# Patient Record
Sex: MALE | Race: WHITE | NOT HISPANIC OR LATINO
[De-identification: names, ages, dates, MRNs, and addresses within clinical notes are randomized per-mention and may not be internally consistent; named-entity substitution may affect disease eponyms.]

---

## 2017-06-16 ENCOUNTER — RESULT REVIEW (OUTPATIENT)
Age: 77
End: 2017-06-16

## 2020-04-21 ENCOUNTER — RESULT REVIEW (OUTPATIENT)
Age: 80
End: 2020-04-21

## 2020-06-12 ENCOUNTER — RESULT CHARGE (OUTPATIENT)
Age: 80
End: 2020-06-12

## 2020-06-12 ENCOUNTER — APPOINTMENT (OUTPATIENT)
Age: 80
End: 2020-06-12
Payer: MEDICARE

## 2020-06-12 VITALS
WEIGHT: 220 LBS | BODY MASS INDEX: 32.58 KG/M2 | OXYGEN SATURATION: 95 % | DIASTOLIC BLOOD PRESSURE: 88 MMHG | SYSTOLIC BLOOD PRESSURE: 149 MMHG | HEART RATE: 84 BPM | HEIGHT: 69 IN

## 2020-06-12 DIAGNOSIS — Z86.39 PERSONAL HISTORY OF OTHER ENDOCRINE, NUTRITIONAL AND METABOLIC DISEASE: ICD-10-CM

## 2020-06-12 DIAGNOSIS — Z86.79 PERSONAL HISTORY OF OTHER DISEASES OF THE CIRCULATORY SYSTEM: ICD-10-CM

## 2020-06-12 LAB
BILIRUB UR QL STRIP: NORMAL
GLUCOSE UR-MCNC: NORMAL
HCG UR QL: 0.2 EU/DL
HGB UR QL STRIP.AUTO: NORMAL
KETONES UR-MCNC: NORMAL
LEUKOCYTE ESTERASE UR QL STRIP: NORMAL
NITRITE UR QL STRIP: NORMAL
PH UR STRIP: 5.5
PROT UR STRIP-MCNC: NORMAL
SP GR UR STRIP: <=1.005

## 2020-06-12 PROCEDURE — 99204 OFFICE O/P NEW MOD 45 MIN: CPT

## 2020-06-12 NOTE — HISTORY OF PRESENT ILLNESS
[FreeTextEntry1] : 78 yo male presents for Gross hematuria and Bladder mass. \par 10 days ago had Gross hematuria, saw Primary care physician, took Antibiotics- resolved. \par On Ultrasound- Bladder mass. No history of recurrent urinary tract infections or kidney stone. \par Non smoker. \par Reports reasonable stream, urinates every few hours or so during the day. Nocturia of 1 x. \par Denies hesitancy, straining, intermittency, urgency, incontinence, sense of incomplete emptying.\par Denies dysuria, lower abdominal or flank pain, fever, chills or rigors.\par \par

## 2020-06-12 NOTE — LETTER BODY
[Dear  ___] : Dear  [unfilled], [Consult Letter:] : I had the pleasure of evaluating your patient, [unfilled]. [( Thank you for referring [unfilled] for consultation for _____ )] : Thank you for referring [unfilled] for consultation for [unfilled] [Please see my note below.] : Please see my note below. [Consult Closing:] : Thank you very much for allowing me to participate in the care of this patient.  If you have any questions, please do not hesitate to contact me. [Sincerely,] : Sincerely, [FreeTextEntry3] : Adrien Nicole MD\par  of Urology\par [facility]\par \par Offices:\par [facility] @\par [address], [place] [address]\par and\par [address], [place] [address], [address]\par and\par [address] [address]\par \par TEL: [phone]\par FAX: [phone]

## 2020-06-12 NOTE — REVIEW OF SYSTEMS
[Told you have blood in urine on a urine test] : told blood was present in a urine test [Blood in urine that you can see] : blood visible in urine [Wake up at night to urinate  How many times?  ___] : wakes up to urinate [unfilled] times during the night [Negative] : Heme/Lymph

## 2020-06-12 NOTE — ASSESSMENT
[FreeTextEntry1] : Reviewed outside records. \par \par Benign Prostatic Hyperplasia:\par No bothersome lower urinary tract symptoms and minimal post void residual. \par \par Gross hematuria:\par Bladder mass:\par Discussed the differential diagnosis of hematuria including benign and malignant pathology- including but not limited to nephrolithiasis, bladder stone, urinary tract infection, glomerular disease, renal cancer, bladder cancer, prostate cancer. We also discussed the chance that workup will not reveal a source for the bleeding. The patient understands that the hematuria could be from an upper tract (kidney or ureter) or lower tract (bladder, urethra, prostate) and that workup includes imaging and direct visualization of all of these.\par \par Will proceed with work up with Urinalysis with microscopy, Urine culture, Urine cytology, CT Urogram and Cystoscopy. \par \par Return to office after CT scan- will do Cystoscopy.

## 2020-06-12 NOTE — PHYSICAL EXAM
[Normal Appearance] : normal appearance [General Appearance - Well Developed] : well developed [General Appearance - In No Acute Distress] : no acute distress [] : no respiratory distress [Abdomen Tenderness] : non-tender [Abdomen Mass (___ Cm)] : no abdominal mass palpated [Abdomen Soft] : soft [Urethral Meatus] : meatus normal [Costovertebral Angle Tenderness] : no ~M costovertebral angle tenderness [Epididymis] : the epididymides were normal [Penis Abnormality] : normal uncircumcised penis [Scrotum] : the scrotum was normal [Testes Mass (___cm)] : there were no testicular masses [Testes Tenderness] : no tenderness of the testes [FreeTextEntry1] : Prostate partially palpated, non tender, no nodule in the palpated part  [Normal Station and Gait] : the gait and station were normal for the patient's age [Skin Color & Pigmentation] : normal skin color and pigmentation [No Focal Deficits] : no focal deficits [Oriented To Time, Place, And Person] : oriented to person, place, and time [No Palpable Adenopathy] : no palpable adenopathy

## 2020-06-15 LAB
APPEARANCE: CLEAR
BACTERIA UR CULT: NORMAL
BACTERIA: NEGATIVE
BILIRUBIN URINE: NEGATIVE
BLOOD URINE: ABNORMAL
COLOR: NORMAL
GLUCOSE QUALITATIVE U: NEGATIVE
HYALINE CASTS: 3 /LPF
KETONES URINE: NEGATIVE
LEUKOCYTE ESTERASE URINE: ABNORMAL
MICROSCOPIC-UA: NORMAL
NITRITE URINE: NEGATIVE
PH URINE: 6
PROTEIN URINE: NORMAL
RED BLOOD CELLS URINE: 2 /HPF
SPECIFIC GRAVITY URINE: 1.01
SQUAMOUS EPITHELIAL CELLS: 2 /HPF
UROBILINOGEN URINE: NORMAL
WHITE BLOOD CELLS URINE: 16 /HPF

## 2020-06-17 LAB — URINE CYTOLOGY: NORMAL

## 2020-07-17 ENCOUNTER — APPOINTMENT (OUTPATIENT)
Dept: UROLOGY | Facility: CLINIC | Age: 80
End: 2020-07-17
Payer: MEDICARE

## 2020-07-17 VITALS
SYSTOLIC BLOOD PRESSURE: 146 MMHG | HEIGHT: 69 IN | DIASTOLIC BLOOD PRESSURE: 83 MMHG | OXYGEN SATURATION: 96 % | WEIGHT: 225 LBS | BODY MASS INDEX: 33.33 KG/M2 | HEART RATE: 77 BPM | TEMPERATURE: 98.3 F

## 2020-07-17 DIAGNOSIS — N32.89 OTHER SPECIFIED DISORDERS OF BLADDER: ICD-10-CM

## 2020-07-17 PROCEDURE — 52000 CYSTOURETHROSCOPY: CPT

## 2020-07-29 ENCOUNTER — RESULT REVIEW (OUTPATIENT)
Age: 80
End: 2020-07-29

## 2020-07-29 ENCOUNTER — OUTPATIENT (OUTPATIENT)
Dept: OUTPATIENT SERVICES | Facility: HOSPITAL | Age: 80
LOS: 1 days | End: 2020-07-29
Payer: MEDICARE

## 2020-07-29 VITALS
SYSTOLIC BLOOD PRESSURE: 144 MMHG | WEIGHT: 235.01 LBS | HEART RATE: 81 BPM | RESPIRATION RATE: 16 BRPM | HEIGHT: 69 IN | OXYGEN SATURATION: 96 % | TEMPERATURE: 98 F | DIASTOLIC BLOOD PRESSURE: 79 MMHG

## 2020-07-29 DIAGNOSIS — Z96.651 PRESENCE OF RIGHT ARTIFICIAL KNEE JOINT: Chronic | ICD-10-CM

## 2020-07-29 DIAGNOSIS — Z98.890 OTHER SPECIFIED POSTPROCEDURAL STATES: Chronic | ICD-10-CM

## 2020-07-29 DIAGNOSIS — D49.4 NEOPLASM OF UNSPECIFIED BEHAVIOR OF BLADDER: ICD-10-CM

## 2020-07-29 DIAGNOSIS — Z01.818 ENCOUNTER FOR OTHER PREPROCEDURAL EXAMINATION: ICD-10-CM

## 2020-07-29 DIAGNOSIS — Z96.652 PRESENCE OF LEFT ARTIFICIAL KNEE JOINT: Chronic | ICD-10-CM

## 2020-07-29 DIAGNOSIS — Z98.49 CATARACT EXTRACTION STATUS, UNSPECIFIED EYE: Chronic | ICD-10-CM

## 2020-07-29 LAB
ANION GAP SERPL CALC-SCNC: 4 MMOL/L — LOW (ref 5–17)
APPEARANCE UR: CLEAR — SIGNIFICANT CHANGE UP
APTT BLD: 38.1 SEC — HIGH (ref 27.5–35.5)
BASOPHILS # BLD AUTO: 0.05 K/UL — SIGNIFICANT CHANGE UP (ref 0–0.2)
BASOPHILS NFR BLD AUTO: 0.5 % — SIGNIFICANT CHANGE UP (ref 0–2)
BILIRUB UR-MCNC: NEGATIVE — SIGNIFICANT CHANGE UP
BUN SERPL-MCNC: 16 MG/DL — SIGNIFICANT CHANGE UP (ref 7–23)
CALCIUM SERPL-MCNC: 8.9 MG/DL — SIGNIFICANT CHANGE UP (ref 8.5–10.1)
CHLORIDE SERPL-SCNC: 109 MMOL/L — HIGH (ref 96–108)
CO2 SERPL-SCNC: 28 MMOL/L — SIGNIFICANT CHANGE UP (ref 22–31)
COLOR SPEC: YELLOW — SIGNIFICANT CHANGE UP
CREAT SERPL-MCNC: 1.12 MG/DL — SIGNIFICANT CHANGE UP (ref 0.5–1.3)
DIFF PNL FLD: ABNORMAL
EOSINOPHIL # BLD AUTO: 0.4 K/UL — SIGNIFICANT CHANGE UP (ref 0–0.5)
EOSINOPHIL NFR BLD AUTO: 4.1 % — SIGNIFICANT CHANGE UP (ref 0–6)
GLUCOSE SERPL-MCNC: 144 MG/DL — HIGH (ref 70–99)
GLUCOSE UR QL: NEGATIVE MG/DL — SIGNIFICANT CHANGE UP
HCT VFR BLD CALC: 46.7 % — SIGNIFICANT CHANGE UP (ref 39–50)
HGB BLD-MCNC: 15.6 G/DL — SIGNIFICANT CHANGE UP (ref 13–17)
IMM GRANULOCYTES NFR BLD AUTO: 0.6 % — SIGNIFICANT CHANGE UP (ref 0–1.5)
INR BLD: 1.01 RATIO — SIGNIFICANT CHANGE UP (ref 0.88–1.16)
KETONES UR-MCNC: NEGATIVE — SIGNIFICANT CHANGE UP
LEUKOCYTE ESTERASE UR-ACNC: ABNORMAL
LYMPHOCYTES # BLD AUTO: 2 K/UL — SIGNIFICANT CHANGE UP (ref 1–3.3)
LYMPHOCYTES # BLD AUTO: 20.6 % — SIGNIFICANT CHANGE UP (ref 13–44)
MCHC RBC-ENTMCNC: 31.5 PG — SIGNIFICANT CHANGE UP (ref 27–34)
MCHC RBC-ENTMCNC: 33.4 GM/DL — SIGNIFICANT CHANGE UP (ref 32–36)
MCV RBC AUTO: 94.3 FL — SIGNIFICANT CHANGE UP (ref 80–100)
MONOCYTES # BLD AUTO: 0.83 K/UL — SIGNIFICANT CHANGE UP (ref 0–0.9)
MONOCYTES NFR BLD AUTO: 8.5 % — SIGNIFICANT CHANGE UP (ref 2–14)
NEUTROPHILS # BLD AUTO: 6.39 K/UL — SIGNIFICANT CHANGE UP (ref 1.8–7.4)
NEUTROPHILS NFR BLD AUTO: 65.7 % — SIGNIFICANT CHANGE UP (ref 43–77)
NITRITE UR-MCNC: NEGATIVE — SIGNIFICANT CHANGE UP
PH UR: 5 — SIGNIFICANT CHANGE UP (ref 5–8)
PLATELET # BLD AUTO: 297 K/UL — SIGNIFICANT CHANGE UP (ref 150–400)
POTASSIUM SERPL-MCNC: 4.7 MMOL/L — SIGNIFICANT CHANGE UP (ref 3.5–5.3)
POTASSIUM SERPL-SCNC: 4.7 MMOL/L — SIGNIFICANT CHANGE UP (ref 3.5–5.3)
PROT UR-MCNC: 30 MG/DL
PROTHROM AB SERPL-ACNC: 11.8 SEC — SIGNIFICANT CHANGE UP (ref 10.6–13.6)
RBC # BLD: 4.95 M/UL — SIGNIFICANT CHANGE UP (ref 4.2–5.8)
RBC # FLD: 13 % — SIGNIFICANT CHANGE UP (ref 10.3–14.5)
SODIUM SERPL-SCNC: 141 MMOL/L — SIGNIFICANT CHANGE UP (ref 135–145)
SP GR SPEC: 1.01 — SIGNIFICANT CHANGE UP (ref 1.01–1.02)
UROBILINOGEN FLD QL: NEGATIVE MG/DL — SIGNIFICANT CHANGE UP
WBC # BLD: 9.73 K/UL — SIGNIFICANT CHANGE UP (ref 3.8–10.5)
WBC # FLD AUTO: 9.73 K/UL — SIGNIFICANT CHANGE UP (ref 3.8–10.5)

## 2020-07-29 PROCEDURE — 85025 COMPLETE CBC W/AUTO DIFF WBC: CPT

## 2020-07-29 PROCEDURE — 86850 RBC ANTIBODY SCREEN: CPT

## 2020-07-29 PROCEDURE — 86900 BLOOD TYPING SEROLOGIC ABO: CPT

## 2020-07-29 PROCEDURE — 86901 BLOOD TYPING SEROLOGIC RH(D): CPT

## 2020-07-29 PROCEDURE — 93005 ELECTROCARDIOGRAM TRACING: CPT

## 2020-07-29 PROCEDURE — G0463: CPT | Mod: 25

## 2020-07-29 PROCEDURE — 71046 X-RAY EXAM CHEST 2 VIEWS: CPT

## 2020-07-29 PROCEDURE — 93010 ELECTROCARDIOGRAM REPORT: CPT

## 2020-07-29 PROCEDURE — 85610 PROTHROMBIN TIME: CPT

## 2020-07-29 PROCEDURE — 81001 URINALYSIS AUTO W/SCOPE: CPT

## 2020-07-29 PROCEDURE — 80048 BASIC METABOLIC PNL TOTAL CA: CPT

## 2020-07-29 PROCEDURE — 71046 X-RAY EXAM CHEST 2 VIEWS: CPT | Mod: 26

## 2020-07-29 PROCEDURE — 87186 SC STD MICRODIL/AGAR DIL: CPT

## 2020-07-29 PROCEDURE — 83036 HEMOGLOBIN GLYCOSYLATED A1C: CPT

## 2020-07-29 PROCEDURE — 87086 URINE CULTURE/COLONY COUNT: CPT

## 2020-07-29 PROCEDURE — 36415 COLL VENOUS BLD VENIPUNCTURE: CPT

## 2020-07-29 PROCEDURE — 85730 THROMBOPLASTIN TIME PARTIAL: CPT

## 2020-07-29 NOTE — H&P PST ADULT - HEALTH CARE MAINTENANCE
[place] Pt denies  travel internationally for the past 21 days

## 2020-07-29 NOTE — H&P PST ADULT - ASSESSMENT
79 year male presents to PST for TURBT    Plan:  1. PST instructions given ; NPO status instructions to be given by ASU   2. Pt instructed to take following meds with sip of water : irbesartan amlodipine   3. Pt instructed to take routine evening medications unless indicated   4. Stop NSAIDS ( Aspirin Alev Motrin Mobic Diclofenac), herbal supplements , MVI , Vitamin fish oil 7 days prior to surgery  unless   directed by surgeon or cardiologist;   5. Medical Optimization  with Dr Gloria Lowery   6. Labs EKG CXR as per surgeon request  7.  Pt denies covid symptoms shortness of breath fever cough   8. Pt instructed to self quarantine   9. Covid Testing scheduled Pt notified and aware 79 year male presents to PST for TURBT     Plan:  1. PST instructions given ; NPO status instructions to be given by ASU   2. Pt instructed to take following meds with sip of water : irbesartan amlodipine   3. Pt instructed to take routine evening medications unless indicated   4. Stop NSAIDS ( Aspirin Alev Motrin Mobic Diclofenac), herbal supplements , MVI , Vitamin fish oil 7 days prior to surgery  unless   directed by surgeon or cardiologist;   5. Medical Optimization  with Dr Gloria Lowery   6. Labs EKG CXR as per surgeon request  7.  Pt denies covid symptoms shortness of breath fever cough   8. Pt instructed to self quarantine   9. Covid Testing scheduled Pt notified and aware

## 2020-07-29 NOTE — H&P PST ADULT - NSICDXPASTSURGICALHX_GEN_ALL_CORE_FT
PAST SURGICAL HISTORY:  H/O total knee replacement, right 2015    History of cataract surgery left eye    History of melanoma excision     S/P TKR (total knee replacement), left 2015

## 2020-07-29 NOTE — H&P PST ADULT - NSICDXPASTMEDICALHX_GEN_ALL_CORE_FT
PAST MEDICAL HISTORY:  Bladder tumor     Diabetes mellitus     HLD (hyperlipidemia)     HTN (hypertension)     Melanoma head

## 2020-07-30 DIAGNOSIS — Z01.818 ENCOUNTER FOR OTHER PREPROCEDURAL EXAMINATION: ICD-10-CM

## 2020-07-30 DIAGNOSIS — D49.4 NEOPLASM OF UNSPECIFIED BEHAVIOR OF BLADDER: ICD-10-CM

## 2020-07-30 LAB
A1C WITH ESTIMATED AVERAGE GLUCOSE RESULT: 6.5 % — HIGH (ref 4–5.6)
ESTIMATED AVERAGE GLUCOSE: 140 MG/DL — HIGH (ref 68–114)

## 2020-08-01 ENCOUNTER — OUTPATIENT (OUTPATIENT)
Dept: OUTPATIENT SERVICES | Facility: HOSPITAL | Age: 80
LOS: 1 days | End: 2020-08-01
Payer: MEDICARE

## 2020-08-01 DIAGNOSIS — Z96.651 PRESENCE OF RIGHT ARTIFICIAL KNEE JOINT: Chronic | ICD-10-CM

## 2020-08-01 DIAGNOSIS — Z96.652 PRESENCE OF LEFT ARTIFICIAL KNEE JOINT: Chronic | ICD-10-CM

## 2020-08-01 DIAGNOSIS — Z11.59 ENCOUNTER FOR SCREENING FOR OTHER VIRAL DISEASES: ICD-10-CM

## 2020-08-01 DIAGNOSIS — Z98.49 CATARACT EXTRACTION STATUS, UNSPECIFIED EYE: Chronic | ICD-10-CM

## 2020-08-01 DIAGNOSIS — Z98.890 OTHER SPECIFIED POSTPROCEDURAL STATES: Chronic | ICD-10-CM

## 2020-08-01 PROCEDURE — U0003: CPT

## 2020-08-02 DIAGNOSIS — Z11.59 ENCOUNTER FOR SCREENING FOR OTHER VIRAL DISEASES: ICD-10-CM

## 2020-08-02 LAB
-  AMPICILLIN: SIGNIFICANT CHANGE UP
-  AMPICILLIN: SIGNIFICANT CHANGE UP
-  CIPROFLOXACIN: SIGNIFICANT CHANGE UP
-  CIPROFLOXACIN: SIGNIFICANT CHANGE UP
-  LEVOFLOXACIN: SIGNIFICANT CHANGE UP
-  LEVOFLOXACIN: SIGNIFICANT CHANGE UP
-  NITROFURANTOIN: SIGNIFICANT CHANGE UP
-  NITROFURANTOIN: SIGNIFICANT CHANGE UP
-  TETRACYCLINE: SIGNIFICANT CHANGE UP
-  TETRACYCLINE: SIGNIFICANT CHANGE UP
-  VANCOMYCIN: SIGNIFICANT CHANGE UP
-  VANCOMYCIN: SIGNIFICANT CHANGE UP
CULTURE RESULTS: SIGNIFICANT CHANGE UP
METHOD TYPE: SIGNIFICANT CHANGE UP
METHOD TYPE: SIGNIFICANT CHANGE UP
ORGANISM # SPEC MICROSCOPIC CNT: SIGNIFICANT CHANGE UP
SARS-COV-2 RNA SPEC QL NAA+PROBE: SIGNIFICANT CHANGE UP
SPECIMEN SOURCE: SIGNIFICANT CHANGE UP

## 2020-08-03 RX ORDER — SODIUM CHLORIDE 9 MG/ML
3 INJECTION INTRAMUSCULAR; INTRAVENOUS; SUBCUTANEOUS EVERY 8 HOURS
Refills: 0 | Status: DISCONTINUED | OUTPATIENT
Start: 2020-08-04 | End: 2020-08-05

## 2020-08-03 RX ORDER — SODIUM CHLORIDE 9 MG/ML
1000 INJECTION, SOLUTION INTRAVENOUS
Refills: 0 | Status: DISCONTINUED | OUTPATIENT
Start: 2020-08-04 | End: 2020-08-04

## 2020-08-03 RX ORDER — ONDANSETRON 8 MG/1
4 TABLET, FILM COATED ORAL ONCE
Refills: 0 | Status: DISCONTINUED | OUTPATIENT
Start: 2020-08-04 | End: 2020-08-04

## 2020-08-03 RX ORDER — OXYCODONE HYDROCHLORIDE 5 MG/1
5 TABLET ORAL ONCE
Refills: 0 | Status: DISCONTINUED | OUTPATIENT
Start: 2020-08-04 | End: 2020-08-04

## 2020-08-03 RX ORDER — FENTANYL CITRATE 50 UG/ML
50 INJECTION INTRAVENOUS
Refills: 0 | Status: DISCONTINUED | OUTPATIENT
Start: 2020-08-04 | End: 2020-08-04

## 2020-08-04 ENCOUNTER — APPOINTMENT (OUTPATIENT)
Dept: UROLOGY | Facility: HOSPITAL | Age: 80
End: 2020-08-04

## 2020-08-04 ENCOUNTER — RESULT REVIEW (OUTPATIENT)
Age: 80
End: 2020-08-04

## 2020-08-04 ENCOUNTER — OUTPATIENT (OUTPATIENT)
Dept: INPATIENT UNIT | Facility: HOSPITAL | Age: 80
LOS: 1 days | Discharge: HOME | End: 2020-08-04
Payer: MEDICARE

## 2020-08-04 VITALS
HEART RATE: 96 BPM | DIASTOLIC BLOOD PRESSURE: 76 MMHG | HEIGHT: 69 IN | WEIGHT: 235.01 LBS | RESPIRATION RATE: 18 BRPM | TEMPERATURE: 98 F | OXYGEN SATURATION: 97 % | SYSTOLIC BLOOD PRESSURE: 120 MMHG

## 2020-08-04 DIAGNOSIS — Z96.651 PRESENCE OF RIGHT ARTIFICIAL KNEE JOINT: Chronic | ICD-10-CM

## 2020-08-04 DIAGNOSIS — E11.39 TYPE 2 DIABETES MELLITUS WITH OTHER DIABETIC OPHTHALMIC COMPLICATION: ICD-10-CM

## 2020-08-04 DIAGNOSIS — H42 GLAUCOMA IN DISEASES CLASSIFIED ELSEWHERE: ICD-10-CM

## 2020-08-04 DIAGNOSIS — I12.9 HYPERTENSIVE CHRONIC KIDNEY DISEASE WITH STAGE 1 THROUGH STAGE 4 CHRONIC KIDNEY DISEASE, OR UNSPECIFIED CHRONIC KIDNEY DISEASE: ICD-10-CM

## 2020-08-04 DIAGNOSIS — D49.4 NEOPLASM OF UNSPECIFIED BEHAVIOR OF BLADDER: ICD-10-CM

## 2020-08-04 DIAGNOSIS — K21.9 GASTRO-ESOPHAGEAL REFLUX DISEASE WITHOUT ESOPHAGITIS: ICD-10-CM

## 2020-08-04 DIAGNOSIS — E78.5 HYPERLIPIDEMIA, UNSPECIFIED: ICD-10-CM

## 2020-08-04 DIAGNOSIS — D41.4 NEOPLASM OF UNCERTAIN BEHAVIOR OF BLADDER: ICD-10-CM

## 2020-08-04 DIAGNOSIS — Z96.652 PRESENCE OF LEFT ARTIFICIAL KNEE JOINT: Chronic | ICD-10-CM

## 2020-08-04 DIAGNOSIS — Z98.49 CATARACT EXTRACTION STATUS, UNSPECIFIED EYE: Chronic | ICD-10-CM

## 2020-08-04 DIAGNOSIS — N18.1 CHRONIC KIDNEY DISEASE, STAGE 1: ICD-10-CM

## 2020-08-04 DIAGNOSIS — D09.0 CARCINOMA IN SITU OF BLADDER: ICD-10-CM

## 2020-08-04 DIAGNOSIS — E11.22 TYPE 2 DIABETES MELLITUS WITH DIABETIC CHRONIC KIDNEY DISEASE: ICD-10-CM

## 2020-08-04 DIAGNOSIS — Z96.653 PRESENCE OF ARTIFICIAL KNEE JOINT, BILATERAL: ICD-10-CM

## 2020-08-04 DIAGNOSIS — Z98.890 OTHER SPECIFIED POSTPROCEDURAL STATES: Chronic | ICD-10-CM

## 2020-08-04 PROCEDURE — 88307 TISSUE EXAM BY PATHOLOGIST: CPT | Mod: 26

## 2020-08-04 PROCEDURE — 88307 TISSUE EXAM BY PATHOLOGIST: CPT

## 2020-08-04 PROCEDURE — 82962 GLUCOSE BLOOD TEST: CPT

## 2020-08-04 PROCEDURE — 80048 BASIC METABOLIC PNL TOTAL CA: CPT

## 2020-08-04 PROCEDURE — 36415 COLL VENOUS BLD VENIPUNCTURE: CPT

## 2020-08-04 PROCEDURE — 85027 COMPLETE CBC AUTOMATED: CPT

## 2020-08-04 PROCEDURE — 76770 US EXAM ABDO BACK WALL COMP: CPT

## 2020-08-04 PROCEDURE — 52240 CYSTOSCOPY AND TREATMENT: CPT

## 2020-08-04 PROCEDURE — 51720 TREATMENT OF BLADDER LESION: CPT | Mod: 59

## 2020-08-04 RX ORDER — MITOMYCIN 5 MG/10ML
40 INJECTION, POWDER, LYOPHILIZED, FOR SOLUTION INTRAVENOUS ONCE
Refills: 0 | Status: DISCONTINUED | OUTPATIENT
Start: 2020-08-04 | End: 2020-08-05

## 2020-08-04 RX ORDER — ACETAMINOPHEN 500 MG
650 TABLET ORAL EVERY 6 HOURS
Refills: 0 | Status: DISCONTINUED | OUTPATIENT
Start: 2020-08-04 | End: 2020-08-05

## 2020-08-04 RX ORDER — PHENAZOPYRIDINE HCL 100 MG
200 TABLET ORAL ONCE
Refills: 0 | Status: COMPLETED | OUTPATIENT
Start: 2020-08-04 | End: 2020-08-04

## 2020-08-04 RX ORDER — FAMOTIDINE 10 MG/ML
20 INJECTION INTRAVENOUS ONCE
Refills: 0 | Status: COMPLETED | OUTPATIENT
Start: 2020-08-04 | End: 2020-08-04

## 2020-08-04 RX ORDER — DEXTROSE 50 % IN WATER 50 %
25 SYRINGE (ML) INTRAVENOUS ONCE
Refills: 0 | Status: DISCONTINUED | OUTPATIENT
Start: 2020-08-04 | End: 2020-08-05

## 2020-08-04 RX ORDER — DEXTROSE 50 % IN WATER 50 %
15 SYRINGE (ML) INTRAVENOUS ONCE
Refills: 0 | Status: DISCONTINUED | OUTPATIENT
Start: 2020-08-04 | End: 2020-08-05

## 2020-08-04 RX ORDER — INSULIN LISPRO 100/ML
VIAL (ML) SUBCUTANEOUS
Refills: 0 | Status: DISCONTINUED | OUTPATIENT
Start: 2020-08-04 | End: 2020-08-05

## 2020-08-04 RX ORDER — ACETAMINOPHEN 500 MG
975 TABLET ORAL ONCE
Refills: 0 | Status: COMPLETED | OUTPATIENT
Start: 2020-08-04 | End: 2020-08-04

## 2020-08-04 RX ORDER — PHENAZOPYRIDINE HCL 100 MG
100 TABLET ORAL EVERY 8 HOURS
Refills: 0 | Status: DISCONTINUED | OUTPATIENT
Start: 2020-08-04 | End: 2020-08-05

## 2020-08-04 RX ORDER — GLUCAGON INJECTION, SOLUTION 0.5 MG/.1ML
1 INJECTION, SOLUTION SUBCUTANEOUS ONCE
Refills: 0 | Status: DISCONTINUED | OUTPATIENT
Start: 2020-08-04 | End: 2020-08-05

## 2020-08-04 RX ORDER — DEXTROSE 50 % IN WATER 50 %
12.5 SYRINGE (ML) INTRAVENOUS ONCE
Refills: 0 | Status: DISCONTINUED | OUTPATIENT
Start: 2020-08-04 | End: 2020-08-05

## 2020-08-04 RX ORDER — CIPROFLOXACIN LACTATE 400MG/40ML
400 VIAL (ML) INTRAVENOUS EVERY 12 HOURS
Refills: 0 | Status: DISCONTINUED | OUTPATIENT
Start: 2020-08-04 | End: 2020-08-05

## 2020-08-04 RX ORDER — SODIUM CHLORIDE 9 MG/ML
1000 INJECTION, SOLUTION INTRAVENOUS
Refills: 0 | Status: DISCONTINUED | OUTPATIENT
Start: 2020-08-04 | End: 2020-08-05

## 2020-08-04 RX ADMIN — Medication 975 MILLIGRAM(S): at 10:37

## 2020-08-04 RX ADMIN — FAMOTIDINE 20 MILLIGRAM(S): 10 INJECTION INTRAVENOUS at 10:37

## 2020-08-04 RX ADMIN — SODIUM CHLORIDE 3 MILLILITER(S): 9 INJECTION INTRAMUSCULAR; INTRAVENOUS; SUBCUTANEOUS at 20:20

## 2020-08-04 RX ADMIN — Medication 2: at 20:32

## 2020-08-04 RX ADMIN — Medication 975 MILLIGRAM(S): at 10:38

## 2020-08-04 RX ADMIN — Medication 200 MILLIGRAM(S): at 13:39

## 2020-08-04 RX ADMIN — Medication 100 MILLIGRAM(S): at 18:19

## 2020-08-04 RX ADMIN — Medication 200 MILLIGRAM(S): at 21:54

## 2020-08-04 RX ADMIN — SODIUM CHLORIDE 100 MILLILITER(S): 9 INJECTION, SOLUTION INTRAVENOUS at 13:59

## 2020-08-04 NOTE — PROGRESS NOTE ADULT - SUBJECTIVE AND OBJECTIVE BOX
Pt's pre op urine culture was positive, pt was asymptomatic.   Decided to proceed under cover of antibiotics.   Pt underwent Transurethral resection of Bladder tumor and Intravesical mitomycin instillation.   Large bladder tumor just proximal and medial to right UO.   Post resection urine flow not seen but was able to cannulate UO with sensor wire for 5 cm with out any issue.     Discussed with Pt and wife will admit for Observation.   Will continue antibiotics.   Renal US in the AM.   Possible discharge tomorrow.

## 2020-08-04 NOTE — BRIEF OPERATIVE NOTE - NSICDXBRIEFPROCEDURE_GEN_ALL_CORE_FT
PROCEDURES:  Instillation of mitomycin C 04-Aug-2020 12:57:42  Adrien Nicole  Cystoscopy with fulguration of large lesion of bladder 04-Aug-2020 12:55:42  Adrien Nicole

## 2020-08-04 NOTE — ASU PATIENT PROFILE, ADULT - PSH
H/O total knee replacement, right  2015  History of cataract surgery  left eye  History of melanoma excision    S/P TKR (total knee replacement), left  2015

## 2020-08-04 NOTE — ASU PREOP CHECKLIST - WEIGHT IN LBS
Reason for Call:  Medication or medication refill:    Do you use a [facility]?  Name of the pharmacy and phone number for the current request:  [facility]    Name of the medication requested: Amtiodipine 2.5 mg 2 tabs QD, please call me when approved so I can      Other request: changed to Blood pressure med now wants to go back to Amtiodipine 2.5 mg QD, did not start metropolol and does not want to take these    Can we leave a detailed message on this number? YES    Phone number patient can be reached at: Home number on file [phone] (home)    Best Time: any    Call taken on 8/30/2017 at 1:52 PM by Pascale Lee       070

## 2020-08-05 ENCOUNTER — TRANSCRIPTION ENCOUNTER (OUTPATIENT)
Age: 80
End: 2020-08-05

## 2020-08-05 ENCOUNTER — RESULT REVIEW (OUTPATIENT)
Age: 80
End: 2020-08-05

## 2020-08-05 VITALS
DIASTOLIC BLOOD PRESSURE: 69 MMHG | SYSTOLIC BLOOD PRESSURE: 114 MMHG | RESPIRATION RATE: 17 BRPM | TEMPERATURE: 97 F | OXYGEN SATURATION: 95 % | HEART RATE: 79 BPM

## 2020-08-05 PROBLEM — C43.9 MALIGNANT MELANOMA OF SKIN, UNSPECIFIED: Chronic | Status: ACTIVE | Noted: 2020-07-29

## 2020-08-05 PROBLEM — D49.4 NEOPLASM OF UNSPECIFIED BEHAVIOR OF BLADDER: Chronic | Status: ACTIVE | Noted: 2020-07-29

## 2020-08-05 PROBLEM — E11.9 TYPE 2 DIABETES MELLITUS WITHOUT COMPLICATIONS: Chronic | Status: ACTIVE | Noted: 2020-07-29

## 2020-08-05 PROBLEM — E78.5 HYPERLIPIDEMIA, UNSPECIFIED: Chronic | Status: ACTIVE | Noted: 2020-07-29

## 2020-08-05 PROBLEM — I10 ESSENTIAL (PRIMARY) HYPERTENSION: Chronic | Status: ACTIVE | Noted: 2020-07-29

## 2020-08-05 LAB
ANION GAP SERPL CALC-SCNC: 9 MMOL/L — SIGNIFICANT CHANGE UP (ref 5–17)
BUN SERPL-MCNC: 26 MG/DL — HIGH (ref 7–23)
CALCIUM SERPL-MCNC: 8.2 MG/DL — LOW (ref 8.5–10.1)
CHLORIDE SERPL-SCNC: 110 MMOL/L — HIGH (ref 96–108)
CO2 SERPL-SCNC: 21 MMOL/L — LOW (ref 22–31)
CREAT SERPL-MCNC: 1.21 MG/DL — SIGNIFICANT CHANGE UP (ref 0.5–1.3)
GLUCOSE SERPL-MCNC: 134 MG/DL — HIGH (ref 70–99)
HCT VFR BLD CALC: 43.9 % — SIGNIFICANT CHANGE UP (ref 39–50)
HGB BLD-MCNC: 14.2 G/DL — SIGNIFICANT CHANGE UP (ref 13–17)
MCHC RBC-ENTMCNC: 31.3 PG — SIGNIFICANT CHANGE UP (ref 27–34)
MCHC RBC-ENTMCNC: 32.3 GM/DL — SIGNIFICANT CHANGE UP (ref 32–36)
MCV RBC AUTO: 96.7 FL — SIGNIFICANT CHANGE UP (ref 80–100)
PLATELET # BLD AUTO: 273 K/UL — SIGNIFICANT CHANGE UP (ref 150–400)
POTASSIUM SERPL-MCNC: 4.7 MMOL/L — SIGNIFICANT CHANGE UP (ref 3.5–5.3)
POTASSIUM SERPL-SCNC: 4.7 MMOL/L — SIGNIFICANT CHANGE UP (ref 3.5–5.3)
RBC # BLD: 4.54 M/UL — SIGNIFICANT CHANGE UP (ref 4.2–5.8)
RBC # FLD: 13.1 % — SIGNIFICANT CHANGE UP (ref 10.3–14.5)
SODIUM SERPL-SCNC: 140 MMOL/L — SIGNIFICANT CHANGE UP (ref 135–145)
WBC # BLD: 15.05 K/UL — HIGH (ref 3.8–10.5)
WBC # FLD AUTO: 15.05 K/UL — HIGH (ref 3.8–10.5)

## 2020-08-05 PROCEDURE — 99204 OFFICE O/P NEW MOD 45 MIN: CPT

## 2020-08-05 PROCEDURE — 76770 US EXAM ABDO BACK WALL COMP: CPT | Mod: 26

## 2020-08-05 RX ORDER — CIPROFLOXACIN LACTATE 400MG/40ML
1 VIAL (ML) INTRAVENOUS
Qty: 10 | Refills: 0
Start: 2020-08-05 | End: 2020-08-09

## 2020-08-05 RX ORDER — PHENAZOPYRIDINE HCL 100 MG
1 TABLET ORAL
Qty: 6 | Refills: 0
Start: 2020-08-05 | End: 2020-08-06

## 2020-08-05 RX ADMIN — SODIUM CHLORIDE 3 MILLILITER(S): 9 INJECTION INTRAMUSCULAR; INTRAVENOUS; SUBCUTANEOUS at 06:19

## 2020-08-05 RX ADMIN — Medication 200 MILLIGRAM(S): at 10:06

## 2020-08-05 RX ADMIN — Medication 2: at 12:30

## 2020-08-05 RX ADMIN — SODIUM CHLORIDE 3 MILLILITER(S): 9 INJECTION INTRAMUSCULAR; INTRAVENOUS; SUBCUTANEOUS at 13:07

## 2020-08-05 RX ADMIN — Medication 100 MILLIGRAM(S): at 10:10

## 2020-08-05 RX ADMIN — Medication 100 MILLIGRAM(S): at 02:02

## 2020-08-05 NOTE — DISCHARGE NOTE PROVIDER - NSDCCPTREATMENT_GEN_ALL_CORE_FT
PRINCIPAL PROCEDURE  Procedure: Cystoscopy, with large bladder lesion fulguration  Findings and Treatment:

## 2020-08-05 NOTE — DISCHARGE NOTE PROVIDER - PROVIDER TOKENS
PROVIDER:[TOKEN:[[id number]],FOLLOWUP:[1-3 days]] PROVIDER:[TOKEN:[[id number]],SCHEDULEDAPPT:[08/07/2020],SCHEDULEDAPPTTIME:[11:00 AM]]

## 2020-08-05 NOTE — CONSULT NOTE ADULT - SUBJECTIVE AND OBJECTIVE BOX
HPI:  79 year male with PMH of HTN, HLD, DM Type II,  bladder tumor with hematuria admitted for elective TURB. Pt was kept overnight for monitoring and Am labs and US ordered. As per outPt labs reviewed Pt had + UCX outPt, procedure done under IV Cipro as per urology orders.     PAST MEDICAL & SURGICAL HISTORY:  Bladder tumor  Diabetes mellitus  HLD (hyperlipidemia)  HTN (hypertension)  Melanoma: head  History of melanoma excision  H/O total knee replacement, right: 2015  S/P TKR (total knee replacement), left: 2015  History of cataract surgery: left eye      MEDICATIONS  (STANDING):  ciprofloxacin   IVPB 400 milliGRAM(s) IV Intermittent every 12 hours  dextrose 5%. 1000 milliLiter(s) (50 mL/Hr) IV Continuous <Continuous>  dextrose 50% Injectable 12.5 Gram(s) IV Push once  dextrose 50% Injectable 25 Gram(s) IV Push once  dextrose 50% Injectable 25 Gram(s) IV Push once  insulin lispro (HumaLOG) corrective regimen sliding scale   SubCutaneous Before meals and at bedtime  mitoMYcin Bladder Irrigation. (eMAR) 40 milliGRAM(s) IntraVesical once  sodium chloride 0.9% lock flush 3 milliLiter(s) IV Push every 8 hours    MEDICATIONS  (PRN):  acetaminophen   Tablet .. 650 milliGRAM(s) Oral every 6 hours PRN Mild Pain (1 - 3)  dextrose 40% Gel 15 Gram(s) Oral once PRN Blood Glucose LESS THAN 70 milliGRAM(s)/deciliter  glucagon  Injectable 1 milliGRAM(s) IntraMuscular once PRN Glucose LESS THAN 70 milligrams/deciliter  phenazopyridine 100 milliGRAM(s) Oral every 8 hours PRN dysuria      Allergies  No Known Allergies    SOCIAL HISTORY: Non smoker, denies alcohol or drug abuse     FAMILY HISTORY:    REVIEW OF SYSTEMS:  All other review of systems is negative unless indicated above.          Vital Signs Last 24 Hrs  T(C): 36.7 (05 Aug 2020 05:05), Max: 37 (04 Aug 2020 13:45)  T(F): 98.1 (05 Aug 2020 05:05), Max: 98.6 (04 Aug 2020 13:45)  HR: 55 (05 Aug 2020 05:05) (53 - 96)  BP: 100/57 (05 Aug 2020 05:05) (93/50 - 141/69)-  RR: 18 (05 Aug 2020 05:05) (13 - 21)  SpO2: 92% (05 Aug 2020 05:05) (92% - 100%)      PHYSICAL EXAM:  General: Well developed; well nourished; in no acute distress  Eyes: PERRLA, EOMI; conjunctiva and sclera clear  Head: Normocephalic; atraumatic  ENMT: No nasal discharge; airway clear  Neck: Supple; non tender; no masses  Respiratory: No wheezes, rales or rhonchi  Cardiovascular: Regular rate and rhythm. S1 and S2 Normal; No murmurs, gallops or rubs  Gastrointestinal: Soft non-tender non-distended; Normal bowel sounds  Genitourinary: No  suprapubic  tenderness  Extremities: Normal range of motion, No clubbing, cyanosis or edema  Vascular: Peripheral pulses palpable 2+ bilaterally  Neurological: Alert and oriented x4  Skin: Warm and dry. No acute rash  Lymph Nodes: No acute cervical adenopathy  Musculoskeletal: Normal muscle tone, without deformities  Psychiatric: Cooperative and appropriate      LABS:  OutPt labs reviewed.     Culture - Urine (07.29.20 @ 16:53)    -  Levofloxacin: S <=1    -  Levofloxacin: S <=1    -  Nitrofurantoin: S <=32 Should not be used to treat pyelonephritis.    -  Nitrofurantoin: S <=32 Should not be used to treat pyelonephritis.    -  Tetra/Doxy: R >8    -  Tetra/Doxy: R >8    -  Vancomycin: S 2    -  Vancomycin: S 1    -  Ampicillin: S <=2 Predicts results to ampicillin/sulbactam, amoxacillin-clavulanate and  piperacillin-tazobactam.    -  Ampicillin: S <=2 Predicts results to ampicillin/sulbactam, amoxacillin-clavulanate and  piperacillin-tazobactam.    -  Ciprofloxacin: S <=1    -  Ciprofloxacin: S <=1    Specimen Source: .Urine Clean Catch (Midstream)    Culture Results:   >100,000 CFU/ml Enterococcus faecalis  >100,000 CFU/ml Enterococcus faecalis #2          RADIOLOGY & ADDITIONAL STUDIES:    EXAM:  XR CHEST PA LAT 2V                        PROCEDURE DATE:  07/29/2020        FINDINGS:  The airway is midline.  There are no airspace consolidations.  There is no pleural effusion or pneumothorax.  The visualized osseus structures are intact.  The  heart is enlarged in transverse diameter. No hilar mass. Trachea midline.    IMPRESSION:  No acute radiographic cardiopulmonary pathology. HPI:  79 year male with PMH of HTN, HLD, DM Type II,  bladder tumor with hematuria admitted for elective TURB. Pt was kept overnight for monitoring and Am labs and US ordered. As per outPt labs reviewed Pt had + UCX outPt, procedure done under IV Cipro as per urology orders.   Today Pt is POD #1, OOB to chair, reports some pain at side of cath but otherwise doing well, denies Dizziness, no CP or SOB. Afebrile       PAST MEDICAL & SURGICAL HISTORY:  Bladder tumor  Diabetes mellitus  HLD (hyperlipidemia)  HTN (hypertension)  Melanoma: head  History of melanoma excision  H/O total knee replacement, right: 2015  S/P TKR (total knee replacement), left: 2015  History of cataract surgery: left eye      MEDICATIONS  (STANDING):  ciprofloxacin   IVPB 400 milliGRAM(s) IV Intermittent every 12 hours  dextrose 5%. 1000 milliLiter(s) (50 mL/Hr) IV Continuous <Continuous>  dextrose 50% Injectable 12.5 Gram(s) IV Push once  dextrose 50% Injectable 25 Gram(s) IV Push once  dextrose 50% Injectable 25 Gram(s) IV Push once  insulin lispro (HumaLOG) corrective regimen sliding scale   SubCutaneous Before meals and at bedtime  mitoMYcin Bladder Irrigation. (eMAR) 40 milliGRAM(s) IntraVesical once  sodium chloride 0.9% lock flush 3 milliLiter(s) IV Push every 8 hours    MEDICATIONS  (PRN):  acetaminophen   Tablet .. 650 milliGRAM(s) Oral every 6 hours PRN Mild Pain (1 - 3)  dextrose 40% Gel 15 Gram(s) Oral once PRN Blood Glucose LESS THAN 70 milliGRAM(s)/deciliter  glucagon  Injectable 1 milliGRAM(s) IntraMuscular once PRN Glucose LESS THAN 70 milligrams/deciliter  phenazopyridine 100 milliGRAM(s) Oral every 8 hours PRN dysuria      Allergies  No Known Allergies    SOCIAL HISTORY: Non smoker, denies alcohol or drug abuse     FAMILY HISTORY: denies, reports that both mother and father were healthy     REVIEW OF SYSTEMS:  All other review of systems is negative unless indicated above.          Vital Signs Last 24 Hrs  T(C): 36.7 (05 Aug 2020 05:05), Max: 37 (04 Aug 2020 13:45)  T(F): 98.1 (05 Aug 2020 05:05), Max: 98.6 (04 Aug 2020 13:45)  HR: 55 (05 Aug 2020 05:05) (53 - 96)  BP: 100/57 (05 Aug 2020 05:05) (93/50 - 141/69)-  RR: 18 (05 Aug 2020 05:05) (13 - 21)  SpO2: 92% (05 Aug 2020 05:05) (92% - 100%)      PHYSICAL EXAM:  General: Well developed; well nourished; in no acute distress  Eyes: PERRLA, EOMI; conjunctiva and sclera clear  Head: Normocephalic; atraumatic  ENMT: No nasal discharge; airway clear  Neck: Supple; non tender; no masses  Respiratory: No wheezes, rales or rhonchi  Cardiovascular: Regular rate and rhythm. S1 and S2 Normal;  Gastrointestinal: Soft non-tender non-distended; Normal bowel sounds  Genitourinary: No  suprapubic  tenderness, Khalil in place, draining orange urine   Extremities: No  edema  Vascular: Peripheral pulses palpable 2+ bilaterally  Neurological: Alert and oriented x4  Skin: Warm and dry. No acute rash  Musculoskeletal: Normal muscle tone and strength   Psychiatric: Cooperative and appropriate      LABS:  OutPt labs reviewed.     Culture - Urine (07.29.20 @ 16:53)    -  Levofloxacin: S <=1    -  Levofloxacin: S <=1    -  Nitrofurantoin: S <=32 Should not be used to treat pyelonephritis.    -  Nitrofurantoin: S <=32 Should not be used to treat pyelonephritis.    -  Tetra/Doxy: R >8    -  Tetra/Doxy: R >8    -  Vancomycin: S 2    -  Vancomycin: S 1    -  Ampicillin: S <=2 Predicts results to ampicillin/sulbactam, amoxacillin-clavulanate and  piperacillin-tazobactam.    -  Ampicillin: S <=2 Predicts results to ampicillin/sulbactam, amoxacillin-clavulanate and  piperacillin-tazobactam.    -  Ciprofloxacin: S <=1    -  Ciprofloxacin: S <=1    Specimen Source: .Urine Clean Catch (Midstream)    Culture Results:   >100,000 CFU/ml Enterococcus faecalis  >100,000 CFU/ml Enterococcus faecalis #2          RADIOLOGY & ADDITIONAL STUDIES:    EXAM:  XR CHEST PA LAT 2V                        PROCEDURE DATE:  07/29/2020        FINDINGS:  The airway is midline.  There are no airspace consolidations.  There is no pleural effusion or pneumothorax.  The visualized osseus structures are intact.  The  heart is enlarged in transverse diameter. No hilar mass. Trachea midline.    IMPRESSION:  No acute radiographic cardiopulmonary pathology.

## 2020-08-05 NOTE — DISCHARGE NOTE NURSING/CASE MANAGEMENT/SOCIAL WORK - PATIENT PORTAL LINK FT
You can access the FollowMyHealth Patient Portal offered by [facility] by registering at the following website: [email or web address]. By joining [facility]’s FollowMyHealth portal, you will also be able to view your health information using other applications (apps) compatible with our system.

## 2020-08-05 NOTE — DISCHARGE NOTE PROVIDER - NSDCCPCAREPLAN_GEN_ALL_CORE_FT
PRINCIPAL DISCHARGE DIAGNOSIS  Diagnosis: S/P bladder tumor excision with fulguration  Assessment and Plan of Treatment:

## 2020-08-05 NOTE — DISCHARGE NOTE PROVIDER - HOSPITAL COURSE
· Subjective and Objective:     78 yo male with hx of bladder tumor with hematuria POD 1 s/p cystoscopy TURBT on 8/4/20. Patient is doing well post op. He denies any abd/flank pain, fevers, chills, nausea, vomiting. Yao draining clear orange tinge urine.        PE:    General: No distress, No anxiety    VITALS  T(C): 36.3 (08-05-20 @ 09:15), Max: 37 (08-04-20 @ 13:45)    HR: 79 (08-05-20 @ 09:15) (53 - 90)    BP: 114/69 (08-05-20 @ 09:15) (93/50 - 141/69)    RR: 17 (08-05-20 @ 09:15) (13 - 21)    SpO2: 95% (08-05-20 @ 09:15) (92% - 100%)                Skin     : No jaundice, No lesions, No swelling    HEENT: No icterus , EOM full , No epistaxis    Abdo:   : Soft, Non tender, No guarding, No distension     Back    : No CVAT b/l    Extremity: No calf tenderness     Genitalia Male: Yao draining clear orange tinge urine        LABS:                                14.2     15.05 )-----------( 273      ( 05 Aug 2020 08:07 )               43.9         08-05        140  |  110<H>  |  26<H>    ----------------------------<  134<H>    4.7   |  21<L>  |  1.21        Ca    8.2<L>      05 Aug 2020 08:07                            Assessment and Plan:             78 yo male with hx of bladder tumor with hematuria POD 1 s/p cystoscopy TURBT on 8/4/20. Patient is doing well post op. Yao draining clear orange tinge urine. WBC 15 likely reactive, pt is on Cipro. Creat 1.21. RBUS with no hydronephrosis b/l.    - Discharge with yao to leg bag    - Cipro 500 mg BID x 5 days    - Follow up with Dr. Nicole in office as scheduled on 8/7/20        Case discussed with Dr. Nicole

## 2020-08-05 NOTE — PROGRESS NOTE ADULT - ASSESSMENT
78 yo male with hx of bladder tumor with hematuria POD 1 s/p cystoscopy TURBT on 8/4/20. Patient is doing well post op. Yao draining clear orange tinge urine. WBC 15 likely reactive, pt is on Cipro. Creat 1.21. RBUS with no hydronephrosis b/l.  - Discharge with yao to leg bag  - Cipro 500 mg BID x 5 days  - Follow up with Dr. Nicole in office as scheduled on 8/7/20    Case discussed with Dr. Nicole

## 2020-08-05 NOTE — CONSULT NOTE ADULT - NSTELEHEALTH_GEN_ALL_CORE
I evaluated the patient at bedside.  Stable.  GCS = 15.  Nontoxic.  No distress. Scattered abrasions and contusions.  Pelvis stable and nontender.  Chest wall nontender despite abrasions.  Spine non-tender from top to bottom.  Abdomen nontender.  Appropriate imaging studies/labs ordered and reviewed.  Patient remains stable.  Expect discharge.      Patient ambulatory.  Imaging studies reviewed.  Remained stable     Eulogio Londono MD  05/26/18 [id number]     No

## 2020-08-05 NOTE — CONSULT NOTE ADULT - ASSESSMENT
79 year male with PMH of HTN, HLD, DM Type II, bladder tumor with hematuria   admitted for:       1. Elective TURB POD #1  management as per Urology   Tolerates diet     OOB and ambulate as tolerated           2. HTN  BP meds on hold as BP low normal  likely 22 anesthesia  Monitor closely will resume meds if BP start going up       3. DM Type II   HB A1c 6.5  Diabetic diet   Hold oral hypoglycemics  Monitor BS and cover with ISS    4. DVT PPX: venodynes , pharmacological prophylaxis as per urology team 79 year male with PMH of HTN, HLD, DM Type II, bladder tumor with hematuria   admitted for:       1. Elective TURB POD #1. Large Bladder mass   management as per Urology   Tolerates diet   On IV Cipro for + outPt UCX and procedure, but clinically asymptomatic no UTI   Pyridium PRN   OOB and ambulate as tolerated           2. HTN  BP meds on hold as BP low normal  likely 22 anesthesia  Monitor closely will resume meds if BP start going up   Encourage oral intake       3. DM Type II   HB A1c 6.5  Diabetic diet   Hold oral hypoglycemics  Monitor BS and cover with ISS      4. Leukocytosis, possibly reactive   no fevers, not symptomatic   On IV  Cipro for + UCX   Monitor Closely       5. DVT PPX: venodynes , pharmacological prophylaxis as per urology team     Thank you for consult, will follow with you

## 2020-08-05 NOTE — DISCHARGE NOTE PROVIDER - NSDCFUADDAPPT_GEN_ALL_CORE_FT
Follow up with Dr. Nicole on 8/7/20 as scheduled. Follow up with Dr. Nicole on 8/7/20 at 11 am as scheduled.

## 2020-08-05 NOTE — DISCHARGE NOTE PROVIDER - CARE PROVIDER_API CALL
Adrien Nicole  UROLOGY  [address] [address] [place] [address]  Phone: [phone]  Fax: [phone]  Follow Up Time: 1-3 days Adrien Nicole  UROLOGY  [address] [address] [place] [address]  Phone: [phone]  Fax: [phone]  Scheduled Appointment: 08/07/2020 11:00 AM

## 2020-08-05 NOTE — DISCHARGE NOTE PROVIDER - NSDCMRMEDTOKEN_GEN_ALL_CORE_FT
amLODIPine 5 mg oral tablet: 1 tab(s) orally once a day  atorvastatin 40 mg oral tablet: 1 tab(s) orally once a day (at bedtime)  ciprofloxacin 500 mg oral tablet: 1 tab(s) orally every 12 hours   glimepiride 2 mg oral tablet: 1 tab(s) orally once a day  irbesartan 150 mg oral tablet: 1 tab(s) orally once a day  phenazopyridine 100 mg oral tablet: 1 tab(s) orally every 8 hours, As needed, dysuria

## 2020-08-07 ENCOUNTER — APPOINTMENT (OUTPATIENT)
Dept: UROLOGY | Facility: CLINIC | Age: 80
End: 2020-08-07
Payer: MEDICARE

## 2020-08-07 VITALS
BODY MASS INDEX: 32.58 KG/M2 | DIASTOLIC BLOOD PRESSURE: 81 MMHG | SYSTOLIC BLOOD PRESSURE: 160 MMHG | TEMPERATURE: 98 F | WEIGHT: 220 LBS | OXYGEN SATURATION: 95 % | HEIGHT: 69 IN | HEART RATE: 123 BPM

## 2020-08-07 PROCEDURE — 51700 IRRIGATION OF BLADDER: CPT

## 2020-08-07 PROCEDURE — 51798 US URINE CAPACITY MEASURE: CPT

## 2020-09-02 ENCOUNTER — APPOINTMENT (OUTPATIENT)
Dept: UROLOGY | Facility: CLINIC | Age: 80
End: 2020-09-02
Payer: MEDICARE

## 2020-09-02 PROCEDURE — 51798 US URINE CAPACITY MEASURE: CPT

## 2020-09-02 PROCEDURE — 99214 OFFICE O/P EST MOD 30 MIN: CPT | Mod: 25

## 2020-09-14 NOTE — LETTER BODY
[Dear  ___] : Dear  [unfilled], [Courtesy Letter:] : I had the pleasure of seeing your patient, [unfilled], in my office today. [Please see my note below.] : Please see my note below. [Sincerely,] : Sincerely, [FreeTextEntry3] : Adrien Nicole MD\par  of Urology\par [facility]\par \par Offices:\par [facility] @\par [address], [place] [address]\par and\par [address], [place] [address], [address]\par and\par [address] [address]\par \par TEL: [phone]\par FAX: [phone]

## 2020-09-14 NOTE — HISTORY OF PRESENT ILLNESS
[FreeTextEntry1] : 80 yo male presents for follow up \par Taking Flomax- urinating better, improved flow, nocturia 1-2 x. \par Denies dysuria, hematuria, lower abdominal or flank pain, fever, chills or rigors. \par \par Status post Transurethral Resection of Bladder Tumor with Intravesical Mitomycin Instillation done on 8/4/20 at .\par \par Initially seen for Gross hematuria and Bladder mass. \par 10 days ago had Gross hematuria, saw Primary care physician, took Antibiotics- resolved. \par On Ultrasound- Bladder mass. No history of recurrent urinary tract infections or kidney stone. \par Non smoker. \par Reports reasonable stream, urinates every few hours or so during the day. Nocturia of 1 x. \par Denies hesitancy, straining, intermittency, urgency, incontinence, sense of incomplete emptying.\par Denies dysuria, lower abdominal or flank pain, fever, chills or rigors.\par \par

## 2020-09-14 NOTE — ASSESSMENT
[FreeTextEntry1] : Bladder tumor:\par Bladder cancer:\par Discussed Pathology. \par Recommended Surveillance Cystoscopy in 3 months. \par \par Benign Prostatic Hyperplasia:\par Continue Flomax. \par \par Return to office in 3 months or sooner if any issues: will do Cystoscopy.

## 2020-09-14 NOTE — PHYSICAL EXAM
[Normal Appearance] : normal appearance [General Appearance - In No Acute Distress] : no acute distress [Abdomen Soft] : soft [Abdomen Tenderness] : non-tender [FreeTextEntry1] : normal peripheral circulation  [] : no respiratory distress [Oriented To Time, Place, And Person] : oriented to person, place, and time

## 2021-02-03 ENCOUNTER — APPOINTMENT (OUTPATIENT)
Dept: UROLOGY | Facility: CLINIC | Age: 81
End: 2021-02-03

## 2021-02-03 ENCOUNTER — APPOINTMENT (OUTPATIENT)
Dept: UROLOGY | Facility: CLINIC | Age: 81
End: 2021-02-03
Payer: MEDICARE

## 2021-02-03 VITALS
DIASTOLIC BLOOD PRESSURE: 88 MMHG | WEIGHT: 220 LBS | HEART RATE: 84 BPM | SYSTOLIC BLOOD PRESSURE: 143 MMHG | HEIGHT: 69 IN | BODY MASS INDEX: 32.58 KG/M2 | OXYGEN SATURATION: 94 %

## 2021-02-03 PROCEDURE — 99072 ADDL SUPL MATRL&STAF TM PHE: CPT

## 2021-02-03 PROCEDURE — 52000 CYSTOURETHROSCOPY: CPT

## 2021-06-23 ENCOUNTER — APPOINTMENT (OUTPATIENT)
Dept: UROLOGY | Facility: CLINIC | Age: 81
End: 2021-06-23
Payer: MEDICARE

## 2021-06-23 VITALS
SYSTOLIC BLOOD PRESSURE: 149 MMHG | WEIGHT: 220 LBS | HEART RATE: 72 BPM | OXYGEN SATURATION: 96 % | HEIGHT: 69 IN | TEMPERATURE: 97.7 F | BODY MASS INDEX: 32.58 KG/M2 | DIASTOLIC BLOOD PRESSURE: 82 MMHG

## 2021-06-23 LAB
BILIRUB UR QL STRIP: NORMAL
GLUCOSE UR-MCNC: NORMAL
HCG UR QL: 0.2 EU/DL
HGB UR QL STRIP.AUTO: NORMAL
KETONES UR-MCNC: NORMAL
LEUKOCYTE ESTERASE UR QL STRIP: NORMAL
NITRITE UR QL STRIP: NORMAL
PH UR STRIP: 5
PROT UR STRIP-MCNC: ABNORMAL
SP GR UR STRIP: 1.03

## 2021-06-23 PROCEDURE — 52000 CYSTOURETHROSCOPY: CPT

## 2021-12-30 LAB
APPEARANCE: CLEAR
BILIRUBIN URINE: NEGATIVE
BLOOD URINE: NEGATIVE
COLOR: YELLOW
GLUCOSE QUALITATIVE U: NEGATIVE
KETONES URINE: NEGATIVE
LEUKOCYTE ESTERASE URINE: NEGATIVE
NITRITE URINE: NEGATIVE
PH URINE: 5.5
PROTEIN URINE: NORMAL
SPECIFIC GRAVITY URINE: 1.03
UROBILINOGEN URINE: NORMAL

## 2022-01-05 ENCOUNTER — APPOINTMENT (OUTPATIENT)
Dept: UROLOGY | Facility: CLINIC | Age: 82
End: 2022-01-05
Payer: MEDICARE

## 2022-01-05 VITALS — OXYGEN SATURATION: 97 % | DIASTOLIC BLOOD PRESSURE: 87 MMHG | SYSTOLIC BLOOD PRESSURE: 144 MMHG | HEART RATE: 78 BPM

## 2022-01-05 LAB — URINE CYTOLOGY: NORMAL

## 2022-01-05 PROCEDURE — 99213 OFFICE O/P EST LOW 20 MIN: CPT | Mod: 25

## 2022-01-05 PROCEDURE — 52000 CYSTOURETHROSCOPY: CPT

## 2022-01-05 NOTE — PHYSICAL EXAM
[Normal Appearance] : normal appearance [General Appearance - In No Acute Distress] : no acute distress [Abdomen Soft] : soft [Abdomen Tenderness] : non-tender [Urethral Meatus] : meatus normal [Penis Abnormality] : normal uncircumcised penis [Scrotum] : the scrotum was normal [Epididymis] : the epididymides were normal [Testes Tenderness] : no tenderness of the testes [Testes Mass (___cm)] : there were no testicular masses [Skin Color & Pigmentation] : normal skin color and pigmentation [FreeTextEntry1] : normal peripheral circulation  [] : no respiratory distress [Oriented To Time, Place, And Person] : oriented to person, place, and time

## 2022-01-05 NOTE — LETTER BODY
[Dear  ___] : Dear  [unfilled], [Courtesy Letter:] : I had the pleasure of seeing your patient, [unfilled], in my office today. [Please see my note below.] : Please see my note below. [Sincerely,] : Sincerely, [FreeTextEntry3] : Adrien Nicole MD\par  of Urology\par [facility]\par \par Offices:\par [facility] @\par [address], [place] [address]\par and\par [address], [place] [address], [address]\par and\par [address] [address]\par \par TEL: [phone]\par FAX: [phone]

## 2022-01-05 NOTE — ASSESSMENT
[FreeTextEntry1] : Benign Prostatic Hyperplasia:\par Continue Flomax. \par \par Bladder cancer:\par Negative Cystoscopy today for recurrence. \par Surveillance Cystoscopy in 6 months. \par Urine test 2 weeks before. \par \par Return to office in 6 months or sooner if any issues.

## 2022-01-05 NOTE — HISTORY OF PRESENT ILLNESS
[FreeTextEntry1] : 82 yo male presents for follow up \par Taking Flomax, reasonable stream, daytime frequency 2-3 x, nocturia 1-2 x. \par Endorses urgency. No urinary incontinence. \par Denies dysuria, hematuria, lower abdominal or flank pain, fever, chills or rigors. \par \par Status post Transurethral Resection of Bladder Tumor with Intravesical Mitomycin Instillation done on 8/4/20 at [facility].\par Pathology: Papillary urothelial carcinoma, low grade \par \par Initially seen for Gross hematuria and Bladder mass. \par 10 days ago had Gross hematuria, saw Primary care physician, took Antibiotics- resolved. \par On Ultrasound- Bladder mass. No history of recurrent urinary tract infections or kidney stone. \par Non smoker. \par Reports reasonable stream, urinates every few hours or so during the day. Nocturia of 1 x. \par Denies hesitancy, straining, intermittency, urgency, incontinence, sense of incomplete emptying.\par Denies dysuria, lower abdominal or flank pain, fever, chills or rigors.\par \par

## 2022-02-15 ENCOUNTER — INPATIENT (INPATIENT)
Facility: HOSPITAL | Age: 82
LOS: 13 days | Discharge: NURSING, REHAB OR LONG-TERM CARE | End: 2022-03-01
Attending: INTERNAL MEDICINE | Admitting: INTERNAL MEDICINE
Payer: MEDICARE

## 2022-02-15 VITALS — HEART RATE: 110 BPM | WEIGHT: 220.02 LBS | OXYGEN SATURATION: 99 % | HEIGHT: 69 IN

## 2022-02-15 DIAGNOSIS — Z98.890 OTHER SPECIFIED POSTPROCEDURAL STATES: Chronic | ICD-10-CM

## 2022-02-15 DIAGNOSIS — Z96.652 PRESENCE OF LEFT ARTIFICIAL KNEE JOINT: Chronic | ICD-10-CM

## 2022-02-15 DIAGNOSIS — Z98.49 CATARACT EXTRACTION STATUS, UNSPECIFIED EYE: Chronic | ICD-10-CM

## 2022-02-15 DIAGNOSIS — Z96.651 PRESENCE OF RIGHT ARTIFICIAL KNEE JOINT: Chronic | ICD-10-CM

## 2022-02-15 LAB
ALBUMIN SERPL ELPH-MCNC: 1.4 G/DL — LOW (ref 3.3–5)
ALBUMIN SERPL ELPH-MCNC: 1.5 G/DL — LOW (ref 3.3–5)
ALBUMIN SERPL ELPH-MCNC: 1.6 G/DL — LOW (ref 3.3–5)
ALP SERPL-CCNC: 149 U/L — HIGH (ref 40–120)
ALP SERPL-CCNC: 166 U/L — HIGH (ref 40–120)
ALP SERPL-CCNC: 169 U/L — HIGH (ref 40–120)
ALT FLD-CCNC: 49 U/L — SIGNIFICANT CHANGE UP (ref 12–78)
ALT FLD-CCNC: 51 U/L — SIGNIFICANT CHANGE UP (ref 12–78)
ALT FLD-CCNC: 52 U/L — SIGNIFICANT CHANGE UP (ref 12–78)
ANION GAP SERPL CALC-SCNC: 15 MMOL/L — SIGNIFICANT CHANGE UP (ref 5–17)
ANION GAP SERPL CALC-SCNC: 18 MMOL/L — HIGH (ref 5–17)
ANION GAP SERPL CALC-SCNC: 18 MMOL/L — HIGH (ref 5–17)
APPEARANCE UR: CLEAR — SIGNIFICANT CHANGE UP
APTT BLD: 41.1 SEC — HIGH (ref 27.5–35.5)
AST SERPL-CCNC: 109 U/L — HIGH (ref 15–37)
AST SERPL-CCNC: 120 U/L — HIGH (ref 15–37)
AST SERPL-CCNC: 94 U/L — HIGH (ref 15–37)
BASE EXCESS BLDA CALC-SCNC: -13.2 MMOL/L — LOW (ref -2–3)
BASOPHILS # BLD AUTO: 0 K/UL — SIGNIFICANT CHANGE UP (ref 0–0.2)
BASOPHILS # BLD AUTO: 0.15 K/UL — SIGNIFICANT CHANGE UP (ref 0–0.2)
BASOPHILS NFR BLD AUTO: 0 % — SIGNIFICANT CHANGE UP (ref 0–2)
BASOPHILS NFR BLD AUTO: 0.6 % — SIGNIFICANT CHANGE UP (ref 0–2)
BILIRUB SERPL-MCNC: 0.8 MG/DL — SIGNIFICANT CHANGE UP (ref 0.2–1.2)
BILIRUB SERPL-MCNC: 0.8 MG/DL — SIGNIFICANT CHANGE UP (ref 0.2–1.2)
BILIRUB SERPL-MCNC: 0.9 MG/DL — SIGNIFICANT CHANGE UP (ref 0.2–1.2)
BILIRUB UR-MCNC: NEGATIVE — SIGNIFICANT CHANGE UP
BLOOD GAS COMMENTS: SIGNIFICANT CHANGE UP
BLOOD GAS SOURCE: SIGNIFICANT CHANGE UP
BUN SERPL-MCNC: 169 MG/DL — HIGH (ref 7–23)
BUN SERPL-MCNC: 170 MG/DL — HIGH (ref 7–23)
BUN SERPL-MCNC: 184 MG/DL — HIGH (ref 7–23)
BURR CELLS BLD QL SMEAR: PRESENT — SIGNIFICANT CHANGE UP
CALCIUM SERPL-MCNC: 7 MG/DL — LOW (ref 8.5–10.1)
CALCIUM SERPL-MCNC: 7.4 MG/DL — LOW (ref 8.5–10.1)
CALCIUM SERPL-MCNC: 8 MG/DL — LOW (ref 8.5–10.1)
CHLORIDE SERPL-SCNC: 101 MMOL/L — SIGNIFICANT CHANGE UP (ref 96–108)
CHLORIDE SERPL-SCNC: 105 MMOL/L — SIGNIFICANT CHANGE UP (ref 96–108)
CHLORIDE SERPL-SCNC: 106 MMOL/L — SIGNIFICANT CHANGE UP (ref 96–108)
CO2 BLDA-SCNC: 12 MMOL/L — LOW (ref 19–24)
CO2 SERPL-SCNC: 14 MMOL/L — LOW (ref 22–31)
CO2 SERPL-SCNC: 15 MMOL/L — LOW (ref 22–31)
CO2 SERPL-SCNC: 15 MMOL/L — LOW (ref 22–31)
COLOR SPEC: YELLOW — SIGNIFICANT CHANGE UP
CREAT SERPL-MCNC: 11.8 MG/DL — HIGH (ref 0.5–1.3)
CREAT SERPL-MCNC: 12 MG/DL — HIGH (ref 0.5–1.3)
CREAT SERPL-MCNC: 12.9 MG/DL — HIGH (ref 0.5–1.3)
DIFF PNL FLD: ABNORMAL
EOSINOPHIL # BLD AUTO: 0 K/UL — SIGNIFICANT CHANGE UP (ref 0–0.5)
EOSINOPHIL # BLD AUTO: 0.01 K/UL — SIGNIFICANT CHANGE UP (ref 0–0.5)
EOSINOPHIL NFR BLD AUTO: 0 % — SIGNIFICANT CHANGE UP (ref 0–6)
EOSINOPHIL NFR BLD AUTO: 0 % — SIGNIFICANT CHANGE UP (ref 0–6)
GLUCOSE BLDC GLUCOMTR-MCNC: 228 MG/DL — HIGH (ref 70–99)
GLUCOSE BLDC GLUCOMTR-MCNC: 472 MG/DL — CRITICAL HIGH (ref 70–99)
GLUCOSE SERPL-MCNC: 229 MG/DL — HIGH (ref 70–99)
GLUCOSE SERPL-MCNC: 237 MG/DL — HIGH (ref 70–99)
GLUCOSE SERPL-MCNC: 259 MG/DL — HIGH (ref 70–99)
GLUCOSE UR QL: NEGATIVE MG/DL — SIGNIFICANT CHANGE UP
HCO3 BLDA-SCNC: 12 MMOL/L — LOW (ref 21–28)
HCT VFR BLD CALC: 35.2 % — LOW (ref 39–50)
HCT VFR BLD CALC: 36.6 % — LOW (ref 39–50)
HGB BLD-MCNC: 11.5 G/DL — LOW (ref 13–17)
HGB BLD-MCNC: 12.2 G/DL — LOW (ref 13–17)
HOROWITZ INDEX BLDA+IHG-RTO: 0.4 — SIGNIFICANT CHANGE UP
IMM GRANULOCYTES NFR BLD AUTO: 1.4 % — SIGNIFICANT CHANGE UP (ref 0–1.5)
INR BLD: 1.81 RATIO — HIGH (ref 0.88–1.16)
KETONES UR-MCNC: NEGATIVE — SIGNIFICANT CHANGE UP
LACTATE SERPL-SCNC: 1.5 MMOL/L — SIGNIFICANT CHANGE UP (ref 0.7–2)
LACTATE SERPL-SCNC: 2.5 MMOL/L — HIGH (ref 0.7–2)
LACTATE SERPL-SCNC: 3.9 MMOL/L — HIGH (ref 0.7–2)
LEUKOCYTE ESTERASE UR-ACNC: ABNORMAL
LYMPHOCYTES # BLD AUTO: 0.92 K/UL — LOW (ref 1–3.3)
LYMPHOCYTES # BLD AUTO: 1.9 K/UL — SIGNIFICANT CHANGE UP (ref 1–3.3)
LYMPHOCYTES # BLD AUTO: 3.6 % — LOW (ref 13–44)
LYMPHOCYTES # BLD AUTO: 5 % — LOW (ref 13–44)
MAGNESIUM SERPL-MCNC: 2.8 MG/DL — HIGH (ref 1.6–2.6)
MANUAL SMEAR VERIFICATION: YES — SIGNIFICANT CHANGE UP
MCHC RBC-ENTMCNC: 30.6 PG — SIGNIFICANT CHANGE UP (ref 27–34)
MCHC RBC-ENTMCNC: 31 PG — SIGNIFICANT CHANGE UP (ref 27–34)
MCHC RBC-ENTMCNC: 32.7 G/DL — SIGNIFICANT CHANGE UP (ref 32–36)
MCHC RBC-ENTMCNC: 33.3 G/DL — SIGNIFICANT CHANGE UP (ref 32–36)
MCV RBC AUTO: 92.9 FL — SIGNIFICANT CHANGE UP (ref 80–100)
MCV RBC AUTO: 93.6 FL — SIGNIFICANT CHANGE UP (ref 80–100)
MICROCYTES BLD QL: SLIGHT — SIGNIFICANT CHANGE UP
MONOCYTES # BLD AUTO: 0.76 K/UL — SIGNIFICANT CHANGE UP (ref 0–0.9)
MONOCYTES # BLD AUTO: 1.14 K/UL — HIGH (ref 0–0.9)
MONOCYTES NFR BLD AUTO: 2.9 % — SIGNIFICANT CHANGE UP (ref 2–14)
MONOCYTES NFR BLD AUTO: 3 % — SIGNIFICANT CHANGE UP (ref 2–14)
NEUTROPHILS # BLD AUTO: 23.69 K/UL — HIGH (ref 1.8–7.4)
NEUTROPHILS # BLD AUTO: 34.9 K/UL — HIGH (ref 1.8–7.4)
NEUTROPHILS NFR BLD AUTO: 91.5 % — HIGH (ref 43–77)
NEUTROPHILS NFR BLD AUTO: 92 % — HIGH (ref 43–77)
NITRITE UR-MCNC: NEGATIVE — SIGNIFICANT CHANGE UP
NRBC # BLD: 0 /100 WBCS — SIGNIFICANT CHANGE UP (ref 0–0)
NRBC # BLD: 0 /100 — SIGNIFICANT CHANGE UP (ref 0–0)
NRBC # BLD: SIGNIFICANT CHANGE UP /100 WBCS (ref 0–0)
PCO2 BLDA: 25 MMHG — LOW (ref 32–46)
PH BLD: 7.28 — LOW (ref 7.35–7.45)
PH UR: 7 — SIGNIFICANT CHANGE UP (ref 5–8)
PHOSPHATE SERPL-MCNC: 5.7 MG/DL — HIGH (ref 2.5–4.5)
PLAT MORPH BLD: NORMAL — SIGNIFICANT CHANGE UP
PLATELET # BLD AUTO: 61 K/UL — LOW (ref 150–400)
PLATELET # BLD AUTO: 65 K/UL — LOW (ref 150–400)
PO2 BLDA: 68 MMHG — LOW (ref 83–108)
POTASSIUM SERPL-MCNC: 5.1 MMOL/L — SIGNIFICANT CHANGE UP (ref 3.5–5.3)
POTASSIUM SERPL-MCNC: 5.3 MMOL/L — SIGNIFICANT CHANGE UP (ref 3.5–5.3)
POTASSIUM SERPL-MCNC: 5.7 MMOL/L — HIGH (ref 3.5–5.3)
POTASSIUM SERPL-SCNC: 5.1 MMOL/L — SIGNIFICANT CHANGE UP (ref 3.5–5.3)
POTASSIUM SERPL-SCNC: 5.3 MMOL/L — SIGNIFICANT CHANGE UP (ref 3.5–5.3)
POTASSIUM SERPL-SCNC: 5.7 MMOL/L — HIGH (ref 3.5–5.3)
PROT SERPL-MCNC: 5.5 GM/DL — LOW (ref 6–8.3)
PROT SERPL-MCNC: 5.6 GM/DL — LOW (ref 6–8.3)
PROT SERPL-MCNC: 6 GM/DL — SIGNIFICANT CHANGE UP (ref 6–8.3)
PROT UR-MCNC: 100 MG/DL
PROTHROM AB SERPL-ACNC: 20.4 SEC — HIGH (ref 10.6–13.6)
RAPID RVP RESULT: SIGNIFICANT CHANGE UP
RBC # BLD: 3.76 M/UL — LOW (ref 4.2–5.8)
RBC # BLD: 3.94 M/UL — LOW (ref 4.2–5.8)
RBC # FLD: 13.9 % — SIGNIFICANT CHANGE UP (ref 10.3–14.5)
RBC # FLD: 14.2 % — SIGNIFICANT CHANGE UP (ref 10.3–14.5)
RBC BLD AUTO: ABNORMAL
SAO2 % BLDA: 93 % — LOW (ref 94–98)
SARS-COV-2 RNA SPEC QL NAA+PROBE: SIGNIFICANT CHANGE UP
SCHISTOCYTES BLD QL AUTO: SLIGHT — SIGNIFICANT CHANGE UP
SODIUM SERPL-SCNC: 134 MMOL/L — LOW (ref 135–145)
SODIUM SERPL-SCNC: 135 MMOL/L — SIGNIFICANT CHANGE UP (ref 135–145)
SODIUM SERPL-SCNC: 138 MMOL/L — SIGNIFICANT CHANGE UP (ref 135–145)
SP GR SPEC: 1.01 — SIGNIFICANT CHANGE UP (ref 1.01–1.02)
UROBILINOGEN FLD QL: NEGATIVE MG/DL — SIGNIFICANT CHANGE UP
WBC # BLD: 25.9 K/UL — HIGH (ref 3.8–10.5)
WBC # BLD: 37.94 K/UL — HIGH (ref 3.8–10.5)
WBC # FLD AUTO: 25.9 K/UL — HIGH (ref 3.8–10.5)
WBC # FLD AUTO: 37.94 K/UL — HIGH (ref 3.8–10.5)

## 2022-02-15 PROCEDURE — 74176 CT ABD & PELVIS W/O CONTRAST: CPT | Mod: 26

## 2022-02-15 PROCEDURE — 99285 EMERGENCY DEPT VISIT HI MDM: CPT

## 2022-02-15 PROCEDURE — 99291 CRITICAL CARE FIRST HOUR: CPT

## 2022-02-15 PROCEDURE — 71045 X-RAY EXAM CHEST 1 VIEW: CPT | Mod: 26

## 2022-02-15 RX ORDER — AZITHROMYCIN 500 MG/1
TABLET, FILM COATED ORAL
Refills: 0 | Status: DISCONTINUED | OUTPATIENT
Start: 2022-02-15 | End: 2022-02-21

## 2022-02-15 RX ORDER — CHLORHEXIDINE GLUCONATE 213 G/1000ML
1 SOLUTION TOPICAL
Refills: 0 | Status: DISCONTINUED | OUTPATIENT
Start: 2022-02-15 | End: 2022-02-16

## 2022-02-15 RX ORDER — SODIUM CHLORIDE 9 MG/ML
1000 INJECTION INTRAMUSCULAR; INTRAVENOUS; SUBCUTANEOUS ONCE
Refills: 0 | Status: COMPLETED | OUTPATIENT
Start: 2022-02-15 | End: 2022-02-15

## 2022-02-15 RX ORDER — DEXTROSE 50 % IN WATER 50 %
50 SYRINGE (ML) INTRAVENOUS ONCE
Refills: 0 | Status: COMPLETED | OUTPATIENT
Start: 2022-02-15 | End: 2022-02-15

## 2022-02-15 RX ORDER — SODIUM CHLORIDE 9 MG/ML
2200 INJECTION INTRAMUSCULAR; INTRAVENOUS; SUBCUTANEOUS ONCE
Refills: 0 | Status: COMPLETED | OUTPATIENT
Start: 2022-02-15 | End: 2022-02-15

## 2022-02-15 RX ORDER — AZITHROMYCIN 500 MG/1
500 TABLET, FILM COATED ORAL EVERY 24 HOURS
Refills: 0 | Status: DISCONTINUED | OUTPATIENT
Start: 2022-02-16 | End: 2022-02-21

## 2022-02-15 RX ORDER — SODIUM BICARBONATE 1 MEQ/ML
0.07 SYRINGE (ML) INTRAVENOUS
Qty: 150 | Refills: 0 | Status: DISCONTINUED | OUTPATIENT
Start: 2022-02-15 | End: 2022-02-16

## 2022-02-15 RX ORDER — HEPARIN SODIUM 5000 [USP'U]/ML
5000 INJECTION INTRAVENOUS; SUBCUTANEOUS EVERY 12 HOURS
Refills: 0 | Status: DISCONTINUED | OUTPATIENT
Start: 2022-02-15 | End: 2022-02-16

## 2022-02-15 RX ORDER — AZITHROMYCIN 500 MG/1
500 TABLET, FILM COATED ORAL ONCE
Refills: 0 | Status: COMPLETED | OUTPATIENT
Start: 2022-02-15 | End: 2022-02-15

## 2022-02-15 RX ORDER — NOREPINEPHRINE BITARTRATE/D5W 8 MG/250ML
0.05 PLASTIC BAG, INJECTION (ML) INTRAVENOUS
Qty: 8 | Refills: 0 | Status: ACTIVE | OUTPATIENT
Start: 2022-02-15 | End: 2023-01-14

## 2022-02-15 RX ORDER — SODIUM BICARBONATE 1 MEQ/ML
0.06 SYRINGE (ML) INTRAVENOUS
Qty: 50 | Refills: 0 | Status: DISCONTINUED | OUTPATIENT
Start: 2022-02-15 | End: 2022-02-15

## 2022-02-15 RX ORDER — CEFTRIAXONE 500 MG/1
1000 INJECTION, POWDER, FOR SOLUTION INTRAMUSCULAR; INTRAVENOUS EVERY 24 HOURS
Refills: 0 | Status: ACTIVE | OUTPATIENT
Start: 2022-02-15 | End: 2022-02-22

## 2022-02-15 RX ORDER — INSULIN HUMAN 100 [IU]/ML
10 INJECTION, SOLUTION SUBCUTANEOUS ONCE
Refills: 0 | Status: COMPLETED | OUTPATIENT
Start: 2022-02-15 | End: 2022-02-15

## 2022-02-15 RX ADMIN — INSULIN HUMAN 10 UNIT(S): 100 INJECTION, SOLUTION SUBCUTANEOUS at 22:56

## 2022-02-15 RX ADMIN — SODIUM CHLORIDE 1000 MILLILITER(S): 9 INJECTION INTRAMUSCULAR; INTRAVENOUS; SUBCUTANEOUS at 19:03

## 2022-02-15 RX ADMIN — Medication 9.36 MICROGRAM(S)/KG/MIN: at 22:31

## 2022-02-15 RX ADMIN — SODIUM CHLORIDE 2200 MILLILITER(S): 9 INJECTION INTRAMUSCULAR; INTRAVENOUS; SUBCUTANEOUS at 14:26

## 2022-02-15 RX ADMIN — Medication 50 MILLILITER(S): at 22:56

## 2022-02-15 RX ADMIN — AZITHROMYCIN 255 MILLIGRAM(S): 500 TABLET, FILM COATED ORAL at 22:14

## 2022-02-15 RX ADMIN — CEFTRIAXONE 100 MILLIGRAM(S): 500 INJECTION, POWDER, FOR SOLUTION INTRAMUSCULAR; INTRAVENOUS at 21:42

## 2022-02-15 RX ADMIN — Medication 125 MEQ/KG/HR: at 20:50

## 2022-02-15 NOTE — H&P ADULT - NSHPPHYSICALEXAM_GEN_ALL_CORE
CON : NAD  EENT : EOMI, Dry MM  NECK : Full ROM  RESP : decreased bs at bases, slighlty increased wob  CARD : irreg irreg, tachyno m/r/g  ABD : S NT ND NABS no rebound  EXT : mild edema  SKIN : no rashes  NEURO : AAOX3

## 2022-02-15 NOTE — ED ADULT TRIAGE NOTE - CHIEF COMPLAINT QUOTE
pt biba from  home c/o severe diarrhea x2 weeks, started on erythromycin by PMD on 2/9 for stomach virus , bp  low on EMS arrival 79/42 hr 110 o2 sat 80 on RA and 99% on NRBM h x DM , HTN

## 2022-02-15 NOTE — ED PROVIDER NOTE - OBJECTIVE STATEMENT
81M w/ PMH HTN BIBEMS weakness. Pt reports diarrhea x 3 days. Pt is poor historian. Per EMS, BP in field 70/40, HR 110s, Afib, SPO2 80% RA, 99% NRB, RR 20s. Pt improved on ED arrival, w/ elevated BP. Awake, alert. States he feels better. Pt received 1L NS en route. Denies F/C, CP, SOB, abd pain, back pain, N/V, UTI sx. Pt endorses phlegm cough onset this AM. Reports saw PMD this week. Triage note states started on erythromycin for 'stomach virus' 81M w/ PMH HTN BIBEMS weakness. Pt reports diarrhea x 3 days. Pt is poor historian. Per EMS, BP in field 70/40, HR 110s, Afib, SPO2 80% RA, 99% NRB, RR 20s. Pt improved on ED arrival, w/ elevated BP. Awake, alert. States he feels better. Pt received 1L NS en route. Denies F/C, CP, SOB, abd pain, back pain, N/V, UTI sx. Pt endorses phlegm cough onset this AM. Reports saw PMD this week. Triage note states started on erythromycin for 'stomach virus'    Denies hx Afib, states no on AC. 81M w/ PMH HTN BIBEMS weakness. Pt reports diarrhea x 3 days. Pt is poor historian. Per EMS, BP in field 70/40, HR 110s, Afib, SPO2 80% RA, 99% NRB, RR 20s. Pt improved on ED arrival, w/ elevated BP. Awake, alert. States he feels better. Pt received 1L NS en route. Denies F/C, CP, SOB, abd pain, back pain, N/V, UTI sx. Pt endorses phlegm cough onset this AM. Reports saw PMD this week. Triage note states started on erythromycin for 'stomach virus.'  Denies hx Afib, states no on AC.  Spoke w/ grand daughter Chantal: PMH HTN, meds as listed, PSH knee, melanoma, bladder tumor removal, NKDA. Pt w/ diarrhea x 2wks, saw PMD 1wk ago, started on erythromycin, unsure for what exactly. No fever / chills / nausea / vomiting. Saw pt today, agree he looked SOB but pt denied SOB. Pt did not complain of phlegm / cough to family.

## 2022-02-15 NOTE — ED ADULT NURSE NOTE - NS ED NURSE TRANSPORT WITH
Cardiac Monitor/Defib/ACLS/Rescue Kit/O2/BVM/IV pump Cardiac Monitor/Defib/ACLS/Rescue Kit/O2/BVM/IV pump/ACLS Rescue Kit

## 2022-02-15 NOTE — H&P ADULT - NSHPLABSRESULTS_GEN_ALL_CORE
Lab Results:  CBC  CBC Full  -  ( 15 Feb 2022 14:18 )  WBC Count : 37.94 K/uL  RBC Count : 3.94 M/uL  Hemoglobin : 12.2 g/dL  Hematocrit : 36.6 %  Platelet Count - Automated : 65 K/uL  Mean Cell Volume : 92.9 fl  Mean Cell Hemoglobin : 31.0 pg  Mean Cell Hemoglobin Concentration : 33.3 g/dL  Auto Neutrophil # : 34.90 K/uL  Auto Lymphocyte # : 1.90 K/uL  Auto Monocyte # : 1.14 K/uL  Auto Eosinophil # : 0.00 K/uL  Auto Basophil # : 0.00 K/uL  Auto Neutrophil % : 92.0 %  Auto Lymphocyte % : 5.0 %  Auto Monocyte % : 3.0 %  Auto Eosinophil % : 0.0 %  Auto Basophil % : 0.0 %    .		Differential:	[] Automated		[] Manual  Chemistry                        12.2   37.94 )-----------( 65       ( 15 Feb 2022 14:18 )             36.6     02-15    135  |  105  |  170<H>  ----------------------------<  229<H>  5.7<H>   |  15<L>  |  12.00<H>    Ca    7.4<L>      15 Feb 2022 19:04    TPro  5.5<L>  /  Alb  1.4<L>  /  TBili  0.9  /  DBili  x   /  AST  109<H>  /  ALT  51  /  AlkPhos  149<H>  02-15    LIVER FUNCTIONS - ( 15 Feb 2022 19:04 )  Alb: 1.4 g/dL / Pro: 5.5 gm/dL / ALK PHOS: 149 U/L / ALT: 51 U/L / AST: 109 U/L / GGT: x           PT/INR - ( 15 Feb 2022 14:18 )   PT: 20.4 sec;   INR: 1.81 ratio         PTT - ( 15 Feb 2022 14:18 )  PTT:41.1 sec        Lactate, Blood: 2.5 mmol/L *H* (02-15-22 @ 18:24)  Lactate, Blood: 3.9 mmol/L *H* (02-15-22 @ 14:15)      Lactate, Blood: 2.5 mmol/L (02-15-22 @ 18:24)  Lactate, Blood: 3.9 mmol/L (02-15-22 @ 14:15)      MICROBIOLOGY/CULTURES:  blood and urine cultures pending    RADIOLOGY RESULTS:  cxr - with possible new b/l basilar infiltrates    EKG - afib, no peaked t or widned qrs    POCUS - hyperdynamic, no rv strain no pericardial effusion

## 2022-02-15 NOTE — H&P ADULT - NSHPREVIEWOFSYSTEMS_GEN_ALL_CORE
CONSTITUTIONAL:  no weight loss or night sweats  HEENT:  Eyes:  No diplopia or blurred vision.   ENT:  No earache, sore throat or runny nose.  CARDIOVASCULAR:  No pressure, squeezing, tightness, heaviness or aching about the chest, neck, axilla or epigastrium.  RESPIRATORY:  No cough, shortness of breath, PND or orthopnea.  GASTROINTESTINAL:  No nausea, vomiting +diarrhea.  GENITOURINARY:  No dysuria, frequency or urgency.  MUSCULOSKELETAL:  No joint pain  SKIN:  No change in skin, hair or nails.  NEUROLOGIC:  No paresthesias, fasciculations, seizures + general weakness  PSYCHIATRIC:  No disorder of thought or mood.  HEMATOLOGICAL:  No easy bruising or bleeding.

## 2022-02-15 NOTE — CONSULT NOTE ADULT - SUBJECTIVE AND OBJECTIVE BOX
HPI:  Patient is a 81y old  Male who presented to ED earlier today with lethargy, weakness following 3 weeks of diarrhea which worsened last 3 days. He was found to be in severe RADHA associated with AG/NAGMA.   He was hypotensive on arrival --> received 4L of crystalloid with minimal improvement in BP and eventually started on vasopressor. Per my request, Khalil placed at 22:30 pm revealing 500cc+ residual. No imaging done thus far.   Pt is awake, alert but mildly lethargic., providing information with difficulty due to mouth dryness and deafness. Denies recent NSAIDs, IV dye.   Renal evaluation requested to manage RADHA.     Home meds include Cipro and ARB among others.       PAST MEDICAL & SURGICAL HISTORY:  Melanoma  head    HTN (hypertension)    HLD (hyperlipidemia)    Diabetes mellitus    Bladder tumor    History of cataract surgery  left eye    S/P TKR (total knee replacement), left  2015    H/O total knee replacement, right  2015    History of melanoma excision        Social Hx:     FAMILY HISTORY:      Allergies    No Known Allergies    Intolerances        MEDICATIONS  (STANDING):  azithromycin  IVPB      cefTRIAXone   IVPB 1000 milliGRAM(s) IV Intermittent every 24 hours  chlorhexidine 4% Liquid 1 Application(s) Topical <User Schedule>  dextrose 50% Injectable 50 milliLiter(s) IV Push once  heparin   Injectable 5000 Unit(s) SubCutaneous every 12 hours  insulin regular  human recombinant 10 Unit(s) IV Push once  norepinephrine Infusion 0.05 MICROgram(s)/kG/Min (9.36 mL/Hr) IV Continuous <Continuous>  sodium bicarbonate  Infusion 0.188 mEq/kG/Hr (125 mL/Hr) IV Continuous <Continuous>    MEDICATIONS  (PRN):      Daily Height in cm: 175.26 (15 Feb 2022 13:08)    Daily     Drug Dosing Weight  Height (cm): 175.3 (15 Feb 2022 13:08)  Weight (kg): 99.8 (15 Feb 2022 13:08)  BMI (kg/m2): 32.5 (15 Feb 2022 13:08)  BSA (m2): 2.15 (15 Feb 2022 13:08)      REVIEW OF SYSTEMS:    Per HPI        ABG - ( 15 Feb 2022 22:05 )  pH, Arterial: x     pH, Blood: 7.28  /  pCO2: 25    /  pO2: 68    / HCO3: 12    / Base Excess: -13.2 /  SaO2: 93.0                I&O's Detail        PHYSICAL EXAM:    GENERAL: dyspneic, lethargic.   HEAD:  Atraumatic, normocephalic  EYES: EOMI, PERRLA. Conjunctiva and sclera clear  ENMT: dry oral mucosa  NECK: Supple. No increase in JVP  NERVOUS SYSTEM:  pos asterixis  CHEST/LUNG: Clear to auscultation bilaterally  HEART: S1S2  ABDOMEN: Soft, distended,   EXTREMITIES:  no edema      LABS:  CBC Full  -  ( 15 Feb 2022 14:18 )  WBC Count : 37.94 K/uL  RBC Count : 3.94 M/uL  Hemoglobin : 12.2 g/dL  Hematocrit : 36.6 %  Platelet Count - Automated : 65 K/uL  Mean Cell Volume : 92.9 fl  Mean Cell Hemoglobin : 31.0 pg  Mean Cell Hemoglobin Concentration : 33.3 g/dL  Auto Neutrophil # : 34.90 K/uL  Auto Lymphocyte # : 1.90 K/uL  Auto Monocyte # : 1.14 K/uL  Auto Eosinophil # : 0.00 K/uL  Auto Basophil # : 0.00 K/uL  Auto Neutrophil % : 92.0 %  Auto Lymphocyte % : 5.0 %  Auto Monocyte % : 3.0 %  Auto Eosinophil % : 0.0 %  Auto Basophil % : 0.0 %    02-15    135  |  105  |  170<H>  ----------------------------<  229<H>  5.7<H>   |  15<L>  |  12.00<H>    Ca    7.4<L>      15 Feb 2022 19:04  Phos  5.7     02-15  Mg     2.8     02-15    TPro  5.5<L>  /  Alb  1.4<L>  /  TBili  0.9  /  DBili  x   /  AST  109<H>  /  ALT  51  /  AlkPhos  149<H>  02-15    CAPILLARY BLOOD GLUCOSE      POCT Blood Glucose.: 472 mg/dL (15 Feb 2022 13:20)    PT/INR - ( 15 Feb 2022 14:18 )   PT: 20.4 sec;   INR: 1.81 ratio         PTT - ( 15 Feb 2022 14:18 )  PTT:41.1 sec          Impression:  * RADHA -- DDx: severe pre-renal azotemia sec to volume depletion following 3 weeks of diarrhea, ischemic ATN, septic ATN, post-renal, AIN  * HyperK -- due to severe RADHA, acidosis, dec distal Na delivery  * AG + NAGMA due to RADHA and diarrhea respectively.   * Uremia   Recommendations:   * Risks, benefits of dialysis discussed with patient who provide consent. Will dialyze urgently tonight to tx uremia, hyperK, metabolic acidosis  * Access to be place by CCU PA at my request  * HD nurse informed about dialytic urgency at 22:50  * Maintain Khalil  * Vasopressors, crystalloid, empiric Abx

## 2022-02-15 NOTE — ED ADULT NURSE NOTE - OBJECTIVE STATEMENT
pt biba from  home c/o severe diarrhea x2 weeks, started on erythromycin by PMD on 2/9 for stomach virus , bp  low on EMS arrival 79/42 hr 110 o2 sat 80 on RA and 99% on NRBM h x DM , HTN . pt states mouth severely dry, pt appears weak, tired , tachypneic. denies any pain, denies palpitations.  pt -125  on CM. 18 g right AC 1 L fluid given by ems/  92% on non re breather. /152

## 2022-02-15 NOTE — H&P ADULT - HISTORY OF PRESENT ILLNESS
81 year old male pmh htn dm presents with 2 weeks of diarrhea and weakness. presented to ED and found to be in rapid afib with elevated cr. last cr feb 2021 was 1.  rec'd 5 liters ns by ed/ems.  cr improved slighlty. no abd pain, no fever no chest pain.

## 2022-02-15 NOTE — H&P ADULT - ASSESSMENT
81 year old with anthony in setting of diarrhea with elevated wbc     Plan  Neuro - monitor mental status  RESP - slightly tachypneic, likely from metabolic acidosis. will cover for cap given b/l infiltrates  CV - afib, possible new. goal map greater than 65. can start midodrine. continue fluid resus, official echo  GI - monitor lft - ct abdomen pelvis to rule out obstruction, cdiff  RENAL - repeat lytes in 4 hours, vbg. start bicarb drip 125/hr, nephro consult. renal us, [facility]. currently no need for HD but may require upon next lab draw  ID - cap treatment, follow up cultures  ENDO - sliding scale insulin  PPX - scd, hep subq  GOC - patient wishes to be full code  DISPO - admit to micu in setting of possible need for urgent HD    ccm 40 min

## 2022-02-15 NOTE — ED PROVIDER NOTE - PHYSICAL EXAMINATION
GEN: Awake, alert, interactive, NAD.  HEAD AND NECK: NC/AT. Airway patent. Neck supple.   EYES:  Clear b/l. EOMI. PERRL.   ENT: Moist mucus membranes.   CARDIAC: Regular rate, regular rhythm. No evident pedal edema.    RESP/CHEST: Normal respiratory effort with no use of accessory muscles or retractions. Clear throughout on auscultation.  ABD: Soft, non-distended, non-tender. No rebound, no guarding.   BACK: No midline spinal TTP. No CVAT.   EXTREMITIES: Moving all extremities with no apparent deformities.   SKIN: Warm, dry, intact normal color. No rash.   NEURO: AOx3, CN II-XII grossly intact, no focal deficits.   PSYCH: Appropriate mood and affect. GEN: Awake, alert, interactive, NAD.  HEAD AND NECK: NC/AT. Airway patent. Neck supple.   EYES:  Clear b/l. EOMI. PERRL.   ENT: Dry mucus membranes. Tongue surface bright red color.   CARDIAC: Regular rate, regular rhythm. No evident pedal edema.    RESP/CHEST: Normal respiratory effort with no use of accessory muscles or retractions. Clear throughout on auscultation.  ABD: Soft, non-distended, non-tender. No rebound, no guarding.   BACK: No midline spinal TTP. No CVAT.   EXTREMITIES: Moving all extremities with no apparent deformities.   SKIN: Warm, dry, intact normal color. No rash.   NEURO: AOx3, CN II-XII grossly intact, no focal deficits.   PSYCH: Appropriate mood and affect. GEN: Awake, alert, interactive, NAD.  HEAD AND NECK: NC/AT. Airway patent. Neck supple.   EYES:  Clear b/l. EOMI. PERRL.   ENT: Dry mucus membranes. ? Tongue surface bright red color.   CARDIAC: Tachycardic, irregularly irregular rhythm.  RESP/CHEST: + Tachypneic. Clear throughout on auscultation.  ABD: Soft, non-distended, non-tender. No rebound, no guarding.   BACK: No midline spinal TTP. No CVAT.   EXTREMITIES: Moving all extremities with no apparent deformities.   SKIN: Warm, dry, intact normal color. No rash.   NEURO: AOx3, CN II-XII grossly intact, no focal deficits.   PSYCH: Appropriate mood and affect.

## 2022-02-15 NOTE — ED PROVIDER NOTE - PROGRESS NOTE DETAILS
W/u significant for: + leukocytosis to 38, lactate 3.9 > 2.5, BUN/Cr 170/13 (baseline Cr 1.2 in 2020). Will add on C.dif PCR / contact precautions. Rpt CMP w/ renal function grossly unchanged, pt persistently hypotensive despite 4-5L NS. ICU consulted, will admit pt to their service. Pt updated. He understands / agrees w/ this plan.

## 2022-02-16 LAB
A1C WITH ESTIMATED AVERAGE GLUCOSE RESULT: 7.8 % — HIGH (ref 4–5.6)
ALBUMIN SERPL ELPH-MCNC: 1.4 G/DL — LOW (ref 3.3–5)
ALBUMIN SERPL ELPH-MCNC: 1.5 G/DL — LOW (ref 3.3–5)
ALP SERPL-CCNC: 185 U/L — HIGH (ref 40–120)
ALP SERPL-CCNC: 232 U/L — HIGH (ref 40–120)
ALT FLD-CCNC: 48 U/L — SIGNIFICANT CHANGE UP (ref 12–78)
ALT FLD-CCNC: 56 U/L — SIGNIFICANT CHANGE UP (ref 12–78)
ANION GAP SERPL CALC-SCNC: 13 MMOL/L — SIGNIFICANT CHANGE UP (ref 5–17)
ANION GAP SERPL CALC-SCNC: 14 MMOL/L — SIGNIFICANT CHANGE UP (ref 5–17)
APPEARANCE UR: ABNORMAL
AST SERPL-CCNC: 124 U/L — HIGH (ref 15–37)
AST SERPL-CCNC: 146 U/L — HIGH (ref 15–37)
BACTERIA # UR AUTO: ABNORMAL
BACTERIA # UR AUTO: ABNORMAL
BASE EXCESS BLDA CALC-SCNC: -7.5 MMOL/L — LOW (ref -2–3)
BASOPHILS # BLD AUTO: 0.03 K/UL — SIGNIFICANT CHANGE UP (ref 0–0.2)
BASOPHILS NFR BLD AUTO: 0.2 % — SIGNIFICANT CHANGE UP (ref 0–2)
BILIRUB SERPL-MCNC: 0.8 MG/DL — SIGNIFICANT CHANGE UP (ref 0.2–1.2)
BILIRUB SERPL-MCNC: 0.9 MG/DL — SIGNIFICANT CHANGE UP (ref 0.2–1.2)
BILIRUB UR-MCNC: NEGATIVE — SIGNIFICANT CHANGE UP
BLOOD GAS COMMENTS: SIGNIFICANT CHANGE UP
BLOOD GAS COMMENTS: SIGNIFICANT CHANGE UP
BLOOD GAS SOURCE: SIGNIFICANT CHANGE UP
BUN SERPL-MCNC: 148 MG/DL — HIGH (ref 7–23)
BUN SERPL-MCNC: 149 MG/DL — HIGH (ref 7–23)
C3 SERPL-MCNC: 115 MG/DL — SIGNIFICANT CHANGE UP (ref 81–157)
C4 SERPL-MCNC: 26 MG/DL — SIGNIFICANT CHANGE UP (ref 13–39)
CALCIUM SERPL-MCNC: 7.1 MG/DL — LOW (ref 8.5–10.1)
CALCIUM SERPL-MCNC: 7.1 MG/DL — LOW (ref 8.5–10.1)
CHLORIDE SERPL-SCNC: 104 MMOL/L — SIGNIFICANT CHANGE UP (ref 96–108)
CHLORIDE SERPL-SCNC: 104 MMOL/L — SIGNIFICANT CHANGE UP (ref 96–108)
CO2 BLDA-SCNC: 16 MMOL/L — LOW (ref 19–24)
CO2 SERPL-SCNC: 18 MMOL/L — LOW (ref 22–31)
CO2 SERPL-SCNC: 20 MMOL/L — LOW (ref 22–31)
COLOR SPEC: ABNORMAL
CREAT SERPL-MCNC: 8.73 MG/DL — HIGH (ref 0.5–1.3)
CREAT SERPL-MCNC: 9.2 MG/DL — HIGH (ref 0.5–1.3)
D DIMER BLD IA.RAPID-MCNC: 8320 NG/ML DDU — HIGH
DIFF PNL FLD: ABNORMAL
EOSINOPHIL # BLD AUTO: 0 K/UL — SIGNIFICANT CHANGE UP (ref 0–0.5)
EOSINOPHIL NFR BLD AUTO: 0 % — SIGNIFICANT CHANGE UP (ref 0–6)
EPI CELLS # UR: SIGNIFICANT CHANGE UP
EPI CELLS # UR: SIGNIFICANT CHANGE UP
ESTIMATED AVERAGE GLUCOSE: 177 MG/DL — HIGH (ref 68–114)
FIBRINOGEN PPP-MCNC: 572 MG/DL — HIGH (ref 290–520)
GLUCOSE BLDC GLUCOMTR-MCNC: 189 MG/DL — HIGH (ref 70–99)
GLUCOSE BLDC GLUCOMTR-MCNC: 233 MG/DL — HIGH (ref 70–99)
GLUCOSE BLDC GLUCOMTR-MCNC: 253 MG/DL — HIGH (ref 70–99)
GLUCOSE SERPL-MCNC: 245 MG/DL — HIGH (ref 70–99)
GLUCOSE SERPL-MCNC: 257 MG/DL — HIGH (ref 70–99)
GLUCOSE UR QL: NEGATIVE MG/DL — SIGNIFICANT CHANGE UP
GRAM STN FLD: SIGNIFICANT CHANGE UP
GRAM STN FLD: SIGNIFICANT CHANGE UP
HAV IGM SER-ACNC: SIGNIFICANT CHANGE UP
HBV CORE IGM SER-ACNC: SIGNIFICANT CHANGE UP
HBV SURFACE AB SER-ACNC: SIGNIFICANT CHANGE UP
HBV SURFACE AG SER-ACNC: SIGNIFICANT CHANGE UP
HCO3 BLDA-SCNC: 16 MMOL/L — LOW (ref 21–28)
HCT VFR BLD CALC: 33.1 % — LOW (ref 39–50)
HCV AB S/CO SERPL IA: 0.12 S/CO — SIGNIFICANT CHANGE UP (ref 0–0.99)
HCV AB SERPL-IMP: SIGNIFICANT CHANGE UP
HGB BLD-MCNC: 11.2 G/DL — LOW (ref 13–17)
HOROWITZ INDEX BLDA+IHG-RTO: 45 — SIGNIFICANT CHANGE UP
IMM GRANULOCYTES NFR BLD AUTO: 2.1 % — HIGH (ref 0–1.5)
K OXYTOCA DNA BLD POS QL NAA+NON-PROBE: SIGNIFICANT CHANGE UP
KETONES UR-MCNC: NEGATIVE — SIGNIFICANT CHANGE UP
LEUKOCYTE ESTERASE UR-ACNC: ABNORMAL
LYMPHOCYTES # BLD AUTO: 0.5 K/UL — LOW (ref 1–3.3)
LYMPHOCYTES # BLD AUTO: 3 % — LOW (ref 13–44)
MAGNESIUM SERPL-MCNC: 2.6 MG/DL — SIGNIFICANT CHANGE UP (ref 1.6–2.6)
MAGNESIUM SERPL-MCNC: 2.9 MG/DL — HIGH (ref 1.6–2.6)
MCHC RBC-ENTMCNC: 30.8 PG — SIGNIFICANT CHANGE UP (ref 27–34)
MCHC RBC-ENTMCNC: 33.8 G/DL — SIGNIFICANT CHANGE UP (ref 32–36)
MCV RBC AUTO: 90.9 FL — SIGNIFICANT CHANGE UP (ref 80–100)
METHOD TYPE: SIGNIFICANT CHANGE UP
MONOCYTES # BLD AUTO: 0.4 K/UL — SIGNIFICANT CHANGE UP (ref 0–0.9)
MONOCYTES NFR BLD AUTO: 2.4 % — SIGNIFICANT CHANGE UP (ref 2–14)
MRSA PCR RESULT.: SIGNIFICANT CHANGE UP
NEUTROPHILS # BLD AUTO: 15.21 K/UL — HIGH (ref 1.8–7.4)
NEUTROPHILS NFR BLD AUTO: 92.3 % — HIGH (ref 43–77)
NITRITE UR-MCNC: POSITIVE
NRBC # BLD: 0 /100 WBCS — SIGNIFICANT CHANGE UP (ref 0–0)
PCO2 BLDA: 25 MMHG — LOW (ref 32–46)
PH BLD: 7.4 — SIGNIFICANT CHANGE UP (ref 7.35–7.45)
PH UR: 6.5 — SIGNIFICANT CHANGE UP (ref 5–8)
PHOSPHATE SERPL-MCNC: 4.1 MG/DL — SIGNIFICANT CHANGE UP (ref 2.5–4.5)
PHOSPHATE SERPL-MCNC: 4.6 MG/DL — HIGH (ref 2.5–4.5)
PLATELET # BLD AUTO: 49 K/UL — LOW (ref 150–400)
PO2 BLDA: 142 MMHG — HIGH (ref 83–108)
POTASSIUM SERPL-MCNC: 3.9 MMOL/L — SIGNIFICANT CHANGE UP (ref 3.5–5.3)
POTASSIUM SERPL-MCNC: 4 MMOL/L — SIGNIFICANT CHANGE UP (ref 3.5–5.3)
POTASSIUM SERPL-SCNC: 3.9 MMOL/L — SIGNIFICANT CHANGE UP (ref 3.5–5.3)
POTASSIUM SERPL-SCNC: 4 MMOL/L — SIGNIFICANT CHANGE UP (ref 3.5–5.3)
PROCALCITONIN SERPL-MCNC: 81.7 NG/ML — HIGH (ref 0.02–0.1)
PROT ?TM UR-MCNC: 75 MG/DL — HIGH (ref 0–12)
PROT SERPL-MCNC: 4.9 G/DL — LOW (ref 6–8.3)
PROT SERPL-MCNC: 4.9 G/DL — LOW (ref 6–8.3)
PROT SERPL-MCNC: 5.2 GM/DL — LOW (ref 6–8.3)
PROT SERPL-MCNC: 5.4 GM/DL — LOW (ref 6–8.3)
PROT UR-MCNC: 100 MG/DL
RBC # BLD: 3.64 M/UL — LOW (ref 4.2–5.8)
RBC # FLD: 13.9 % — SIGNIFICANT CHANGE UP (ref 10.3–14.5)
RBC CASTS # UR COMP ASSIST: ABNORMAL /HPF (ref 0–4)
RBC CASTS # UR COMP ASSIST: ABNORMAL /HPF (ref 0–4)
S AUREUS DNA NOSE QL NAA+PROBE: SIGNIFICANT CHANGE UP
SAO2 % BLDA: 99.6 % — HIGH (ref 94–98)
SODIUM SERPL-SCNC: 136 MMOL/L — SIGNIFICANT CHANGE UP (ref 135–145)
SODIUM SERPL-SCNC: 137 MMOL/L — SIGNIFICANT CHANGE UP (ref 135–145)
SODIUM UR-SCNC: 52 MMOL/L — SIGNIFICANT CHANGE UP
SP GR SPEC: 1.01 — SIGNIFICANT CHANGE UP (ref 1.01–1.02)
SPECIMEN SOURCE: SIGNIFICANT CHANGE UP
SPECIMEN SOURCE: SIGNIFICANT CHANGE UP
UROBILINOGEN FLD QL: NEGATIVE MG/DL — SIGNIFICANT CHANGE UP
WBC # BLD: 16.49 K/UL — HIGH (ref 3.8–10.5)
WBC # FLD AUTO: 16.49 K/UL — HIGH (ref 3.8–10.5)
WBC UR QL: >50
WBC UR QL: ABNORMAL

## 2022-02-16 PROCEDURE — 99291 CRITICAL CARE FIRST HOUR: CPT

## 2022-02-16 PROCEDURE — 71045 X-RAY EXAM CHEST 1 VIEW: CPT | Mod: 26

## 2022-02-16 PROCEDURE — 76705 ECHO EXAM OF ABDOMEN: CPT | Mod: 26

## 2022-02-16 PROCEDURE — 36556 INSERT NON-TUNNEL CV CATH: CPT

## 2022-02-16 RX ORDER — AMIODARONE HYDROCHLORIDE 400 MG/1
1 TABLET ORAL
Qty: 900 | Refills: 0 | Status: ACTIVE | OUTPATIENT
Start: 2022-02-16 | End: 2022-02-16

## 2022-02-16 RX ORDER — AMIODARONE HYDROCHLORIDE 400 MG/1
0.5 TABLET ORAL
Qty: 900 | Refills: 0 | Status: ACTIVE | OUTPATIENT
Start: 2022-02-16 | End: 2022-02-17

## 2022-02-16 RX ORDER — CHLORHEXIDINE GLUCONATE 213 G/1000ML
1 SOLUTION TOPICAL
Refills: 0 | Status: DISCONTINUED | OUTPATIENT
Start: 2022-02-16 | End: 2022-03-01

## 2022-02-16 RX ORDER — DEXTROSE 50 % IN WATER 50 %
12.5 SYRINGE (ML) INTRAVENOUS ONCE
Refills: 0 | Status: DISCONTINUED | OUTPATIENT
Start: 2022-02-16 | End: 2022-02-16

## 2022-02-16 RX ORDER — HEPARIN SODIUM 5000 [USP'U]/ML
5000 INJECTION INTRAVENOUS; SUBCUTANEOUS EVERY 8 HOURS
Refills: 0 | Status: DISCONTINUED | OUTPATIENT
Start: 2022-02-16 | End: 2022-02-16

## 2022-02-16 RX ORDER — DEXTROSE 50 % IN WATER 50 %
25 SYRINGE (ML) INTRAVENOUS ONCE
Refills: 0 | Status: DISCONTINUED | OUTPATIENT
Start: 2022-02-16 | End: 2022-02-16

## 2022-02-16 RX ORDER — DEXTROSE 50 % IN WATER 50 %
15 SYRINGE (ML) INTRAVENOUS ONCE
Refills: 0 | Status: DISCONTINUED | OUTPATIENT
Start: 2022-02-16 | End: 2022-02-16

## 2022-02-16 RX ORDER — SODIUM CHLORIDE 9 MG/ML
1000 INJECTION, SOLUTION INTRAVENOUS
Refills: 0 | Status: DISCONTINUED | OUTPATIENT
Start: 2022-02-16 | End: 2022-02-16

## 2022-02-16 RX ORDER — SODIUM CHLORIDE 9 MG/ML
10 INJECTION INTRAMUSCULAR; INTRAVENOUS; SUBCUTANEOUS
Refills: 0 | Status: DISCONTINUED | OUTPATIENT
Start: 2022-02-16 | End: 2022-03-01

## 2022-02-16 RX ORDER — GLUCAGON INJECTION, SOLUTION 0.5 MG/.1ML
1 INJECTION, SOLUTION SUBCUTANEOUS ONCE
Refills: 0 | Status: DISCONTINUED | OUTPATIENT
Start: 2022-02-16 | End: 2022-02-16

## 2022-02-16 RX ORDER — SODIUM CHLORIDE 9 MG/ML
1000 INJECTION, SOLUTION INTRAVENOUS
Refills: 0 | Status: COMPLETED | OUTPATIENT
Start: 2022-02-16 | End: 2022-02-16

## 2022-02-16 RX ORDER — SODIUM CHLORIDE 9 MG/ML
1000 INJECTION, SOLUTION INTRAVENOUS ONCE
Refills: 0 | Status: COMPLETED | OUTPATIENT
Start: 2022-02-16 | End: 2022-02-16

## 2022-02-16 RX ORDER — GLIMEPIRIDE 1 MG
1 TABLET ORAL
Qty: 0 | Refills: 0 | DISCHARGE

## 2022-02-16 RX ORDER — INSULIN LISPRO 100/ML
VIAL (ML) SUBCUTANEOUS EVERY 6 HOURS
Refills: 0 | Status: DISCONTINUED | OUTPATIENT
Start: 2022-02-16 | End: 2022-02-18

## 2022-02-16 RX ADMIN — Medication 6: at 06:09

## 2022-02-16 RX ADMIN — SODIUM CHLORIDE 100 MILLILITER(S): 9 INJECTION, SOLUTION INTRAVENOUS at 11:03

## 2022-02-16 RX ADMIN — CEFTRIAXONE 100 MILLIGRAM(S): 500 INJECTION, POWDER, FOR SOLUTION INTRAMUSCULAR; INTRAVENOUS at 20:25

## 2022-02-16 RX ADMIN — Medication 9.36 MICROGRAM(S)/KG/MIN: at 11:24

## 2022-02-16 RX ADMIN — Medication 125 MEQ/KG/HR: at 05:06

## 2022-02-16 RX ADMIN — Medication 2: at 17:52

## 2022-02-16 RX ADMIN — AMIODARONE HYDROCHLORIDE 33.3 MG/MIN: 400 TABLET ORAL at 11:03

## 2022-02-16 RX ADMIN — AMIODARONE HYDROCHLORIDE 16.7 MG/MIN: 400 TABLET ORAL at 17:49

## 2022-02-16 RX ADMIN — CHLORHEXIDINE GLUCONATE 1 APPLICATION(S): 213 SOLUTION TOPICAL at 05:07

## 2022-02-16 RX ADMIN — Medication 4: at 11:22

## 2022-02-16 RX ADMIN — SODIUM CHLORIDE 1000 MILLILITER(S): 9 INJECTION, SOLUTION INTRAVENOUS at 10:20

## 2022-02-16 RX ADMIN — AZITHROMYCIN 255 MILLIGRAM(S): 500 TABLET, FILM COATED ORAL at 22:14

## 2022-02-16 RX ADMIN — SODIUM CHLORIDE 100 MILLILITER(S): 9 INJECTION, SOLUTION INTRAVENOUS at 20:25

## 2022-02-16 NOTE — PROCEDURAL SAFETY CHECKLIST WITH OR WITHOUT SEDATION - NSPREVERIFYSED_GEN_ALL_CORE
Cardiac w/u in hospital neg  Now on omeprazole as ? Of jaundice w/ pantoprazole  No complaints of CP voiced to family  No further cardiac w/u per cardiology  Will keep on PPI long term.         done

## 2022-02-16 NOTE — PROGRESS NOTE ADULT - SUBJECTIVE AND OBJECTIVE BOX
CHIEF COMPLAINT:    Interval Events:    REVIEW OF SYSTEMS:  Constitutional: [ ] fevers [ ] chills [ ] weight loss [ ] weight gain  HEENT: [ ] dry eyes [ ] eye irritation [ ] postnasal drip [ ] nasal congestion  CV: [ ] chest pain [ ] orthopnea [ ] palpitations [ ] murmur  Resp: [ ] cough [ ] shortness of breath [ ] dyspnea [ ] wheezing [ ] sputum [ ] hemoptysis  GI: [ ] nausea [ ] vomiting [ ] diarrhea [ ] constipation [ ] abd pain [ ] dysphagia   : [ ] dysuria [ ] nocturia [ ] hematuria [ ] increased urinary frequency  Musculoskeletal: [ ] back pain [ ] myalgias [ ] arthralgias [ ] fracture  Skin: [ ] rash [ ] itch  Neurological: [ ] headache [ ] dizziness [ ] syncope [ ] weakness [ ] numbness  Hematologic/Lymphatic: [ ] anemia [ ] bleeding problem  Allergic/Immunologic: [ ] itchy eyes [ ] nasal discharge [ ] hives [ ] angioedema  [ ] All other systems negative  [ ] Unable to assess ROS because ________    OBJECTIVE:  ICU Vital Signs Last 24 Hrs  T(C): 36.4 (2022 03:46), Max: 37 (2022 00:09)  T(F): 97.6 (2022 03:46), Max: 98.6 (2022 00:09)  HR: 129 (2022 07:15) (96 - 161)  BP: 109/63 (2022 07:00) (75/53 - 171/130)  BP(mean): 72 (2022 07:00) (53 - 90)  ABP: --  ABP(mean): --  RR: 21 (2022 07:15) (20 - 31)  SpO2: 97% (2022 07:15) (85% - 100%)        02-15 @ 07:01  -   @ 07:00  --------------------------------------------------------  IN: 790.5 mL / OUT: 1500 mL / NET: -709.5 mL      CAPILLARY BLOOD GLUCOSE      POCT Blood Glucose.: 253 mg/dL (2022 05:43)      PHYSICAL EXAM:  General:   HEENT:   Neck:   Respiratory:   Cardiovascular:   Abdomen:   Extremities:   Skin:   Neurological:  Psychiatry:    LINES:    HOSPITAL MEDICATIONS:  MEDICATIONS  (STANDING):  azithromycin  IVPB      azithromycin  IVPB 500 milliGRAM(s) IV Intermittent every 24 hours  cefTRIAXone   IVPB 1000 milliGRAM(s) IV Intermittent every 24 hours  chlorhexidine 2% Cloths 1 Application(s) Topical <User Schedule>  dextrose 40% Gel 15 Gram(s) Oral once  dextrose 5%. 1000 milliLiter(s) (50 mL/Hr) IV Continuous <Continuous>  dextrose 5%. 1000 milliLiter(s) (100 mL/Hr) IV Continuous <Continuous>  dextrose 50% Injectable 25 Gram(s) IV Push once  dextrose 50% Injectable 12.5 Gram(s) IV Push once  dextrose 50% Injectable 25 Gram(s) IV Push once  glucagon  Injectable 1 milliGRAM(s) IntraMuscular once  insulin lispro (ADMELOG) corrective regimen sliding scale   SubCutaneous every 6 hours  norepinephrine Infusion 0.05 MICROgram(s)/kG/Min (9.36 mL/Hr) IV Continuous <Continuous>  sodium bicarbonate  Infusion 0.075 mEq/kG/Hr (50 mL/Hr) IV Continuous <Continuous>    MEDICATIONS  (PRN):  sodium chloride 0.9% lock flush 10 milliLiter(s) IV Push every 1 hour PRN Pre/post blood products, medications, blood draw, and to maintain line patency      LABS:                        11.2   16.49 )-----------( 49       ( 2022 02:31 )             33.1     Hgb Trend: 11.2<--, 11.5<--, 12.2<--      136  |  104  |  149<H>  ----------------------------<  245<H>  3.9   |  18<L>  |  9.20<H>    Ca    7.1<L>      2022 02:31  Phos  4.1       Mg     2.9         TPro  5.2<L>  /  Alb  1.4<L>  /  TBili  0.8  /  DBili  x   /  AST  124<H>  /  ALT  48  /  AlkPhos  232<H>      PT/INR - ( 15 Feb 2022 14:18 )   PT: 20.4 sec;   INR: 1.81 ratio         PTT - ( 15 Feb 2022 14:18 )  PTT:41.1 sec  Urinalysis Basic - ( 2022 02:27 )    Color: Brown / Appearance: very cloudy / S.010 / pH: x  Gluc: x / Ketone: Negative  / Bili: Negative / Urobili: Negative mg/dL   Blood: x / Protein: 100 mg/dL / Nitrite: Positive   Leuk Esterase: Moderate / RBC: 6-10 /HPF / WBC >50   Sq Epi: x / Non Sq Epi: Occasional / Bacteria: TNTC      Arterial Blood Gas:   @ 05:19  --//16/99.6/-7.5  ABG lactate: --  Arterial Blood Gas:  02-15 @ 22:05  --/12/93.0/-13.2  ABG lactate: --        MICROBIOLOGY:     RADIOLOGY:  [ ] Reviewed and interpreted by me CHIEF COMPLAINT:    Interval Events:  s/p HD x1 overnight    REVIEW OF SYSTEMS:  [x ] Unable to assess ROS because somewhat confused/uremic    OBJECTIVE:  ICU Vital Signs Last 24 Hrs  T(C): 36.4 (2022 03:46), Max: 37 (2022 00:09)  T(F): 97.6 (2022 03:46), Max: 98.6 (2022 00:09)  HR: 129 (2022 07:15) (96 - 161)  BP: 109/63 (2022 07:00) (75/53 - 171/130)  BP(mean): 72 (2022 07:00) (53 - 90)  ABP: --  ABP(mean): --  RR: 21 (2022 07:15) (20 - 31)  SpO2: 97% (2022 07:15) (85% - 100%)        -15 @ 07:01  -  -16 @ 07:00  --------------------------------------------------------  IN: 790.5 mL / OUT: 1500 mL / NET: -709.5 mL      CAPILLARY BLOOD GLUCOSE      POCT Blood Glucose.: 253 mg/dL (2022 05:43)      PHYSICAL EXAM:  General: NAD, non toxic appearing  HEENT: dry MM, EOMI  Neck: supple  Respiratory: CTA b/l  Cardiovascular: s1s2 irregular tachycardic  Abdomen: soft, non tender, non distended  Extremities: warm, no edema or clubbing  Skin: intact  Neurological: moves all extremities  Psychiatry: answers to his name, but sleepy    LINES:  [facility] HD catheter     HOSPITAL MEDICATIONS:  MEDICATIONS  (STANDING):  azithromycin  IVPB      azithromycin  IVPB 500 milliGRAM(s) IV Intermittent every 24 hours  cefTRIAXone   IVPB 1000 milliGRAM(s) IV Intermittent every 24 hours  chlorhexidine 2% Cloths 1 Application(s) Topical <User Schedule>  dextrose 40% Gel 15 Gram(s) Oral once  dextrose 5%. 1000 milliLiter(s) (50 mL/Hr) IV Continuous <Continuous>  dextrose 5%. 1000 milliLiter(s) (100 mL/Hr) IV Continuous <Continuous>  dextrose 50% Injectable 25 Gram(s) IV Push once  dextrose 50% Injectable 12.5 Gram(s) IV Push once  dextrose 50% Injectable 25 Gram(s) IV Push once  glucagon  Injectable 1 milliGRAM(s) IntraMuscular once  insulin lispro (ADMELOG) corrective regimen sliding scale   SubCutaneous every 6 hours  norepinephrine Infusion 0.05 MICROgram(s)/kG/Min (9.36 mL/Hr) IV Continuous <Continuous>  sodium bicarbonate  Infusion 0.075 mEq/kG/Hr (50 mL/Hr) IV Continuous <Continuous>    MEDICATIONS  (PRN):  sodium chloride 0.9% lock flush 10 milliLiter(s) IV Push every 1 hour PRN Pre/post blood products, medications, blood draw, and to maintain line patency      LABS:                        11.2   16.49 )-----------( 49       ( 2022 02:31 )             33.1     Hgb Trend: 11.2<--, 11.5<--, 12.2<--      136  |  104  |  149<H>  ----------------------------<  245<H>  3.9   |  18<L>  |  9.20<H>    Ca    7.1<L>      2022 02:31  Phos  4.1       Mg     2.9         TPro  5.2<L>  /  Alb  1.4<L>  /  TBili  0.8  /  DBili  x   /  AST  124<H>  /  ALT  48  /  AlkPhos  232<H>  16    PT/INR - ( 15 Feb 2022 14:18 )   PT: 20.4 sec;   INR: 1.81 ratio         PTT - ( 15 Feb 2022 14:18 )  PTT:41.1 sec  Urinalysis Basic - ( 2022 02:27 )    Color: Brown / Appearance: very cloudy / S.010 / pH: x  Gluc: x / Ketone: Negative  / Bili: Negative / Urobili: Negative mg/dL   Blood: x / Protein: 100 mg/dL / Nitrite: Positive   Leuk Esterase: Moderate / RBC: 6-10 /HPF / WBC >50   Sq Epi: x / Non Sq Epi: Occasional / Bacteria: TNTC      Arterial Blood Gas:   @ 05:19  --/16/99.6/-7.5  ABG lactate: --  Arterial Blood Gas:  02-15 @ 22:05  --//93.0/-13.2  ABG lactate: --        MICROBIOLOGY:   Culture - Blood (02.15.22 @ 19:01)    Gram Stain:   Growth in aerobic and anaerobic bottles: Gram Negative Rods    Specimen Source: .Blood Blood-Peripheral    Culture Results:   Growth in aerobic and anaerobic bottles: Gram Negative Rods          RADIOLOGY:  [ ] Reviewed and interpreted by me

## 2022-02-16 NOTE — PROGRESS NOTE ADULT - SUBJECTIVE AND OBJECTIVE BOX
[facility] NEPHROLOGY SERVICES, [facility]  NEPHROLOGY AND HYPERTENSION  [address]  [address]  [place] [address]  [phone]    MD ANU OH, MD FIONA SCHMIDT, MD MARIBELL ESQUEDA, MD GRACIE TIWARI, MD ALLA GUEVARA MD          Patient events noted  No distress  Post HD overnight       MEDICATIONS  (STANDING):  aMIOdarone Infusion 1 mG/Min (33.3 mL/Hr) IV Continuous <Continuous>  aMIOdarone Infusion 0.5 mG/Min (16.7 mL/Hr) IV Continuous <Continuous>  azithromycin  IVPB      azithromycin  IVPB 500 milliGRAM(s) IV Intermittent every 24 hours  cefTRIAXone   IVPB 1000 milliGRAM(s) IV Intermittent every 24 hours  chlorhexidine 2% Cloths 1 Application(s) Topical <User Schedule>  insulin lispro (ADMELOG) corrective regimen sliding scale   SubCutaneous every 6 hours  lactated ringers. 1000 milliLiter(s) (100 mL/Hr) IV Continuous <Continuous>  norepinephrine Infusion 0.05 MICROgram(s)/kG/Min (9.36 mL/Hr) IV Continuous <Continuous>    MEDICATIONS  (PRN):  sodium chloride 0.9% lock flush 10 milliLiter(s) IV Push every 1 hour PRN Pre/post blood products, medications, blood draw, and to maintain line patency      02-15-22 @ 07:01  -  02-16-22 @ 07:00  --------------------------------------------------------  IN: 860.8 mL / OUT: 1500 mL / NET: -639.2 mL    02-16-22 @ 07:01  -  02-16-22 @ 18:06  --------------------------------------------------------  IN: 2312 mL / OUT: 710 mL / NET: 1602 mL      PHYSICAL EXAM:      T(C): 36 (02-16-22 @ 16:27), Max: 37 (02-16-22 @ 00:09)  HR: 112 (02-16-22 @ 17:00) (96 - 161)  BP: 101/63 (02-16-22 @ 17:00) (75/53 - 117/70)  RR: 23 (02-16-22 @ 17:00) (19 - 31)  SpO2: 94% (02-16-22 @ 17:00) (85% - 100%)  Wt(kg): --  Lungs clear  Heart S1S2  Abd soft NT ND  Extremities:   tr edema                                    11.2   16.49 )-----------( 49       ( 16 Feb 2022 02:31 )             33.1     02-16    137  |  104  |  148<H>  ----------------------------<  257<H>  4.0   |  20<L>  |  8.73<H>    Ca    7.1<L>      16 Feb 2022 10:40  Phos  4.6     02-16  Mg     2.6     02-16    TPro  5.4<L>  /  Alb  1.5<L>  /  TBili  0.9  /  DBili  x   /  AST  146<H>  /  ALT  56  /  AlkPhos  185<H>  02-16    ABG - ( 16 Feb 2022 05:19 )  pH, Arterial: x     pH, Blood: 7.40  /  pCO2: 25    /  pO2: 142   / HCO3: 16    / Base Excess: -7.5  /  SaO2: 99.6              LIVER FUNCTIONS - ( 16 Feb 2022 10:40 )  Alb: 1.5 g/dL / Pro: 5.4 gm/dL / ALK PHOS: 185 U/L / ALT: 56 U/L / AST: 146 U/L / GGT: x           Creatinine Trend: 8.73<--, 9.20<--, 11.80<--, 12.00<--, 12.90<--      Assessment   RADHA pre renal azotemia; ischemic ATN  Nonoliguric;     Plan:  Follow off HD  IVF -150 ml hr  Decide on HD pending course;     Severino Lynn MD

## 2022-02-16 NOTE — PATIENT PROFILE ADULT - FALL HARM RISK - HARM RISK INTERVENTIONS

## 2022-02-16 NOTE — CHART NOTE - NSCHARTNOTEFT_GEN_A_CORE
: Kathy    INDICATION: shock state    PROCEDURE:  [x ] LIMITED ECHO  [ x] LIMITED CHEST  [ ] LIMITED RETROPERITONEAL  [ ] LIMITED ABDOMINAL  [ ] LIMITED DVT  [ ] NEEDLE GUIDANCE VASCULAR  [ ] NEEDLE GUIDANCE THORACENTESIS  [ ] NEEDLE GUIDANCE PARACENTESIS  [ ] NEEDLE GUIDANCE PERICARDIOCENTESIS  [ ] OTHER    FINDINGS:  Limited chest- A lines anteriorly, ?small consolidation b/l bases, no effusions  Limited TTE - very difficult windows to obtain, LV larger than RV, IVC virtual       INTERPRETATION:  unable to assess LV function on this POCUS, IVC remains very small / virtual - will give additional IVF and titrate down on pressors

## 2022-02-17 LAB
A1C WITH ESTIMATED AVERAGE GLUCOSE RESULT: 7.6 % — HIGH (ref 4–5.6)
ALBUMIN SERPL ELPH-MCNC: 1.3 G/DL — LOW (ref 3.3–5)
ALP SERPL-CCNC: 143 U/L — HIGH (ref 40–120)
ALT FLD-CCNC: 43 U/L — SIGNIFICANT CHANGE UP (ref 12–78)
ANION GAP SERPL CALC-SCNC: 11 MMOL/L — SIGNIFICANT CHANGE UP (ref 5–17)
ANION GAP SERPL CALC-SCNC: 11 MMOL/L — SIGNIFICANT CHANGE UP (ref 5–17)
APTT BLD: 29.4 SEC — SIGNIFICANT CHANGE UP (ref 27.5–35.5)
APTT BLD: 65 SEC — HIGH (ref 27.5–35.5)
APTT BLD: 71.5 SEC — HIGH (ref 27.5–35.5)
AST SERPL-CCNC: 93 U/L — HIGH (ref 15–37)
BILIRUB SERPL-MCNC: 0.7 MG/DL — SIGNIFICANT CHANGE UP (ref 0.2–1.2)
BUN SERPL-MCNC: 146 MG/DL — HIGH (ref 7–23)
BUN SERPL-MCNC: 148 MG/DL — HIGH (ref 7–23)
CALCIUM SERPL-MCNC: 7.2 MG/DL — LOW (ref 8.5–10.1)
CALCIUM SERPL-MCNC: 7.3 MG/DL — LOW (ref 8.5–10.1)
CHLORIDE SERPL-SCNC: 106 MMOL/L — SIGNIFICANT CHANGE UP (ref 96–108)
CHLORIDE SERPL-SCNC: 107 MMOL/L — SIGNIFICANT CHANGE UP (ref 96–108)
CO2 SERPL-SCNC: 21 MMOL/L — LOW (ref 22–31)
CO2 SERPL-SCNC: 22 MMOL/L — SIGNIFICANT CHANGE UP (ref 22–31)
CREAT ?TM UR-MCNC: 133 MG/DL — SIGNIFICANT CHANGE UP
CREAT SERPL-MCNC: 6.46 MG/DL — HIGH (ref 0.5–1.3)
CREAT SERPL-MCNC: 7.18 MG/DL — HIGH (ref 0.5–1.3)
CREATININE, URINE RESULT: 133 MG/DL — SIGNIFICANT CHANGE UP
ESTIMATED AVERAGE GLUCOSE: 171 MG/DL — HIGH (ref 68–114)
GLUCOSE BLDC GLUCOMTR-MCNC: 160 MG/DL — HIGH (ref 70–99)
GLUCOSE BLDC GLUCOMTR-MCNC: 177 MG/DL — HIGH (ref 70–99)
GLUCOSE BLDC GLUCOMTR-MCNC: 178 MG/DL — HIGH (ref 70–99)
GLUCOSE BLDC GLUCOMTR-MCNC: 180 MG/DL — HIGH (ref 70–99)
GLUCOSE BLDC GLUCOMTR-MCNC: 187 MG/DL — HIGH (ref 70–99)
GLUCOSE SERPL-MCNC: 175 MG/DL — HIGH (ref 70–99)
GLUCOSE SERPL-MCNC: 214 MG/DL — HIGH (ref 70–99)
GRAM STN FLD: SIGNIFICANT CHANGE UP
HCT VFR BLD CALC: 25.4 % — LOW (ref 39–50)
HCT VFR BLD CALC: 29.4 % — LOW (ref 39–50)
HGB BLD-MCNC: 8.7 G/DL — LOW (ref 13–17)
HGB BLD-MCNC: 9.9 G/DL — LOW (ref 13–17)
MAGNESIUM SERPL-MCNC: 3.2 MG/DL — HIGH (ref 1.6–2.6)
MCHC RBC-ENTMCNC: 30.5 PG — SIGNIFICANT CHANGE UP (ref 27–34)
MCHC RBC-ENTMCNC: 30.6 PG — SIGNIFICANT CHANGE UP (ref 27–34)
MCHC RBC-ENTMCNC: 33.7 G/DL — SIGNIFICANT CHANGE UP (ref 32–36)
MCHC RBC-ENTMCNC: 34.3 G/DL — SIGNIFICANT CHANGE UP (ref 32–36)
MCV RBC AUTO: 89.4 FL — SIGNIFICANT CHANGE UP (ref 80–100)
MCV RBC AUTO: 90.5 FL — SIGNIFICANT CHANGE UP (ref 80–100)
MYELOPEROXIDASE AB SER-ACNC: <5 UNITS — SIGNIFICANT CHANGE UP
MYELOPEROXIDASE CELLS FLD QL: NEGATIVE — SIGNIFICANT CHANGE UP
NRBC # BLD: 0 /100 WBCS — SIGNIFICANT CHANGE UP (ref 0–0)
NRBC # BLD: 0 /100 WBCS — SIGNIFICANT CHANGE UP (ref 0–0)
PHOSPHATE SERPL-MCNC: 4.9 MG/DL — HIGH (ref 2.5–4.5)
PLATELET # BLD AUTO: 74 K/UL — LOW (ref 150–400)
PLATELET # BLD AUTO: 80 K/UL — LOW (ref 150–400)
POTASSIUM SERPL-MCNC: 3.7 MMOL/L — SIGNIFICANT CHANGE UP (ref 3.5–5.3)
POTASSIUM SERPL-MCNC: 4.4 MMOL/L — SIGNIFICANT CHANGE UP (ref 3.5–5.3)
POTASSIUM SERPL-SCNC: 3.7 MMOL/L — SIGNIFICANT CHANGE UP (ref 3.5–5.3)
POTASSIUM SERPL-SCNC: 4.4 MMOL/L — SIGNIFICANT CHANGE UP (ref 3.5–5.3)
PROT SERPL-MCNC: 5 GM/DL — LOW (ref 6–8.3)
PROTEINASE3 AB FLD-ACNC: <5 UNITS — SIGNIFICANT CHANGE UP
PROTEINASE3 AB SER-ACNC: NEGATIVE — SIGNIFICANT CHANGE UP
RBC # BLD: 2.84 M/UL — LOW (ref 4.2–5.8)
RBC # BLD: 3.25 M/UL — LOW (ref 4.2–5.8)
RBC # FLD: 13.9 % — SIGNIFICANT CHANGE UP (ref 10.3–14.5)
RBC # FLD: 14 % — SIGNIFICANT CHANGE UP (ref 10.3–14.5)
SODIUM SERPL-SCNC: 139 MMOL/L — SIGNIFICANT CHANGE UP (ref 135–145)
SODIUM SERPL-SCNC: 139 MMOL/L — SIGNIFICANT CHANGE UP (ref 135–145)
SPECIMEN SOURCE: SIGNIFICANT CHANGE UP
WBC # BLD: 20.27 K/UL — HIGH (ref 3.8–10.5)
WBC # BLD: 25.84 K/UL — HIGH (ref 3.8–10.5)
WBC # FLD AUTO: 20.27 K/UL — HIGH (ref 3.8–10.5)
WBC # FLD AUTO: 25.84 K/UL — HIGH (ref 3.8–10.5)

## 2022-02-17 PROCEDURE — 93970 EXTREMITY STUDY: CPT | Mod: 26

## 2022-02-17 PROCEDURE — 99291 CRITICAL CARE FIRST HOUR: CPT

## 2022-02-17 RX ORDER — CEFEPIME 1 G/1
1000 INJECTION, POWDER, FOR SOLUTION INTRAMUSCULAR; INTRAVENOUS ONCE
Refills: 0 | Status: COMPLETED | OUTPATIENT
Start: 2022-02-17 | End: 2022-02-17

## 2022-02-17 RX ORDER — CEFEPIME 1 G/1
INJECTION, POWDER, FOR SOLUTION INTRAMUSCULAR; INTRAVENOUS
Refills: 0 | Status: DISCONTINUED | OUTPATIENT
Start: 2022-02-17 | End: 2022-02-21

## 2022-02-17 RX ORDER — HEPARIN SODIUM 5000 [USP'U]/ML
INJECTION INTRAVENOUS; SUBCUTANEOUS
Qty: 25000 | Refills: 0 | Status: DISCONTINUED | OUTPATIENT
Start: 2022-02-17 | End: 2022-02-19

## 2022-02-17 RX ORDER — SODIUM CHLORIDE 9 MG/ML
1000 INJECTION, SOLUTION INTRAVENOUS
Refills: 0 | Status: DISCONTINUED | OUTPATIENT
Start: 2022-02-17 | End: 2022-02-21

## 2022-02-17 RX ORDER — CEFEPIME 1 G/1
1000 INJECTION, POWDER, FOR SOLUTION INTRAMUSCULAR; INTRAVENOUS EVERY 24 HOURS
Refills: 0 | Status: DISCONTINUED | OUTPATIENT
Start: 2022-02-18 | End: 2022-02-21

## 2022-02-17 RX ORDER — SODIUM CHLORIDE 9 MG/ML
1000 INJECTION, SOLUTION INTRAVENOUS
Refills: 0 | Status: ACTIVE | OUTPATIENT
Start: 2022-02-17 | End: 2023-01-16

## 2022-02-17 RX ADMIN — Medication 2: at 06:10

## 2022-02-17 RX ADMIN — Medication 2: at 00:20

## 2022-02-17 RX ADMIN — HEPARIN SODIUM 1900 UNIT(S)/HR: 5000 INJECTION INTRAVENOUS; SUBCUTANEOUS at 17:15

## 2022-02-17 RX ADMIN — AZITHROMYCIN 255 MILLIGRAM(S): 500 TABLET, FILM COATED ORAL at 21:46

## 2022-02-17 RX ADMIN — CEFEPIME 100 MILLIGRAM(S): 1 INJECTION, POWDER, FOR SOLUTION INTRAMUSCULAR; INTRAVENOUS at 20:17

## 2022-02-17 RX ADMIN — CHLORHEXIDINE GLUCONATE 1 APPLICATION(S): 213 SOLUTION TOPICAL at 06:02

## 2022-02-17 RX ADMIN — SODIUM CHLORIDE 100 MILLILITER(S): 9 INJECTION, SOLUTION INTRAVENOUS at 12:23

## 2022-02-17 RX ADMIN — SODIUM CHLORIDE 100 MILLILITER(S): 9 INJECTION, SOLUTION INTRAVENOUS at 06:07

## 2022-02-17 RX ADMIN — HEPARIN SODIUM 1900 UNIT(S)/HR: 5000 INJECTION INTRAVENOUS; SUBCUTANEOUS at 10:04

## 2022-02-17 RX ADMIN — Medication 2: at 12:23

## 2022-02-17 NOTE — PROGRESS NOTE ADULT - SUBJECTIVE AND OBJECTIVE BOX
Patient is a 81y old  Male who presents with a chief complaint of diarrhea and weakness x 2 weeks.       BRIEF HOSPITAL COURSE: ***  80 yo male with PHX: HTN, HLD, bladder tumor presents to ED on 2/15/22 C/O diarrhea and weakness x 2 weeks. Found to be in Septic shock, ARF, metabolic acidosis with encephelopathy, new onset AFIB with RVR. Admitted to ICU S/P 1 HD session 22 with mental status mildly improving.    Events last 24 hours: ***  S/P initial HD session 22 mental status mildly improving, Renal function improving; producing urine.         Review of Systems:  CONSTITUTIONAL: No fever, chills, + Fatigue  EYES: No eye pain, visual disturbances, or discharge  ENMT:  + dry mouth, +difficulty hearing. No tinnitus,no throat pain.   NECK: No pain or stiffness  RESPIRATORY: No cough, wheezing, chills or hemoptysis; No shortness of breath  CARDIOVASCULAR: No chest pain, palpitations, dizziness, or leg swelling  GASTROINTESTINAL: No abdominal pain. No nausea, vomiting, or hematemesis; No diarrhea or constipation. No melena or hematochezia.  GENITOURINARY: No dysuria  NEUROLOGICAL: No headaches, memory loss, loss of strength, numbness, or tremors  SKIN: No itching, burning, rashes, or lesions   MUSCULOSKELETAL: No joint pain or swelling; No muscle, back, or extremity pain  PSYCHIATRIC: No depression, anxiety, mood swings, or difficulty sleeping      Medications:  azithromycin  IVPB      azithromycin  IVPB 500 milliGRAM(s) IV Intermittent every 24 hours  cefTRIAXone   IVPB 1000 milliGRAM(s) IV Intermittent every 24 hours    aMIOdarone Infusion 1 mG/Min IV Continuous <Continuous>  aMIOdarone Infusion 0.5 mG/Min IV Continuous <Continuous>  norepinephrine Infusion 0.05 MICROgram(s)/kG/Min IV Continuous <Continuous>          heparin  Infusion.  Unit(s)/Hr IV Continuous <Continuous>        insulin lispro (ADMELOG) corrective regimen sliding scale   SubCutaneous every 6 hours    lactated ringers. 1000 milliLiter(s) IV Continuous <Continuous>  sodium chloride 0.9% lock flush 10 milliLiter(s) IV Push every 1 hour PRN      chlorhexidine 2% Cloths 1 Application(s) Topical <User Schedule>            ICU Vital Signs Last 24 Hrs  T(C): 35.9 (2022 05:00), Max: 36.1 (2022 12:00)  T(F): 96.6 (2022 05:00), Max: 96.9 (2022 12:00)  HR: 81 (2022 07:00) (78 - 139)  BP: 98/56 (2022 07:00) (90/69 - 119/60)  BP(mean): 66 (2022 07:00) (58 - 81)  ABP: --  ABP(mean): --  RR: 24 (2022 07:00) (17 - 28)  SpO2: 98% (2022 07:00) (83% - 100%)      ABG - ( 2022 05:19 )  pH, Arterial: x     pH, Blood: 7.40  /  pCO2: 25    /  pO2: 142   / HCO3: 16    / Base Excess: -7.5  /  SaO2: 99.6                I&O's Detail    2022 07:01  -  2022 07:00  --------------------------------------------------------  IN:    Amiodarone: 233.1 mL    Amiodarone: 233.8 mL    IV PiggyBack: 300 mL    Lactated Ringers: 2100 mL    Lactated Ringers Bolus: 1000 mL    Norepinephrine: 289.5 mL    Sodium Bicarbonate: 50 mL  Total IN: 4206.4 mL    OUT:    Indwelling Catheter - Urethral (mL): 1100 mL    Voided (mL): 825 mL  Total OUT: 1925 mL    Total NET: 2281.4 mL            LABS:                        9.9    25.84 )-----------( 74       ( 2022 05:39 )             29.4     02-    139  |  107  |  146<H>  ----------------------------<  175<H>  4.4   |  21<L>  |  7.18<H>    Ca    7.3<L>      2022 05:39  Phos  4.9       Mg     3.2         TPro  5.0<L>  /  Alb  1.3<L>  /  TBili  0.7  /  DBili  x   /  AST  93<H>  /  ALT  43  /  AlkPhos  143<H>            CAPILLARY BLOOD GLUCOSE      POCT Blood Glucose.: 178 mg/dL (2022 06:04)    PT/INR - ( 15 Feb 2022 14:18 )   PT: 20.4 sec;   INR: 1.81 ratio         PTT - ( 2022 10:56 )  PTT:29.4 sec  Urinalysis Basic - ( 2022 02:27 )    Color: Brown / Appearance: very cloudy / S.010 / pH: x  Gluc: x / Ketone: Negative  / Bili: Negative / Urobili: Negative mg/dL   Blood: x / Protein: 100 mg/dL / Nitrite: Positive   Leuk Esterase: Moderate / RBC: 6-10 /HPF / WBC >50   Sq Epi: x / Non Sq Epi: Occasional / Bacteria: TNTC      CULTURES:  Culture Results:   Growth in aerobic and anaerobic bottles: Klebsiella oxytoca/Raoutella  ornithinolytica  ***Blood Panel PCR results on this specimen are available  approximately 3 hours after the Gram stain result.***  Gram stain, PCR, and/or culture results may not always  correspond due to difference in methodologies.  ************************************************************  This PCR assay was performed by multiplex PCR. This  Assay tests for 66 bacterial and resistance gene targets.  Please refer to [facility] test directory  at [email or web address] for details. (02-15 @ 19:01)  Culture Results:   Growth in aerobic and anaerobic bottles: Klebsiella oxytoca/Raoutella  ornithinolytica  See previous culture [id number] (02-15 @ 19:01)  Rapid RVP Result: NotDetec (02-15 @ 14:34)      Physical Examination:    General: No acute distress.  Alert, oriented, interactive, nonfocal    HEENT: Pupils equal, reactive to light.  Symmetric.    PULM: Clear to auscultation bilaterally, no significant sputum production    CVS: Regular rate and rhythm, no murmurs, rubs, or gallops    ABD: Soft, nondistended, nontender, normoactive bowel sounds, no masses    EXT: No edema, nontender    SKIN: Warm and well perfused, no rashes noted.    RADIOLOGY: ***    CRITICAL CARE TIME SPENT: ***   Patient is a 81y old  Male who presents with a chief complaint of diarrhea and weakness x 2 weeks.       BRIEF HOSPITAL COURSE: ***  80 yo male with PHX: HTN, HLD, bladder tumor presents to ED on 2/15/22 C/O diarrhea and weakness x 2 weeks. Found to be in Septic shock, ARF, metabolic acidosis with encephelopathy, new onset AFIB with RVR. Admitted to ICU S/P 1 HD session 22 with mental status mildly improving.    Events last 24 hours: ***  S/P initial HD session 22 mental status mildly improving, Renal function improving; producing urine.         Review of Systems:  CONSTITUTIONAL: No fever, chills, + Fatigue  EYES: No eye pain, visual disturbances, or discharge  ENMT:  + dry mouth, +difficulty hearing. No tinnitus,no throat pain.   NECK: No pain or stiffness  RESPIRATORY: No cough, wheezing, chills or hemoptysis; No shortness of breath  CARDIOVASCULAR: No chest pain, palpitations, dizziness, or leg swelling  GASTROINTESTINAL: No abdominal pain. No nausea, vomiting, or hematemesis; No diarrhea or constipation. No melena or hematochezia.  GENITOURINARY: No dysuria  NEUROLOGICAL: + memory loss. No headaches, decreased strength or sensation  SKIN: No itching, burning, rashes, or lesions   MUSCULOSKELETAL: No joint pain or swelling; No muscle, back, or extremity pain  PSYCHIATRIC: No depression, anxiety, mood swings, or difficulty sleeping                ICU Vital Signs Last 24 Hrs  T(C): 35.9 (2022 05:00), Max: 36.1 (2022 12:00)  T(F): 96.6 (2022 05:00), Max: 96.9 (2022 12:00)  HR: 81 (2022 07:00) (78 - 139)  BP: 98/56 (2022 07:00) (90/69 - 119/60)  BP(mean): 66 (2022 07:00) (58 - 81)  ABP: --  ABP(mean): --  RR: 24 (2022 07:00) (17 - 28)  SpO2: 98% (2022 07:00) (83% - 100%)      ABG - ( 2022 05:19 )  pH, Arterial: x     pH, Blood: 7.40  /  pCO2: 25    /  pO2: 142   / HCO3: 16    / Base Excess: -7.5  /  SaO2: 99.6                I&O's Detail    2022 07:01  -  2022 07:00  --------------------------------------------------------  IN:    Amiodarone: 233.1 mL    Amiodarone: 233.8 mL    IV PiggyBack: 300 mL    Lactated Ringers: 2100 mL    Lactated Ringers Bolus: 1000 mL    Norepinephrine: 289.5 mL    Sodium Bicarbonate: 50 mL  Total IN: 4206.4 mL    OUT:    Indwelling Catheter - Urethral (mL): 1100 mL    Voided (mL): 825 mL  Total OUT: 1925 mL    Total NET: 2281.4 mL            LABS:                        9.9    25.84 )-----------( 74       ( 2022 05:39 )             29.4     02-17    139  |  107  |  146<H>  ----------------------------<  175<H>  4.4   |  21<L>  |  7.18<H>    Ca    7.3<L>      2022 05:39  Phos  4.9       Mg     3.2         TPro  5.0<L>  /  Alb  1.3<L>  /  TBili  0.7  /  DBili  x   /  AST  93<H>  /  ALT  43  /  AlkPhos  143<H>  02-17          CAPILLARY BLOOD GLUCOSE      POCT Blood Glucose.: 178 mg/dL (2022 06:04)    PT/INR - ( 15 Feb 2022 14:18 )   PT: 20.4 sec;   INR: 1.81 ratio         PTT - ( 2022 10:56 )  PTT:29.4 sec  Urinalysis Basic - ( 2022 02:27 )    Color: Brown / Appearance: very cloudy / S.010 / pH: x  Gluc: x / Ketone: Negative  / Bili: Negative / Urobili: Negative mg/dL   Blood: x / Protein: 100 mg/dL / Nitrite: Positive   Leuk Esterase: Moderate / RBC: 6-10 /HPF / WBC >50   Sq Epi: x / Non Sq Epi: Occasional / Bacteria: TNTC      CULTURES:  Culture Results:   Growth in aerobic and anaerobic bottles: Klebsiella oxytoca/Raoutella  ornithinolytica  ***Blood Panel PCR results on this specimen are available  approximately 3 hours after the Gram stain result.***  Gram stain, PCR, and/or culture results may not always  correspond due to difference in methodologies.  ************************************************************  This PCR assay was performed by multiplex PCR. This  Assay tests for 66 bacterial and resistance gene targets.  Please refer to [facility] test directory  at [email or web address] for details. (02-15 @ 19:01)  Culture Results:   Growth in aerobic and anaerobic bottles: Klebsiella oxytoca/Raoutella  ornithinolytica  See previous culture [id number] (02-15 @ 19:01)  Rapid RVP Result: NotDetec (02-15 @ 14:34)      Physical Examination:    General: No acute distress.  Alert, oriented, interactive, nonfocal    HEENT: Pupils equal, reactive to light.  Symmetric.    PULM: Clear to auscultation bilaterally, no significant sputum production    CVS: Regular rate and rhythm, no murmurs, rubs, or gallops    ABD: Soft, nondistended, nontender, normoactive bowel sounds, no masses    EXT: No edema, nontender    SKIN: Warm and well perfused, no rashes noted.    RADIOLOGY: ***    Medications:  azithromycin  IVPB      azithromycin  IVPB 500 milliGRAM(s) IV Intermittent every 24 hours  cefTRIAXone   IVPB 1000 milliGRAM(s) IV Intermittent every 24 hours    aMIOdarone Infusion 1 mG/Min IV Continuous <Continuous>  aMIOdarone Infusion 0.5 mG/Min IV Continuous <Continuous>  norepinephrine Infusion 0.05 MICROgram(s)/kG/Min IV Continuous <Continuous>          heparin  Infusion.  Unit(s)/Hr IV Continuous <Continuous>        insulin lispro (ADMELOG) corrective regimen sliding scale   SubCutaneous every 6 hours    lactated ringers. 1000 milliLiter(s) IV Continuous <Continuous>  sodium chloride 0.9% lock flush 10 milliLiter(s) IV Push every 1 hour PRN      chlorhexidine 2% Cloths 1 Application(s) Topical <User Schedule>     Patient is a 81y old  Male who presents with a chief complaint of diarrhea and weakness x 2 weeks.       BRIEF HOSPITAL COURSE: ***  80 yo male with PHX: HTN, HLD, bladder tumor presents to ED on 2/15/22 C/O diarrhea and weakness x 2 weeks. Found to be in Septic shock, ARF, metabolic acidosis with encephelopathy, new onset AFIB with RVR. Admitted to ICU S/P 1 HD session 22 with mental status mildly improving.    Events last 24 hours: ***  S/P initial HD session 22 mental status mildly improving, Renal function improving; producing urine.         Review of Systems:  CONSTITUTIONAL: No fever, chills, + Fatigue  EYES: No eye pain, visual disturbances, or discharge  ENMT:  + dry mouth, +difficulty hearing. No tinnitus,no throat pain.   NECK: No pain or stiffness  RESPIRATORY: No cough, wheezing, chills or hemoptysis; No shortness of breath  CARDIOVASCULAR: No chest pain, palpitations, dizziness, or leg swelling  GASTROINTESTINAL: No abdominal pain. No nausea, vomiting, or hematemesis; No diarrhea or constipation. No melena or hematochezia.  GENITOURINARY: No dysuria  NEUROLOGICAL: + memory loss. No headaches, decreased strength or sensation  SKIN: No itching, burning, rashes, or lesions   MUSCULOSKELETAL: No joint pain or swelling; No muscle, back, or extremity pain  PSYCHIATRIC: No depression, anxiety, mood swings, or difficulty sleeping          ICU Vital Signs Last 24 Hrs  T(C): 35.9 (2022 05:00), Max: 36.1 (2022 12:00)  T(F): 96.6 (2022 05:00), Max: 96.9 (2022 12:00)  HR: 81 (2022 07:00) (78 - 139)  BP: 98/56 (2022 07:00) (90/69 - 119/60)  BP(mean): 66 (2022 07:00) (58 - 81)  ABP: --  ABP(mean): --  RR: 24 (2022 07:00) (17 - 28)  SpO2: 98% (2022 07:00) (83% - 100%)      PHYSICAL EXAM:  General: NAD, non toxic appearing  HEENT: dry oral mucosa, PERRLA, EOM intact  Neck: supple, Right IJ Shiley   Respiratory: CTA b/l with no adventitious sounds  Cardiovascular: RRR, S1/S2   Abdomen: soft, non tender, non distended  Extremities: warm, no edema or clubbing  Skin: intact  Neurological: moves all extremities  Psychiatry: answers to his name, but sleepy        LABS:                        9.9    25.84 )-----------( 74       ( 2022 05:39 )             29.4     02-    139  |  107  |  146<H>  ----------------------------<  175<H>  4.4   |  21<L>  |  7.18<H>    Ca    7.3<L>      2022 05:39  Phos  4.9       Mg     3.2         TPro  5.0<L>  /  Alb  1.3<L>  /  TBili  0.7  /  DBili  x   /  AST  93<H>  /  ALT  43  /  AlkPhos  143<H>      ABG - ( 2022 05:19 )  pH, Arterial: x     pH, Blood: 7.40  /  pCO2: 25    /  pO2: 142   / HCO3: 16    / Base Excess: -7.5  /  SaO2: 99.6      CAPILLARY BLOOD GLUCOSE      POCT Blood Glucose.: 178 mg/dL (2022 06:04)    PT/INR - ( 15 Feb 2022 14:18 )   PT: 20.4 sec;   INR: 1.81 ratio         PTT - ( 2022 10:56 )  PTT:29.4 sec  Urinalysis Basic - ( 2022 02:27 )    Color: Brown / Appearance: very cloudy / S.010 / pH: x  Gluc: x / Ketone: Negative  / Bili: Negative / Urobili: Negative mg/dL   Blood: x / Protein: 100 mg/dL / Nitrite: Positive   Leuk Esterase: Moderate / RBC: 6-10 /HPF / WBC >50   Sq Epi: x / Non Sq Epi: Occasional / Bacteria: TNTC      CULTURES:  Culture Results:   Growth in aerobic and anaerobic bottles: Klebsiella oxytoca/Raoutella  ornithinolytica  ***Blood Panel PCR results on this specimen are available  approximately 3 hours after the Gram stain result.***  Gram stain, PCR, and/or culture results may not always  correspond due to difference in methodologies.  ************************************************************  This PCR assay was performed by multiplex PCR. This  Assay tests for 66 bacterial and resistance gene targets.  Please refer to [facility] test directory  at [email or web address] for details. (02-15 @ 19:01)  Culture Results:   Growth in aerobic and anaerobic bottles: Klebsiella oxytoca/Raoutella  ornithinolytica  See previous culture [id number] (02-15 @ 19:01)  Rapid RVP Result: NotDetec (02-15 @ 14:34)        I&O's Detail    2022 07:01  -  2022 07:00  --------------------------------------------------------  IN:    Amiodarone: 233.1 mL    Amiodarone: 233.8 mL    IV PiggyBack: 300 mL    Lactated Ringers: 2100 mL    Lactated Ringers Bolus: 1000 mL    Norepinephrine: 289.5 mL    Sodium Bicarbonate: 50 mL  Total IN: 4206.4 mL    OUT:    Indwelling Catheter - Urethral (mL): 1100 mL    Voided (mL): 825 mL  Total OUT: 1925 mL    Total NET: 2281.4 mL                          Physical Examination:    General: No acute distress.  Alert, oriented, interactive, nonfocal    HEENT: Pupils equal, reactive to light.  Symmetric.    PULM: Clear to auscultation bilaterally, no significant sputum production    CVS: Regular rate and rhythm, no murmurs, rubs, or gallops    ABD: Soft, nondistended, nontender, normoactive bowel sounds, no masses    EXT: No edema, nontender    SKIN: Warm and well perfused, no rashes noted.    RADIOLOGY: ***    Medications:  azithromycin  IVPB      azithromycin  IVPB 500 milliGRAM(s) IV Intermittent every 24 hours  cefTRIAXone   IVPB 1000 milliGRAM(s) IV Intermittent every 24 hours    aMIOdarone Infusion 1 mG/Min IV Continuous <Continuous>  aMIOdarone Infusion 0.5 mG/Min IV Continuous <Continuous>  norepinephrine Infusion 0.05 MICROgram(s)/kG/Min IV Continuous <Continuous>          heparin  Infusion.  Unit(s)/Hr IV Continuous <Continuous>        insulin lispro (ADMELOG) corrective regimen sliding scale   SubCutaneous every 6 hours    lactated ringers. 1000 milliLiter(s) IV Continuous <Continuous>  sodium chloride 0.9% lock flush 10 milliLiter(s) IV Push every 1 hour PRN      chlorhexidine 2% Cloths 1 Application(s) Topical <User Schedule>     Patient is a 81y old  Male who presents with a chief complaint of diarrhea and weakness x 2 weeks.       BRIEF HOSPITAL COURSE: ***  80 yo male with PHX: HTN, HLD, bladder tumor presents to ED on 2/15/22 C/O diarrhea and weakness x 2 weeks. Found to be in Septic shock, ARF, metabolic acidosis with encephelopathy, new onset AFIB with RVR. Admitted to ICU S/P 1 HD session 22 with mental status mildly improving.    Events last 24 hours: ***  S/P initial HD session 22 mental status mildly improving, Renal function improving; producing urine.         Review of Systems:  CONSTITUTIONAL: No fever, chills, + Fatigue  EYES: No eye pain, visual disturbances, or discharge  ENMT:  + dry mouth, +difficulty hearing. No tinnitus,no throat pain.   NECK: No pain or stiffness  RESPIRATORY: No cough, wheezing, chills or hemoptysis; No shortness of breath  CARDIOVASCULAR: No chest pain, palpitations, dizziness, or leg swelling  GASTROINTESTINAL: No abdominal pain. No nausea, vomiting, or hematemesis; No diarrhea or constipation. No melena or hematochezia.  GENITOURINARY: No dysuria  NEUROLOGICAL: + memory loss. No headaches, decreased strength or sensation  SKIN: No itching, burning, rashes, or lesions   MUSCULOSKELETAL: No joint pain or swelling; No muscle, back, or extremity pain  PSYCHIATRIC: No depression, anxiety, mood swings, or difficulty sleeping          ICU Vital Signs Last 24 Hrs  T(C): 35.9 (2022 05:00), Max: 36.1 (2022 12:00)  T(F): 96.6 (2022 05:00), Max: 96.9 (2022 12:00)  HR: 81 (2022 07:00) (78 - 139)  BP: 98/56 (2022 07:00) (90/69 - 119/60)  BP(mean): 66 (2022 07:00) (58 - 81)  ABP: --  ABP(mean): --  RR: 24 (2022 07:00) (17 - 28)  SpO2: 98% (2022 07:00) (83% - 100%)      PHYSICAL EXAM:  General: NAD, non toxic appearing  HEENT: dry oral mucosa, PERRLA, EOM intact  Neck: supple, Right IJ Shiley   Respiratory: CTA b/l with no adventitious sounds  Cardiovascular: RRR, S1/S2 no m/r/g  Abdomen: distended, soft, non tender,  Extremities: warm, mild B/L LE edema  Skin: sacral gluteal fold stage 1 ulcer and DTI  Neurological: A&Ox2 to person and place, following commands, moves all extremities freely  Psychiatry: normal affect, drowsy        LABS:                        9.9    25.84 )-----------( 74       ( 2022 05:39 )             29.4     02    139  |  107  |  146<H>  ----------------------------<  175<H>  4.4   |  21<L>  |  7.18<H>    Ca    7.3<L>      2022 05:39  Phos  4.9       Mg     3.2         TPro  5.0<L>  /  Alb  1.3<L>  /  TBili  0.7  /  DBili  x   /  AST  93<H>  /  ALT  43  /  AlkPhos  143<H>      ABG - ( 2022 05:19 )  pH, Arterial: x     pH, Blood: 7.40  /  pCO2: 25    /  pO2: 142   / HCO3: 16    / Base Excess: -7.5  /  SaO2: 99.6      CAPILLARY BLOOD GLUCOSE      POCT Blood Glucose.: 178 mg/dL (2022 06:04)    PT/INR - ( 15 Feb 2022 14:18 )   PT: 20.4 sec;   INR: 1.81 ratio         PTT - ( 2022 10:56 )  PTT:29.4 sec  Urinalysis Basic - ( 2022 02:27 )    Color: Brown / Appearance: very cloudy / S.010 / pH: x  Gluc: x / Ketone: Negative  / Bili: Negative / Urobili: Negative mg/dL   Blood: x / Protein: 100 mg/dL / Nitrite: Positive   Leuk Esterase: Moderate / RBC: 6-10 /HPF / WBC >50   Sq Epi: x / Non Sq Epi: Occasional / Bacteria: TNTC      CULTURES:  Culture Results:   Growth in aerobic and anaerobic bottles: Klebsiella oxytoca/Raoutella  ornithinolytica  ***Blood Panel PCR results on this specimen are available  approximately 3 hours after the Gram stain result.***  Gram stain, PCR, and/or culture results may not always  correspond due to difference in methodologies.  ************************************************************  This PCR assay was performed by multiplex PCR. This  Assay tests for 66 bacterial and resistance gene targets.  Please refer to [facility] test directory  at [email or web address] for details. (02-15 @ 19:01)  Culture Results:   Growth in aerobic and anaerobic bottles: Klebsiella oxytoca/Raoutella  ornithinolytica  See previous culture [id number] (02-15 @ 19:01)  Rapid RVP Result: NotDetec (02-15 @ 14:34)        I&O's Detail    2022 07:01  -  2022 07:00  --------------------------------------------------------  IN:    Amiodarone: 233.1 mL    Amiodarone: 233.8 mL    IV PiggyBack: 300 mL    Lactated Ringers: 2100 mL    Lactated Ringers Bolus: 1000 mL    Norepinephrine: 289.5 mL    Sodium Bicarbonate: 50 mL  Total IN: 4206.4 mL    OUT:    Indwelling Catheter - Urethral (mL): 1100 mL    Voided (mL): 825 mL  Total OUT: 1925 mL    Total NET: 2281.4 mL        RADIOLOGY: ***    < from: US Duplex Venous Lower Ext Complete, Bilateral (22 @ 04:56) >  IMPRESSION:  RIGHT: Limited calf vein visualization. Nonvisualization of the calf   veins. No evidence of deep venous thrombosis at or above the knee.    LEFT: Limited calf vein visualization. Acute nonocclusive above-the-knee   deep venous thrombosis. Occlusive DVT in the greater saphenous vein.    Findings were discussed with NP Au at 4:25 AM on 2022 with read back   verification    --- End of Report ---    < end of copied text >      < from: US Abdomen Upper Quadrant Right (22 @ 12:03) >  IMPRESSION: Multiple gallstones. Gallbladder wall thickening. No   pericholecystic fluid. No evidence for intrahepatic or extrahepatic   biliary ductal dilatation.    --- End of Report ---    < end of copied text >            Medications:  azithromycin  IVPB      azithromycin  IVPB 500 milliGRAM(s) IV Intermittent every 24 hours  cefTRIAXone   IVPB 1000 milliGRAM(s) IV Intermittent every 24 hours    aMIOdarone Infusion 1 mG/Min IV Continuous <Continuous>  aMIOdarone Infusion 0.5 mG/Min IV Continuous <Continuous>  norepinephrine Infusion 0.05 MICROgram(s)/kG/Min IV Continuous <Continuous>          heparin  Infusion.  Unit(s)/Hr IV Continuous <Continuous>        insulin lispro (ADMELOG) corrective regimen sliding scale   SubCutaneous every 6 hours    lactated ringers. 1000 milliLiter(s) IV Continuous <Continuous>  sodium chloride 0.9% lock flush 10 milliLiter(s) IV Push every 1 hour PRN      chlorhexidine 2% Cloths 1 Application(s) Topical <User Schedule>

## 2022-02-17 NOTE — DIETITIAN INITIAL EVALUATION ADULT. - OTHER INFO
Pt adm w/ c/o diarrhea & weakness x 2 weeks. Pt found to be in septic shock ; ARF ; metabolic acidosis w/ encephalopathy. New onset A-fib w/ RVR. Adm to CCU s/p 1 HD session on 2/16 w/ mental status mildly improving ; Renal function Improving ; Producing urine. Pt adm w/ c/o diarrhea & weakness x 2 weeks. Pt found to be in septic shock ; ARF ; metabolic acidosis w/ encephalopathy. New onset A-fib w/ RVR. Adm to CCU s/p 1 HD session on 2/16 w/ mental status mildly improving ; Renal function Improving ; Producing urine.( I/O = 1556 / 8895) Pt on Contact C diff Isolation. Will deter Education/ literature until pt is on medical floor. Pt adm w/ c/o diarrhea & weakness x 2 weeks. Pt found to be in septic shock ; ARF ; metabolic acidosis w/ encephalopathy. New onset A-fib w/ RVR. Adm to CCU s/p 1 HD session on 2/16 w/ mental status mildly improving ; Renal function Improving ; Producing urine.( I/O = 5056 / 3765) Pt on Contact C diff Isolation. Will deter Education/ literature until pt is on medical floor. Pt withou pressure ulcer stage 2 or >.

## 2022-02-17 NOTE — PROGRESS NOTE ADULT - ASSESSMENT
81M w/ HTN, bladder tumor removal, melanoma. Presents w/ diarrhea and weakness. Admitted to ICU w/ rapif afib, septic shock likely from UTI vs pneumonia, RADHA w/ hyperkalemia and significant uremia and thrombocytopenia. S/p HD x1 overnight    #Neuro - pt is lethargic/confused at times, still uremic; continue to monitor  #CV - on norepi for likely septic shock and likely hypovolemic shock, decreasing doses, titrate to MAP >/65; pt with new onset rapid afib, will start amiodarone gtt for rate control; get baseline TTE  #Pulm - satting well on 2LNC  #ID- currently on ceftriaxone and azithromycin for CAP vs UTI; GNR+ bacteremia as of this morning, f/u specification; f/u blood and urine cx; send GI PCR and c diff if diarrhea recurs  #Renal/metabolic - RADHA likely combination of prerenal, ATN and obstructive uropathy; s/p HD x1 overnight; now making urine; pt still appears dry, will give additional IVF and start maintenance, d/c bicarb gtt; monitor UOP and electrolytes closely  #GI- keep NPO for now while uremic; noted to have elevated LFTs, CT A/P unremarkable, will get RUQ US  #Heme - noted thrombocytopenia of unclear etiology, will get DIC panel with next labs; monitor for now  #Endo - FS w/ ISS  #PPx - SCDs in setting of thrombocytopenia  #Dispo- prognosis guarded; remains in ICU on pressors; full code   81M w/ HTN, bladder tumor removal, melanoma. Presents w/ diarrhea and weakness. Admitted to ICU w/ rapif afib, septic shock likely from UTI vs pneumonia, RADHA w/ hyperkalemia and significant uremia and thrombocytopenia. S/p 1 HD treatment 2/16/22      Plan    #Neuro  - PT is drowsy, A&Ox2 confused at times, still uremic; continue to monitor neuro status    #CV   - on norepi for likely septic shock and likely hypovolemic shock, decreasing doses, titrate to MAP >/65; PT with new onset rapid afib: likely due to sepsis now in NSR with rate controlled: D/C amiodarone. Monitor on telemetry. Get baseline TTE    #Pulm   - SpO2 95% and above on 2LNC    #ID  - currently on ceftriaxone and azithromycin for CAP vs UTI; GNR+ bacteremia as of this morning, f/u specification; f/u blood and urine cx; No diarrhea 2 days: send GI PCR and c diff if diarrhea recurs    #Renal/metabolic   - RADHA likely combination of prerenal, ATN and obstructive uropathy; s/p HD x1; now making urine; PT still appears dry, will start LR maintenance, d/c bicarb gtt; monitor UOP and electrolytes closely    #GI  - Diet advanced now that mental status improving and able to follow commands, noted to have elevated LFTs, CT A/P unremarkable, RUQ US with multiple gallstones without cholecystitis.     #Heme   - noted thrombocytopenia of unclear etiology, now on Heparin for LLE DVT, monitor platelet count, aPTT.     #Endo   - FS w/ ISS    #PPx   - SCDs, on heparin gtt for DVT    #Dispo  - prognosis guarded; remains in ICU on pressors; full code

## 2022-02-17 NOTE — CHART NOTE - NSCHARTNOTEFT_GEN_A_CORE
Spoke with Alexx - Son-In-Law - [phone]. Updated on patient status. Opportunity provided to ask questions and answered. Attempted to contact Nidia Alison - Daughter - [phone] ( [phone]) with no [phone].

## 2022-02-17 NOTE — DIETITIAN INITIAL EVALUATION ADULT. - DIET TYPE
DASH/TLC (sodium and cholesterol restricted diet)/full fluid/consistent carbohydrate (evening snack)/renal replacement pts:no protein restr,no conc K & phos, low sodium

## 2022-02-17 NOTE — DIETITIAN INITIAL EVALUATION ADULT. - PERTINENT MEDS FT
MEDICATIONS  (STANDING):  aMIOdarone Infusion 1 mG/Min (33.3 mL/Hr) IV Continuous <Continuous>  aMIOdarone Infusion 0.5 mG/Min (16.7 mL/Hr) IV Continuous <Continuous>  azithromycin  IVPB      azithromycin  IVPB 500 milliGRAM(s) IV Intermittent every 24 hours  cefTRIAXone   IVPB 1000 milliGRAM(s) IV Intermittent every 24 hours  chlorhexidine 2% Cloths 1 Application(s) Topical <User Schedule>  heparin  Infusion.  Unit(s)/Hr (19 mL/Hr) IV Continuous <Continuous>  insulin lispro (ADMELOG) corrective regimen sliding scale   SubCutaneous every 6 hours  lactated ringers. 1000 milliLiter(s) (100 mL/Hr) IV Continuous <Continuous>  norepinephrine Infusion 0.05 MICROgram(s)/kG/Min (9.36 mL/Hr) IV Continuous <Continuous>    MEDICATIONS  (PRN):  sodium chloride 0.9% lock flush 10 milliLiter(s) IV Push every 1 hour PRN Pre/post blood products, medications, blood draw, and to maintain line patency

## 2022-02-17 NOTE — CONSULT NOTE ADULT - SUBJECTIVE AND OBJECTIVE BOX
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,             Allergies :  No Known Allergies  Intolerances - none        Social History:  Tobacco: negative  Alcohol: negative  Recent Travel: none  Immunizations: Rec'd the Moderna COVID-19 Vaccine x 2 and a Booster  Lives alone and still drives  No pets at home      MEDICATIONS  (STANDING):  aMIOdarone Infusion 1 mG/Min (33.3 mL/Hr) IV Continuous <Continuous>  aMIOdarone Infusion 0.5 mG/Min (16.7 mL/Hr) IV Continuous <Continuous>  azithromycin  IVPB      azithromycin  IVPB 500 milliGRAM(s) IV Intermittent every 24 hours  cefTRIAXone   IVPB 1000 milliGRAM(s) IV Intermittent every 24 hours  chlorhexidine 2% Cloths 1 Application(s) Topical <User Schedule>  heparin  Infusion.  Unit(s)/Hr (19 mL/Hr) IV Continuous <Continuous>  insulin lispro (ADMELOG) corrective regimen sliding scale   SubCutaneous every 6 hours  lactated ringers. 1000 milliLiter(s) (100 mL/Hr) IV Continuous <Continuous>  norepinephrine Infusion 0.05 MICROgram(s)/kG/Min (9.36 mL/Hr) IV Continuous <Continuous>    MEDICATIONS  (PRN):  sodium chloride 0.9% lock flush 10 milliLiter(s) IV Push every 1 hour PRN Pre/post blood products, medications, blood draw, and to maintain line patency      LABS:  CBC Full  -  ( 2022 16:54 )  WBC Count : 20.27 K/uL  RBC Count : 2.84 M/uL  Hemoglobin : 8.7 g/dL  Hematocrit : 25.4 %  Platelet Count - Automated : 80 K/uL  Mean Cell Volume : 89.4 fl  Mean Cell Hemoglobin : 30.6 pg  Mean Cell Hemoglobin Concentration : 34.3 g/dL  Auto Neutrophil # : x  Auto Lymphocyte # : x  Auto Monocyte # : x  Auto Eosinophil # : x  Auto Basophil # : x  Auto Neutrophil % : x  Auto Lymphocyte % : x  Auto Monocyte % : x  Auto Eosinophil % : x  Auto Basophil % : x        139  |  106  |  148<H>  ----------------------------<  214<H>  3.7   |  22  |  6.46<H>    Ca    7.2<L>      2022 16:54  Phos  4.9       Mg     3.2         TPro  5.0<L>  /  Alb  1.3<L>  /  TBili  0.7  /  DBili  x   /  AST  93<H>  /  ALT  43  /  AlkPhos  143<H>      LIVER FUNCTIONS - ( 2022 05:39 )  Alb: 1.3 g/dL / Pro: 5.0 gm/dL / ALK PHOS: 143 U/L / ALT: 43 U/L / AST: 93 U/L / GGT: x           PTT - ( 2022 16:54 )  PTT:65.0 sec    Urinalysis Basic - ( 2022 02:27 )  Color: Brown / Appearance: very cloudy / S.010 / pH: x  Gluc: x / Ketone: Negative  / Bili: Negative / Urobili: Negative mg/dL   Blood: x / Protein: 100 mg/dL / Nitrite: Positive   Leuk Esterase: Moderate / RBC: 6-10 /HPF / WBC >50   Sq Epi: x / Non Sq Epi: Occasional / Bacteria: TNTC    ABG - ( 2022 05:19 )  pH, Arterial: x     pH, Blood: 7.40  /  pCO2: 25    /  pO2: 142   / HCO3: 16    / Base Excess: -7.5  /  SaO2: 99.6      Procalcitonin, Serum (22 @ 09:20)    Procalcitonin, Serum: 81.70:    Lactate, Blood (02.15.22 @ 21:14)    Lactate, Blood: 1.5 mmol/L  Lactate, Blood (02.15.22 @ 18:24)    Lactate, Blood: 2.5: Elevated lactate. Consider ordering follow-up lactate to trend. mmol/L  Lactate, Blood (02.15.22 @ 14:15)    Lactate, Blood: 3.9: Elevated lactate. Consider ordering follow-up lactate to trend. mmol/L    A1C with Estimated Average Glucose (22 @ 09:30)    A1C with Estimated Average Glucose Result: 7.6: Method: Immunoassay       Reference Range                4.0-5.6%       High risk (prediabetic)        5.7-6.4%       Diabetic, diagnostic             >=6.5%       ADA diabetic treatment goal       <7.0%    Estimated Average Glucose: 171        MICROBIOLOGY:  Specimen Source: .Sputum Sputum ( @ 01:01)  Culture - Sputum . (22 @ 01:01)    Gram Stain:   Rare polymorphonuclear leukocytes per low power field  Moderate Squamous epithelial cells per low power field  Numerous Yeast per oil power field  Moderate Gram Positive Rods per oil power field  Few Gram Negative Rods per oil power field  Rare Gram positive cocci in pairs per oil power field    Specimen Source: .Sputum Sputum    Specimen Source: .Blood Blood-Peripheral (02-15 @ 19:01)  Culture Results:   Growth in aerobic and anaerobic bottles: Klebsiella oxytoca/Raoutella  ornithinolytica    Specimen Source: .Blood Blood-Peripheral (02-15 @ 19:01)  Culture Results:   Growth in aerobic and anaerobic bottles: Klebsiella oxytoca/Raoutella  ornithinolytica  See previous culture [id number] (02-15 @ 19:01)      MRSA/MSSA PCR (22 @ 09:20)    MRSA PCR Result.: [facility]:    Staph Aureus PCR Result: [facility]    Respiratory Viral Panel with COVID-19 by [facility] (02.15.22 @ 14:34)    Rapid RVP Result: [facility]    SARS-CoV-2: [facility]: This Respiratory Panel uses polymerase chain reaction (PCR) to detect for  adenovirus; coronavirus (HKU1, NL63, 229E, OC43); human metapneumovirus  (hMPV); human enterovirus/rhinovirus (Entero/RV); influenza A; influenza  A/H1; influenza A/H3; influenza A/H1-2009; influenza B; parainfluenza  viruses 1, 2, 3, 4; respiratory syncytial virus; Mycoplasma pneumoniae;  Chlamydophila pneumoniae; and SARS-CoV-2.      RADIOLOGY:  < from: US Duplex Venous Lower Ext Complete, Bilateral (22 @ 04:56) >  ACC: [id number] EXAM:  US DPLX LWR EXT VEINS COMPL BI                        PROCEDURE DATE:  2022    INTERPRETATION:  CLINICAL INFORMATION: Lower extremity swelling.  COMPARISON: None available.  TECHNIQUE: Duplex sonography of theBILATERAL LOWER extremity veins with   color and spectral Doppler, with and without compression.  FINDINGS:  RIGHT:  Normal compressibility of the RIGHT common femoral, femoral and popliteal   veins. The calf veins including the posterior tibial and peroneal were   not well visualized due to calf edema and portable technique.  Doppler examination shows normal spontaneous and phasic flow.  LEFT:  Echogenic material within the LEFT common femoral and femoral veins with   partial flow and partial compressibility compatible with nonocclusive   above-the-knee deep venous thrombosis. The greater saphenous vein also   demonstrates no compressibility or flow. The LEFT popliteal and posterior   tibial veins are patent. The peroneal vein is not visualized.  Doppler examination shows normal spontaneous and phasic flow.  IMPRESSION:  RIGHT: Limited calf vein visualization. Nonvisualization of the calf   veins. No evidence of deep venous thrombosis at or above the knee.  LEFT: Limited calf vein visualization. Acute nonocclusive above-the-knee   deep venous thrombosis. Occlusive DVT in the greater saphenous vein.    < from: US Abdomen Upper Quadrant Right (22 @ 12:03) >  ACC: [id number] EXAM:  US ABDOMEN RT UPR QUADRANT                        PROCEDURE DATE:  2022    INTERPRETATION:  CLINICAL INFORMATION: Elevated LFTs.  COMPARISON: CT abdomen/pelvis 2/15/2022.  TECHNIQUE: Sonography of the right upper quadrant.  FINDINGS:  Liver: Normal in size. No focal hepatic masses identified.  Bile ducts: Normal caliber. Common bile duct measures 5 mm.  Gallbladder: Multiple gallstones are identified. Gallbladder wall   thickening is identified measuring up to 6 mm. No pericholecystic fluid.  Pancreas: Visualized portions are within normal limits.  Right kidney: 11.2 cm.. No hydronephrosis. No renal calculi. No   space-occupying lesions.  Ascites: None.  IVC: Visualized portions are within normal limits.  IMPRESSION: Multiple gallstones. Gallbladder wall thickening. No   pericholecystic fluid. No evidence for intrahepatic or extrahepatic   biliary ductal dilatation.    < from: Xray Chest 1 View-PORTABLE IMMEDIATE (Xray Chest 1 View-PORTABLE IMMEDIATE .) (22 @ 01:26) >  ACC: [id number] EXAM:  XR CHEST PORTABLE IMMED 1V                        PROCEDURE DATE:  2022    INTERPRETATION:  Clinical history: 82-year-old male, status post right IJ   HD catheter placement.  Portable/rotated view of the chest is compared to 2/15/2022 and   demonstrates a right IJ HD catheter with the tip at the junction of the   SVC and right atrium, new.  Cardiac silhouette and pulmonary vasculature are within normal limits   with no consolidation, effusion, pneumothoraxor acute osseous finding.  IMPRESSION:  Right IJ line HD catheter in satisfactory position with no pneumothorax,   new    < from: CT Abdomen and Pelvis No Cont (02.15.22 @ 23:57) >  ACC: [id number] EXAM:  CT ABDOMEN AND PELVIS                        PROCEDURE DATE:  02/15/2022    INTERPRETATION:  CLINICAL INFORMATION:  Abdominal distension  COMPARISON: None.  CONTRAST/COMPLICATIONS:  IV Contrast: None  Oral Contrast:None  Complications: None  PROCEDURE:  Axial CT images were acquired through the abdomen and pelvis obtained   without intravenous contrast. Coronal and sagittal reformatted images   provided.  FINDINGS: This examination is limited by patient motion artifact and the   lack of oral and intravenous contrast.  LOWER CHEST: Bibasilar atelectasis. Coronary artery calcifications.  LIVER: Within normal limits.  BILE DUCTS: Normal caliber.  GALLBLADDER: Cholelithiasis..  SPLEEN: Within normal limits.  PANCREAS: Within normal limits.  ADRENALS: Within normal limits.  KIDNEYS/URETERS: No definite renal stone or hydronephrosis  BLADDER: Distended with Khalil catheter. No bladder wall thickening.  REPRODUCTIVE ORGANS: Prostate gland mildly enlarged.  BOWEL: Evaluation of bowel is limited without distention with oral   contrast, however there is no bowel obstruction. Few colonic diverticula   without acute diverticulitis. Small bowel loops are not dilated.   Unremarkable appendix without appendicitis. Stomach is underdistended for   adequate evaluation, however suggestion of a moderate hiatal hernia.   Appendix  PERITONEUM: No free air significant ascites.  VESSELS:  Limited without intravenous contrast. There is calcific   atherosclerosis of the abdominal aorta without gross aneurysmal   dilatation. 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  RETROPERITONEUM: No lymphadenopathy.  ABDOMINAL WALL: Small fat-containing bilateral inguinal hernias.  BONES: Degenerative changes of the spine. Bilateral L5 spondylolysis with   slight grade 1 anterolisthesis of L5 on S1.  MPRESSION:  There is no bowel obstruction, significant ascites or free   intraperitoneal air.  Moderate hiatal hernia.  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    < from: Xray Chest 1 View- PORTABLE-Urgent (02.15.22 @ 14:32) >  ACC: [id number] EXAM:  XR CHEST PORTABLE URGENT 1V                        PROCEDURE DATE:  02/15/2022    INTERPRETATION:  AP semierect chest on February 15, 2022 at 1:55 PM.   Patient has sepsis.  Heart enlargement again noted.  There aremild mid lower lung field infiltrates medially new since 2020.  IMPRESSION: Bilateral infiltrates as above.      Vital Signs Last 24 Hrs  T(C): 35.8 (2022 15:30), Max: 36.4 (2022 12:30)  T(F): 96.5 (2022 15:30), Max: 97.5 (2022 12:30)  HR: 77 (2022 18:30) (66 - 96)  BP: 104/56 (2022 18:00) (90/69 - 119/60)  BP(mean): 66 (2022 18:00) (58 - 81)  RR: 27 (2022 18:30) (14 - 29)  SpO2: 90% (2022 18:30) (83% - 100%)  Supplemental O2: on  2Liters NC O2    PHYSICAL EXAM    General: 82 y/o white male awake, alert, very [facility], with NC O2 in place, pleasant and cooperative, c/o very dry mouth but in NAD now  HEENT: conj pale, sclerae anicteric, PERRLA, no oral lesions noted but mucosa and tongue appear dry  Neck: supple, no nodes noted           RIJ Vas Cath in place - site clean with dressing dry and intact - place 22  Heart: RR  Lungs: few moist rhonchi anteriorly with decreased BS at the bases bilaterally  Abdomen: BS+, semi-soft, nontender to palpation, no masses or HS-megaly detected  Back: no CVA or Spinal tenderness noted  Extremities: well-healed bilateral TKR scars                    1+ edema LE's                     arms and hands moderately swollen now  Skin: warm, dry, no rash noted  Khalil in place and draining clear yellow urine now      I&O's Summary :    2022 07:01  -  2022 07:00  --------------------------------------------------------  IN: 4206.4 mL / OUT: 1925 mL / NET: 2281.4 mL    2022 07:01  -  2022 18:53  --------------------------------------------------------  IN: 2245.8 mL / OUT: 1450 mL / NET: 795.8 mL      Impression:  82 y/o white mlae with hx of HTN, HLD, DM, Enterococcus faecalis UTI on 20, 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 ago, s/p  Bilateral Cataract Surgeries, admitted via the ER to [facility] on 2/15/22 with c/o diarrhea x 2 weeks at home along with decreased po intake and mild nausea.,        Sepsis with Klebsiella Oxytoca ( AKA Raoutella ornithinolytica ) Bacteremia as identified from 2 out of 2 Blood Cx's from 2/15/22  - ? , GI, or Pulmonary source with Bilateral lower lobe PNA seen on initial CXR and Cholelithiasis with GB wall thickening seen on Sono.    Suggestions:  Will therefore change rx to Cefepime and continue Zithromax pending further Cx results.  Follow-up temps and labs closely.  Follow-up CXR and monitor O2 Sat's.  Discussed with patient in detail at the bedside.  thanks, will follow with you. HPI:  80 y/o white male with hx of HTN, HLD, DM, Enterococcus faecalis UTI on 20, s/p TURB in / for Low-Grade Papillary Urothelial Ca,             Allergies :  No Known Allergies  Intolerances - none        Social History:  Tobacco: negative  Alcohol: negative  Recent Travel: none  Immunizations: Rec'd the Moderna COVID-19 Vaccine x 2 and a Booster  Lives alone and still drives  No pets at home      MEDICATIONS  (STANDING):  aMIOdarone Infusion 1 mG/Min (33.3 mL/Hr) IV Continuous <Continuous>  aMIOdarone Infusion 0.5 mG/Min (16.7 mL/Hr) IV Continuous <Continuous>  azithromycin  IVPB      azithromycin  IVPB 500 milliGRAM(s) IV Intermittent every 24 hours  cefTRIAXone   IVPB 1000 milliGRAM(s) IV Intermittent every 24 hours  chlorhexidine 2% Cloths 1 Application(s) Topical <User Schedule>  heparin  Infusion.  Unit(s)/Hr (19 mL/Hr) IV Continuous <Continuous>  insulin lispro (ADMELOG) corrective regimen sliding scale   SubCutaneous every 6 hours  lactated ringers. 1000 milliLiter(s) (100 mL/Hr) IV Continuous <Continuous>  norepinephrine Infusion 0.05 MICROgram(s)/kG/Min (9.36 mL/Hr) IV Continuous <Continuous>    MEDICATIONS  (PRN):  sodium chloride 0.9% lock flush 10 milliLiter(s) IV Push every 1 hour PRN Pre/post blood products, medications, blood draw, and to maintain line patency      LABS:  CBC Full  -  ( 2022 16:54 )  WBC Count : 20.27 K/uL  RBC Count : 2.84 M/uL  Hemoglobin : 8.7 g/dL  Hematocrit : 25.4 %  Platelet Count - Automated : 80 K/uL  Mean Cell Volume : 89.4 fl  Mean Cell Hemoglobin : 30.6 pg  Mean Cell Hemoglobin Concentration : 34.3 g/dL  Auto Neutrophil # : x  Auto Lymphocyte # : x  Auto Monocyte # : x  Auto Eosinophil # : x  Auto Basophil # : x  Auto Neutrophil % : x  Auto Lymphocyte % : x  Auto Monocyte % : x  Auto Eosinophil % : x  Auto Basophil % : x        139  |  106  |  148<H>  ----------------------------<  214<H>  3.7   |  22  |  6.46<H>    Ca    7.2<L>      2022 16:54  Phos  4.9       Mg     3.2         TPro  5.0<L>  /  Alb  1.3<L>  /  TBili  0.7  /  DBili  x   /  AST  93<H>  /  ALT  43  /  AlkPhos  143<H>      LIVER FUNCTIONS - ( 2022 05:39 )  Alb: 1.3 g/dL / Pro: 5.0 gm/dL / ALK PHOS: 143 U/L / ALT: 43 U/L / AST: 93 U/L / GGT: x           PTT - ( 2022 16:54 )  PTT:65.0 sec    Urinalysis Basic - ( 2022 02:27 )  Color: Brown / Appearance: very cloudy / S.010 / pH: x  Gluc: x / Ketone: Negative  / Bili: Negative / Urobili: Negative mg/dL   Blood: x / Protein: 100 mg/dL / Nitrite: Positive   Leuk Esterase: Moderate / RBC: 6-10 /HPF / WBC >50   Sq Epi: x / Non Sq Epi: Occasional / Bacteria: TNTC    ABG - ( 2022 05:19 )  pH, Arterial: x     pH, Blood: 7.40  /  pCO2: 25    /  pO2: 142   / HCO3: 16    / Base Excess: -7.5  /  SaO2: 99.6      Procalcitonin, Serum (22 @ 09:20)    Procalcitonin, Serum: 81.70:    Lactate, Blood (02.15.22 @ 21:14)    Lactate, Blood: 1.5 mmol/L  Lactate, Blood (02.15.22 @ 18:24)    Lactate, Blood: 2.5: Elevated lactate. Consider ordering follow-up lactate to trend. mmol/L  Lactate, Blood (02.15.22 @ 14:15)    Lactate, Blood: 3.9: Elevated lactate. Consider ordering follow-up lactate to trend. mmol/L    A1C with Estimated Average Glucose (22 @ 09:30)    A1C with Estimated Average Glucose Result: 7.6: Method: Immunoassay       Reference Range                4.0-5.6%       High risk (prediabetic)        5.7-6.4%       Diabetic, diagnostic             >=6.5%       ADA diabetic treatment goal       <7.0%    Estimated Average Glucose: 171        MICROBIOLOGY:  Specimen Source: .Sputum Sputum ( @ 01:01)  Culture - Sputum . (22 @ 01:01)    Gram Stain:   Rare polymorphonuclear leukocytes per low power field  Moderate Squamous epithelial cells per low power field  Numerous Yeast per oil power field  Moderate Gram Positive Rods per oil power field  Few Gram Negative Rods per oil power field  Rare Gram positive cocci in pairs per oil power field    Specimen Source: .Sputum Sputum    Specimen Source: .Blood Blood-Peripheral (02-15 @ 19:01)  Culture Results:   Growth in aerobic and anaerobic bottles: Klebsiella oxytoca/Raoutella  ornithinolytica    Specimen Source: .Blood Blood-Peripheral (02-15 @ 19:01)  Culture Results:   Growth in aerobic and anaerobic bottles: Klebsiella oxytoca/Raoutella  ornithinolytica  See previous culture [id number] (02-15 @ 19:01)      MRSA/MSSA PCR (22 @ 09:20)    MRSA PCR Result.: [facility]:    Staph Aureus PCR Result: [facility]    Respiratory Viral Panel with COVID-19 by [facility] (02.15.22 @ 14:34)    Rapid RVP Result: [facility]    SARS-CoV-2: [facility]: This Respiratory Panel uses polymerase chain reaction (PCR) to detect for  adenovirus; coronavirus (HKU1, NL63, 229E, OC43); human metapneumovirus  (hMPV); human enterovirus/rhinovirus (Entero/RV); influenza A; influenza  A/H1; influenza A/H3; influenza A/H1-2009; influenza B; parainfluenza  viruses 1, 2, 3, 4; respiratory syncytial virus; Mycoplasma pneumoniae;  Chlamydophila pneumoniae; and SARS-CoV-2.      RADIOLOGY:  < from: US Duplex Venous Lower Ext Complete, Bilateral (22 @ 04:56) >  ACC: [id number] EXAM:  US DPLX LWR EXT VEINS COMPL BI                        PROCEDURE DATE:  2022    INTERPRETATION:  CLINICAL INFORMATION: Lower extremity swelling.  COMPARISON: None available.  TECHNIQUE: Duplex sonography of theBILATERAL LOWER extremity veins with   color and spectral Doppler, with and without compression.  FINDINGS:  RIGHT:  Normal compressibility of the RIGHT common femoral, femoral and popliteal   veins. The calf veins including the posterior tibial and peroneal were   not well visualized due to calf edema and portable technique.  Doppler examination shows normal spontaneous and phasic flow.  LEFT:  Echogenic material within the LEFT common femoral and femoral veins with   partial flow and partial compressibility compatible with nonocclusive   above-the-knee deep venous thrombosis. The greater saphenous vein also   demonstrates no compressibility or flow. The LEFT popliteal and posterior   tibial veins are patent. The peroneal vein is not visualized.  Doppler examination shows normal spontaneous and phasic flow.  IMPRESSION:  RIGHT: Limited calf vein visualization. Nonvisualization of the calf   veins. No evidence of deep venous thrombosis at or above the knee.  LEFT: Limited calf vein visualization. Acute nonocclusive above-the-knee   deep venous thrombosis. Occlusive DVT in the greater saphenous vein.    < from: US Abdomen Upper Quadrant Right (22 @ 12:03) >  ACC: [id number] EXAM:  US ABDOMEN RT UPR QUADRANT                        PROCEDURE DATE:  2022    INTERPRETATION:  CLINICAL INFORMATION: Elevated LFTs.  COMPARISON: CT abdomen/pelvis 2/15/2022.  TECHNIQUE: Sonography of the right upper quadrant.  FINDINGS:  Liver: Normal in size. No focal hepatic masses identified.  Bile ducts: Normal caliber. Common bile duct measures 5 mm.  Gallbladder: Multiple gallstones are identified. Gallbladder wall   thickening is identified measuring up to 6 mm. No pericholecystic fluid.  Pancreas: Visualized portions are within normal limits.  Right kidney: 11.2 cm.. No hydronephrosis. No renal calculi. No   space-occupying lesions.  Ascites: None.  IVC: Visualized portions are within normal limits.  IMPRESSION: Multiple gallstones. Gallbladder wall thickening. No   pericholecystic fluid. No evidence for intrahepatic or extrahepatic   biliary ductal dilatation.    < from: Xray Chest 1 View-PORTABLE IMMEDIATE (Xray Chest 1 View-PORTABLE IMMEDIATE .) (22 @ 01:26) >  ACC: [id number] EXAM:  XR CHEST PORTABLE IMMED 1V                        PROCEDURE DATE:  2022    INTERPRETATION:  Clinical history: 82-year-old male, status post right IJ   HD catheter placement.  Portable/rotated view of the chest is compared to 2/15/2022 and   demonstrates a right IJ HD catheter with the tip at the junction of the   SVC and right atrium, new.  Cardiac silhouette and pulmonary vasculature are within normal limits   with no consolidation, effusion, pneumothoraxor acute osseous finding.  IMPRESSION:  Right IJ line HD catheter in satisfactory position with no pneumothorax,   new    < from: CT Abdomen and Pelvis No Cont (02.15.22 @ 23:57) >  ACC: [id number] EXAM:  CT ABDOMEN AND PELVIS                        PROCEDURE DATE:  02/15/2022    INTERPRETATION:  CLINICAL INFORMATION:  Abdominal distension  COMPARISON: None.  CONTRAST/COMPLICATIONS:  IV Contrast: None  Oral Contrast:None  Complications: None  PROCEDURE:  Axial CT images were acquired through the abdomen and pelvis obtained   without intravenous contrast. Coronal and sagittal reformatted images   provided.  FINDINGS: This examination is limited by patient motion artifact and the   lack of oral and intravenous contrast.  LOWER CHEST: Bibasilar atelectasis. Coronary artery calcifications.  LIVER: Within normal limits.  BILE DUCTS: Normal caliber.  GALLBLADDER: Cholelithiasis..  SPLEEN: Within normal limits.  PANCREAS: Within normal limits.  ADRENALS: Within normal limits.  KIDNEYS/URETERS: No definite renal stone or hydronephrosis  BLADDER: Distended with Khalil catheter. No bladder wall thickening.  REPRODUCTIVE ORGANS: Prostate gland mildly enlarged.  BOWEL: Evaluation of bowel is limited without distention with oral   contrast, however there is no bowel obstruction. Few colonic diverticula   without acute diverticulitis. Small bowel loops are not dilated.   Unremarkable appendix without appendicitis. Stomach is underdistended for   adequate evaluation, however suggestion of a moderate hiatal hernia.   Appendix  PERITONEUM: No free air significant ascites.  VESSELS:  Limited without intravenous contrast. There is calcific   atherosclerosis of the abdominal aorta without gross aneurysmal   dilatation. 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  RETROPERITONEUM: No lymphadenopathy.  ABDOMINAL WALL: Small fat-containing bilateral inguinal hernias.  BONES: Degenerative changes of the spine. Bilateral L5 spondylolysis with   slight grade 1 anterolisthesis of L5 on S1.  MPRESSION:  There is no bowel obstruction, significant ascites or free   intraperitoneal air.  Moderate hiatal hernia.  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    < from: Xray Chest 1 View- PORTABLE-Urgent (02.15.22 @ 14:32) >  ACC: [id number] EXAM:  XR CHEST PORTABLE URGENT 1V                        PROCEDURE DATE:  02/15/2022    INTERPRETATION:  AP semierect chest on February 15, 2022 at 1:55 PM.   Patient has sepsis.  Heart enlargement again noted.  There aremild mid lower lung field infiltrates medially new since 2020.  IMPRESSION: Bilateral infiltrates as above.      Vital Signs Last 24 Hrs  T(C): 35.8 (2022 15:30), Max: 36.4 (2022 12:30)  T(F): 96.5 (2022 15:30), Max: 97.5 (2022 12:30)  HR: 77 (2022 18:30) (66 - 96)  BP: 104/56 (2022 18:00) (90/69 - 119/60)  BP(mean): 66 (2022 18:00) (58 - 81)  RR: 27 (2022 18:30) (14 - 29)  SpO2: 90% (2022 18:30) (83% - 100%)  Supplemental O2: on  2Liters NC O2    PHYSICAL EXAM    General: 80 y/o white male awake, alert, very [place], with NC O2 in place, pleasant and cooperative, c/o very dry mouth but in NAD now  HEENT: conj pale, sclerae anicteric, PERRLA, no oral lesions noted but mucosa and tongue appear dry  Neck: supple, no nodes noted           RIJ Vas Cath in place - site clean with dressing dry and intact - place 22  Heart: RR  Lungs: few moist rhonchi anteriorly with decreased BS at the bases bilaterally  Abdomen: BS+, semi-soft, nontender to palpation, no masses or HS-megaly detected  Back: no CVA or Spinal tenderness noted  Extremities: well-healed bilateral TKR scars                    1+ edema LE's                     arms and hands moderately swollen now  Skin: warm, dry, no rash noted  Khalil in place and draining clear yellow urine now      I&O's Summary :    2022 07:01  -  2022 07:00  --------------------------------------------------------  IN: 4206.4 mL / OUT: 1925 mL / NET: 2281.4 mL    2022 07:01  -  2022 18:53  --------------------------------------------------------  IN: 2245.8 mL / OUT: 1450 mL / NET: 795.8 mL      Impression:  80 y/o white mlae with hx of HTN, HLD, DM, Enterococcus faecalis UTI on 20, 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 ago, s/p  Bilateral Cataract Surgeries, admitted via the ER to [facility] on 2/15/22 with c/o diarrhea x 2 weeks at home along with decreased po intake and mild nausea.,        Sepsis with Klebsiella Oxytoca ( AKA Raoutella ornithinolytica ) Bacteremia as identified from 2 out of 2 Blood Cx's from 2/15/22  - ? , GI, or Pulmonary source with Bilateral lower lobe PNA seen on initial CXR and Cholelithiasis with GB wall thickening seen on Sono.    Suggestions:  Will therefore change rx to Cefepime and continue Zithromax pending further Cx results.  Follow-up temps and labs closely.  Follow-up CXR and monitor O2 Sat's. Consider further w/u with HIDA Scan / CT of the Abdomen + Pelvis with oral contrast.  Discussed with patient in detail at the bedside.  thanks, will follow with you. HPI:  80 y/o white male with hx of HTN, HLD, DM, Enterococcus faecalis UTI on 20, s/p TURB in / for Low-Grade Papillary Urothelial Ca, s/p Melanoma resection, s/p BIlateral TKR's several yrs. ago, s/p Bilateral Cataract Surgeries, admitted via the ER to [facility] on 2/15/22 with c/o diarrhea x 2 weeks at home along with decreased po intake and mild nausea with a few episodes of vomiting bilious material.  W/u revealed a markedly elevated WBC's of 37,940, an elevated Lactate to 3.9, and his BUN/ Creat were found to be elevated to 169/ 12.9 and Glucose was elevated as well.  Further w/u with CXR and CT of the Abdomen + Pelvis was done and revealed Bilateral Infiltrates, Cholelithiasis, a moderate Hiatal Hernia, but no bowel obstruction was seen.  Patient was admitted to the CCU for further care and was rx'ed with 5 Liters of NS and was begun on Rocephin + Zithromax empirically after Blood and Urine Cx's were obtained and he underwent HD urgently x 1 on 2/15/22 PM.  Patient now found to have 2 out of 2 Blood Cx's  from 2/15/22 ( all 4 bottles ) growing Klebsiella oxytoca ( AKA Raoutella ornithinolytica ).  Abdominal Sono done 22 revealed multiple GB stones and thickening of the GB wall but no pericholecystic fluid and Doppler of the LE's done today revealed LLE Acute non-occlusive above the knee DVT and an occlusive DVT of the Greater Saphenous Vein.      Allergies :  No Known Allergies  Intolerances - none      Social History:  Tobacco: negative  Alcohol: negative  Recent Travel: none  Immunizations: Rec'd the Moderna COVID-19 Vaccine x 2 and a Booster  Lives alone and still drives  No pets at home      MEDICATIONS  (STANDING):  aMIOdarone Infusion 1 mG/Min (33.3 mL/Hr) IV Continuous <Continuous>  aMIOdarone Infusion 0.5 mG/Min (16.7 mL/Hr) IV Continuous <Continuous>  azithromycin  IVPB      azithromycin  IVPB 500 milliGRAM(s) IV Intermittent every 24 hours  cefTRIAXone   IVPB 1000 milliGRAM(s) IV Intermittent every 24 hours  chlorhexidine 2% Cloths 1 Application(s) Topical <User Schedule>  heparin  Infusion.  Unit(s)/Hr (19 mL/Hr) IV Continuous <Continuous>  insulin lispro (ADMELOG) corrective regimen sliding scale   SubCutaneous every 6 hours  lactated ringers. 1000 milliLiter(s) (100 mL/Hr) IV Continuous <Continuous>  norepinephrine Infusion 0.05 MICROgram(s)/kG/Min (9.36 mL/Hr) IV Continuous <Continuous>    MEDICATIONS  (PRN):  sodium chloride 0.9% lock flush 10 milliLiter(s) IV Push every 1 hour PRN Pre/post blood products, medications, blood draw, and to maintain line patency      LABS:  CBC Full  -  ( 2022 16:54 )  WBC Count : 20.27 K/uL  RBC Count : 2.84 M/uL  Hemoglobin : 8.7 g/dL  Hematocrit : 25.4 %  Platelet Count - Automated : 80 K/uL  Mean Cell Volume : 89.4 fl  Mean Cell Hemoglobin : 30.6 pg  Mean Cell Hemoglobin Concentration : 34.3 g/dL  Auto Neutrophil # : x  Auto Lymphocyte # : x  Auto Monocyte # : x  Auto Eosinophil # : x  Auto Basophil # : x  Auto Neutrophil % : x  Auto Lymphocyte % : x  Auto Monocyte % : x  Auto Eosinophil % : x  Auto Basophil % : x        139  |  106  |  148<H>  ----------------------------<  214<H>  3.7   |  22  |  6.46<H>    Ca    7.2<L>      2022 16:54  Phos  4.9       Mg     3.2         TPro  5.0<L>  /  Alb  1.3<L>  /  TBili  0.7  /  DBili  x   /  AST  93<H>  /  ALT  43  /  AlkPhos  143<H>      LIVER FUNCTIONS - ( 2022 05:39 )  Alb: 1.3 g/dL / Pro: 5.0 gm/dL / ALK PHOS: 143 U/L / ALT: 43 U/L / AST: 93 U/L / GGT: x           PTT - ( 2022 16:54 )  PTT:65.0 sec    Urinalysis Basic - ( 2022 02:27 )  Color: Brown / Appearance: very cloudy / S.010 / pH: x  Gluc: x / Ketone: Negative  / Bili: Negative / Urobili: Negative mg/dL   Blood: x / Protein: 100 mg/dL / Nitrite: Positive   Leuk Esterase: Moderate / RBC: 6-10 /HPF / WBC >50   Sq Epi: x / Non Sq Epi: Occasional / Bacteria: TNTC    ABG - ( 2022 05:19 )  pH, Arterial: x     pH, Blood: 7.40  /  pCO2: 25    /  pO2: 142   / HCO3: 16    / Base Excess: -7.5  /  SaO2: 99.6      Procalcitonin, Serum (22 @ 09:20)    Procalcitonin, Serum: 81.70:    Lactate, Blood (02.15.22 @ 21:14)    Lactate, Blood: 1.5 mmol/L  Lactate, Blood (02.15.22 @ 18:24)    Lactate, Blood: 2.5: Elevated lactate. Consider ordering follow-up lactate to trend. mmol/L  Lactate, Blood (02.15.22 @ 14:15)    Lactate, Blood: 3.9: Elevated lactate. Consider ordering follow-up lactate to trend. mmol/L    A1C with Estimated Average Glucose (22 @ 09:30)    A1C with Estimated Average Glucose Result: 7.6: Method: Immunoassay       Reference Range                4.0-5.6%       High risk (prediabetic)        5.7-6.4%       Diabetic, diagnostic             >=6.5%       ADA diabetic treatment goal       <7.0%    Estimated Average Glucose: 171        MICROBIOLOGY:  Specimen Source: .Sputum Sputum ( @ 01:01)  Culture - Sputum . (22 @ 01:01)    Gram Stain:   Rare polymorphonuclear leukocytes per low power field  Moderate Squamous epithelial cells per low power field  Numerous Yeast per oil power field  Moderate Gram Positive Rods per oil power field  Few Gram Negative Rods per oil power field  Rare Gram positive cocci in pairs per oil power field    Specimen Source: .Sputum Sputum    Specimen Source: .Blood Blood-Peripheral (02-15 @ 19:01)  Culture Results:   Growth in aerobic and anaerobic bottles: Klebsiella oxytoca/Raoutella  ornithinolytica    Specimen Source: .Blood Blood-Peripheral (02-15 @ 19:01)  Culture Results:   Growth in aerobic and anaerobic bottles: Klebsiella oxytoca/Raoutella  ornithinolytica  See previous culture [id number] (02-15 @ 19:01)      MRSA/MSSA PCR (22 @ 09:20)    MRSA PCR Result.: [facility]:    Staph Aureus PCR Result: [facility]    Respiratory Viral Panel with COVID-19 by [facility] (02.15.22 @ 14:34)    Rapid RVP Result: [facility]    SARS-CoV-2: [facility]: This Respiratory Panel uses polymerase chain reaction (PCR) to detect for  adenovirus; coronavirus (HKU1, NL63, 229E, OC43); human metapneumovirus  (hMPV); human enterovirus/rhinovirus (Entero/RV); influenza A; influenza  A/H1; influenza A/H3; influenza A/H1-2009; influenza B; parainfluenza  viruses 1, 2, 3, 4; respiratory syncytial virus; Mycoplasma pneumoniae;  Chlamydophila pneumoniae; and SARS-CoV-2.      RADIOLOGY:  < from: US Duplex Venous Lower Ext Complete, Bilateral (22 @ 04:56) >  ACC: [id number] EXAM:  US DPLX LWR EXT VEINS COMPL BI                        PROCEDURE DATE:  2022    INTERPRETATION:  CLINICAL INFORMATION: Lower extremity swelling.  COMPARISON: None available.  TECHNIQUE: Duplex sonography of theBILATERAL LOWER extremity veins with   color and spectral Doppler, with and without compression.  FINDINGS:  RIGHT:  Normal compressibility of the RIGHT common femoral, femoral and popliteal   veins. The calf veins including the posterior tibial and peroneal were   not well visualized due to calf edema and portable technique.  Doppler examination shows normal spontaneous and phasic flow.  LEFT:  Echogenic material within the LEFT common femoral and femoral veins with   partial flow and partial compressibility compatible with nonocclusive   above-the-knee deep venous thrombosis. The greater saphenous vein also   demonstrates no compressibility or flow. The LEFT popliteal and posterior   tibial veins are patent. The peroneal vein is not visualized.  Doppler examination shows normal spontaneous and phasic flow.  IMPRESSION:  RIGHT: Limited calf vein visualization. Nonvisualization of the calf   veins. No evidence of deep venous thrombosis at or above the knee.  LEFT: Limited calf vein visualization. Acute nonocclusive above-the-knee   deep venous thrombosis. Occlusive DVT in the greater saphenous vein.    < from: US Abdomen Upper Quadrant Right (22 @ 12:03) >  ACC: [id number] EXAM:  US ABDOMEN RT UPR QUADRANT                        PROCEDURE DATE:  2022    INTERPRETATION:  CLINICAL INFORMATION: Elevated LFTs.  COMPARISON: CT abdomen/pelvis 2/15/2022.  TECHNIQUE: Sonography of the right upper quadrant.  FINDINGS:  Liver: Normal in size. No focal hepatic masses identified.  Bile ducts: Normal caliber. Common bile duct measures 5 mm.  Gallbladder: Multiple gallstones are identified. Gallbladder wall   thickening is identified measuring up to 6 mm. No pericholecystic fluid.  Pancreas: Visualized portions are within normal limits.  Right kidney: 11.2 cm.. No hydronephrosis. No renal calculi. No   space-occupying lesions.  Ascites: None.  IVC: Visualized portions are within normal limits.  IMPRESSION: Multiple gallstones. Gallbladder wall thickening. No   pericholecystic fluid. No evidence for intrahepatic or extrahepatic   biliary ductal dilatation.    < from: Xray Chest 1 View-PORTABLE IMMEDIATE (Xray Chest 1 View-PORTABLE IMMEDIATE .) (22 @ 01:26) >  ACC: [id number] EXAM:  XR CHEST PORTABLE IMMED 1V                        PROCEDURE DATE:  2022    INTERPRETATION:  Clinical history: 82-year-old male, status post right IJ   HD catheter placement.  Portable/rotated view of the chest is compared to 2/15/2022 and   demonstrates a right IJ HD catheter with the tip at the junction of the   SVC and right atrium, new.  Cardiac silhouette and pulmonary vasculature are within normal limits   with no consolidation, effusion, pneumothoraxor acute osseous finding.  IMPRESSION:  Right IJ line HD catheter in satisfactory position with no pneumothorax,   new    < from: CT Abdomen and Pelvis No Cont (02.15.22 @ 23:57) >  ACC: [id number] EXAM:  CT ABDOMEN AND PELVIS                        PROCEDURE DATE:  02/15/2022    INTERPRETATION:  CLINICAL INFORMATION:  Abdominal distension  COMPARISON: None.  CONTRAST/COMPLICATIONS:  IV Contrast: None  Oral Contrast:None  Complications: None  PROCEDURE:  Axial CT images were acquired through the abdomen and pelvis obtained   without intravenous contrast. Coronal and sagittal reformatted images   provided.  FINDINGS: This examination is limited by patient motion artifact and the   lack of oral and intravenous contrast.  LOWER CHEST: Bibasilar atelectasis. Coronary artery calcifications.  LIVER: Within normal limits.  BILE DUCTS: Normal caliber.  GALLBLADDER: Cholelithiasis..  SPLEEN: Within normal limits.  PANCREAS: Within normal limits.  ADRENALS: Within normal limits.  KIDNEYS/URETERS: No definite renal stone or hydronephrosis  BLADDER: Distended with Khalil catheter. No bladder wall thickening.  REPRODUCTIVE ORGANS: Prostate gland mildly enlarged.  BOWEL: Evaluation of bowel is limited without distention with oral   contrast, however there is no bowel obstruction. Few colonic diverticula   without acute diverticulitis. Small bowel loops are not dilated.   Unremarkable appendix without appendicitis. Stomach is underdistended for   adequate evaluation, however suggestion of a moderate hiatal hernia.   Appendix  PERITONEUM: No free air significant ascites.  VESSELS:  Limited without intravenous contrast. There is calcific   atherosclerosis of the abdominal aorta without gross aneurysmal   dilatation. 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  RETROPERITONEUM: No lymphadenopathy.  ABDOMINAL WALL: Small fat-containing bilateral inguinal hernias.  BONES: Degenerative changes of the spine. Bilateral L5 spondylolysis with   slight grade 1 anterolisthesis of L5 on S1.  MPRESSION:  There is no bowel obstruction, significant ascites or free   intraperitoneal air.  Moderate hiatal hernia.  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    < from: Xray Chest 1 View- PORTABLE-Urgent (02.15.22 @ 14:32) >  ACC: [id number] EXAM:  XR CHEST PORTABLE URGENT 1V                        PROCEDURE DATE:  02/15/2022    INTERPRETATION:  AP semierect chest on February 15, 2022 at 1:55 PM.   Patient has sepsis.  Heart enlargement again noted.  There aremild mid lower lung field infiltrates medially new since 2020.  IMPRESSION: Bilateral infiltrates as above.      Vital Signs Last 24 Hrs  T(C): 35.8 (2022 15:30), Max: 36.4 (2022 12:30)  T(F): 96.5 (2022 15:30), Max: 97.5 (2022 12:30)  HR: 77 (2022 18:30) (66 - 96)  BP: 104/56 (2022 18:00) (90/69 - 119/60)  BP(mean): 66 (2022 18:00) (58 - 81)  RR: 27 (2022 18:30) (14 - 29)  SpO2: 90% (2022 18:30) (83% - 100%)  Supplemental O2: on  2Liters NC O2    PHYSICAL EXAM    General: 80 y/o white male awake, alert, very [place], with NC O2 in place, pleasant and cooperative, c/o very dry mouth but in NAD now  HEENT: conj pale, sclerae anicteric, PERRLA, no oral lesions noted but mucosa and tongue appear dry  Neck: supple, no nodes noted           RI Vas Cath in place - site clean with dressing dry and intact - place 22  Heart: RR  Lungs: few moist rhonchi anteriorly with decreased BS at the bases bilaterally  Abdomen: BS+, semi-soft, nontender to palpation, no masses or HS-megaly detected  Back: no CVA or Spinal tenderness noted  Extremities: well-healed bilateral TKR scars                    1+ edema LE's                     arms and hands moderately swollen now  Skin: warm, dry, no rash noted  Khalil in place and draining clear yellow urine now      I&O's Summary :    2022 07:01  -  2022 07:00  --------------------------------------------------------  IN: 4206.4 mL / OUT: 1925 mL / NET: 2281.4 mL    2022 07:01  -  2022 18:53  --------------------------------------------------------  IN: 2245.8 mL / OUT: 1450 mL / NET: 795.8 mL      Impression:  80 y/o white mlae with hx of HTN, HLD, DM, Enterococcus faecalis UTI on 20, 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 ago, s/p  Bilateral Cataract Surgeries, admitted via the ER to [facility] on 2/15/22 with c/o diarrhea x 2 weeks at home along with decreased po intake and mild nausea with a few episodes of vomiting bilious material.  W/u revealed a markedly elevated WBC's of 37,940.  an elevated Lactate to 3.9, and his BUN / Creat were found to be elevated at 169 / 12.9 and Glucose was elevated as well. Further w/u with CXR and CT of the Abdomen + Pelvis was done and revealed Bilateral Infiltrates, Cholelithiasis, a moderate Hiatal Hernia, but no bowel obstruction was seen.  Patient was admitted to the CCU for further care and he was Rx'ed empirically with Rocephin + Zithromax after Blood and Urine Cx's were obtained and he underwent HD urgently x 1 on 2/15/22 PM and rec'd 5 Liters of NS.  Sepsis with Klebsiella Oxytoca ( AKA Raoutella ornithinolytica ) Bacteremia as identified from 2 out of 2 Blood Cx's from 2/15/22  - ? , GI, or Pulmonary source with Bilateral lower lobe PNA seen on initial CXR and Cholelithiasis with GB wall thickening seen on Sono.    Suggestions:  Will therefore change rx to Cefepime and continue Zithromax pending further Cx results.  Follow-up temps and labs closely.  Follow-up CXR and monitor O2 Sat's. Consider further w/u with HIDA Scan / CT of the Abdomen + Pelvis with oral contrast.  Discussed with patient in detail at the bedside.  Thanks, will follow with you.

## 2022-02-17 NOTE — DIETITIAN INITIAL EVALUATION ADULT. - PERTINENT LABORATORY DATA
02-17 Na139 mmol/L Glu 175 mg/dL<H> K+ 4.4 mmol/L Cr  7.18 mg/dL<H>  mg/dL<H> 02-17 Phos 4.9 mg/dL<H> 02-17 Alb 1.3 g/dL<L>02-17 ALT 43 U/L AST 93 U/L<H> Alkaline Phosphatase 143 U/L<H>02-17-22 @ 09:30 A1C 7.6  02-16-22 @ 15:18 A1C 7.8

## 2022-02-17 NOTE — PROGRESS NOTE ADULT - SUBJECTIVE AND OBJECTIVE BOX
NEPHROLOGY PROGRESS NOTE    CHIEF COMPLAINT:  RADHA    HPI:  Dialzyed once on 2/15.  Small spontaneous improvement in renal function since then.     ROS:  minimal dyspnea    EXAM:  T(F): 96.6 (22 @ 05:00)  HR: 81 (22 @ 07:00)  BP: 98/56 (22 @ 07:00)  RR: 24 (22 @ 07:00)  SpO2: 98% (22 @ 07:00)    Conversant, in no apparent distress  Normal respiratory effort, lungs clear bilaterally  Heart RRR with no murmur, mild peripheral edema         LABS                             9.9    25.84 )-----------( 74       ( 2022 05:39 )             29.4              139  |  107  |  146<H>  ----------------------------<  175<H>  4.4   |  21<L>  |  7.18<H>    Ca    7.3<L>      2022 05:39  Phos  4.9       Mg     3.2         TPro  5.0<L>  /  Alb  1.3<L>  /  TBili  0.7  /  DBili  x   /  AST  93<H>  /  ALT  43  /  AlkPhos  143<H>      Urinalysis Basic - ( 2022 02:27 )  Color: Brown / Appearance: very cloudy / S.010 / pH: x  Gluc: x / Ketone: Negative  / Bili: Negative / Urobili: Negative mg/dL   Blood: x / Protein: 100 mg/dL / Nitrite: Positive   Leuk Esterase: Moderate / RBC: 6-10 /HPF / WBC >50   Sq Epi: x / Non Sq Epi: Occasional / Bacteria: TNTC      Assessment   RADHA pre renal azotemia; ischemic ATN  Nonoliguric;    Plan:  Follow off HD on gentle IVF  Maintain dialysis access for now

## 2022-02-18 LAB
-  AMIKACIN: SIGNIFICANT CHANGE UP
-  AMPICILLIN/SULBACTAM: SIGNIFICANT CHANGE UP
-  AMPICILLIN: SIGNIFICANT CHANGE UP
-  AZTREONAM: SIGNIFICANT CHANGE UP
-  CEFAZOLIN: SIGNIFICANT CHANGE UP
-  CEFEPIME: SIGNIFICANT CHANGE UP
-  CEFOXITIN: SIGNIFICANT CHANGE UP
-  CEFTRIAXONE: SIGNIFICANT CHANGE UP
-  CIPROFLOXACIN: SIGNIFICANT CHANGE UP
-  ERTAPENEM: SIGNIFICANT CHANGE UP
-  GENTAMICIN: SIGNIFICANT CHANGE UP
-  IMIPENEM: SIGNIFICANT CHANGE UP
-  LEVOFLOXACIN: SIGNIFICANT CHANGE UP
-  MEROPENEM: SIGNIFICANT CHANGE UP
-  PIPERACILLIN/TAZOBACTAM: SIGNIFICANT CHANGE UP
-  TOBRAMYCIN: SIGNIFICANT CHANGE UP
-  TRIMETHOPRIM/SULFAMETHOXAZOLE: SIGNIFICANT CHANGE UP
ALBUMIN SERPL ELPH-MCNC: 1.3 G/DL — LOW (ref 3.3–5)
ALP SERPL-CCNC: 142 U/L — HIGH (ref 40–120)
ALT FLD-CCNC: 40 U/L — SIGNIFICANT CHANGE UP (ref 12–78)
ANION GAP SERPL CALC-SCNC: 10 MMOL/L — SIGNIFICANT CHANGE UP (ref 5–17)
APTT BLD: 84 SEC — HIGH (ref 27.5–35.5)
AST SERPL-CCNC: 61 U/L — HIGH (ref 15–37)
BILIRUB SERPL-MCNC: 0.8 MG/DL — SIGNIFICANT CHANGE UP (ref 0.2–1.2)
BUN SERPL-MCNC: 139 MG/DL — HIGH (ref 7–23)
CALCIUM SERPL-MCNC: 7.1 MG/DL — LOW (ref 8.5–10.1)
CHLORIDE SERPL-SCNC: 107 MMOL/L — SIGNIFICANT CHANGE UP (ref 96–108)
CO2 SERPL-SCNC: 23 MMOL/L — SIGNIFICANT CHANGE UP (ref 22–31)
CREAT SERPL-MCNC: 5.71 MG/DL — HIGH (ref 0.5–1.3)
CRP SERPL-MCNC: 83 MG/L — HIGH
CULTURE RESULTS: SIGNIFICANT CHANGE UP
ERYTHROCYTE [SEDIMENTATION RATE] IN BLOOD: 62 MM/HR — HIGH (ref 0–20)
GBM IGG SER-ACNC: 5 — SIGNIFICANT CHANGE UP (ref 0–20)
GLUCOSE BLDC GLUCOMTR-MCNC: 101 MG/DL — HIGH (ref 70–99)
GLUCOSE BLDC GLUCOMTR-MCNC: 116 MG/DL — HIGH (ref 70–99)
GLUCOSE BLDC GLUCOMTR-MCNC: 155 MG/DL — HIGH (ref 70–99)
GLUCOSE BLDC GLUCOMTR-MCNC: 86 MG/DL — SIGNIFICANT CHANGE UP (ref 70–99)
GLUCOSE SERPL-MCNC: 115 MG/DL — HIGH (ref 70–99)
GRAM STN FLD: SIGNIFICANT CHANGE UP
HCT VFR BLD CALC: 24.1 % — LOW (ref 39–50)
HGB BLD-MCNC: 8.3 G/DL — LOW (ref 13–17)
LEGIONELLA AG UR QL: NEGATIVE — SIGNIFICANT CHANGE UP
MAGNESIUM SERPL-MCNC: 3 MG/DL — HIGH (ref 1.6–2.6)
MCHC RBC-ENTMCNC: 30.9 PG — SIGNIFICANT CHANGE UP (ref 27–34)
MCHC RBC-ENTMCNC: 34.4 G/DL — SIGNIFICANT CHANGE UP (ref 32–36)
MCV RBC AUTO: 89.6 FL — SIGNIFICANT CHANGE UP (ref 80–100)
METHOD TYPE: SIGNIFICANT CHANGE UP
NRBC # BLD: 0 /100 WBCS — SIGNIFICANT CHANGE UP (ref 0–0)
ORGANISM # SPEC MICROSCOPIC CNT: SIGNIFICANT CHANGE UP
PHOSPHATE SERPL-MCNC: 4.4 MG/DL — SIGNIFICANT CHANGE UP (ref 2.5–4.5)
PLATELET # BLD AUTO: 96 K/UL — LOW (ref 150–400)
POTASSIUM SERPL-MCNC: 3.6 MMOL/L — SIGNIFICANT CHANGE UP (ref 3.5–5.3)
POTASSIUM SERPL-SCNC: 3.6 MMOL/L — SIGNIFICANT CHANGE UP (ref 3.5–5.3)
PROCALCITONIN SERPL-MCNC: 17.2 NG/ML — HIGH (ref 0.02–0.1)
PROT SERPL-MCNC: 5.1 GM/DL — LOW (ref 6–8.3)
RBC # BLD: 2.69 M/UL — LOW (ref 4.2–5.8)
RBC # FLD: 14 % — SIGNIFICANT CHANGE UP (ref 10.3–14.5)
SODIUM SERPL-SCNC: 140 MMOL/L — SIGNIFICANT CHANGE UP (ref 135–145)
SPECIMEN SOURCE: SIGNIFICANT CHANGE UP
WBC # BLD: 21.88 K/UL — HIGH (ref 3.8–10.5)
WBC # FLD AUTO: 21.88 K/UL — HIGH (ref 3.8–10.5)

## 2022-02-18 PROCEDURE — 99233 SBSQ HOSP IP/OBS HIGH 50: CPT

## 2022-02-18 RX ORDER — LATANOPROST 0.05 MG/ML
1 SOLUTION/ DROPS OPHTHALMIC; TOPICAL AT BEDTIME
Refills: 0 | Status: DISCONTINUED | OUTPATIENT
Start: 2022-02-18 | End: 2022-03-01

## 2022-02-18 RX ORDER — INSULIN LISPRO 100/ML
VIAL (ML) SUBCUTANEOUS
Refills: 0 | Status: DISCONTINUED | OUTPATIENT
Start: 2022-02-18 | End: 2022-03-01

## 2022-02-18 RX ORDER — POTASSIUM CHLORIDE 20 MEQ
20 PACKET (EA) ORAL ONCE
Refills: 0 | Status: COMPLETED | OUTPATIENT
Start: 2022-02-18 | End: 2022-02-18

## 2022-02-18 RX ORDER — ATORVASTATIN CALCIUM 80 MG/1
40 TABLET, FILM COATED ORAL AT BEDTIME
Refills: 0 | Status: DISCONTINUED | OUTPATIENT
Start: 2022-02-18 | End: 2022-03-01

## 2022-02-18 RX ADMIN — CHLORHEXIDINE GLUCONATE 1 APPLICATION(S): 213 SOLUTION TOPICAL at 05:43

## 2022-02-18 RX ADMIN — LATANOPROST 1 DROP(S): 0.05 SOLUTION/ DROPS OPHTHALMIC; TOPICAL at 22:01

## 2022-02-18 RX ADMIN — AZITHROMYCIN 255 MILLIGRAM(S): 500 TABLET, FILM COATED ORAL at 22:00

## 2022-02-18 RX ADMIN — HEPARIN SODIUM 1900 UNIT(S)/HR: 5000 INJECTION INTRAVENOUS; SUBCUTANEOUS at 18:08

## 2022-02-18 RX ADMIN — ATORVASTATIN CALCIUM 40 MILLIGRAM(S): 80 TABLET, FILM COATED ORAL at 22:01

## 2022-02-18 RX ADMIN — Medication 20 MILLIEQUIVALENT(S): at 05:43

## 2022-02-18 RX ADMIN — HEPARIN SODIUM 1900 UNIT(S)/HR: 5000 INJECTION INTRAVENOUS; SUBCUTANEOUS at 19:28

## 2022-02-18 RX ADMIN — CEFEPIME 100 MILLIGRAM(S): 1 INJECTION, POWDER, FOR SOLUTION INTRAMUSCULAR; INTRAVENOUS at 20:16

## 2022-02-18 NOTE — PHYSICAL THERAPY INITIAL EVALUATION ADULT - BALANCE TRAINING, PT EVAL
Patient will improve static and dynamic standing balance by 1 grade with rolling walker in 2-3 weeks to improve safety and decrease risk of falls.

## 2022-02-18 NOTE — PROGRESS NOTE ADULT - ATTENDING SUPERVISION STATEMENT
Student
Student
How Severe Are Your Spot(S)?: mild
Have Your Spot(S) Been Treated In The Past?: has not been treated
Hpi Title: Evaluation of Skin Lesions
Location: Left Zygoma
Year Removed: September 2017

## 2022-02-18 NOTE — PROGRESS NOTE ADULT - ASSESSMENT
81M w/ HTN, bladder tumor removal, melanoma. Presents w/ diarrhea and weakness. Admitted to ICU w/ rapif afib, septic shock likely from UTI vs pneumonia, RADHA w/ hyperkalemia and significant uremia and thrombocytopenia. S/p 1 HD treatment 2/16/22      Plan    #Neuro  - PT A&Ox4 today, awake alert with no confusion. Likely uremia clearing. Continue to monitor neuro status.     #CV   -Off nor-epi and hemodynamically stable  PT with new onset rapid afib: likely due to sepsis now in NSR with rate controlled: off amiodarone and sustaining NSR.     #Pulm   - SpO2 95% and above on 2LNC    #ID  -ID reccs noted, culture sensitivities resulted, Ceftriaxone DC/d due to likely resistance: C/W Cefepime and Azithromycin for CAP vs UTI; GNR+ bacteremia. No diarrhea 2 days: send GI PCR and c diff if diarrhea recurs    #Renal/metabolic   - RADHA likely combination of prerenal, ATN and obstructive uropathy; s/p HD x1; now making urine; PT still appears dry, will start LR maintenance, d/c bicarb gtt; monitor UOP and electrolytes closely. Renal reccs noted: D/C HD catheter no further HD at this time.     #GI  - Diet advanced now that mental status improving and able to follow commands, noted to have elevated LFTs, CT A/P unremarkable, RUQ US with multiple gallstones without cholecystitis.     #Heme   - noted thrombocytopenia of unclear etiology, platelet count improving. On Heparin for LLE DVT, monitor platelet count, aPTT.     #Endo   - FS w/ ISS    #PPx   - SCDs, on heparin gtt for DVT    #Dispo  - prognosis improving, likely downgrade to medicine today.

## 2022-02-18 NOTE — CHART NOTE - NSCHARTNOTEFT_GEN_A_CORE
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 presented to ED and found to be in rapid Afib with elevated creatine 12.00;  last creatine  February 2021 was 1.  In ER patient rec'd 5 liters NS by ed/ems.  With creatine improved slightly. Patient admitted to ICU Septic shock,  RADHA in setting of diarrhea with elevated wbc. Patient was slightly tachypneic, likely from metabolic acidosis. Cultures sent. Seen by nephology and received urgent HD; with continued IVF resuscitation 2/2 severe pre-renal azotemia do to volume depletion following 3 weeks of diarrhea. Initially on sodium bicarb drip. Septic shock requiring Vasopressors, crystalloid now transitioned to midodrine and LR IVF. Blood cultures revealed  Klebsiella oxytoca/Raoutella ornithinolytica. ID consulted and ABX changed rx to Cefepime and continue Zithromax pending Urine for legionella  and for atypical for CAP.  Patient lower extremities; Doppler examination shows normal spontaneous and phasic flow. IMPRESSION: RIGHT: Limited calf vein visualization. Nonvisualization of the calf veins. No evidence of deep venous thrombosis at or above the knee. LEFT: Limited calf vein visualization. Acute nonocclusive above-the-knee  deep venous thrombosis. Occlusive DVT in the greater saphenous vein. Patient on full dose anticoagulation with heparin drip. Thrombocytopenia probably 2/2 sepsis, platelet count improving. On Heparin for LLE DVT, monitor platelet count, aPTT.  New onset rapid Afib: likely due to sepsis now in NSR with rate controlled: off amiodarone and sustaining NSR. Patient seen by nephrology today no further HD at this time. Will remove HD catheter. Patient seen and examined by ICU attending and stable for transfer to medicine service. 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 presented to ED and found to be in rapid Afib with elevated creatine 12.00;  last creatine  February 2021 was 1.  In ER patient rec'd 5 liters NS by ed/ems.  With creatine improved slightly. Patient admitted to ICU Septic shock,  RADHA in setting of diarrhea with elevated wbc. Patient was slightly tachypneic, likely from metabolic acidosis. Cultures sent. Seen by nephology and received urgent HD; with continued IVF resuscitation 2/2 severe pre-renal azotemia do to volume depletion following 3 weeks of diarrhea. Initially on sodium bicarb drip. Septic shock requiring Vasopressors, crystalloid now transitioned to midodrine and LR IVF. Blood cultures revealed  Klebsiella oxytoca/Raoutella ornithinolytica. ID consulted and ABX changed rx to Cefepime and continue Zithromax pending Urine for legionella and for atypical for CAP.  As per ID consider HIDA vs CT ABD / PELVIS with PO contrast to further evaluate / identify source of Gm Neg bacteria.  Patient lower extremities; Doppler examination shows normal spontaneous and phasic flow. IMPRESSION: RIGHT: Limited calf vein visualization. Nonvisualization of the calf veins. No evidence of deep venous thrombosis at or above the knee. LEFT: Limited calf vein visualization. Acute nonocclusive above-the-knee  deep venous thrombosis. Occlusive DVT in the greater saphenous vein. Patient on full dose anticoagulation with heparin drip. Thrombocytopenia probably 2/2 sepsis, platelet count improving. On Heparin for LLE DVT, monitor platelet count, aPTT.  New onset rapid Afib: likely due to sepsis now in NSR with rate controlled: off amiodarone and sustaining NSR. Patient seen by nephrology today no further HD at this time. Will remove HD catheter. Patient seen and examined by ICU attending and stable for transfer to medicine service.     Report given to Dr. Travis and placed on Dr. Alvarado service    JOSE DAVID Martinez  critical care

## 2022-02-18 NOTE — CHART NOTE - NSCHARTNOTEFT_GEN_A_CORE
Right IJ HD Catheter removed. Catheter intact. Pressure applied until hemostasis achieved. Clean dry dressing applied. PT tolerated procedure well.

## 2022-02-18 NOTE — PROGRESS NOTE ADULT - SUBJECTIVE AND OBJECTIVE BOX
Patient is a 81y old  Male who presents with a chief complaint of diarrhea and weakness x 2 weeks.       BRIEF HOSPITAL COURSE: ***  80 yo male with PHX: HTN, HLD, bladder tumor presents to ED on 2/15/22 C/O diarrhea and weakness x 2 weeks. Found to be in Septic shock, ARF, metabolic acidosis with encephelopathy, new onset AFIB with RVR. Admitted to ICU S/P 1 HD session 2/16/22 with mental status improved.     Events last 24 hours: ***  S/P initial HD session 2/16/22 mental status improved, now A&Ox4. Renal function continues to improve improving; producing urine. Off nor-epi and hemodynamically stable. Off amiodarone with sustained NSR.         Review of Systems:  CONSTITUTIONAL: No fever, chills, + Fatigue  EYES: No eye pain, visual disturbances, or discharge  ENMT:  + dry mouth, +difficulty hearing. No tinnitus,no throat pain.   NECK: No pain or stiffness  RESPIRATORY: No cough, wheezing, chills or hemoptysis; No shortness of breath  CARDIOVASCULAR: No chest pain, palpitations, dizziness, or leg swelling  GASTROINTESTINAL: No abdominal pain. No nausea, vomiting, or hematemesis; No diarrhea or constipation. No melena or hematochezia.  GENITOURINARY: No dysuria  NEUROLOGICAL: No memory loss. No headaches, decreased strength or sensation  SKIN: No itching, burning, rashes, or lesions   MUSCULOSKELETAL: No joint pain or swelling; No muscle, back, or extremity pain  PSYCHIATRIC: No depression, anxiety, mood swings, or difficulty sleeping          ICU Vital Signs Last 24 Hrs  T(C): 36.1 (18 Feb 2022 09:00), Max: 36.4 (17 Feb 2022 12:30)  T(F): 97 (18 Feb 2022 09:00), Max: 97.6 (18 Feb 2022 05:00)  HR: 83 (18 Feb 2022 11:00) (67 - 89)  BP: 119/64 (18 Feb 2022 11:00) (92/61 - 119/66)  BP(mean): 77 (18 Feb 2022 11:00) (59 - 84)  ABP: --  ABP(mean): --  RR: 22 (18 Feb 2022 11:00) (14 - 29)  SpO2: 78% (18 Feb 2022 11:00) (78% - 99%)      PHYSICAL EXAM:  General: NAD, non toxic appearing  HEENT: dry oral mucosa, PERRLA, EOM intact  Neck: supple, Right IJ Shiley   Respiratory: CTA b/l with no adventitious sounds  Cardiovascular: RRR, S1/S2 no m/r/g  Abdomen: distended, soft, non tender, +bowel sounds  Extremities: warm, mild B/L LE edema  Skin: sacral gluteal fold stage 1 ulcer and DTI  Neurological: A&Ox4, following commands, moves all extremities freely  Psychiatry: normal affect                  8.3                  140  | 23   | 139          21.88 >-----------< 96      ------------------------< 115                   24.1                 3.6  | 107  | 5.71                                         Ca 7.1   Mg 3.0   Ph 4.4      Blood Gas Profile - Arterial (02.16.22 @ 05:19)    pH, Blood: 7.40    Blood Gas Comments: pressure    Blood Gas Source: Arterial    pCO2, Arterial: 25 mmHg    pO2, Arterial: 142 mmHg    HCO3, Arterial: 16 mmol/L    Base Excess, Arterial: -7.5 mmol/L    Oxygen Saturation, Arterial: 99.6 %    Total CO2, Arterial: 16 mmol/L    FIO2, Arterial: 45.0    CAPILLARY BLOOD GLUCOSE      POCT Blood Glucose.: 116 mg/dL (18 Feb 2022 10:53)  POCT Blood Glucose.: 86 mg/dL (18 Feb 2022 05:46)  POCT Blood Glucose.: 160 mg/dL (17 Feb 2022 23:32)  POCT Blood Glucose.: 177 mg/dL (17 Feb 2022 19:26)  POCT Blood Glucose.: 180 mg/dL (17 Feb 2022 12:10)    Culture - Sputum . (02.17.22 @ 01:01)    Gram Stain:     Culture Results:   Normal Respiratory Nerissa present    Culture - Urine (02.16.22 @ 08:49)    Specimen Source: Clean Catch Clean Catch (Midstream)    Culture Results:   >=3 organisms. Probable collection contamination.    Culture - Blood (02.15.22 @ 19:01)    Gram Stain:     Culture Results:   Growth in aerobic and anaerobic bottles: Klebsiella oxytoca/Raoutella  ornithinolytica  See previous culture [id number]    Rapid RVP Result: Mason (02-15 @ 14:34)      I&O's Detail    17 Feb 2022 07:01  -  18 Feb 2022 07:00  --------------------------------------------------------  IN:    Amiodarone: 83.5 mL    Heparin Infusion: 388 mL    IV PiggyBack: 300 mL    Lactated Ringers: 1100 mL    Lactated Ringers: 600 mL    Norepinephrine: 20.3 mL    Oral Fluid: 2131 mL  Total IN: 4622.8 mL    OUT:    Indwelling Catheter - Urethral (mL): 2900 mL    Voided (mL): 525 mL  Total OUT: 3425 mL    Total NET: 1197.8 mL      18 Feb 2022 07:01  -  18 Feb 2022 11:55  --------------------------------------------------------  IN:    Heparin Infusion: 76 mL    Lactated Ringers: 300 mL  Total IN: 376 mL    OUT:    Indwelling Catheter - Urethral (mL): 725 mL  Total OUT: 725 mL    Total NET: -349 mL          RADIOLOGY: ***    < from: US Duplex Venous Lower Ext Complete, Bilateral (02.17.22 @ 04:56) >  IMPRESSION:  RIGHT: Limited calf vein visualization. Nonvisualization of the calf   veins. No evidence of deep venous thrombosis at or above the knee.    LEFT: Limited calf vein visualization. Acute nonocclusive above-the-knee   deep venous thrombosis. Occlusive DVT in the greater saphenous vein.    Findings were discussed with NP Au at 4:25 AM on 2/17/2022 with read back   verification    --- End of Report ---    < end of copied text >      < from: US Abdomen Upper Quadrant Right (02.16.22 @ 12:03) >  IMPRESSION: Multiple gallstones. Gallbladder wall thickening. No   pericholecystic fluid. No evidence for intrahepatic or extrahepatic   biliary ductal dilatation.    --- End of Report ---    < end of copied text >        MEDICATIONS  (STANDING):  atorvastatin 40 milliGRAM(s) Oral at bedtime  azithromycin  IVPB      azithromycin  IVPB 500 milliGRAM(s) IV Intermittent every 24 hours  cefepime   IVPB      cefepime   IVPB 1000 milliGRAM(s) IV Intermittent every 24 hours  chlorhexidine 2% Cloths 1 Application(s) Topical <User Schedule>  heparin  Infusion.  Unit(s)/Hr (19 mL/Hr) IV Continuous <Continuous>  insulin lispro (ADMELOG) corrective regimen sliding scale   SubCutaneous Before meals and at bedtime  lactated ringers. 1000 milliLiter(s) (75 mL/Hr) IV Continuous <Continuous>  latanoprost 0.005% Ophthalmic Solution 1 Drop(s) Both EYES at bedtime    MEDICATIONS  (PRN):  sodium chloride 0.9% lock flush 10 milliLiter(s) IV Push every 1 hour PRN Pre/post blood products, medications, blood draw, and to maintain line patency

## 2022-02-18 NOTE — PHYSICAL THERAPY INITIAL EVALUATION ADULT - ADDITIONAL COMMENTS
As per patient, he lives in an assisted living prior to hospitalization. Patient denies use of assistive device prior to hospitalization.

## 2022-02-18 NOTE — PROGRESS NOTE ADULT - SUBJECTIVE AND OBJECTIVE BOX
[facility] NEPHROLOGY SERVICES, [facility]  NEPHROLOGY AND HYPERTENSION  [address]  [address]  [place] [address]  [phone]    MD ANU OH, MD FIONA SCHMIDT, MD MARIBELL ESQUEDA, MD GRACIE TIWARI, MD ALLA GUEVARA MD          Patient events noted  feels well no distress    MEDICATIONS  (STANDING):  atorvastatin 40 milliGRAM(s) Oral at bedtime  azithromycin  IVPB      azithromycin  IVPB 500 milliGRAM(s) IV Intermittent every 24 hours  cefepime   IVPB      cefepime   IVPB 1000 milliGRAM(s) IV Intermittent every 24 hours  chlorhexidine 2% Cloths 1 Application(s) Topical <User Schedule>  heparin  Infusion.  Unit(s)/Hr (19 mL/Hr) IV Continuous <Continuous>  insulin lispro (ADMELOG) corrective regimen sliding scale   SubCutaneous Before meals and at bedtime  lactated ringers. 1000 milliLiter(s) (75 mL/Hr) IV Continuous <Continuous>  latanoprost 0.005% Ophthalmic Solution 1 Drop(s) Both EYES at bedtime    MEDICATIONS  (PRN):  sodium chloride 0.9% lock flush 10 milliLiter(s) IV Push every 1 hour PRN Pre/post blood products, medications, blood draw, and to maintain line patency      02-17-22 @ 07:01  -  02-18-22 @ 07:00  --------------------------------------------------------  IN: 4622.8 mL / OUT: 3425 mL / NET: 1197.8 mL    02-18-22 @ 07:01  -  02-18-22 @ 22:03  --------------------------------------------------------  IN: 1784 mL / OUT: 1625 mL / NET: 159 mL      PHYSICAL EXAM:      T(C): 36.1 (02-18-22 @ 16:36), Max: 36.4 (02-18-22 @ 05:00)  HR: 87 (02-18-22 @ 17:00) (67 - 87)  BP: 131/75 (02-18-22 @ 17:00) (101/61 - 131/75)  RR: 23 (02-18-22 @ 17:00) (14 - 25)  SpO2: 94% (02-18-22 @ 16:00) (84% - 98%)  Wt(kg): --  Lungs clear  Heart S1S2  Abd soft NT ND  Extremities:   2 edema                                    8.3    21.88 )-----------( 96       ( 18 Feb 2022 02:22 )             24.1     02-18    140  |  107  |  139<H>  ----------------------------<  115<H>  3.6   |  23  |  5.71<H>    Ca    7.1<L>      18 Feb 2022 02:22  Phos  4.4     02-18  Mg     3.0     02-18    TPro  5.1<L>  /  Alb  1.3<L>  /  TBili  0.8  /  DBili  x   /  AST  61<H>  /  ALT  40  /  AlkPhos  142<H>  02-18      LIVER FUNCTIONS - ( 18 Feb 2022 02:22 )  Alb: 1.3 g/dL / Pro: 5.1 gm/dL / ALK PHOS: 142 U/L / ALT: 40 U/L / AST: 61 U/L / GGT: x           Creatinine Trend: 5.71<--, 6.46<--, 7.18<--, 8.73<--, 9.20<--, 11.80<--      Assessment   RADHA pre renal azotemia; ischemic ATN  Nonoliguric;    Plan:  Follow off HD on gentle IVF  D/C abe catheter    Severino Lynn MD

## 2022-02-18 NOTE — PHYSICAL THERAPY INITIAL EVALUATION ADULT - GAIT TRAINING, PT EVAL
Patient will ambulate 200 feet with rolling walker with minimal assistance x 1 for community ambulation in 2-3 weeks.

## 2022-02-18 NOTE — PROGRESS NOTE ADULT - SUBJECTIVE AND OBJECTIVE BOX
TMAX - 96 - 97.6    On day # 2 Cefepime / # 4 Zithromax     Vital Signs Last 24 Hrs  T(C): 36 (2022 12:00), Max: 36.4 (2022 05:00)  T(F): 96.8 (2022 12:00), Max: 97.6 (2022 05:00)  HR: 87 (2022 15:02) (67 - 89)  BP: 122/61 (2022 15:02) (94/48 - 122/61)  BP(mean): 75 (2022 15:02) (59 - 84)  RR: 21 (2022 15:02) (14 - 27)  SpO2: 97% (2022 15:02) (84% - 99%)  Supplemental O2:  on 2 Liters NC O2     Awake, alert, feeling a little better today.  No c/o SOB now with NC O2 in place and with O2 Sat's between 85 - 98%.  Still with intermittent moist cough but with some difficulty expectorating the mucous.  No c/o cp or abdominal pain and tolerated some solid food.  Asking for more liquids to drink.  Khalil d/c'ed but patient has not voided as yet.  Reports passing some flatus but no BM as yet.    PHYSICAL EXAM  General: 80 y/o white male awake, alert, with NC O2 in place, pleasant and cooperative, c/o very dry mouth but in NAD now  HEENT: conj pale, sclerae anicteric, PERRLA, no oral lesions noted but mucosa and tongue still appear somewhat dry  Neck: supple, no nodes noted           RIJ Vas Cath in place - site clean with dressing dry and intact - place 22  Heart: RR  Lungs: few moist rhonchi scattered  bilaterally  Abdomen: BS+, soft, nontender to palpation, no masses or HS-megaly detected  Back: no CVA or Spinal tenderness noted  Extremities: well-healed bilateral TKR scars                    1+ edema LE's                     arms and hands  slightly less swollen   Skin: warm, dry, no rash noted      I&O's Summary :  2022 07:01  -  2022 07:00  --------------------------------------------------------  IN: 4622.8 mL / OUT: 3425 mL / NET: 1197.8 mL    2022 07:01  -  2022 15:37  --------------------------------------------------------  IN: 1208 mL / OUT: 1625 mL / NET: -417 mL      LABS:  CBC Full  -  ( 2022 02:22 )  WBC Count : 21.88 K/uL  RBC Count : 2.69 M/uL  Hemoglobin : 8.3 g/dL  Hematocrit : 24.1 %  Platelet Count - Automated : 96 K/uL  Mean Cell Volume : 89.6 fl  Mean Cell Hemoglobin : 30.9 pg  Mean Cell Hemoglobin Concentration : 34.4 g/dL  Auto Neutrophil # : x  Auto Lymphocyte # : x  Auto Monocyte # : x  Auto Eosinophil # : x  Auto Basophil # : x  Auto Neutrophil % : x  Auto Lymphocyte % : x  Auto Monocyte % : x  Auto Eosinophil % : x  Auto Basophil % : x        140  |  107  |  139<H>  ----------------------------<  115<H>  3.6   |  23  |  5.71<H>  Ca    7.1<L>      2022 02:22  Phos  4.4       Mg     3.0       TPro  5.1<L>  /  Alb  1.3<L>  /  TBili  0.8  /  DBili  x   /  AST  61<H>  /  ALT  40  /  AlkPhos  142<H>    LIVER FUNCTIONS - ( 2022 02:22 )  Alb: 1.3 g/dL / Pro: 5.1 gm/dL / ALK PHOS: 142 U/L / ALT: 40 U/L / AST: 61 U/L / GGT: x           PTT - ( 2022 02:22 )  PTT:84.0 sec    Sedimentation Rate, Erythrocyte: 62 mm/hr ( @ 02:22)    C-Reactive Protein, Serum (22 @ 09:23)    C-Reactive Protein, Serum: 83 mg/L    Procalcitonin, Serum (22 @ 09:23)    Procalcitonin, Serum: 17.20:   Procalcitonin, Serum (22 @ 09:20)    Procalcitonin, Serum: 81.70:      Lactate, Blood (02.15.22 @ 21:14)    Lactate, Blood: 1.5 mmol/L  Lactate, Blood (02.15.22 @ 18:24)    Lactate, Blood: 2.5: Elevated lactate. Consider ordering follow-up lactate to trend. mmol/L  Lactate, Blood (02.15.22 @ 14:15)    Lactate, Blood: 3.9: Elevated lactate. Consider ordering follow-up lactate to trend. mmol/L    A1C with Estimated Average Glucose (22 @ 09:30)    A1C with Estimated Average Glucose Result: 7.6: Method: Immunoassay       Reference Range                4.0-5.6%       High risk (prediabetic)        5.7-6.4%       Diabetic, diagnostic             >=6.5%       ADA diabetic treatment goal       <7.0%    Estimated Average Glucose: 171    Acute Hepatitis Panel (22 @ 09:37)    Hepatitis C Virus Interpretation: Nonreact: Hepatitis C AB    Hepatitis C Virus S/CO Ratio: 0.12 S/CO    Hepatitis B Core IgM Antibody: Nonreact    Hepatitis B Surface Antigen: Nonreact    Hepatitis A IgM Antibody: Nonreact    Hepatitis B Surface Antibody (22 @ 09:37)    Hepatitis B Surface Antibody: Nonreact    C3 Complement, Serum (22 @ 09:37)    C3 Complement, Serum: 115 mg/dL    C4 Complement, Serum (22 @ 09:37)    C4 Complement, Serum: 26 mg/dL    Glomerular Basement Membrane Ab IgG (22 @ 21:11)    Glomerular Basement Membrane Ab Ig: Negative          MICROBIOLOGY:  Specimen Source: .Sputum Sputum ( @ 01:01)    Gram Stain:   Rare polymorphonuclear leukocytes per low power field  Moderate Squamous epithelial cells per low power field  Numerous Yeast per oil power field  Moderate Gram Positive Rods per oil power field  Few Gram Negative Rods per oil power field  Rare Gram positive cocci in pairs per oil power field    Specimen Source: .Sputum Sputum    Culture Results:   Normal Respiratory Nerissa present    Specimen Source: Clean Catch Clean Catch (Midstream) ( @ 08:49)  Culture Results:   >=3 organisms. Probable collection contamination. ( @ 08:49)    Specimen Source: .Blood Blood-Peripheral (02-15 @ 19:01)  Culture Results:   Growth in aerobic and anaerobic bottles: Klebsiella oxytoca/Raoutella  ornithinolytica    -  Klebsiella oxytoca: Detec    -  Tobramycin: S <=2    -  Trimethoprim/Sulfamethoxazole: S <=0.5/9.5    -  Ertapenem: S <=0.5    -  Gentamicin: S <=2    -  Imipenem: S <=1    -  Levofloxacin: S <=0.5    -  Meropenem: S <=1    -  Piperacillin/Tazobactam: S <=8    -  Amikacin: S <=16    -  Ampicillin: R >16 These ampicillin results predict results for amoxicillin    Gram Stain:   Growth in aerobic and anaerobic bottles: Gram Negative Rods    -  Ampicillin/Sulbactam: S 8/4 Enterobacter, Klebsiella aerogenes, Citrobacter, and Serratia may develop resistance during prolonged therapy (3-4 days)    -  Aztreonam: R >16    -  Cefazolin: R 16 Enterobacter, Klebsiella aerogenes, Citrobacter, and Serratia may develop resistance during prolonged therapy (3-4 days)    -  Cefepime: S <=2    -  Cefoxitin: S <=8    -  Ceftriaxone: S <=1 Enterobacter, Klebsiella aerogenes, Citrobacter, and Serratia may develop resistance during prolonged therapy    -  Ciprofloxacin: S <=0.25    Specimen Source: .Blood Blood-Peripheral    Organism: Blood Culture PCR    Organism: Klebsiella oxytoca /Raoutella ornithinolytica    Culture Results:   Growth in aerobic and anaerobic bottles: Klebsiella oxytoca/Raoutella  ornithinolytica    Organism Identification: Blood Culture PCR  Klebsiella oxytoca /Raoutella ornithinolytica    Method Type: PCR    Method Type: NATACHA    Culture Results:   Growth in aerobic and anaerobic bottles: Klebsiella oxytoca/Raoutella  ornithinolytica  See previous culture [id number] (02-15 @ 19:01)      MRSA/MSSA PCR (22 @ 09:20)    MRSA PCR Result.: NotDetec:    Staph Aureus PCR Result: NotDetec    Respiratory Viral Panel with COVID-19 by [facility] (02.15.22 @ 14:34)    Rapid RVP Result: [facility]    SARS-CoV-2: [facility]: This Respiratory Panel uses polymerase chain reaction (PCR) to detect for  adenovirus; coronavirus (HKU1, NL63, 229E, OC43); human metapneumovirus  (hMPV); human enterovirus/rhinovirus (Entero/RV); influenza A; influenza  A/H1; influenza A/H3; influenza A/H1-2009; influenza B; parainfluenza  viruses 1, 2, 3, 4; respiratory syncytial virus; Mycoplasma pneumoniae;  Chlamydophila pneumoniae; and SARS-CoV-2.MRSA/MSSA PCR (22 @ 09:20)    MRSA PCR Result.: [facility]:    Staph Aureus PCR Result: [facility]    Respiratory Viral Panel with COVID-19 by [facility] (02.15.22 @ 14:34)    Rapid RVP Result: [facility]    SARS-CoV-2: [facility]: This Respiratory Panel uses polymerase chain reaction (PCR) to detect for  adenovirus; coronavirus (HKU1, NL63, 229E, OC43); human metapneumovirus  (hMPV); human enterovirus/rhinovirus (Entero/RV); influenza A; influenza  A/H1; influenza A/H3; influenza A/H1-2009; influenza B; parainfluenza  viruses 1, 2, 3, 4; respiratory syncytial virus; Mycoplasma pneumoniae;  Chlamydophila pneumoniae; and SARS-CoV-2.    RADIOLOGY:  < from: US Duplex Venous Lower Ext Complete, Bilateral (22 @ 04:56) >  ACC: [id number] EXAM:  US DPLX LWR EXT VEINS COMPL BI                        PROCEDURE DATE:  2022    INTERPRETATION:  CLINICAL INFORMATION: Lower extremity swelling.  COMPARISON: None available.  TECHNIQUE: Duplex sonography of theBILATERAL LOWER extremity veins with   color and spectral Doppler, with and without compression.  FINDINGS:  RIGHT:  Normal compressibility of the RIGHT common femoral, femoral and popliteal   veins. The calf veins including the posterior tibial and peroneal were   not well visualized due to calf edema and portable technique.  Doppler examination shows normal spontaneous and phasic flow.  LEFT:  Echogenic material within the LEFT common femoral and femoral veins with   partial flow and partial compressibility compatible with nonocclusive   above-the-knee deep venous thrombosis. The greater saphenous vein also   demonstrates no compressibility or flow. The LEFT popliteal and posterior   tibial veins are patent. The peroneal vein is not visualized.  Doppler examination shows normal spontaneous and phasic flow.  IMPRESSION:  RIGHT: Limited calf vein visualization. Nonvisualization of the calf   veins. No evidence of deep venous thrombosis at or above the knee.  LEFT: Limited calf vein visualization. Acute nonocclusive above-the-knee   deep venous thrombosis. Occlusive DVT in the greater saphenous vein.    < from: US Abdomen Upper Quadrant Right (22 @ 12:03) >  ACC: [id number] EXAM:  US ABDOMEN RT UPR QUADRANT                        PROCEDURE DATE:  2022    INTERPRETATION:  CLINICAL INFORMATION: Elevated LFTs.  COMPARISON: CT abdomen/pelvis 2/15/2022.  TECHNIQUE: Sonography of the right upper quadrant.  FINDINGS:  Liver: Normal in size. No focal hepatic masses identified.  Bile ducts: Normal caliber. Common bile duct measures 5 mm.  Gallbladder: Multiple gallstones are identified. Gallbladder wall   thickening is identified measuring up to 6 mm. No pericholecystic fluid.  Pancreas: Visualized portions are within normal limits.  Right kidney: 11.2 cm.. No hydronephrosis. No renal calculi. No   space-occupying lesions.  Ascites: None.  IVC: Visualized portions are within normal limits.  IMPRESSION: Multiple gallstones. Gallbladder wall thickening. No   pericholecystic fluid. No evidence for intrahepatic or extrahepatic   biliary ductal dilatation.    < from: Xray Chest 1 View-PORTABLE IMMEDIATE (Xray Chest 1 View-PORTABLE IMMEDIATE .) (22 @ 01:26) >  ACC: [id number] EXAM:  XR CHEST PORTABLE IMMED 1V                        PROCEDURE DATE:  2022    INTERPRETATION:  Clinical history: 82-year-old male, status post right IJ   HD catheter placement.  Portable/rotated view of the chest is compared to 2/15/2022 and   demonstrates a right IJ HD catheter with the tip at the junction of the   SVC and right atrium, new.  Cardiac silhouette and pulmonary vasculature are within normal limits   with no consolidation, effusion, pneumothoraxor acute osseous finding.  IMPRESSION:  Right IJ line HD catheter in satisfactory position with no pneumothorax,   new    < from: CT Abdomen and Pelvis No Cont (02.15.22 @ 23:57) >  ACC: [id number] EXAM:  CT ABDOMEN AND PELVIS                        PROCEDURE DATE:  02/15/2022    INTERPRETATION:  CLINICAL INFORMATION:  Abdominal distension  COMPARISON: None.  CONTRAST/COMPLICATIONS:  IV Contrast: None  Oral Contrast:None  Complications: None  PROCEDURE:  Axial CT images were acquired through the abdomen and pelvis obtained   without intravenous contrast. Coronal and sagittal reformatted images   provided.  FINDINGS: This examination is limited by patient motion artifact and the   lack of oral and intravenous contrast.  LOWER CHEST: Bibasilar atelectasis. Coronary artery calcifications.  LIVER: Within normal limits.  BILE DUCTS: Normal caliber.  GALLBLADDER: Cholelithiasis..  SPLEEN: Within normal limits.  PANCREAS: Within normal limits.  ADRENALS: Within normal limits.  KIDNEYS/URETERS: No definite renal stone or hydronephrosis  BLADDER: Distended with Khalil catheter. No bladder wall thickening.  REPRODUCTIVE ORGANS: Prostate gland mildly enlarged.  BOWEL: Evaluation of bowel is limited without distention with oral   contrast, however there is no bowel obstruction. Few colonic diverticula   without acute diverticulitis. Small bowel loops are not dilated.   Unremarkable appendix without appendicitis. Stomach is underdistended for   adequate evaluation, however suggestion of a moderate hiatal hernia.   Appendix  PERITONEUM: No free air significant ascites.  VESSELS:  Limited without intravenous contrast. There is calcific   atherosclerosis of the abdominal aorta without gross aneurysmal   dilatation. 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  RETROPERITONEUM: No lymphadenopathy.  ABDOMINAL WALL: Small fat-containing bilateral inguinal hernias.  BONES: Degenerative changes of the spine. Bilateral L5 spondylolysis with   slight grade 1 anterolisthesis of L5 on S1.  MPRESSION:  There is no bowel obstruction, significant ascites or free   intraperitoneal air.  Moderate hiatal hernia.  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    < from: Xray Chest 1 View- PORTABLE-Urgent (02.15.22 @ 14:32) >  ACC: [id number] EXAM:  XR CHEST PORTABLE URGENT 1V                        PROCEDURE DATE:  02/15/2022    INTERPRETATION:  AP semierect chest on February 15, 2022 at 1:55 PM.   Patient has sepsis.  Heart enlargement again noted.  There aremild mid lower lung field infiltrates medially new since 2020.  IMPRESSION: Bilateral infiltrates as above.      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 ago, s/p  Bilateral Cataract Surgeries, admitted via the ER to [facility] on 2/15/22 with c/o diarrhea x 2 weeks at home along with decreased po intake and mild nausea with a few episodes of vomiting bilious material.         Rx'ed empirically with Rocephin + Zithromax after Blood and Urine Cx's were obtained.    Sepsis with Klebsiella Oxytoca ( AKA Raoutella ornithinolytica ) Bacteremia as identified from 2 out of 2 Blood Cx's from 2/15/22  - ? , GI, or Pulmonary source with Bilateral lower lobe PNA seen on initial CXR and Cholelithiasis with GB wall thickening seen on Sono.  Ab rx changed to Cefepime with Zithromax on 22 and patient remains with low - normal temps.  Noted WBC's trending downwards along with decreasing Procalcitonin and  with some slow improvement of renal function.  Sensitivities of the Kleb Oxytoca isolated from Blood Cx's noted above and sensitive to Cefepime.  Urine Legionella Ab is pending.  Will request Micro lab to further w/u the urine cx.    Suggestions:   Will therefore continue present ab rx with Cefepime and  Zithromax pending further Cx results.  Follow-up temps and labs closely.  Follow-up CXR and monitor O2 Sat's. Consider further w/u with HIDA Scan / CT of the Abdomen + Pelvis with oral contrast to further evaluate the source of patient's Klebsiella Oxytoca Bacteremia.  Discussed with patient  again in detail at the bedside TMAX - 96 - 97.6    On day # 2 Cefepime / # 4 Zithromax     Vital Signs Last 24 Hrs  T(C): 36 (2022 12:00), Max: 36.4 (2022 05:00)  T(F): 96.8 (2022 12:00), Max: 97.6 (2022 05:00)  HR: 87 (2022 15:02) (67 - 89)  BP: 122/61 (2022 15:02) (94/48 - 122/61)  BP(mean): 75 (2022 15:02) (59 - 84)  RR: 21 (2022 15:02) (14 - 27)  SpO2: 97% (2022 15:02) (84% - 99%)  Supplemental O2:  on 2 Liters NC O2     Awake, alert, feeling a little better today.  No c/o SOB now with NC O2 in place and with O2 Sat's between 85 - 98%.  Still with intermittent moist cough but with some difficulty expectorating the mucous.  No c/o cp or abdominal pain and tolerated some solid food.  Asking for more liquids to drink.  Khalil d/c'ed but patient has not voided as yet.  Reports passing some flatus but no BM as yet.    PHYSICAL EXAM  General: 82 y/o white male awake, alert, with NC O2 in place, pleasant and cooperative, c/o very dry mouth but in NAD now  HEENT: conj pale, sclerae anicteric, PERRLA, no oral lesions noted but mucosa and tongue still appear somewhat dry  Neck: supple, no nodes noted           RIJ Vas Cath in place - site clean with dressing dry and intact - place 22  Heart: RR  Lungs: few moist rhonchi scattered  bilaterally  Abdomen: BS+, soft, nontender to palpation, no masses or HS-megaly detected  Back: no CVA or Spinal tenderness noted  Extremities: well-healed bilateral TKR scars                    1+ edema LE's                     arms and hands  slightly less swollen   Skin: warm, dry, no rash noted      I&O's Summary :  2022 07:01  -  2022 07:00  --------------------------------------------------------  IN: 4622.8 mL / OUT: 3425 mL / NET: 1197.8 mL    2022 07:01  -  2022 15:37  --------------------------------------------------------  IN: 1208 mL / OUT: 1625 mL / NET: -417 mL      LABS:  CBC Full  -  ( 2022 02:22 )  WBC Count : 21.88 K/uL  RBC Count : 2.69 M/uL  Hemoglobin : 8.3 g/dL  Hematocrit : 24.1 %  Platelet Count - Automated : 96 K/uL  Mean Cell Volume : 89.6 fl  Mean Cell Hemoglobin : 30.9 pg  Mean Cell Hemoglobin Concentration : 34.4 g/dL  Auto Neutrophil # : x  Auto Lymphocyte # : x  Auto Monocyte # : x  Auto Eosinophil # : x  Auto Basophil # : x  Auto Neutrophil % : x  Auto Lymphocyte % : x  Auto Monocyte % : x  Auto Eosinophil % : x  Auto Basophil % : x        140  |  107  |  139<H>  ----------------------------<  115<H>  3.6   |  23  |  5.71<H>  Ca    7.1<L>      2022 02:22  Phos  4.4       Mg     3.0       TPro  5.1<L>  /  Alb  1.3<L>  /  TBili  0.8  /  DBili  x   /  AST  61<H>  /  ALT  40  /  AlkPhos  142<H>    LIVER FUNCTIONS - ( 2022 02:22 )  Alb: 1.3 g/dL / Pro: 5.1 gm/dL / ALK PHOS: 142 U/L / ALT: 40 U/L / AST: 61 U/L / GGT: x           PTT - ( 2022 02:22 )  PTT:84.0 sec    Sedimentation Rate, Erythrocyte: 62 mm/hr ( @ 02:22)    C-Reactive Protein, Serum (22 @ 09:23)    C-Reactive Protein, Serum: 83 mg/L    Procalcitonin, Serum (22 @ 09:23)    Procalcitonin, Serum: 17.20:   Procalcitonin, Serum (22 @ 09:20)    Procalcitonin, Serum: 81.70:      Lactate, Blood (02.15.22 @ 21:14)    Lactate, Blood: 1.5 mmol/L  Lactate, Blood (02.15.22 @ 18:24)    Lactate, Blood: 2.5: Elevated lactate. Consider ordering follow-up lactate to trend. mmol/L  Lactate, Blood (02.15.22 @ 14:15)    Lactate, Blood: 3.9: Elevated lactate. Consider ordering follow-up lactate to trend. mmol/L    A1C with Estimated Average Glucose (22 @ 09:30)    A1C with Estimated Average Glucose Result: 7.6: Method: Immunoassay       Reference Range                4.0-5.6%       High risk (prediabetic)        5.7-6.4%       Diabetic, diagnostic             >=6.5%       ADA diabetic treatment goal       <7.0%    Estimated Average Glucose: 171    Acute Hepatitis Panel (22 @ 09:37)    Hepatitis C Virus Interpretation: Nonreact: Hepatitis C AB    Hepatitis C Virus S/CO Ratio: 0.12 S/CO    Hepatitis B Core IgM Antibody: Nonreact    Hepatitis B Surface Antigen: Nonreact    Hepatitis A IgM Antibody: Nonreact    Hepatitis B Surface Antibody (22 @ 09:37)    Hepatitis B Surface Antibody: Nonreact    C3 Complement, Serum (22 @ 09:37)    C3 Complement, Serum: 115 mg/dL    C4 Complement, Serum (22 @ 09:37)    C4 Complement, Serum: 26 mg/dL    Glomerular Basement Membrane Ab IgG (22 @ 21:11)    Glomerular Basement Membrane Ab Ig: Negative          MICROBIOLOGY:  Specimen Source: .Sputum Sputum ( @ 01:01)    Gram Stain:   Rare polymorphonuclear leukocytes per low power field  Moderate Squamous epithelial cells per low power field  Numerous Yeast per oil power field  Moderate Gram Positive Rods per oil power field  Few Gram Negative Rods per oil power field  Rare Gram positive cocci in pairs per oil power field    Specimen Source: .Sputum Sputum    Culture Results:   Normal Respiratory Nerissa present    Specimen Source: Clean Catch Clean Catch (Midstream) ( @ 08:49)  Culture Results:   >=3 organisms. Probable collection contamination. ( @ 08:49)    Specimen Source: .Blood Blood-Peripheral (02-15 @ 19:01)  Culture Results:   Growth in aerobic and anaerobic bottles: Klebsiella oxytoca/Raoutella  ornithinolytica    -  Klebsiella oxytoca: Detec    -  Tobramycin: S <=2    -  Trimethoprim/Sulfamethoxazole: S <=0.5/9.5    -  Ertapenem: S <=0.5    -  Gentamicin: S <=2    -  Imipenem: S <=1    -  Levofloxacin: S <=0.5    -  Meropenem: S <=1    -  Piperacillin/Tazobactam: S <=8    -  Amikacin: S <=16    -  Ampicillin: R >16 These ampicillin results predict results for amoxicillin    Gram Stain:   Growth in aerobic and anaerobic bottles: Gram Negative Rods    -  Ampicillin/Sulbactam: S 8/4 Enterobacter, Klebsiella aerogenes, Citrobacter, and Serratia may develop resistance during prolonged therapy (3-4 days)    -  Aztreonam: R >16    -  Cefazolin: R 16 Enterobacter, Klebsiella aerogenes, Citrobacter, and Serratia may develop resistance during prolonged therapy (3-4 days)    -  Cefepime: S <=2    -  Cefoxitin: S <=8    -  Ceftriaxone: S <=1 Enterobacter, Klebsiella aerogenes, Citrobacter, and Serratia may develop resistance during prolonged therapy    -  Ciprofloxacin: S <=0.25    Specimen Source: .Blood Blood-Peripheral    Organism: Blood Culture PCR    Organism: Klebsiella oxytoca /Raoutella ornithinolytica    Culture Results:   Growth in aerobic and anaerobic bottles: Klebsiella oxytoca/Raoutella  ornithinolytica    Organism Identification: Blood Culture PCR  Klebsiella oxytoca /Raoutella ornithinolytica    Method Type: PCR    Method Type: NATACHA    Culture Results:   Growth in aerobic and anaerobic bottles: Klebsiella oxytoca/Raoutella  ornithinolytica  See previous culture [id number] (02-15 @ 19:01)      MRSA/MSSA PCR (22 @ 09:20)    MRSA PCR Result.: NotDetec:    Staph Aureus PCR Result: NotDetec    Respiratory Viral Panel with COVID-19 by [facility] (02.15.22 @ 14:34)    Rapid RVP Result: [facility]    SARS-CoV-2: [facility]: This Respiratory Panel uses polymerase chain reaction (PCR) to detect for  adenovirus; coronavirus (HKU1, NL63, 229E, OC43); human metapneumovirus  (hMPV); human enterovirus/rhinovirus (Entero/RV); influenza A; influenza  A/H1; influenza A/H3; influenza A/H1-2009; influenza B; parainfluenza  viruses 1, 2, 3, 4; respiratory syncytial virus; Mycoplasma pneumoniae;  Chlamydophila pneumoniae; and SARS-CoV-2.MRSA/MSSA PCR (22 @ 09:20)    MRSA PCR Result.: [facility]:    Staph Aureus PCR Result: [facility]    Respiratory Viral Panel with COVID-19 by [facility] (02.15.22 @ 14:34)    Rapid RVP Result: [facility]    SARS-CoV-2: [facility]: This Respiratory Panel uses polymerase chain reaction (PCR) to detect for  adenovirus; coronavirus (HKU1, NL63, 229E, OC43); human metapneumovirus  (hMPV); human enterovirus/rhinovirus (Entero/RV); influenza A; influenza  A/H1; influenza A/H3; influenza A/H1-2009; influenza B; parainfluenza  viruses 1, 2, 3, 4; respiratory syncytial virus; Mycoplasma pneumoniae;  Chlamydophila pneumoniae; and SARS-CoV-2.    RADIOLOGY:  < from: US Duplex Venous Lower Ext Complete, Bilateral (22 @ 04:56) >  ACC: [id number] EXAM:  US DPLX LWR EXT VEINS COMPL BI                        PROCEDURE DATE:  2022    INTERPRETATION:  CLINICAL INFORMATION: Lower extremity swelling.  COMPARISON: None available.  TECHNIQUE: Duplex sonography of theBILATERAL LOWER extremity veins with   color and spectral Doppler, with and without compression.  FINDINGS:  RIGHT:  Normal compressibility of the RIGHT common femoral, femoral and popliteal   veins. The calf veins including the posterior tibial and peroneal were   not well visualized due to calf edema and portable technique.  Doppler examination shows normal spontaneous and phasic flow.  LEFT:  Echogenic material within the LEFT common femoral and femoral veins with   partial flow and partial compressibility compatible with nonocclusive   above-the-knee deep venous thrombosis. The greater saphenous vein also   demonstrates no compressibility or flow. The LEFT popliteal and posterior   tibial veins are patent. The peroneal vein is not visualized.  Doppler examination shows normal spontaneous and phasic flow.  IMPRESSION:  RIGHT: Limited calf vein visualization. Nonvisualization of the calf   veins. No evidence of deep venous thrombosis at or above the knee.  LEFT: Limited calf vein visualization. Acute nonocclusive above-the-knee   deep venous thrombosis. Occlusive DVT in the greater saphenous vein.    < from: US Abdomen Upper Quadrant Right (22 @ 12:03) >  ACC: [id number] EXAM:  US ABDOMEN RT UPR QUADRANT                        PROCEDURE DATE:  2022    INTERPRETATION:  CLINICAL INFORMATION: Elevated LFTs.  COMPARISON: CT abdomen/pelvis 2/15/2022.  TECHNIQUE: Sonography of the right upper quadrant.  FINDINGS:  Liver: Normal in size. No focal hepatic masses identified.  Bile ducts: Normal caliber. Common bile duct measures 5 mm.  Gallbladder: Multiple gallstones are identified. Gallbladder wall   thickening is identified measuring up to 6 mm. No pericholecystic fluid.  Pancreas: Visualized portions are within normal limits.  Right kidney: 11.2 cm.. No hydronephrosis. No renal calculi. No   space-occupying lesions.  Ascites: None.  IVC: Visualized portions are within normal limits.  IMPRESSION: Multiple gallstones. Gallbladder wall thickening. No   pericholecystic fluid. No evidence for intrahepatic or extrahepatic   biliary ductal dilatation.    < from: Xray Chest 1 View-PORTABLE IMMEDIATE (Xray Chest 1 View-PORTABLE IMMEDIATE .) (22 @ 01:26) >  ACC: [id number] EXAM:  XR CHEST PORTABLE IMMED 1V                        PROCEDURE DATE:  2022    INTERPRETATION:  Clinical history: 82-year-old male, status post right IJ   HD catheter placement.  Portable/rotated view of the chest is compared to 2/15/2022 and   demonstrates a right IJ HD catheter with the tip at the junction of the   SVC and right atrium, new.  Cardiac silhouette and pulmonary vasculature are within normal limits   with no consolidation, effusion, pneumothoraxor acute osseous finding.  IMPRESSION:  Right IJ line HD catheter in satisfactory position with no pneumothorax,   new    < from: CT Abdomen and Pelvis No Cont (02.15.22 @ 23:57) >  ACC: [id number] EXAM:  CT ABDOMEN AND PELVIS                        PROCEDURE DATE:  02/15/2022    INTERPRETATION:  CLINICAL INFORMATION:  Abdominal distension  COMPARISON: None.  CONTRAST/COMPLICATIONS:  IV Contrast: None  Oral Contrast:None  Complications: None  PROCEDURE:  Axial CT images were acquired through the abdomen and pelvis obtained   without intravenous contrast. Coronal and sagittal reformatted images   provided.  FINDINGS: This examination is limited by patient motion artifact and the   lack of oral and intravenous contrast.  LOWER CHEST: Bibasilar atelectasis. Coronary artery calcifications.  LIVER: Within normal limits.  BILE DUCTS: Normal caliber.  GALLBLADDER: Cholelithiasis..  SPLEEN: Within normal limits.  PANCREAS: Within normal limits.  ADRENALS: Within normal limits.  KIDNEYS/URETERS: No definite renal stone or hydronephrosis  BLADDER: Distended with Khalil catheter. No bladder wall thickening.  REPRODUCTIVE ORGANS: Prostate gland mildly enlarged.  BOWEL: Evaluation of bowel is limited without distention with oral   contrast, however there is no bowel obstruction. Few colonic diverticula   without acute diverticulitis. Small bowel loops are not dilated.   Unremarkable appendix without appendicitis. Stomach is underdistended for   adequate evaluation, however suggestion of a moderate hiatal hernia.   Appendix  PERITONEUM: No free air significant ascites.  VESSELS:  Limited without intravenous contrast. There is calcific   atherosclerosis of the abdominal aorta without gross aneurysmal   dilatation. 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  RETROPERITONEUM: No lymphadenopathy.  ABDOMINAL WALL: Small fat-containing bilateral inguinal hernias.  BONES: Degenerative changes of the spine. Bilateral L5 spondylolysis with   slight grade 1 anterolisthesis of L5 on S1.  MPRESSION:  There is no bowel obstruction, significant ascites or free   intraperitoneal air.  Moderate hiatal hernia.  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    < from: Xray Chest 1 View- PORTABLE-Urgent (02.15.22 @ 14:32) >  ACC: [id number] EXAM:  XR CHEST PORTABLE URGENT 1V                        PROCEDURE DATE:  02/15/2022    INTERPRETATION:  AP semierect chest on February 15, 2022 at 1:55 PM.   Patient has sepsis.  Heart enlargement again noted.  There aremild mid lower lung field infiltrates medially new since 2020.  IMPRESSION: Bilateral infiltrates as above.      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 ago, s/p  Bilateral Cataract Surgeries, admitted via the ER to [facility] on 2/15/22 with c/o diarrhea x 2 weeks at home along with decreased po intake and mild nausea with a few episodes of vomiting bilious material.  W/u revealed a markedly elevated WBC's of 37,940.  an elevated Lactate to 3.9, and his BUN / Creat were found to be elevated at 169 / 12.9 and Glucose was elevated as well. Further w/u with CXR and CT of the Abdomen + Pelvis was done and revealed Bilateral Infiltrates, Cholelithiasis, a moderate Hiatal Hernia, but no bowel obstruction was seen.  Patient was admitted to the CCU for further care and he wasrx'ed with 5 Liters of NS and rx'ed empirically with Rocephin + Zithromax after Blood and Urine Cx's were obtained. Sepsis with Klebsiella Oxytoca ( AKA Raoutella ornithinolytica ) Bacteremia as identified from 2 out of 2 Blood Cx's from 2/15/22  - ? , GI, or Pulmonary source with Bilateral lower lobe PNA seen on initial CXR and Cholelithiasis with GB wall thickening seen on Sono.  Ab rx changed to Cefepime with Zithromax on 22 and patient remains with low - normal temps.  Noted WBC's trending downwards along with decreasing Procalcitonin and  with some slow improvement of renal function.  Sensitivities of the Kleb Oxytoca isolated from Blood Cx's noted above and sensitive to Cefepime.  Urine Legionella Ab is pending.  Will request Micro lab to further w/u the urine cx.    Suggestions:   Will therefore continue present ab rx with Cefepime and  Zithromax pending further Cx results.  Follow-up temps and labs closely.  Follow-up CXR and monitor O2 Sat's. Consider further w/u with HIDA Scan / CT of the Abdomen + Pelvis with oral contrast to further evaluate the source of patient's Klebsiella Oxytoca Bacteremia.  Discussed with patient  again in detail at the bedside

## 2022-02-18 NOTE — PHYSICAL THERAPY INITIAL EVALUATION ADULT - BED MOBILITY TRAINING, PT EVAL
Pt will independently perform all aspects of bed mobility to help prevent pressure ulcers, by 2-3 weeks.

## 2022-02-18 NOTE — PROGRESS NOTE ADULT - ATTENDING COMMENTS
Pt is an 82 yo M with h/o  HTN, bladder tumor removal and melanoma. Pt presented 2 to diarrhea and weakness; admitted to ICU 2 to 1) A fib with RVR 2) Klebsiella oxytoca/Raoutella ornithinolytica septic shock likely from UTI and less likely from  PNA 3) RADHA (vs acute on CKD) 2 to ATN with hyperkalemia and significant uremia; s/p HD x1 4) Thrombocytopenia 2 to above organisms     Resp: Supplemental O2 prn to maintain O2 sat > 92%  ID: Cefepime + Zithromax as per ID  CVS: Off Levophed/ A fib probably precipitated by septic shock, RADHA/ Dc HD cath  HEME: AC for L LE DVT/ Trend platelets (are increasing)  FEN: Po diet  GI: LFTs are downtrending  Renal: Cont hydration and follow BUN/Cr and UO/ F/u as per Renal  Neuro: MS good at baseline  Social: OOB->chair/ PT/OT/ May transfer to [facility]
Pt is an 80 yo M with h/o  HTN, bladder tumor removal and melanoma. Pt presented 2 to diarrhea and weakness; admitted to ICU 2 to 1) A fib with RVR 2) Klebsiella oxytoca/Raoutella ornithinolytica septic shock likely from UTI and less likely from  PNA 3) RADHA (vs acute on CKD) 2 to ATN with hyperkalemia and significant uremia; s/p HD x1 4) Thrombocytopenia 2 to above organisms     Resp: Supplemental O2 prn to maintain O2 sat > 92%  ID: Abx as per ID consult  CVS: Currently off Levophed/ A fib probably precipitated by septic shock, RADHA; dc Amio drip once completed  HEME: AC for L LE DVT/ Trend platelets  FEN: As MS improves advance po diet  GI: LFTs are downtrending  Renal: cont aggressive hydration and follow BUN/Cr and UO/ F/u as per Renal  Neuro: Pt's MS should improve as medical condition improves    CCT: 45 min not in conjunction with NP student

## 2022-02-18 NOTE — PHYSICAL THERAPY INITIAL EVALUATION ADULT - GAIT DEVIATIONS NOTED, PT EVAL
decreased leonardo/decreased step length/decreased stride length/increased stride width/decreased weight-shifting ability

## 2022-02-18 NOTE — PHYSICAL THERAPY INITIAL EVALUATION ADULT - PERTINENT HX OF CURRENT PROBLEM, REHAB EVAL
Patient admitted with 2 weeks of diarrhea and weakness, found to have increased creatinine and rapid afib. Patient also noted to have acute non occlusive at or below R knee thrombus, occlusive DVT in the greater saphenous vein, post full dose of heparin.

## 2022-02-19 LAB
-  AMIKACIN: SIGNIFICANT CHANGE UP
-  AMOXICILLIN/CLAVULANIC ACID: SIGNIFICANT CHANGE UP
-  AMPICILLIN/SULBACTAM: SIGNIFICANT CHANGE UP
-  AMPICILLIN: SIGNIFICANT CHANGE UP
-  AZTREONAM: SIGNIFICANT CHANGE UP
-  CEFAZOLIN: SIGNIFICANT CHANGE UP
-  CEFEPIME: SIGNIFICANT CHANGE UP
-  CEFOXITIN: SIGNIFICANT CHANGE UP
-  CEFTRIAXONE: SIGNIFICANT CHANGE UP
-  CIPROFLOXACIN: SIGNIFICANT CHANGE UP
-  ERTAPENEM: SIGNIFICANT CHANGE UP
-  GENTAMICIN: SIGNIFICANT CHANGE UP
-  IMIPENEM: SIGNIFICANT CHANGE UP
-  LEVOFLOXACIN: SIGNIFICANT CHANGE UP
-  MEROPENEM: SIGNIFICANT CHANGE UP
-  NITROFURANTOIN: SIGNIFICANT CHANGE UP
-  PIPERACILLIN/TAZOBACTAM: SIGNIFICANT CHANGE UP
-  TIGECYCLINE: SIGNIFICANT CHANGE UP
-  TOBRAMYCIN: SIGNIFICANT CHANGE UP
-  TRIMETHOPRIM/SULFAMETHOXAZOLE: SIGNIFICANT CHANGE UP
ALBUMIN SERPL ELPH-MCNC: 1.3 G/DL — LOW (ref 3.3–5)
ALP SERPL-CCNC: 158 U/L — HIGH (ref 40–120)
ALT FLD-CCNC: 46 U/L — SIGNIFICANT CHANGE UP (ref 12–78)
ANION GAP SERPL CALC-SCNC: 9 MMOL/L — SIGNIFICANT CHANGE UP (ref 5–17)
APTT BLD: 41 SEC — HIGH (ref 27.5–35.5)
APTT BLD: 84.3 SEC — HIGH (ref 27.5–35.5)
APTT BLD: >200 SEC — CRITICAL HIGH (ref 27.5–35.5)
AST SERPL-CCNC: 61 U/L — HIGH (ref 15–37)
BILIRUB SERPL-MCNC: 0.8 MG/DL — SIGNIFICANT CHANGE UP (ref 0.2–1.2)
BUN SERPL-MCNC: 127 MG/DL — HIGH (ref 7–23)
CALCIUM SERPL-MCNC: 7.4 MG/DL — LOW (ref 8.5–10.1)
CHLORIDE SERPL-SCNC: 108 MMOL/L — SIGNIFICANT CHANGE UP (ref 96–108)
CO2 SERPL-SCNC: 23 MMOL/L — SIGNIFICANT CHANGE UP (ref 22–31)
CREAT SERPL-MCNC: 3.95 MG/DL — HIGH (ref 0.5–1.3)
CULTURE RESULTS: SIGNIFICANT CHANGE UP
GLUCOSE BLDC GLUCOMTR-MCNC: 103 MG/DL — HIGH (ref 70–99)
GLUCOSE SERPL-MCNC: 94 MG/DL — SIGNIFICANT CHANGE UP (ref 70–99)
HCT VFR BLD CALC: 21.9 % — LOW (ref 39–50)
HCT VFR BLD CALC: 22.2 % — LOW (ref 39–50)
HGB BLD-MCNC: 7.3 G/DL — LOW (ref 13–17)
HGB BLD-MCNC: 7.4 G/DL — LOW (ref 13–17)
INR BLD: 1.34 RATIO — HIGH (ref 0.88–1.16)
MAGNESIUM SERPL-MCNC: 2.9 MG/DL — HIGH (ref 1.6–2.6)
MCHC RBC-ENTMCNC: 30.7 PG — SIGNIFICANT CHANGE UP (ref 27–34)
MCHC RBC-ENTMCNC: 30.9 PG — SIGNIFICANT CHANGE UP (ref 27–34)
MCHC RBC-ENTMCNC: 33.3 G/DL — SIGNIFICANT CHANGE UP (ref 32–36)
MCHC RBC-ENTMCNC: 33.3 G/DL — SIGNIFICANT CHANGE UP (ref 32–36)
MCV RBC AUTO: 92.1 FL — SIGNIFICANT CHANGE UP (ref 80–100)
MCV RBC AUTO: 92.8 FL — SIGNIFICANT CHANGE UP (ref 80–100)
METHOD TYPE: SIGNIFICANT CHANGE UP
NRBC # BLD: 0 /100 WBCS — SIGNIFICANT CHANGE UP (ref 0–0)
NRBC # BLD: 0 /100 WBCS — SIGNIFICANT CHANGE UP (ref 0–0)
ORGANISM # SPEC MICROSCOPIC CNT: SIGNIFICANT CHANGE UP
ORGANISM # SPEC MICROSCOPIC CNT: SIGNIFICANT CHANGE UP
PHOSPHATE SERPL-MCNC: 4.4 MG/DL — SIGNIFICANT CHANGE UP (ref 2.5–4.5)
PLATELET # BLD AUTO: 113 K/UL — LOW (ref 150–400)
PLATELET # BLD AUTO: 154 K/UL — SIGNIFICANT CHANGE UP (ref 150–400)
POTASSIUM SERPL-MCNC: 3.7 MMOL/L — SIGNIFICANT CHANGE UP (ref 3.5–5.3)
POTASSIUM SERPL-SCNC: 3.7 MMOL/L — SIGNIFICANT CHANGE UP (ref 3.5–5.3)
PROT SERPL-MCNC: 5.2 GM/DL — LOW (ref 6–8.3)
PROTHROM AB SERPL-ACNC: 15.3 SEC — HIGH (ref 10.6–13.6)
RBC # BLD: 2.36 M/UL — LOW (ref 4.2–5.8)
RBC # BLD: 2.41 M/UL — LOW (ref 4.2–5.8)
RBC # FLD: 13.7 % — SIGNIFICANT CHANGE UP (ref 10.3–14.5)
RBC # FLD: 13.8 % — SIGNIFICANT CHANGE UP (ref 10.3–14.5)
SODIUM SERPL-SCNC: 140 MMOL/L — SIGNIFICANT CHANGE UP (ref 135–145)
WBC # BLD: 24.77 K/UL — HIGH (ref 3.8–10.5)
WBC # BLD: 25.64 K/UL — HIGH (ref 3.8–10.5)
WBC # FLD AUTO: 24.77 K/UL — HIGH (ref 3.8–10.5)
WBC # FLD AUTO: 25.64 K/UL — HIGH (ref 3.8–10.5)

## 2022-02-19 PROCEDURE — 74176 CT ABD & PELVIS W/O CONTRAST: CPT | Mod: 26

## 2022-02-19 PROCEDURE — 99233 SBSQ HOSP IP/OBS HIGH 50: CPT

## 2022-02-19 RX ORDER — HEPARIN SODIUM 5000 [USP'U]/ML
2100 INJECTION INTRAVENOUS; SUBCUTANEOUS
Qty: 25000 | Refills: 0 | Status: DISCONTINUED | OUTPATIENT
Start: 2022-02-19 | End: 2022-02-19

## 2022-02-19 RX ORDER — IOHEXOL 300 MG/ML
30 INJECTION, SOLUTION INTRAVENOUS ONCE
Refills: 0 | Status: COMPLETED | OUTPATIENT
Start: 2022-02-19 | End: 2022-02-19

## 2022-02-19 RX ORDER — HEPARIN SODIUM 5000 [USP'U]/ML
1600 INJECTION INTRAVENOUS; SUBCUTANEOUS
Qty: 25000 | Refills: 0 | Status: DISCONTINUED | OUTPATIENT
Start: 2022-02-19 | End: 2022-02-20

## 2022-02-19 RX ORDER — HEPARIN SODIUM 5000 [USP'U]/ML
4000 INJECTION INTRAVENOUS; SUBCUTANEOUS EVERY 6 HOURS
Refills: 0 | Status: DISCONTINUED | OUTPATIENT
Start: 2022-02-19 | End: 2022-02-19

## 2022-02-19 RX ORDER — HEPARIN SODIUM 5000 [USP'U]/ML
9000 INJECTION INTRAVENOUS; SUBCUTANEOUS EVERY 6 HOURS
Refills: 0 | Status: DISCONTINUED | OUTPATIENT
Start: 2022-02-19 | End: 2022-02-19

## 2022-02-19 RX ORDER — ALPRAZOLAM 0.25 MG
0.25 TABLET ORAL ONCE
Refills: 0 | Status: DISCONTINUED | OUTPATIENT
Start: 2022-02-19 | End: 2022-02-20

## 2022-02-19 RX ORDER — HEPARIN SODIUM 5000 [USP'U]/ML
INJECTION INTRAVENOUS; SUBCUTANEOUS
Qty: 25000 | Refills: 0 | Status: DISCONTINUED | OUTPATIENT
Start: 2022-02-19 | End: 2022-02-19

## 2022-02-19 RX ORDER — HEPARIN SODIUM 5000 [USP'U]/ML
9000 INJECTION INTRAVENOUS; SUBCUTANEOUS EVERY 6 HOURS
Refills: 0 | Status: DISCONTINUED | OUTPATIENT
Start: 2022-02-19 | End: 2022-02-20

## 2022-02-19 RX ORDER — HEPARIN SODIUM 5000 [USP'U]/ML
4000 INJECTION INTRAVENOUS; SUBCUTANEOUS EVERY 6 HOURS
Refills: 0 | Status: DISCONTINUED | OUTPATIENT
Start: 2022-02-19 | End: 2022-02-20

## 2022-02-19 RX ADMIN — HEPARIN SODIUM 2100 UNIT(S)/HR: 5000 INJECTION INTRAVENOUS; SUBCUTANEOUS at 20:58

## 2022-02-19 RX ADMIN — CHLORHEXIDINE GLUCONATE 1 APPLICATION(S): 213 SOLUTION TOPICAL at 06:38

## 2022-02-19 RX ADMIN — HEPARIN SODIUM 1900 UNIT(S)/HR: 5000 INJECTION INTRAVENOUS; SUBCUTANEOUS at 11:57

## 2022-02-19 RX ADMIN — Medication 2: at 12:03

## 2022-02-19 RX ADMIN — HEPARIN SODIUM 1900 UNIT(S)/HR: 5000 INJECTION INTRAVENOUS; SUBCUTANEOUS at 07:37

## 2022-02-19 RX ADMIN — IOHEXOL 30 MILLILITER(S): 300 INJECTION, SOLUTION INTRAVENOUS at 12:27

## 2022-02-19 RX ADMIN — AZITHROMYCIN 255 MILLIGRAM(S): 500 TABLET, FILM COATED ORAL at 21:42

## 2022-02-19 RX ADMIN — ATORVASTATIN CALCIUM 40 MILLIGRAM(S): 80 TABLET, FILM COATED ORAL at 21:42

## 2022-02-19 RX ADMIN — HEPARIN SODIUM 2100 UNIT(S)/HR: 5000 INJECTION INTRAVENOUS; SUBCUTANEOUS at 17:42

## 2022-02-19 RX ADMIN — SODIUM CHLORIDE 75 MILLILITER(S): 9 INJECTION, SOLUTION INTRAVENOUS at 06:38

## 2022-02-19 RX ADMIN — LATANOPROST 1 DROP(S): 0.05 SOLUTION/ DROPS OPHTHALMIC; TOPICAL at 21:42

## 2022-02-19 RX ADMIN — Medication 2: at 23:08

## 2022-02-19 RX ADMIN — CEFEPIME 100 MILLIGRAM(S): 1 INJECTION, POWDER, FOR SOLUTION INTRAMUSCULAR; INTRAVENOUS at 21:42

## 2022-02-19 RX ADMIN — HEPARIN SODIUM 1600 UNIT(S)/HR: 5000 INJECTION INTRAVENOUS; SUBCUTANEOUS at 21:38

## 2022-02-19 NOTE — PROGRESS NOTE ADULT - ASSESSMENT
81M w/ HTN, bladder tumor removal, melanoma. Presents w/ diarrhea and weakness. Admitted to ICU w/ rapif afib, septic shock likely from UTI vs pneumonia, RADHA w/ hyperkalemia and significant uremia and thrombocytopenia. S/p 1 HD treatment 2/16/22      Plan    #Neuro  - PT A&Ox4 today, awake alert with no confusion.      #CV   -PT with new onset rapid afib: likely due to sepsis now in NSR with rate controlled: off amiodarone and sustaining NSR.     #Pulm   - SpO2 95% and above on 2LNC    #ID  -ID reccs noted, culture sensitivities resulted, Ceftriaxone DC/d due to likely resistance: C/W Cefepime and Azithromycin for CAP vs UTI; GNR+ bacteremia. No diarrhea 2 days: send GI PCR and c diff if diarrhea recurs  Will get CT abd pelv w oral cont    #Renal/metabolic   - RADHA likely combination of prerenal, ATN and obstructive uropathy; s/p HD x1; now making urine; PT still appears dry, will start LR maintenance, d/c bicarb gtt; monitor UOP and electrolytes closely. Renal reccs noted: D/C HD catheter no further HD at this time.     #GI  - Diet advanced now that mental status improving and able to follow commands, noted to have elevated LFTs, CT A/P unremarkable, RUQ US with multiple gallstones without cholecystitis.     #Heme   - noted thrombocytopenia of unclear etiology, platelet count improving. On Heparin for LLE DVT, monitor platelet count, aPTT.   -Hgb Lower today, will order repeat during the day, ct abd pelv ordered     #Endo   - FS w/ ISS    #PPx   - SCDs, on heparin gtt for DVT

## 2022-02-19 NOTE — PROGRESS NOTE ADULT - SUBJECTIVE AND OBJECTIVE BOX
TMAX  - 96.8 - 98.1    On day # 3 Cefepime / # 5 Zithtromax    Vital Signs Last 24 Hrs  T(C): 36.4 (2022 17:01), Max: 36.7 (2022 23:54)  T(F): 97.6 (2022 17:01), Max: 98.1 (2022 23:54)  HR: 81 (2022 17:01) (74 - 81)  BP: 107/69 (2022 17:01) (107/69 - 127/74)  RR: 18 (2022 17:01) (18 - 18)  SpO2: 94% (2022 17:01) (94% - 95%)  Supplemental O2:  on 2 Liters NC O2 now    Awake, alert, feeling a little better today with no c/o abdominal pain and no N/V/D reported.  Appetite is improving slowly and tolerating po liquids.  No c/o cp or SOB but still with c/o cough but with difficulty expectorating the mucous.  Voiding without the Khalil in place and with 4,175cc of urine output recorded for the prior 24hrs.  O2 Sat's are between 84 - 97% on 2 liters NC O2.    PHYSICAL EXAM  General: 80 y/o white male awake, alert, with NC O2 in place, pleasant and cooperative, sitting uprignt in bed now, still with a dry mouth but in NAD  HEENT: conj pale, sclerae anicteric, PERRLA, no oral lesions noted but mucosa and tongue still appear somewhat dry  Neck: supple, no nodes noted           RIJ Vas Cath d/c'ed and with dry dressing at the site now  Heart: RR  Lungs: few moist rhonchi scattered  bilaterally and with decreased BS at the bases  Abdomen: BS+, soft, nontender to palpation, no masses or HS-megaly detected  Back: no CVA or Spinal tenderness noted  Extremities: well-healed bilateral TKR scars                    1+ edema LE's                     arms and hands  slightly less swollen   Skin: warm, dry, no rash noted    I&O's Summary :  2022 07:01  -  2022 07:00  IN: 3312 mL / OUT: 4175 mL / NET: -863 mL  2022 07:01  -  2022 1  IN: 0 mL / OUT: 1200 mL / NET: -1200 mL    LABS:  CBC Full  -  ( 2022 16:33 )  WBC Count : 25.64 K/uL  RBC Count : 2.36 M/uL  Hemoglobin : 7.3 g/dL  Hematocrit : 21.9 %  Platelet Count - Automated : 113 K/uL  Mean Cell Volume : 92.8 fl  Mean Cell Hemoglobin : 30.9 pg  Mean Cell Hemoglobin Concentration : 33.3 g/dL  Auto Neutrophil # : x  Auto Lymphocyte # : x  Auto Monocyte # : x  Auto Eosinophil # : x  Auto Basophil # : x  Auto Neutrophil % : x  Auto Lymphocyte % : x  Auto Monocyte % : x  Auto Eosinophil % : x  Auto Basophil % : x      140  |  108  |  127<H>  ----------------------------<  94  3.7   |  23  |  3.95<H>  Ca    7.4<L>      2022 08:17  Phos  4.4       Mg     2.9       TPro  5.2<L>  /  Alb  1.3<L>  /  TBili  0.8  /  DBili  x   /  AST  61<H>  /  ALT  46  /  AlkPhos  158<H>    LIVER FUNCTIONS - ( 2022 08:17 )  Alb: 1.3 g/dL / Pro: 5.2 gm/dL / ALK PHOS: 158 U/L / ALT: 46 U/L / AST: 61 U/L / GGT: x           PT/INR - ( 2022 16:33 )   PT: 15.3 sec;   INR: 1.34 ratio     PTT - ( 2022 16:33 )  PTT:41.0 sec    Sedimentation Rate, Erythrocyte: 62 mm/hr ( @ 02:22)    C-Reactive Protein, Serum (22 @ 09:23)    C-Reactive Protein, Serum: 83 mg/L    Procalcitonin, Serum (22 @ 09:23)    Procalcitonin, Serum: 17.20:   Procalcitonin, Serum (22 @ 09:20)    Procalcitonin, Serum: 81.70:    Lactate, Blood (02.15.22 @ 21:14)    Lactate, Blood: 1.5 mmol/L  Lactate, Blood (02.15.22 @ 18:24)    Lactate, Blood: 2.5: Elevated lactate. Consider ordering follow-up lactate to trend. mmol/L  Lactate, Blood (02.15.22 @ 14:15)    Lactate, Blood: 3.9: Elevated lactate. Consider ordering follow-up lactate to trend. mmol/L    A1C with Estimated Average Glucose (22 @ 09:30)    A1C with Estimated Average Glucose Result: 7.6: Method: Immunoassay       Reference Range                4.0-5.6%       High risk (prediabetic)        5.7-6.4%       Diabetic, diagnostic             >=6.5%       ADA diabetic treatment goal       <7.0%    Estimated Average Glucose: 171    Acute Hepatitis Panel (22 @ 09:37)    Hepatitis C Virus Interpretation: Nonreact: Hepatitis C AB    Hepatitis C Virus S/CO Ratio: 0.12 S/CO    Hepatitis B Core IgM Antibody: Nonreact    Hepatitis B Surface Antigen: Nonreact    Hepatitis A IgM Antibody: Nonreact    Hepatitis B Surface Antibody (22 @ 09:37)    Hepatitis B Surface Antibody: Nonreact    C3 Complement, Serum (22 @ 09:37)    C3 Complement, Serum: 115 mg/dL  C4 Complement, Serum (22 @ 09:37)    C4 Complement, Serum: 26 mg/dL    Glomerular Basement Membrane Ab IgG (22 @ 21:11)    Glomerular Basement Membrane Ab Ig: Negative        MICROBIOLOGY:  Specimen Source: .Sputum Sputum ( @ 01:01)    Gram Stain:   Rare polymorphonuclear leukocytes per low power field  Moderate Squamous epithelial cells per low power field  Numerous Yeast per oil power field  Moderate Gram Positive Rods per oil power field  Few Gram Negative Rods per oil power field  Rare Gram positive cocci in pairs per oil power field    Specimen Source: .Sputum Sputum    Culture Results:   Normal Respiratory Alonzo present    Culture - Urine (22 @ 08:49)    Specimen Source: Clean Catch Clean Catch (Midstream)    Culture Results:   >100,000 CFU/ml Klebsiella oxytoca/Raoutella ornithinolytica    Specimen Source: .Blood Blood-Peripheral (02-15 @ 19:01)  Culture Results:   Growth in aerobic and anaerobic bottles: Klebsiella oxytoca/Raoutella  ornithinolytica  Culture - Blood (02.15.22 @ 19:01)    -  Klebsiella oxytoca: Detec    Gram Stain:   Growth in aerobic and anaerobic bottles: Gram Negative Rods    -  Meropenem: S <=1    -  Ertapenem: S <=0.5    -  Gentamicin: S <=2    -  Imipenem: S <=1    -  Levofloxacin: S <=0.5    -  Piperacillin/Tazobactam: S <=8    -  Tobramycin: S <=2    -  Trimethoprim/Sulfamethoxazole: S <=0.5/9.5    -  Amikacin: S <=16    -  Ampicillin: R >16 These ampicillin results predict results for amoxicillin    -  Ampicillin/Sulbactam: S 8/4 Enterobacter, Klebsiella aerogenes, Citrobacter, and Serratia may develop resistance during prolonged therapy (3-4 days)    -  Aztreonam: R >16    -  Cefazolin: R 16 Enterobacter, Klebsiella aerogenes, Citrobacter, and Serratia may develop resistance during prolonged therapy (3-4 days)    -  Cefepime: S <=2    -  Cefoxitin: S <=8    -  Ceftriaxone: S <=1 Enterobacter, Klebsiella aerogenes, Citrobacter, and Serratia may develop resistance during prolonged therapy    -  Ciprofloxacin: S <=0.25    Organism Identification: Blood Culture PCR  Klebsiella oxytoca /Raoutella ornithinolytica    Method Type: PCR    Method Type: NATACHA    Specimen Source: .Blood Blood-Peripheral (02-15 @ 19:01)  Culture Results:   Growth in aerobic and anaerobic bottles: Klebsiella oxytoca/Raoutella  ornithinolytica  See previous culture [id number] (02-15 @ 19:01)      Legionella Antigen, Urine: Negative ( @ 09:14)    MRSA/MSSA PCR (22 @ 09:20)    MRSA PCR Result.: NotDetec:    Staph Aureus PCR Result: NotDetec    Respiratory Viral Panel with COVID-19 by [facility] (02.15.22 @ 14:34)    Rapid RVP Result: NotDetec    SARS-CoV-2: NotDetec:     RADIOLOGY:  < from: CT Abdomen and Pelvis w/ Oral Cont (22 @ 13:36) >  ACC: [id number] EXAM:  CT ABDOMEN AND PELVIS OC                        PROCEDURE DATE:  2022    INTERPRETATION:  CLINICAL INFORMATION: Bacteremia  COMPARISON: 2/15  PROCEDURE:  CT of the Abdomen and Pelvis was performed without intravenous contrast.  Intravenous contrast: None.  Oral contrast: Positive contrast was administered.  Sagittal and coronal reformats were performed.  FINDINGS:  VISUALIZED CHEST: Bibasilar atelectasis and trace effusions.  ABDOMEN AND PELVIS:  LIVER: Within normal limits.  BILE DUCTS: Normal caliber.  GALLBLADDER: Cholelithiasis.  SPLEEN: Within normal limits.  PANCREAS: Within normal limits.  ADRENALS: Within normal limits.  KIDNEYS/URETERS: Within normal limits.  BLADDER: Khalil catheter.  REPRODUCTIVE ORGANS: No pelvic masses.  BOWEL: No bowel obstruction. Appendix is normal. Moderate hiatal hernia.  PERITONEUM: No ascites.  VESSELS: Atherosclerotic changes.  RETROPERITONEUM/LYMPH NODES: No lymphadenopathy.  ABDOMINAL WALL: Within normal limits.  BONES: Within normal limits.  IMPRESSION:  No acute findings. Multiple gallstones.    < from: US Duplex Venous Lower Ext Complete, Bilateral (22 @ 04:56) >  ACC: [id number] EXAM:  US DPLX LWR EXT VEINS COMPL BI                        PROCEDURE DATE:  2022    INTERPRETATION:  CLINICAL INFORMATION: Lower extremity swelling.  COMPARISON: None available.  TECHNIQUE: Duplex sonography of theBILATERAL LOWER extremity veins with   color and spectral Doppler, with and without compression.  IMPRESSION:  RIGHT: Limited calf vein visualization. Nonvisualization of the calf   veins. No evidence of deep venous thrombosis at or above the knee.  LEFT: Limited calf vein visualization. Acute nonocclusive above-the-knee   deep venous thrombosis. Occlusive DVT in the greater saphenous vein.    < from: US Abdomen Upper Quadrant Right (22 @ 12:03) >  ACC: [id number] EXAM:  US ABDOMEN RT UPR QUADRANT                        PROCEDURE DATE:  2022    INTERPRETATION:  CLINICAL INFORMATION: Elevated LFTs.  COMPARISON: CT abdomen/pelvis 2/15/2022.  TECHNIQUE: Sonography of the right upper quadrant.  IMPRESSION: Multiple gallstones. Gallbladder wall thickening. No   pericholecystic fluid. No evidence for intrahepatic or extrahepatic   biliary ductal dilatation.    < from: Xray Chest 1 View-PORTABLE IMMEDIATE (Xray Chest 1 View-PORTABLE IMMEDIATE .) (22 @ 01:26) >  ACC: [id number] EXAM:  XR CHEST PORTABLE IMMED 1V                        PROCEDURE DATE:  2022    IMPRESSION:  Right IJ line HD catheter in satisfactory position with no pneumothorax,   new    < from: CT Abdomen and Pelvis No Cont (02.15.22 @ 23:57) >  ACC: [id number] EXAM:  CT ABDOMEN AND PELVIS                        PROCEDURE DATE:  02/15/2022    INTERPRETATION:  CLINICAL INFORMATION:  Abdominal distension  COMPARISON: None.  CONTRAST/COMPLICATIONS:  IV Contrast: None  Oral Contrast:None  Complications: None  PROCEDURE:  Axial CT images were acquired through the abdomen and pelvis obtained   without intravenous contrast. Coronal and sagittal reformatted images   provided.  FINDINGS: This examination is limited by patient motion artifact and the   lack of oral and intravenous contrast.  LOWER CHEST: Bibasilar atelectasis. Coronary artery calcifications.  LIVER: Within normal limits.  BILE DUCTS: Normal caliber.  GALLBLADDER: Cholelithiasis..  SPLEEN: Within normal limits.  PANCREAS: Within normal limits.  ADRENALS: Within normal limits.  KIDNEYS/URETERS: No definite renal stone or hydronephrosis  BLADDER: Distended with Khalil catheter. No bladder wall thickening.  REPRODUCTIVE ORGANS: Prostate gland mildly enlarged.  BOWEL: Evaluation of bowel is limited without distention with oral   contrast, however there is no bowel obstruction. Few colonic diverticula   without acute diverticulitis. Small bowel loops are not dilated.   Unremarkable appendix without appendicitis. Stomach is underdistended for   adequate evaluation, however suggestion of a moderate hiatal hernia.   Appendix  PERITONEUM: No free air significant ascites.  VESSELS:  Limited without intravenous contrast. There is calcific   atherosclerosis of the abdominal aorta without gross aneurysmal   dilatation. 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  RETROPERITONEUM: No lymphadenopathy.  ABDOMINAL WALL: Small fat-containing bilateral inguinal hernias.  BONES: Degenerative changes of the spine. Bilateral L5 spondylolysis with   slight grade 1 anterolisthesis of L5 on S1.  MPRESSION:  There is no bowel obstruction, significant ascites or free   intraperitoneal air.  Moderate hiatal hernia.  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    < from: Xray Chest 1 View- PORTABLE-Urgent (02.15.22 @ 14:32) >  ACC: [id number] EXAM:  XR CHEST PORTABLE URGENT 1V                        PROCEDURE DATE:  02/15/2022    INTERPRETATION:  AP semierect chest on February 15, 2022 at 1:55 PM.   Patient has sepsis.  Heart enlargement again noted.  There aremild mid lower lung field infiltrates medially new since 2020.  IMPRESSION: Bilateral infiltrates as above.    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 ago, s/p  Bilateral Cataract Surgeries, admitted via the ER to [facility] on 2/15/22 with c/o diarrhea x 2 weeks at home along with decreased po intake and mild nausea with a few episodes of vomiting bilious material.         Rx'ed empirically with Rocephin + Zithromax after Blood and Urine Cx's were obtained.    Sepsis with Klebsiella Oxytoca ( AKA Raoutella ornithinolytica ) Bacteremia as identified from 2 out of 2 Blood Cx's from 2/15/22  - ? , GI, or Pulmonary source with Bilateral lower lobe PNA seen on initial CXR and Cholelithiasis with GB wall thickening seen on Sono.  Ab rx changed to Cefepime with Zithromax on 22 and patient remains with low - normal temps.  Noted WBC's remain very elevated but  with decreasing Procalcitonin and  with some slow improvement of renal function.  Sensitivities of the Kleb Oxytoca isolated from Blood Cx's noted above and sensitive to Cefepime.  Urine Legionella Ag is negative and now Urine Cx from 22 reported with >100,000 Klebsiella oxytoca as well.  Sputum Cx finalized as normal alonzo.  CT of the Abdomen + Pelvis with oral contrast done this AM ( 22 ) resulted with no acute findings, moderate Hiatal Hernia, and multiple Gallstones.     Suggestions:  Will therefore continue present ab rx with Cefepime and  Zithromax  for rx of Sepsis due to Klebsiella Bacteremia and UTI along with Bilateral PNA pending further Cx results.  Follow-up temps and labs closely.  Follow-up CXR and monitor O2 Sat's.  Discussed with patient  again in detail at the bedside TMAX  - 96.8 - 98.1    On day # 3 Cefepime / # 5 Zithtromax    Vital Signs Last 24 Hrs  T(C): 36.4 (2022 17:01), Max: 36.7 (2022 23:54)  T(F): 97.6 (2022 17:01), Max: 98.1 (2022 23:54)  HR: 81 (2022 17:01) (74 - 81)  BP: 107/69 (2022 17:01) (107/69 - 127/74)  RR: 18 (2022 17:01) (18 - 18)  SpO2: 94% (2022 17:01) (94% - 95%)  Supplemental O2:  on 2 Liters NC O2 now    Awake, alert, feeling a little better today with no c/o abdominal pain and no N/V/D reported.  Appetite is improving slowly and tolerating po liquids.  No c/o cp or SOB but still with c/o cough but with difficulty expectorating the mucous.  Voiding without the Khalil in place and with 4,175cc of urine output recorded for the prior 24hrs.  O2 Sat's are between 84 - 97% on 2 liters NC O2.    PHYSICAL EXAM  General: 80 y/o white male awake, alert, with NC O2 in place, pleasant and cooperative, sitting uprignt in bed now, still with a dry mouth but in NAD  HEENT: conj pale, sclerae anicteric, PERRLA, no oral lesions noted but mucosa and tongue still appear somewhat dry  Neck: supple, no nodes noted           RIJ Vas Cath d/c'ed and with dry dressing at the site now  Heart: RR  Lungs: few moist rhonchi scattered  bilaterally and with decreased BS at the bases  Abdomen: BS+, soft, nontender to palpation, no masses or HS-megaly detected  Back: no CVA or Spinal tenderness noted  Extremities: well-healed bilateral TKR scars                    1+ edema LE's                     arms and hands  slightly less swollen   Skin: warm, dry, no rash noted    I&O's Summary :  2022 07:01  -  2022 07:00  IN: 3312 mL / OUT: 4175 mL / NET: -863 mL  2022 07:01  -  2022 1  IN: 0 mL / OUT: 1200 mL / NET: -1200 mL    LABS:  CBC Full  -  ( 2022 16:33 )  WBC Count : 25.64 K/uL  RBC Count : 2.36 M/uL  Hemoglobin : 7.3 g/dL  Hematocrit : 21.9 %  Platelet Count - Automated : 113 K/uL  Mean Cell Volume : 92.8 fl  Mean Cell Hemoglobin : 30.9 pg  Mean Cell Hemoglobin Concentration : 33.3 g/dL  Auto Neutrophil # : x  Auto Lymphocyte # : x  Auto Monocyte # : x  Auto Eosinophil # : x  Auto Basophil # : x  Auto Neutrophil % : x  Auto Lymphocyte % : x  Auto Monocyte % : x  Auto Eosinophil % : x  Auto Basophil % : x      140  |  108  |  127<H>  ----------------------------<  94  3.7   |  23  |  3.95<H>  Ca    7.4<L>      2022 08:17  Phos  4.4       Mg     2.9       TPro  5.2<L>  /  Alb  1.3<L>  /  TBili  0.8  /  DBili  x   /  AST  61<H>  /  ALT  46  /  AlkPhos  158<H>    LIVER FUNCTIONS - ( 2022 08:17 )  Alb: 1.3 g/dL / Pro: 5.2 gm/dL / ALK PHOS: 158 U/L / ALT: 46 U/L / AST: 61 U/L / GGT: x           PT/INR - ( 2022 16:33 )   PT: 15.3 sec;   INR: 1.34 ratio     PTT - ( 2022 16:33 )  PTT:41.0 sec    Sedimentation Rate, Erythrocyte: 62 mm/hr ( @ 02:22)    C-Reactive Protein, Serum (22 @ 09:23)    C-Reactive Protein, Serum: 83 mg/L    Procalcitonin, Serum (22 @ 09:23)    Procalcitonin, Serum: 17.20:   Procalcitonin, Serum (22 @ 09:20)    Procalcitonin, Serum: 81.70:    Lactate, Blood (02.15.22 @ 21:14)    Lactate, Blood: 1.5 mmol/L  Lactate, Blood (02.15.22 @ 18:24)    Lactate, Blood: 2.5: Elevated lactate. Consider ordering follow-up lactate to trend. mmol/L  Lactate, Blood (02.15.22 @ 14:15)    Lactate, Blood: 3.9: Elevated lactate. Consider ordering follow-up lactate to trend. mmol/L    A1C with Estimated Average Glucose (22 @ 09:30)    A1C with Estimated Average Glucose Result: 7.6: Method: Immunoassay       Reference Range                4.0-5.6%       High risk (prediabetic)        5.7-6.4%       Diabetic, diagnostic             >=6.5%       ADA diabetic treatment goal       <7.0%    Estimated Average Glucose: 171    Acute Hepatitis Panel (22 @ 09:37)    Hepatitis C Virus Interpretation: Nonreact: Hepatitis C AB    Hepatitis C Virus S/CO Ratio: 0.12 S/CO    Hepatitis B Core IgM Antibody: Nonreact    Hepatitis B Surface Antigen: Nonreact    Hepatitis A IgM Antibody: Nonreact    Hepatitis B Surface Antibody (22 @ 09:37)    Hepatitis B Surface Antibody: Nonreact    C3 Complement, Serum (22 @ 09:37)    C3 Complement, Serum: 115 mg/dL  C4 Complement, Serum (22 @ 09:37)    C4 Complement, Serum: 26 mg/dL    Glomerular Basement Membrane Ab IgG (22 @ 21:11)    Glomerular Basement Membrane Ab Ig: Negative        MICROBIOLOGY:  Specimen Source: .Sputum Sputum ( @ 01:01)    Gram Stain:   Rare polymorphonuclear leukocytes per low power field  Moderate Squamous epithelial cells per low power field  Numerous Yeast per oil power field  Moderate Gram Positive Rods per oil power field  Few Gram Negative Rods per oil power field  Rare Gram positive cocci in pairs per oil power field    Specimen Source: .Sputum Sputum    Culture Results:   Normal Respiratory Alonzo present    Culture - Urine (22 @ 08:49)    Specimen Source: Clean Catch Clean Catch (Midstream)    Culture Results:   >100,000 CFU/ml Klebsiella oxytoca/Raoutella ornithinolytica    Specimen Source: .Blood Blood-Peripheral (02-15 @ 19:01)  Culture Results:   Growth in aerobic and anaerobic bottles: Klebsiella oxytoca/Raoutella  ornithinolytica  Culture - Blood (02.15.22 @ 19:01)    -  Klebsiella oxytoca: Detec    Gram Stain:   Growth in aerobic and anaerobic bottles: Gram Negative Rods    -  Meropenem: S <=1    -  Ertapenem: S <=0.5    -  Gentamicin: S <=2    -  Imipenem: S <=1    -  Levofloxacin: S <=0.5    -  Piperacillin/Tazobactam: S <=8    -  Tobramycin: S <=2    -  Trimethoprim/Sulfamethoxazole: S <=0.5/9.5    -  Amikacin: S <=16    -  Ampicillin: R >16 These ampicillin results predict results for amoxicillin    -  Ampicillin/Sulbactam: S 8/4 Enterobacter, Klebsiella aerogenes, Citrobacter, and Serratia may develop resistance during prolonged therapy (3-4 days)    -  Aztreonam: R >16    -  Cefazolin: R 16 Enterobacter, Klebsiella aerogenes, Citrobacter, and Serratia may develop resistance during prolonged therapy (3-4 days)    -  Cefepime: S <=2    -  Cefoxitin: S <=8    -  Ceftriaxone: S <=1 Enterobacter, Klebsiella aerogenes, Citrobacter, and Serratia may develop resistance during prolonged therapy    -  Ciprofloxacin: S <=0.25    Organism Identification: Blood Culture PCR  Klebsiella oxytoca /Raoutella ornithinolytica    Method Type: PCR    Method Type: NATACHA    Specimen Source: .Blood Blood-Peripheral (02-15 @ 19:01)  Culture Results:   Growth in aerobic and anaerobic bottles: Klebsiella oxytoca/Raoutella  ornithinolytica  See previous culture [id number] (02-15 @ 19:01)      Legionella Antigen, Urine: Negative ( @ 09:14)    MRSA/MSSA PCR (22 @ 09:20)    MRSA PCR Result.: NotDetec:    Staph Aureus PCR Result: NotDetec    Respiratory Viral Panel with COVID-19 by [facility] (02.15.22 @ 14:34)    Rapid RVP Result: NotDetec    SARS-CoV-2: NotDetec:     RADIOLOGY:  < from: CT Abdomen and Pelvis w/ Oral Cont (22 @ 13:36) >  ACC: [id number] EXAM:  CT ABDOMEN AND PELVIS OC                        PROCEDURE DATE:  2022    INTERPRETATION:  CLINICAL INFORMATION: Bacteremia  COMPARISON: 2/15  PROCEDURE:  CT of the Abdomen and Pelvis was performed without intravenous contrast.  Intravenous contrast: None.  Oral contrast: Positive contrast was administered.  Sagittal and coronal reformats were performed.  FINDINGS:  VISUALIZED CHEST: Bibasilar atelectasis and trace effusions.  ABDOMEN AND PELVIS:  LIVER: Within normal limits.  BILE DUCTS: Normal caliber.  GALLBLADDER: Cholelithiasis.  SPLEEN: Within normal limits.  PANCREAS: Within normal limits.  ADRENALS: Within normal limits.  KIDNEYS/URETERS: Within normal limits.  BLADDER: Khalil catheter.  REPRODUCTIVE ORGANS: No pelvic masses.  BOWEL: No bowel obstruction. Appendix is normal. Moderate hiatal hernia.  PERITONEUM: No ascites.  VESSELS: Atherosclerotic changes.  RETROPERITONEUM/LYMPH NODES: No lymphadenopathy.  ABDOMINAL WALL: Within normal limits.  BONES: Within normal limits.  IMPRESSION:  No acute findings. Multiple gallstones.    < from: US Duplex Venous Lower Ext Complete, Bilateral (22 @ 04:56) >  ACC: [id number] EXAM:  US DPLX LWR EXT VEINS COMPL BI                        PROCEDURE DATE:  2022    INTERPRETATION:  CLINICAL INFORMATION: Lower extremity swelling.  COMPARISON: None available.  TECHNIQUE: Duplex sonography of theBILATERAL LOWER extremity veins with   color and spectral Doppler, with and without compression.  IMPRESSION:  RIGHT: Limited calf vein visualization. Nonvisualization of the calf   veins. No evidence of deep venous thrombosis at or above the knee.  LEFT: Limited calf vein visualization. Acute nonocclusive above-the-knee   deep venous thrombosis. Occlusive DVT in the greater saphenous vein.    < from: US Abdomen Upper Quadrant Right (22 @ 12:03) >  ACC: [id number] EXAM:  US ABDOMEN RT UPR QUADRANT                        PROCEDURE DATE:  2022    INTERPRETATION:  CLINICAL INFORMATION: Elevated LFTs.  COMPARISON: CT abdomen/pelvis 2/15/2022.  TECHNIQUE: Sonography of the right upper quadrant.  IMPRESSION: Multiple gallstones. Gallbladder wall thickening. No   pericholecystic fluid. No evidence for intrahepatic or extrahepatic   biliary ductal dilatation.    < from: Xray Chest 1 View-PORTABLE IMMEDIATE (Xray Chest 1 View-PORTABLE IMMEDIATE .) (22 @ 01:26) >  ACC: [id number] EXAM:  XR CHEST PORTABLE IMMED 1V                        PROCEDURE DATE:  2022    IMPRESSION:  Right IJ line HD catheter in satisfactory position with no pneumothorax,   new    < from: CT Abdomen and Pelvis No Cont (02.15.22 @ 23:57) >  ACC: [id number] EXAM:  CT ABDOMEN AND PELVIS                        PROCEDURE DATE:  02/15/2022    INTERPRETATION:  CLINICAL INFORMATION:  Abdominal distension  COMPARISON: None.  CONTRAST/COMPLICATIONS:  IV Contrast: None  Oral Contrast:None  Complications: None  PROCEDURE:  Axial CT images were acquired through the abdomen and pelvis obtained   without intravenous contrast. Coronal and sagittal reformatted images   provided.  FINDINGS: This examination is limited by patient motion artifact and the   lack of oral and intravenous contrast.  LOWER CHEST: Bibasilar atelectasis. Coronary artery calcifications.  LIVER: Within normal limits.  BILE DUCTS: Normal caliber.  GALLBLADDER: Cholelithiasis..  SPLEEN: Within normal limits.  PANCREAS: Within normal limits.  ADRENALS: Within normal limits.  KIDNEYS/URETERS: No definite renal stone or hydronephrosis  BLADDER: Distended with Khalil catheter. No bladder wall thickening.  REPRODUCTIVE ORGANS: Prostate gland mildly enlarged.  BOWEL: Evaluation of bowel is limited without distention with oral   contrast, however there is no bowel obstruction. Few colonic diverticula   without acute diverticulitis. Small bowel loops are not dilated.   Unremarkable appendix without appendicitis. Stomach is underdistended for   adequate evaluation, however suggestion of a moderate hiatal hernia.   Appendix  PERITONEUM: No free air significant ascites.  VESSELS:  Limited without intravenous contrast. There is calcific   atherosclerosis of the abdominal aorta without gross aneurysmal   dilatation. 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  RETROPERITONEUM: No lymphadenopathy.  ABDOMINAL WALL: Small fat-containing bilateral inguinal hernias.  BONES: Degenerative changes of the spine. Bilateral L5 spondylolysis with   slight grade 1 anterolisthesis of L5 on S1.  MPRESSION:  There is no bowel obstruction, significant ascites or free   intraperitoneal air.  Moderate hiatal hernia.  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    < from: Xray Chest 1 View- PORTABLE-Urgent (02.15.22 @ 14:32) >  ACC: [id number] EXAM:  XR CHEST PORTABLE URGENT 1V                        PROCEDURE DATE:  02/15/2022    INTERPRETATION:  AP semierect chest on February 15, 2022 at 1:55 PM.   Patient has sepsis.  Heart enlargement again noted.  There aremild mid lower lung field infiltrates medially new since 2020.  IMPRESSION: Bilateral infiltrates as above.    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 ago, s/p  Bilateral Cataract Surgeries, admitted via the ER to [facility] on 2/15/22 with c/o diarrhea x 2 weeks at home along with decreased po intake and mild nausea with a few episodes of vomiting bilious material at home along with decreased po intake. W/u revealed a markedly elevated WBC's of 37,940.  an elevated Lactate to 3.9, and his BUN / Creat were found to be elevated at 169 / 12.9 and Glucose was elevated as well. Further w/u with CXR and CT of the Abdomen + Pelvis was done and revealed Bilateral Infiltrates, Cholelithiasis, a moderate Hiatal Hernia, but no bowel obstruction was seen.  Patient was admitted to the CCU for further care and he was Rx'ed empirically with Rocephin + Zithromax after Blood and Urine Cx's were obtained and he underwent HD urgently x 1 on 2/15/22 PM. and rec'd 5 Liters of NS as well. Subsequently found to have Sepsis with Klebsiella Oxytoca ( AKA Raoutella ornithinolytica ) Bacteremia as identified from 2 out of 2 Blood Cx's from 2/15/22  - ? , GI, or Pulmonary source with Bilateral lower lobe PNA seen on initial CXR and Cholelithiasis with GB wall thickening seen on Sono.  Ab rx changed to Cefepime with Zithromax on 22 and patient remains with low - normal temps.  Noted WBC's remain very elevated but  with decreasing Procalcitonin and  with some slow improvement of renal function.  Sensitivities of the Kleb Oxytoca isolated from Blood Cx's noted above and sensitive to Cefepime.  Urine Legionella Ag is negative and now Urine Cx from 22 reported with >100,000 Klebsiella oxytoca as well.  Sputum Cx finalized as normal alonzo.  CT of the Abdomen + Pelvis with oral contrast done this AM ( 22 ) resulted with no acute findings, moderate Hiatal Hernia, and multiple Gallstones.     Suggestions:  Will therefore continue present ab rx with Cefepime and  Zithromax  for rx of Sepsis due to Klebsiella Bacteremia and UTI along with Bilateral PNA pending further Cx results.  Follow-up temps and labs closely.  Follow-up CXR and monitor O2 Sat's.  Discussed with patient  again in detail at the bedside

## 2022-02-19 NOTE — PHARMACOTHERAPY INTERVENTION NOTE - COMMENTS
recommended d/c heparin, platelets 61
Recommended discontinuing azithromycin since Legionella PCR is negative and sputum culture showed normal alonzo

## 2022-02-19 NOTE — CHART NOTE - NSCHARTNOTEFT_GEN_A_CORE
Called by RN regarding patient c/o cramping. Per RN, bladder scan done and shows PVR 816cc and pt has not urinate since this pm.   A/P  Urinary retention  Khalil catheter ordered

## 2022-02-19 NOTE — PROGRESS NOTE ADULT - SUBJECTIVE AND OBJECTIVE BOX
NEPHROLOGY PROGRESS NOTE    CHIEF COMPLAINT:  RADHA    HPI:  Renal function recovering with good urine output.    ROS:  no SOB    EXAM:  T(F): 98.1 (02-19-22 @ 10:57)  HR: 78 (02-19-22 @ 10:57)  BP: 107/70 (02-19-22 @ 10:57)  RR: 18 (02-19-22 @ 10:57)  SpO2: 94% (02-19-22 @ 10:57)    Conversant, in no apparent distress  Normal respiratory effort, lungs clear bilaterally  Heart RRR with no murmur, +peripheral edema         LABS                             7.4    24.77 )-----------( 154      ( 19 Feb 2022 08:17 )             22.2          02-19    140  |  108  |  127<H>  ----------------------------<  94  3.7   |  23  |  3.95<H>    Ca    7.4<L>      19 Feb 2022 08:17  Phos  4.4     02-19  Mg     2.9     02-19    TPro  5.2<L>  /  Alb  1.3<L>  /  TBili  0.8  /  DBili  x   /  AST  61<H>  /  ALT  46  /  AlkPhos  158<H>  02-19           Assessment   RADHA pre renal azotemia; ischemic ATN  Nonoliguric, recovery phase    Plan:  Follow off HD on gentle IVF  D/C abe catheter

## 2022-02-19 NOTE — PROGRESS NOTE ADULT - SUBJECTIVE AND OBJECTIVE BOX
Patient is a 81y old  Male who presents with a chief complaint of hypotension and renal failure (2022 22:03)    INTERVAL HPI/OVERNIGHT EVENTS: yao reisnerted     MEDICATIONS  (STANDING):  atorvastatin 40 milliGRAM(s) Oral at bedtime  azithromycin  IVPB      azithromycin  IVPB 500 milliGRAM(s) IV Intermittent every 24 hours  cefepime   IVPB      cefepime   IVPB 1000 milliGRAM(s) IV Intermittent every 24 hours  chlorhexidine 2% Cloths 1 Application(s) Topical <User Schedule>  heparin  Infusion.  Unit(s)/Hr (19 mL/Hr) IV Continuous <Continuous>  insulin lispro (ADMELOG) corrective regimen sliding scale   SubCutaneous Before meals and at bedtime  iohexol 300 mG (iodine)/mL Oral Solution 30 milliLiter(s) Oral once  lactated ringers. 1000 milliLiter(s) (75 mL/Hr) IV Continuous <Continuous>  latanoprost 0.005% Ophthalmic Solution 1 Drop(s) Both EYES at bedtime    MEDICATIONS  (PRN):  sodium chloride 0.9% lock flush 10 milliLiter(s) IV Push every 1 hour PRN Pre/post blood products, medications, blood draw, and to maintain line patency    Allergies    No Known Allergies    Intolerances      REVIEW OF SYSTEMS:  All other systems reviewed and are negative    Vital Signs Last 24 Hrs  T(C): 36.7 (2022 10:57), Max: 36.7 (2022 23:54)  T(F): 98.1 (2022 10:57), Max: 98.1 (2022 23:54)  HR: 78 (2022 10:57) (74 - 87)  BP: 107/70 (2022 10:57) (107/70 - 131/75)  BP(mean): 86 (2022 17:00) (71 - 86)  RR: 18 (2022 10:57) (16 - 25)  SpO2: 94% (2022 10:57) (92% - 97%)  Daily     Daily Weight in k.6 (2022 05:38)  I&O's Summary    2022 07:01  -  2022 07:00  --------------------------------------------------------  IN: 3312 mL / OUT: 4175 mL / NET: -863 mL      CAPILLARY BLOOD GLUCOSE      POCT Blood Glucose.: 174 mg/dL (2022 10:55)  POCT Blood Glucose.: 103 mg/dL (2022 07:48)  POCT Blood Glucose.: 155 mg/dL (2022 21:17)  POCT Blood Glucose.: 101 mg/dL (2022 17:02)    PHYSICAL EXAM:  GENERAL: NAD,    HEAD:  Atraumatic, Normocephalic  EYES: EOMI, PERRLA, conjunctiva and sclera clear  ENMT: No tonsillar erythema, exudates, or enlargement; Moist mucous membranes, Good dentition, No lesions  NECK: Supple, No JVD, Normal thyroid  NERVOUS SYSTEM:  Alert & Oriented X3, Good concentration; Motor Strength 5/5 B/L upper and lower extremities; DTRs 2+ intact and symmetric  CHEST/LUNG: Clear to percussion bilaterally; No rales, rhonchi, wheezing, or rubs  HEART: Regular rate and rhythm; No murmurs, rubs, or gallops  ABDOMEN: Soft, Nontender, Nondistended; Bowel sounds present  EXTREMITIES:  2+ Peripheral Pulses, No clubbing, cyanosis, or edema  LYMPH: No lymphadenopathy noted  SKIN: No rashes or lesions    Labs                          7.4    24.77 )-----------( 154      ( 2022 08:17 )             22.2         140  |  108  |  127<H>  ----------------------------<  94  3.7   |  23  |  3.95<H>    Ca    7.4<L>      2022 08:17  Phos  4.4       Mg     2.9         TPro  5.2<L>  /  Alb  1.3<L>  /  TBili  0.8  /  DBili  x   /  AST  61<H>  /  ALT  46  /  AlkPhos  158<H>      PTT - ( 2022 08:17 )  PTT:84.3 sec          Culture - Sputum (collected 2022 01:01)  Source: .Sputum Sputum  Gram Stain (2022 20:26):    Rare polymorphonuclear leukocytes per low power field    Moderate Squamous epithelial cells per low power field    Numerous Yeast per oil power field    Moderate Gram Positive Rods per oil power field    Few Gram Negative Rods per oil power field    Rare Gram positive cocci in pairs per oil power field  Final Report (2022 20:26):    Normal Respiratory Nerissa present                DVT prophylaxis: > Lovenox 40mg SQ daily  > Heparin   > SCD's

## 2022-02-20 LAB
% ALBUMIN: 40.1 % — SIGNIFICANT CHANGE UP
% ALPHA 1: 10.5 % — SIGNIFICANT CHANGE UP
% ALPHA 2: 15.2 % — SIGNIFICANT CHANGE UP
% BETA: 18 % — SIGNIFICANT CHANGE UP
% GAMMA: 16.2 % — SIGNIFICANT CHANGE UP
% M SPIKE: SIGNIFICANT CHANGE UP
ALBUMIN SERPL ELPH-MCNC: 2 G/DL — LOW (ref 3.6–5.5)
ALBUMIN/GLOB SERPL ELPH: 0.7 RATIO — SIGNIFICANT CHANGE UP
ALPHA1 GLOB SERPL ELPH-MCNC: 0.5 G/DL — HIGH (ref 0.1–0.4)
ALPHA2 GLOB SERPL ELPH-MCNC: 0.7 G/DL — SIGNIFICANT CHANGE UP (ref 0.5–1)
ANION GAP SERPL CALC-SCNC: 7 MMOL/L — SIGNIFICANT CHANGE UP (ref 5–17)
APTT BLD: 63.6 SEC — HIGH (ref 27.5–35.5)
APTT BLD: 70.2 SEC — HIGH (ref 27.5–35.5)
B-GLOBULIN SERPL ELPH-MCNC: 0.9 G/DL — SIGNIFICANT CHANGE UP (ref 0.5–1)
BLD GP AB SCN SERPL QL: SIGNIFICANT CHANGE UP
BUN SERPL-MCNC: 116 MG/DL — HIGH (ref 7–23)
CALCIUM SERPL-MCNC: 7 MG/DL — LOW (ref 8.5–10.1)
CHLORIDE SERPL-SCNC: 109 MMOL/L — HIGH (ref 96–108)
CO2 SERPL-SCNC: 25 MMOL/L — SIGNIFICANT CHANGE UP (ref 22–31)
CREAT SERPL-MCNC: 2.91 MG/DL — HIGH (ref 0.5–1.3)
GAMMA GLOBULIN: 0.8 G/DL — SIGNIFICANT CHANGE UP (ref 0.6–1.6)
GLUCOSE SERPL-MCNC: 144 MG/DL — HIGH (ref 70–99)
HCT VFR BLD CALC: 19.8 % — CRITICAL LOW (ref 39–50)
HCT VFR BLD CALC: 20.6 % — CRITICAL LOW (ref 39–50)
HGB BLD-MCNC: 6.5 G/DL — CRITICAL LOW (ref 13–17)
HGB BLD-MCNC: 6.8 G/DL — CRITICAL LOW (ref 13–17)
INTERPRETATION SERPL IFE-IMP: SIGNIFICANT CHANGE UP
M-SPIKE: SIGNIFICANT CHANGE UP (ref 0–0)
MCHC RBC-ENTMCNC: 30.8 PG — SIGNIFICANT CHANGE UP (ref 27–34)
MCHC RBC-ENTMCNC: 30.9 PG — SIGNIFICANT CHANGE UP (ref 27–34)
MCHC RBC-ENTMCNC: 32.8 G/DL — SIGNIFICANT CHANGE UP (ref 32–36)
MCHC RBC-ENTMCNC: 33 G/DL — SIGNIFICANT CHANGE UP (ref 32–36)
MCV RBC AUTO: 93.6 FL — SIGNIFICANT CHANGE UP (ref 80–100)
MCV RBC AUTO: 93.8 FL — SIGNIFICANT CHANGE UP (ref 80–100)
NRBC # BLD: 0 /100 WBCS — SIGNIFICANT CHANGE UP (ref 0–0)
NRBC # BLD: 0 /100 WBCS — SIGNIFICANT CHANGE UP (ref 0–0)
PLATELET # BLD AUTO: 115 K/UL — LOW (ref 150–400)
PLATELET # BLD AUTO: 166 K/UL — SIGNIFICANT CHANGE UP (ref 150–400)
POTASSIUM SERPL-MCNC: 3.9 MMOL/L — SIGNIFICANT CHANGE UP (ref 3.5–5.3)
POTASSIUM SERPL-SCNC: 3.9 MMOL/L — SIGNIFICANT CHANGE UP (ref 3.5–5.3)
PROT PATTERN SERPL ELPH-IMP: SIGNIFICANT CHANGE UP
RBC # BLD: 2.11 M/UL — LOW (ref 4.2–5.8)
RBC # BLD: 2.2 M/UL — LOW (ref 4.2–5.8)
RBC # FLD: 13.6 % — SIGNIFICANT CHANGE UP (ref 10.3–14.5)
RBC # FLD: 13.7 % — SIGNIFICANT CHANGE UP (ref 10.3–14.5)
SODIUM SERPL-SCNC: 141 MMOL/L — SIGNIFICANT CHANGE UP (ref 135–145)
WBC # BLD: 23.19 K/UL — HIGH (ref 3.8–10.5)
WBC # BLD: 24.19 K/UL — HIGH (ref 3.8–10.5)
WBC # FLD AUTO: 23.19 K/UL — HIGH (ref 3.8–10.5)
WBC # FLD AUTO: 24.19 K/UL — HIGH (ref 3.8–10.5)

## 2022-02-20 PROCEDURE — 99233 SBSQ HOSP IP/OBS HIGH 50: CPT

## 2022-02-20 RX ADMIN — AZITHROMYCIN 255 MILLIGRAM(S): 500 TABLET, FILM COATED ORAL at 21:01

## 2022-02-20 RX ADMIN — Medication 4: at 16:09

## 2022-02-20 RX ADMIN — ATORVASTATIN CALCIUM 40 MILLIGRAM(S): 80 TABLET, FILM COATED ORAL at 21:01

## 2022-02-20 RX ADMIN — Medication 0.25 MILLIGRAM(S): at 00:02

## 2022-02-20 RX ADMIN — Medication 2: at 11:21

## 2022-02-20 RX ADMIN — HEPARIN SODIUM 1600 UNIT(S)/HR: 5000 INJECTION INTRAVENOUS; SUBCUTANEOUS at 07:19

## 2022-02-20 RX ADMIN — CEFEPIME 100 MILLIGRAM(S): 1 INJECTION, POWDER, FOR SOLUTION INTRAMUSCULAR; INTRAVENOUS at 21:01

## 2022-02-20 RX ADMIN — LATANOPROST 1 DROP(S): 0.05 SOLUTION/ DROPS OPHTHALMIC; TOPICAL at 21:01

## 2022-02-20 RX ADMIN — HEPARIN SODIUM 1600 UNIT(S)/HR: 5000 INJECTION INTRAVENOUS; SUBCUTANEOUS at 04:29

## 2022-02-20 RX ADMIN — CHLORHEXIDINE GLUCONATE 1 APPLICATION(S): 213 SOLUTION TOPICAL at 05:16

## 2022-02-20 NOTE — PROGRESS NOTE ADULT - ASSESSMENT
81M w/ HTN, bladder tumor removal, melanoma. Presents w/ diarrhea and weakness. Admitted to ICU w/ rapif afib, septic shock likely from UTI vs pneumonia, RADHA w/ hyperkalemia and significant uremia and thrombocytopenia. S/p 1 HD treatment 2/16/22 2/20  -Noted drop in Hgb pt on heparin drip, some reported dark stool per nurse  will transfuse 1un, send occult blood  monitor hgb, if continues to decrease or bleeding noted will need gi eval, and possible evaluation for IVC filter   c/w abx as prescribed by ID, ct abd wnl , noted to have multiple gallstones   RADHA improving     Plan    #Neuro  - PT A&Ox4 today, awake alert with no confusion.      #CV   -PT with new onset rapid afib: likely due to sepsis now in NSR with rate controlled: off amiodarone and sustaining NSR.     #Pulm   - SpO2 95% and above on 2LNC    #ID  -ID reccs noted, culture sensitivities resulted, Ceftriaxone DC/d due to likely resistance: C/W Cefepime and Azithromycin for CAP vs UTI; GNR+ bacteremia. No diarrhea 2 days: send GI PCR and c diff if diarrhea recurs  Will get CT abd pelv w oral cont    #Renal/metabolic   - RADHA likely combination of prerenal, ATN and obstructive uropathy; s/p HD x1; now making urine; PT still appears dry, will start LR maintenance, d/c bicarb gtt; monitor UOP and electrolytes closely. Renal reccs noted: D/C HD catheter no further HD at this time.     #GI  - Diet advanced now that mental status improving and able to follow commands, noted to have elevated LFTs, CT A/P unremarkable, RUQ US with multiple gallstones without cholecystitis.     #Heme   - noted thrombocytopenia of unclear etiology, platelet count improving. On Heparin for LLE DVT, monitor platelet count, aPTT.   -Hgb Lower today, will order repeat during the day, ct abd pelv ordered     #Endo   - FS w/ ISS    #PPx   - SCDs, on heparin gtt for DVT   81M w/ HTN, bladder tumor removal, melanoma. Presents w/ diarrhea and weakness. Admitted to ICU w/ rapif afib, septic shock likely from UTI vs pneumonia, RADHA w/ hyperkalemia and significant uremia and thrombocytopenia. S/p 1 HD treatment 2/16/22 2/20  -Noted drop in Hgb pt on heparin drip, some reported dark stool per nurse, some blood in bowl as well   will transfuse 2un, send occult blood  monitor hgb, Will hold heparin for now .  if  bleeding continues will need gi eval, Consider possible evaluation for IVC filter by IR in AM   c/w abx as prescribed by ID, ct abd wnl , noted to have multiple gallstones   RADHA improving     Plan    #Neuro  - PT A&Ox4 today, awake alert with no confusion.      #CV   -PT with new onset rapid afib: likely due to sepsis now in NSR with rate controlled: off amiodarone and sustaining NSR.     #Pulm   - SpO2 95% and above on 2LNC    #ID  -ID reccs noted, culture sensitivities resulted, Ceftriaxone DC/d due to likely resistance: C/W Cefepime and Azithromycin for CAP vs UTI; GNR+ bacteremia. No diarrhea 2 days: send GI PCR and c diff if diarrhea recurs  Will get CT abd pelv w oral cont    #Renal/metabolic   - RADHA likely combination of prerenal, ATN and obstructive uropathy; s/p HD x1; now making urine; PT still appears dry, will start LR maintenance, d/c bicarb gtt; monitor UOP and electrolytes closely. Renal reccs noted: D/C HD catheter no further HD at this time.     #GI  - Diet advanced now that mental status improving and able to follow commands, noted to have elevated LFTs, CT A/P unremarkable, RUQ US with multiple gallstones without cholecystitis.     #Heme   - noted thrombocytopenia of unclear etiology, platelet count improving. On Heparin for LLE DVT, monitor platelet count, aPTT.   -Hgb Lower today, will order repeat during the day, ct abd pelv ordered     #Endo   - FS w/ ISS    #PPx   - SCDs, on heparin gtt for DVT

## 2022-02-20 NOTE — PROVIDER CONTACT NOTE (CRITICAL VALUE NOTIFICATION) - BACKGROUND
Pt has left leg DVT, on Heparin drip. Pt had bloody BM
Pt is positive from DVT to left leg and is on a Heparin drip. Pt has some dark stool and blood with cleaning of the rectum on 02/19/2022

## 2022-02-20 NOTE — PROGRESS NOTE ADULT - SUBJECTIVE AND OBJECTIVE BOX
Patient is a 81y old  Male who presents with a chief complaint of hypotension and renal failure (19 Feb 2022 17:33)    INTERVAL HPI/OVERNIGHT EVENTS: no events     MEDICATIONS  (STANDING):  atorvastatin 40 milliGRAM(s) Oral at bedtime  azithromycin  IVPB      azithromycin  IVPB 500 milliGRAM(s) IV Intermittent every 24 hours  cefepime   IVPB      cefepime   IVPB 1000 milliGRAM(s) IV Intermittent every 24 hours  chlorhexidine 2% Cloths 1 Application(s) Topical <User Schedule>  heparin  Infusion. 1600 Unit(s)/Hr (16 mL/Hr) IV Continuous <Continuous>  insulin lispro (ADMELOG) corrective regimen sliding scale   SubCutaneous Before meals and at bedtime  lactated ringers. 1000 milliLiter(s) (75 mL/Hr) IV Continuous <Continuous>  latanoprost 0.005% Ophthalmic Solution 1 Drop(s) Both EYES at bedtime    MEDICATIONS  (PRN):  heparin   Injectable 9000 Unit(s) IV Push every 6 hours PRN For aPTT less than 40  heparin   Injectable 4000 Unit(s) IV Push every 6 hours PRN For aPTT between 40 - 57  sodium chloride 0.9% lock flush 10 milliLiter(s) IV Push every 1 hour PRN Pre/post blood products, medications, blood draw, and to maintain line patency    Allergies    No Known Allergies    Intolerances      REVIEW OF SYSTEMS:  All other systems reviewed and are negative    Vital Signs Last 24 Hrs  T(C): 36.4 (20 Feb 2022 05:00), Max: 36.7 (19 Feb 2022 10:57)  T(F): 97.6 (20 Feb 2022 05:00), Max: 98.1 (19 Feb 2022 10:57)  HR: 72 (20 Feb 2022 05:00) (72 - 82)  BP: 103/69 (20 Feb 2022 05:00) (103/69 - 114/74)  BP(mean): --  RR: 18 (20 Feb 2022 05:00) (18 - 18)  SpO2: 95% (20 Feb 2022 05:00) (94% - 95%)  Daily     Daily   I&O's Summary    19 Feb 2022 07:01  -  20 Feb 2022 07:00  --------------------------------------------------------  IN: 1473 mL / OUT: 4800 mL / NET: -3327 mL      CAPILLARY BLOOD GLUCOSE      POCT Blood Glucose.: 125 mg/dL (20 Feb 2022 07:33)  POCT Blood Glucose.: 163 mg/dL (19 Feb 2022 22:51)  POCT Blood Glucose.: 129 mg/dL (19 Feb 2022 17:41)  POCT Blood Glucose.: 161 mg/dL (19 Feb 2022 11:59)  POCT Blood Glucose.: 174 mg/dL (19 Feb 2022 10:55)    PHYSICAL EXAM:  GENERAL: NAD,    HEAD:  Atraumatic, Normocephalic  EYES: EOMI, PERRLA, conjunctiva and sclera clear  ENMT: No tonsillar erythema, exudates, or enlargement; Moist mucous membranes, Good dentition, No lesions  NECK: Supple, No JVD, Normal thyroid  NERVOUS SYSTEM:  Alert & Oriented X3, Good concentration; Motor Strength 5/5 B/L upper and lower extremities; DTRs 2+ intact and symmetric  CHEST/LUNG: Clear to percussion bilaterally; No rales, rhonchi, wheezing, or rubs  HEART: Regular rate and rhythm; No murmurs, rubs, or gallops  ABDOMEN: Soft, Nontender, Nondistended; Bowel sounds present  EXTREMITIES:  2+ Peripheral Pulses, No clubbing, cyanosis, or edema  LYMPH: No lymphadenopathy noted  SKIN: No rashes or lesions  Labs                          6.8    23.19 )-----------( 166      ( 20 Feb 2022 03:39 )             20.6     02-20    141  |  109<H>  |  116<H>  ----------------------------<  144<H>  3.9   |  25  |  2.91<H>    Ca    7.0<L>      20 Feb 2022 03:39  Phos  4.4     02-19  Mg     2.9     02-19    TPro  5.2<L>  /  Alb  1.3<L>  /  TBili  0.8  /  DBili  x   /  AST  61<H>  /  ALT  46  /  AlkPhos  158<H>  02-19    PT/INR - ( 19 Feb 2022 16:33 )   PT: 15.3 sec;   INR: 1.34 ratio         PTT - ( 20 Feb 2022 03:39 )  PTT:70.2 sec                    DVT prophylaxis: > Lovenox 40mg SQ daily  > Heparin   > SCD's

## 2022-02-20 NOTE — CHART NOTE - NSCHARTNOTEFT_GEN_A_CORE
Notified by RN of patient's Hgb 6.8 this morning from 7.3 and 7.4 on 2/19.  Patient is a 80yo male admitted for hypotension and renal failure. Patient is on heparin gtt for LLE DVT.    Plan  -Repeat CBC w/ T&S  -Transfuse if <7  -Discussed with Dr. Alvarado

## 2022-02-20 NOTE — PROGRESS NOTE ADULT - SUBJECTIVE AND OBJECTIVE BOX
NEPHROLOGY PROGRESS NOTE    CHIEF COMPLAINT:  RADHA    HPI:  Renal function continues to recover with good urine output, 4.8 liters.  Getting PRBC transfusion today.    EXAM:  T(F): 97.9 (02-20-22 @ 11:48)  HR: 85 (02-20-22 @ 11:48)  BP: 106/69 (02-20-22 @ 11:48)  RR: 18 (02-20-22 @ 11:48)  SpO2: 96% (02-20-22 @ 11:48)    Somnolent  Normal respiratory effort, lungs clear bilaterally  Heart RRR with no murmur, +peripheral edema         LABS                             6.5    24.19 )-----------( 115      ( 20 Feb 2022 09:29 )             19.8          02-20    141  |  109<H>  |  116<H>  ----------------------------<  144<H>  3.9   |  25  |  2.91<H>    Ca    7.0<L>      20 Feb 2022 03:39  Phos  4.4     02-19  Mg     2.9     02-19    TPro  5.2<L>  /  Alb  1.3<L>  /  TBili  0.8  /  DBili  x   /  AST  61<H>  /  ALT  46  /  AlkPhos  158<H>  02-19        Assessment   RADHA pre renal azotemia; ischemic ATN s/p dialysis x 1  Nonoliguric, recovery phase continus    Plan:  Monitor daily BMP and fluid balance

## 2022-02-20 NOTE — PROVIDER CONTACT NOTE (CRITICAL VALUE NOTIFICATION) - ASSESSMENT
No active bleeding noted or reported from any body orifice at this time
No current active bleeding noted

## 2022-02-20 NOTE — PROVIDER CONTACT NOTE (CRITICAL VALUE NOTIFICATION) - ACTION/TREATMENT ORDERED:
No orders at this time
blood transfsion. Md will decide stop heparin drip
House PA aware, following Full nomogram

## 2022-02-21 LAB
ALBUMIN SERPL ELPH-MCNC: 1.5 G/DL — LOW (ref 3.3–5)
ALP SERPL-CCNC: 174 U/L — HIGH (ref 40–120)
ALT FLD-CCNC: 91 U/L — HIGH (ref 12–78)
ANION GAP SERPL CALC-SCNC: 7 MMOL/L — SIGNIFICANT CHANGE UP (ref 5–17)
ANISOCYTOSIS BLD QL: SLIGHT — SIGNIFICANT CHANGE UP
AST SERPL-CCNC: 112 U/L — HIGH (ref 15–37)
BASOPHILS # BLD AUTO: 0 K/UL — SIGNIFICANT CHANGE UP (ref 0–0.2)
BASOPHILS NFR BLD AUTO: 0 % — SIGNIFICANT CHANGE UP (ref 0–2)
BILIRUB SERPL-MCNC: 0.6 MG/DL — SIGNIFICANT CHANGE UP (ref 0.2–1.2)
BUN SERPL-MCNC: 101 MG/DL — HIGH (ref 7–23)
CALCIUM SERPL-MCNC: 7.6 MG/DL — LOW (ref 8.5–10.1)
CHLORIDE SERPL-SCNC: 108 MMOL/L — SIGNIFICANT CHANGE UP (ref 96–108)
CO2 SERPL-SCNC: 25 MMOL/L — SIGNIFICANT CHANGE UP (ref 22–31)
CREAT SERPL-MCNC: 2.17 MG/DL — HIGH (ref 0.5–1.3)
CRP SERPL-MCNC: 47 MG/L — HIGH
EOSINOPHIL # BLD AUTO: 0 K/UL — SIGNIFICANT CHANGE UP (ref 0–0.5)
EOSINOPHIL NFR BLD AUTO: 0 % — SIGNIFICANT CHANGE UP (ref 0–6)
ERYTHROCYTE [SEDIMENTATION RATE] IN BLOOD: 75 MM/HR — HIGH (ref 0–20)
GLUCOSE BLDC GLUCOMTR-MCNC: 131 MG/DL — HIGH (ref 70–99)
GLUCOSE BLDC GLUCOMTR-MCNC: 133 MG/DL — HIGH (ref 70–99)
GLUCOSE BLDC GLUCOMTR-MCNC: 153 MG/DL — HIGH (ref 70–99)
GLUCOSE SERPL-MCNC: 112 MG/DL — HIGH (ref 70–99)
HCT VFR BLD CALC: 26.4 % — LOW (ref 39–50)
HCT VFR BLD CALC: 26.5 % — LOW (ref 39–50)
HGB BLD-MCNC: 8.9 G/DL — LOW (ref 13–17)
HGB BLD-MCNC: 8.9 G/DL — LOW (ref 13–17)
LYMPHOCYTES # BLD AUTO: 2.21 K/UL — SIGNIFICANT CHANGE UP (ref 1–3.3)
LYMPHOCYTES # BLD AUTO: 9 % — LOW (ref 13–44)
MANUAL SMEAR VERIFICATION: SIGNIFICANT CHANGE UP
MCHC RBC-ENTMCNC: 30.1 PG — SIGNIFICANT CHANGE UP (ref 27–34)
MCHC RBC-ENTMCNC: 30.1 PG — SIGNIFICANT CHANGE UP (ref 27–34)
MCHC RBC-ENTMCNC: 33.6 G/DL — SIGNIFICANT CHANGE UP (ref 32–36)
MCHC RBC-ENTMCNC: 33.7 G/DL — SIGNIFICANT CHANGE UP (ref 32–36)
MCV RBC AUTO: 89.2 FL — SIGNIFICANT CHANGE UP (ref 80–100)
MCV RBC AUTO: 89.5 FL — SIGNIFICANT CHANGE UP (ref 80–100)
MICROCYTES BLD QL: SLIGHT — SIGNIFICANT CHANGE UP
MONOCYTES # BLD AUTO: 0.98 K/UL — HIGH (ref 0–0.9)
MONOCYTES NFR BLD AUTO: 4 % — SIGNIFICANT CHANGE UP (ref 2–14)
NEUTROPHILS # BLD AUTO: 21.32 K/UL — HIGH (ref 1.8–7.4)
NEUTROPHILS NFR BLD AUTO: 82 % — HIGH (ref 43–77)
NEUTS BAND # BLD: 5 % — SIGNIFICANT CHANGE UP (ref 0–8)
NRBC # BLD: 0 /100 WBCS — SIGNIFICANT CHANGE UP (ref 0–0)
NRBC # BLD: 0 /100 — SIGNIFICANT CHANGE UP (ref 0–0)
NRBC # BLD: SIGNIFICANT CHANGE UP /100 WBCS (ref 0–0)
PLAT MORPH BLD: NORMAL — SIGNIFICANT CHANGE UP
PLATELET # BLD AUTO: 172 K/UL — SIGNIFICANT CHANGE UP (ref 150–400)
PLATELET # BLD AUTO: 185 K/UL — SIGNIFICANT CHANGE UP (ref 150–400)
POLYCHROMASIA BLD QL SMEAR: SLIGHT — SIGNIFICANT CHANGE UP
POTASSIUM SERPL-MCNC: 3.9 MMOL/L — SIGNIFICANT CHANGE UP (ref 3.5–5.3)
POTASSIUM SERPL-SCNC: 3.9 MMOL/L — SIGNIFICANT CHANGE UP (ref 3.5–5.3)
PROCALCITONIN SERPL-MCNC: 2.09 NG/ML — HIGH (ref 0.02–0.1)
PROT SERPL-MCNC: 5.5 GM/DL — LOW (ref 6–8.3)
RBC # BLD: 2.96 M/UL — LOW (ref 4.2–5.8)
RBC # BLD: 2.96 M/UL — LOW (ref 4.2–5.8)
RBC # FLD: 15.6 % — HIGH (ref 10.3–14.5)
RBC # FLD: 16.1 % — HIGH (ref 10.3–14.5)
RBC BLD AUTO: ABNORMAL
SODIUM SERPL-SCNC: 140 MMOL/L — SIGNIFICANT CHANGE UP (ref 135–145)
WBC # BLD: 24.5 K/UL — HIGH (ref 3.8–10.5)
WBC # BLD: 24.59 K/UL — HIGH (ref 3.8–10.5)
WBC # FLD AUTO: 24.5 K/UL — HIGH (ref 3.8–10.5)
WBC # FLD AUTO: 24.59 K/UL — HIGH (ref 3.8–10.5)

## 2022-02-21 PROCEDURE — 99233 SBSQ HOSP IP/OBS HIGH 50: CPT

## 2022-02-21 RX ORDER — SODIUM CHLORIDE 9 MG/ML
1000 INJECTION, SOLUTION INTRAVENOUS
Refills: 0 | Status: DISCONTINUED | OUTPATIENT
Start: 2022-02-21 | End: 2022-02-23

## 2022-02-21 RX ORDER — ONDANSETRON 8 MG/1
4 TABLET, FILM COATED ORAL ONCE
Refills: 0 | Status: COMPLETED | OUTPATIENT
Start: 2022-02-21 | End: 2022-02-21

## 2022-02-21 RX ORDER — CEFEPIME 1 G/1
INJECTION, POWDER, FOR SOLUTION INTRAMUSCULAR; INTRAVENOUS
Refills: 0 | Status: DISCONTINUED | OUTPATIENT
Start: 2022-02-21 | End: 2022-02-25

## 2022-02-21 RX ORDER — ALPRAZOLAM 0.25 MG
1 TABLET ORAL AT BEDTIME
Refills: 0 | Status: DISCONTINUED | OUTPATIENT
Start: 2022-02-21 | End: 2022-02-28

## 2022-02-21 RX ORDER — CEFEPIME 1 G/1
1000 INJECTION, POWDER, FOR SOLUTION INTRAMUSCULAR; INTRAVENOUS EVERY 12 HOURS
Refills: 0 | Status: DISCONTINUED | OUTPATIENT
Start: 2022-02-22 | End: 2022-02-25

## 2022-02-21 RX ORDER — CEFEPIME 1 G/1
1000 INJECTION, POWDER, FOR SOLUTION INTRAMUSCULAR; INTRAVENOUS ONCE
Refills: 0 | Status: COMPLETED | OUTPATIENT
Start: 2022-02-21 | End: 2022-02-21

## 2022-02-21 RX ORDER — ONDANSETRON 8 MG/1
4 TABLET, FILM COATED ORAL EVERY 4 HOURS
Refills: 0 | Status: DISCONTINUED | OUTPATIENT
Start: 2022-02-21 | End: 2022-03-01

## 2022-02-21 RX ADMIN — Medication 2: at 11:10

## 2022-02-21 RX ADMIN — ONDANSETRON 4 MILLIGRAM(S): 8 TABLET, FILM COATED ORAL at 08:08

## 2022-02-21 RX ADMIN — CEFEPIME 100 MILLIGRAM(S): 1 INJECTION, POWDER, FOR SOLUTION INTRAMUSCULAR; INTRAVENOUS at 19:36

## 2022-02-21 RX ADMIN — SODIUM CHLORIDE 75 MILLILITER(S): 9 INJECTION, SOLUTION INTRAVENOUS at 17:07

## 2022-02-21 RX ADMIN — CHLORHEXIDINE GLUCONATE 1 APPLICATION(S): 213 SOLUTION TOPICAL at 05:09

## 2022-02-21 RX ADMIN — ONDANSETRON 4 MILLIGRAM(S): 8 TABLET, FILM COATED ORAL at 14:55

## 2022-02-21 RX ADMIN — Medication 1 MILLIGRAM(S): at 21:07

## 2022-02-21 RX ADMIN — LATANOPROST 1 DROP(S): 0.05 SOLUTION/ DROPS OPHTHALMIC; TOPICAL at 21:07

## 2022-02-21 RX ADMIN — ATORVASTATIN CALCIUM 40 MILLIGRAM(S): 80 TABLET, FILM COATED ORAL at 21:07

## 2022-02-21 NOTE — PROGRESS NOTE ADULT - SUBJECTIVE AND OBJECTIVE BOX
Patient is a 81y old  Male who presents with a chief complaint of hypotension and renal failure (19 Feb 2022 17:33)    INTERVAL HPI/OVERNIGHT EVENTS: no events     MEDICATIONS  (STANDING):  ALPRAZolam 1 milliGRAM(s) Oral at bedtime  atorvastatin 40 milliGRAM(s) Oral at bedtime  azithromycin  IVPB      azithromycin  IVPB 500 milliGRAM(s) IV Intermittent every 24 hours  cefepime   IVPB      cefepime   IVPB 1000 milliGRAM(s) IV Intermittent every 24 hours  chlorhexidine 2% Cloths 1 Application(s) Topical <User Schedule>  insulin lispro (ADMELOG) corrective regimen sliding scale   SubCutaneous Before meals and at bedtime  lactated ringers. 1000 milliLiter(s) (75 mL/Hr) IV Continuous <Continuous>  latanoprost 0.005% Ophthalmic Solution 1 Drop(s) Both EYES at bedtime    MEDICATIONS  (PRN):  ondansetron Injectable 4 milliGRAM(s) IV Push every 4 hours PRN Nausea and/or Vomiting  sodium chloride 0.9% lock flush 10 milliLiter(s) IV Push every 1 hour PRN Pre/post blood products, medications, blood draw, and to maintain line patency    Allergies    No Known Allergies    Intolerances      REVIEW OF SYSTEMS:  All other systems reviewed and are negative  Vital Signs Last 24 Hrs  T(C): 36.6 (21 Feb 2022 11:31), Max: 36.8 (21 Feb 2022 00:27)  T(F): 97.9 (21 Feb 2022 11:31), Max: 98.2 (21 Feb 2022 00:27)  HR: 85 (21 Feb 2022 11:31) (85 - 93)  BP: 127/79 (21 Feb 2022 11:31) (107/70 - 127/79)  BP(mean): --  RR: 19 (21 Feb 2022 11:31) (16 - 19)  SpO2: 95% (21 Feb 2022 11:31) (93% - 96%)    CAPILLARY BLOOD GLUCOSE      POCT Blood Glucose.: 125 mg/dL (20 Feb 2022 07:33)  POCT Blood Glucose.: 163 mg/dL (19 Feb 2022 22:51)  POCT Blood Glucose.: 129 mg/dL (19 Feb 2022 17:41)  POCT Blood Glucose.: 161 mg/dL (19 Feb 2022 11:59)  POCT Blood Glucose.: 174 mg/dL (19 Feb 2022 10:55)    PHYSICAL EXAM:  GENERAL: NAD,    HEAD:  Atraumatic, Normocephalic  EYES: EOMI, PERRLA, conjunctiva and sclera clear  ENMT: No tonsillar erythema, exudates, or enlargement; Moist mucous membranes, Good dentition, No lesions  NECK: Supple, No JVD, Normal thyroid  NERVOUS SYSTEM:  Alert & Oriented X3, Good concentration; Motor Strength 5/5 B/L upper and lower extremities; DTRs 2+ intact and symmetric  CHEST/LUNG: Clear to percussion bilaterally; No rales, rhonchi, wheezing, or rubs  HEART: Regular rate and rhythm; No murmurs, rubs, or gallops  ABDOMEN: Soft, Nontender, Nondistended; Bowel sounds present  EXTREMITIES:  2+ Peripheral Pulses, No clubbing, cyanosis, or edema  LYMPH: No lymphadenopathy noted  SKIN: No rashes or lesions  Labs                                 8.9    24.50 )-----------( 185      ( 21 Feb 2022 07:24 )             26.5     02-21    140  |  108  |  101<H>  ----------------------------<  112<H>  3.9   |  25  |  2.17<H>    Ca    7.6<L>      21 Feb 2022 07:24    TPro  5.5<L>  /  Alb  1.5<L>  /  TBili  0.6  /  DBili  x   /  AST  112<H>  /  ALT  91<H>  /  AlkPhos  174<H>  02-21    PTT - ( 20 Feb 2022 10:54 )  PTT:63.6 sec                      DVT prophylaxis: > Lovenox 40mg SQ daily  > Heparin   > SCD's

## 2022-02-21 NOTE — PROGRESS NOTE ADULT - ASSESSMENT
81M w/ HTN, bladder tumor removal, melanoma. Presents w/ diarrhea and weakness. Admitted to ICU w/ rapif afib, septic shock likely from UTI vs pneumonia, RADHA w/ hyperkalemia and significant uremia and thrombocytopenia. S/p 1 HD treatment 2/16/22 2/20  -Noted drop in Hgb pt on heparin drip, some reported dark stool per nurse, some blood in bowl as well   will transfuse 2un, send occult blood  monitor hgb, Will hold heparin for now .  if  bleeding continues will need gi eval, Consider possible evaluation for IVC filter by IR in AM   c/w abx as prescribed by ID, ct abd wnl , noted to have multiple gallstones   RADHA improving     Plan    #Neuro  - PT A&Ox4 today, awake alert with no confusion.      #CV   -PT with new onset rapid afib: likely due to sepsis now in NSR with rate controlled: off amiodarone and sustaining NSR.     #Pulm   - SpO2 95% and above on 2LNC    #ID  -ID reccs noted, culture sensitivities resulted, Ceftriaxone DC/d due to likely resistance: C/W Cefepime and Azithromycin for CAP vs UTI; GNR+ bacteremia. No diarrhea 2 days: send GI PCR and c diff if diarrhea recurs  Will get CT abd pelv w oral cont    #Renal/metabolic   - RADHA likely combination of prerenal, ATN and obstructive uropathy; s/p HD x1; now making urine; PT still appears dry, will start LR maintenance, d/c bicarb gtt; monitor UOP and electrolytes closely. Renal reccs noted: D/C HD catheter no further HD at this time.     #GI  - Diet advanced now that mental status improving and able to follow commands, noted to have elevated LFTs, CT A/P unremarkable, RUQ US with multiple gallstones without cholecystitis.     #Heme   - noted thrombocytopenia of unclear etiology, platelet count improving. On Heparin for LLE DVT, monitor platelet count, aPTT.   -Hgb Lower today, will order repeat during the day, ct abd pelv ordered     #Endo   - FS w/ ISS    #PPx   - SCDs, heparin drip on hold

## 2022-02-21 NOTE — PROGRESS NOTE ADULT - SUBJECTIVE AND OBJECTIVE BOX
NEPHROLOGY PROGRESS NOTE    CHIEF COMPLAINT:  RADHA    HPI:  Renal function continues to improve with polyuria.    EXAM:  T(F): 97.9 (02-21-22 @ 11:31)  HR: 85 (02-21-22 @ 11:31)  BP: 127/79 (02-21-22 @ 11:31)  RR: 19 (02-21-22 @ 11:31)  SpO2: 95% (02-21-22 @ 11:31)    Somnolent  Normal respiratory effort, lungs clear bilaterally  Heart RRR with no murmur, +peripheral edema         LABS                             8.9    24.50 )-----------( 185      ( 21 Feb 2022 07:24 )             26.5          02-21    140  |  108  |  101<H>  ----------------------------<  112<H>  3.9   |  25  |  2.17<H>    Ca    7.6<L>      21 Feb 2022 07:24    TPro  5.5<L>  /  Alb  1.5<L>  /  TBili  0.6  /  DBili  x   /  AST  112<H>  /  ALT  91<H>  /  AlkPhos  174<H>  02-21      Assessment   RADHA pre renal azotemia; ischemic ATN s/p dialysis x 1  Nonoliguric, recovery phase continues    Plan:  Monitor daily BMP and fluid balance

## 2022-02-22 LAB
ALBUMIN SERPL ELPH-MCNC: 1.6 G/DL — LOW (ref 3.3–5)
ALP SERPL-CCNC: 175 U/L — HIGH (ref 40–120)
ALT FLD-CCNC: 109 U/L — HIGH (ref 12–78)
ANION GAP SERPL CALC-SCNC: 6 MMOL/L — SIGNIFICANT CHANGE UP (ref 5–17)
AST SERPL-CCNC: 91 U/L — HIGH (ref 15–37)
BASOPHILS # BLD AUTO: 0.08 K/UL — SIGNIFICANT CHANGE UP (ref 0–0.2)
BASOPHILS NFR BLD AUTO: 0.4 % — SIGNIFICANT CHANGE UP (ref 0–2)
BILIRUB SERPL-MCNC: 0.6 MG/DL — SIGNIFICANT CHANGE UP (ref 0.2–1.2)
BUN SERPL-MCNC: 75 MG/DL — HIGH (ref 7–23)
CALCIUM SERPL-MCNC: 7.4 MG/DL — LOW (ref 8.5–10.1)
CHLORIDE SERPL-SCNC: 109 MMOL/L — HIGH (ref 96–108)
CO2 SERPL-SCNC: 28 MMOL/L — SIGNIFICANT CHANGE UP (ref 22–31)
CREAT SERPL-MCNC: 1.83 MG/DL — HIGH (ref 0.5–1.3)
CRP SERPL-MCNC: 44 MG/L — HIGH
EOSINOPHIL # BLD AUTO: 0.29 K/UL — SIGNIFICANT CHANGE UP (ref 0–0.5)
EOSINOPHIL NFR BLD AUTO: 1.4 % — SIGNIFICANT CHANGE UP (ref 0–6)
ERYTHROCYTE [SEDIMENTATION RATE] IN BLOOD: 89 MM/HR — HIGH (ref 0–20)
FLUAV AG NPH QL: SIGNIFICANT CHANGE UP
FLUBV AG NPH QL: SIGNIFICANT CHANGE UP
GLUCOSE BLDC GLUCOMTR-MCNC: 111 MG/DL — HIGH (ref 70–99)
GLUCOSE BLDC GLUCOMTR-MCNC: 113 MG/DL — HIGH (ref 70–99)
GLUCOSE BLDC GLUCOMTR-MCNC: 121 MG/DL — HIGH (ref 70–99)
GLUCOSE BLDC GLUCOMTR-MCNC: 126 MG/DL — HIGH (ref 70–99)
GLUCOSE SERPL-MCNC: 108 MG/DL — HIGH (ref 70–99)
HCT VFR BLD CALC: 24.1 % — LOW (ref 39–50)
HGB BLD-MCNC: 8 G/DL — LOW (ref 13–17)
IMM GRANULOCYTES NFR BLD AUTO: 11.9 % — HIGH (ref 0–1.5)
LYMPHOCYTES # BLD AUTO: 1.24 K/UL — SIGNIFICANT CHANGE UP (ref 1–3.3)
LYMPHOCYTES # BLD AUTO: 6 % — LOW (ref 13–44)
MAGNESIUM SERPL-MCNC: 2.2 MG/DL — SIGNIFICANT CHANGE UP (ref 1.6–2.6)
MCHC RBC-ENTMCNC: 30.1 PG — SIGNIFICANT CHANGE UP (ref 27–34)
MCHC RBC-ENTMCNC: 33.2 G/DL — SIGNIFICANT CHANGE UP (ref 32–36)
MCV RBC AUTO: 90.6 FL — SIGNIFICANT CHANGE UP (ref 80–100)
MONOCYTES # BLD AUTO: 0.75 K/UL — SIGNIFICANT CHANGE UP (ref 0–0.9)
MONOCYTES NFR BLD AUTO: 3.6 % — SIGNIFICANT CHANGE UP (ref 2–14)
NEUTROPHILS # BLD AUTO: 15.79 K/UL — HIGH (ref 1.8–7.4)
NEUTROPHILS NFR BLD AUTO: 76.7 % — SIGNIFICANT CHANGE UP (ref 43–77)
NRBC # BLD: 0 /100 WBCS — SIGNIFICANT CHANGE UP (ref 0–0)
PHOSPHATE SERPL-MCNC: 4.8 MG/DL — HIGH (ref 2.5–4.5)
PLATELET # BLD AUTO: 208 K/UL — SIGNIFICANT CHANGE UP (ref 150–400)
POTASSIUM SERPL-MCNC: 3.9 MMOL/L — SIGNIFICANT CHANGE UP (ref 3.5–5.3)
POTASSIUM SERPL-SCNC: 3.9 MMOL/L — SIGNIFICANT CHANGE UP (ref 3.5–5.3)
PROCALCITONIN SERPL-MCNC: 1.18 NG/ML — HIGH (ref 0.02–0.1)
PROT SERPL-MCNC: 5.9 GM/DL — LOW (ref 6–8.3)
RBC # BLD: 2.66 M/UL — LOW (ref 4.2–5.8)
RBC # FLD: 16.6 % — HIGH (ref 10.3–14.5)
SARS-COV-2 RNA SPEC QL NAA+PROBE: SIGNIFICANT CHANGE UP
SODIUM SERPL-SCNC: 143 MMOL/L — SIGNIFICANT CHANGE UP (ref 135–145)
WBC # BLD: 20.59 K/UL — HIGH (ref 3.8–10.5)
WBC # FLD AUTO: 20.59 K/UL — HIGH (ref 3.8–10.5)

## 2022-02-22 PROCEDURE — 99233 SBSQ HOSP IP/OBS HIGH 50: CPT

## 2022-02-22 RX ORDER — PANTOPRAZOLE SODIUM 20 MG/1
40 TABLET, DELAYED RELEASE ORAL
Refills: 0 | Status: DISCONTINUED | OUTPATIENT
Start: 2022-02-22 | End: 2022-03-01

## 2022-02-22 RX ADMIN — CHLORHEXIDINE GLUCONATE 1 APPLICATION(S): 213 SOLUTION TOPICAL at 05:10

## 2022-02-22 RX ADMIN — Medication 1 MILLIGRAM(S): at 21:14

## 2022-02-22 RX ADMIN — CEFEPIME 100 MILLIGRAM(S): 1 INJECTION, POWDER, FOR SOLUTION INTRAMUSCULAR; INTRAVENOUS at 17:12

## 2022-02-22 RX ADMIN — LATANOPROST 1 DROP(S): 0.05 SOLUTION/ DROPS OPHTHALMIC; TOPICAL at 21:14

## 2022-02-22 RX ADMIN — ATORVASTATIN CALCIUM 40 MILLIGRAM(S): 80 TABLET, FILM COATED ORAL at 21:14

## 2022-02-22 RX ADMIN — CEFEPIME 100 MILLIGRAM(S): 1 INJECTION, POWDER, FOR SOLUTION INTRAMUSCULAR; INTRAVENOUS at 05:10

## 2022-02-22 NOTE — PROGRESS NOTE ADULT - SUBJECTIVE AND OBJECTIVE BOX
Patient is a 81y old  Male who presents with a chief complaint of hypotension and renal failure (22 Feb 2022 17:08)      INTERVAL HPI/ OVERNIGHT EVENTS: Pt was seen and examined at bedside today, No significant overnight events, pt admits to feeling weak, denies feeling SOB or CP      MEDICATIONS  (STANDING):  ALPRAZolam 1 milliGRAM(s) Oral at bedtime  atorvastatin 40 milliGRAM(s) Oral at bedtime  cefepime   IVPB      cefepime   IVPB 1000 milliGRAM(s) IV Intermittent every 12 hours  chlorhexidine 2% Cloths 1 Application(s) Topical <User Schedule>  insulin lispro (ADMELOG) corrective regimen sliding scale   SubCutaneous Before meals and at bedtime  lactated ringers. 1000 milliLiter(s) (75 mL/Hr) IV Continuous <Continuous>  latanoprost 0.005% Ophthalmic Solution 1 Drop(s) Both EYES at bedtime    MEDICATIONS  (PRN):  ondansetron Injectable 4 milliGRAM(s) IV Push every 4 hours PRN Nausea and/or Vomiting  sodium chloride 0.9% lock flush 10 milliLiter(s) IV Push every 1 hour PRN Pre/post blood products, medications, blood draw, and to maintain line patency      Allergies    No Known Allergies    Intolerances        REVIEW OF SYSTEMS:    Unable to examine due to [ ] Encephalopathy [ ] Advanced Dementia [ ] Expressive Aphasia [ ] Non-verbal patient    CONSTITUTIONAL: No fever, positive generalized weakness/Fatigue, No weight loss  EYES: No eye pain, visual disturbances, or discharge  ENMT:  No difficulty hearing, tinnitus, vertigo; No sinus or throat pain  NECK: No pain or stiffness  RESPIRATORY: No shortness of breath,  cough, wheezing, sputum or hemoptysis   CARDIOVASCULAR: No chest pain, palpitations, or leg swelling  GASTROINTESTINAL: No abdominal pain. No nausea, vomiting, diarrhea or constipation. No melena or hematochezia.  GENITOURINARY: No dysuria, frequency, hematuria, or incontinence  NEUROLOGICAL: No headaches, Dizziness, memory loss, loss of strength, numbness, or tremors  SKIN: No itching, burning, rashes, or lesions   MUSCULOSKELETAL: No joint pain or swelling; No muscle, back, or extremity pain  PSYCHIATRIC: No depression, anxiety, mood swings, or difficulty sleeping  HEME/LYMPH: No easy bruising, or bleeding gums      Vital Signs Last 24 Hrs  T(C): 36.8 (22 Feb 2022 18:00), Max: 36.8 (22 Feb 2022 18:00)  T(F): 98.2 (22 Feb 2022 18:00), Max: 98.2 (22 Feb 2022 18:00)  HR: 85 (22 Feb 2022 18:00) (76 - 85)  BP: 105/85 (22 Feb 2022 18:00) (105/85 - 120/77)  BP(mean): --  RR: 18 (22 Feb 2022 18:00) (18 - 18)  SpO2: 95% (22 Feb 2022 18:00) (94% - 99%)    PHYSICAL EXAM:  GENERAL: NAD on NC, obese   HEAD:  Atraumatic, Normocephalic  EYES: conjunctiva and sclera clear  ENMT: Moist mucous membranes  NECK: Supple, No JVD, Normal thyroid  CHEST/LUNG: Clear to Auscultation bilaterally; No rales, rhonchi, wheezing, or rubs  HEART: Regular rate and rhythm; No murmurs, rubs, or gallops  ABDOMEN: Soft, Nontender, Nondistended; Bowel sounds present  EXTREMITIES:  2+ Peripheral Pulses, No clubbing, cyanosis, or edema  SKIN: No rashes or lesions  NERVOUS SYSTEM:  Alert & Oriented X3, Good concentration; Motor Strength 5/5 B/L upper and lower extremities    LABS:                        8.0    20.59 )-----------( 208      ( 22 Feb 2022 08:09 )             24.1     02-22    143  |  109<H>  |  75<H>  ----------------------------<  108<H>  3.9   |  28  |  1.83<H>    Ca    7.4<L>      22 Feb 2022 08:09  Phos  4.8     02-22  Mg     2.2     02-22    TPro  5.9<L>  /  Alb  1.6<L>  /  TBili  0.6  /  DBili  x   /  AST  91<H>  /  ALT  109<H>  /  AlkPhos  175<H>  02-22        CAPILLARY BLOOD GLUCOSE      POCT Blood Glucose.: 121 mg/dL (22 Feb 2022 16:26)  POCT Blood Glucose.: 111 mg/dL (22 Feb 2022 11:04)  POCT Blood Glucose.: 113 mg/dL (22 Feb 2022 08:08)  POCT Blood Glucose.: 131 mg/dL (21 Feb 2022 21:55)        Culture - Sputum (collected 02-17-22)  Source: .Sputum Sputum  Gram Stain (02-18-22):    Rare polymorphonuclear leukocytes per low power field    Moderate Squamous epithelial cells per low power field    Numerous Yeast per oil power field    Moderate Gram Positive Rods per oil power field    Few Gram Negative Rods per oil power field    Rare Gram positive cocci in pairs per oil power field  Final Report (02-18-22):    Normal Respiratory Nerissa present    Culture - Urine (collected 02-16-22)  Source: Clean Catch Clean Catch (Midstream)  Final Report (02-19-22):    >100,000 CFU/ml Klebsiella oxytoca/Raoutella ornithinolytica    Multiple Morphological Strains  Organism: Klebsiella oxytoca /Raoutella ornithinolytica (02-19-22)  Organism: Klebsiella oxytoca /Raoutella ornithinolytica (02-19-22)    Sensitivities:      -  Amikacin: S <=16      -  Amoxicillin/Clavulanic Acid: S <=8/4      -  Ampicillin: R >16 These ampicillin results predict results for amoxicillin      -  Ampicillin/Sulbactam: S 8/4 Enterobacter, Klebsiella aerogenes, Citrobacter, and Serratia may develop resistance during prolonged therapy (3-4 days)      -  Aztreonam: S <=4      -  Cefazolin: R 16      -  Cefepime: S <=2      -  Cefoxitin: S <=8      -  Ceftriaxone: S <=1 Enterobacter, Klebsiella aerogenes, Citrobacter, and Serratia may develop resistance during prolonged therapy      -  Ciprofloxacin: S <=0.25      -  Ertapenem: S <=0.5      -  Gentamicin: S <=2      -  Imipenem: S <=1      -  Levofloxacin: S <=0.5      -  Meropenem: S <=1      -  Nitrofurantoin: S <=32 Should not be used to treat pyelonephritis      -  Piperacillin/Tazobactam: S <=8      -  Tigecycline: S <=2      -  Tobramycin: S <=2      -  Trimethoprim/Sulfamethoxazole: S <=0.5/9.5      Method Type: NATACHA    Culture - Blood (collected 02-15-22)  Source: .Blood Blood-Peripheral  Gram Stain (02-18-22):    Growth in aerobic and anaerobic bottles: Gram Negative Rods  Final Report (02-18-22):    Growth in aerobic and anaerobic bottles: Klebsiella oxytoca/Raoutella    ornithinolytica    See previous culture [id number]    Culture - Blood (collected 02-15-22)  Source: .Blood Blood-Peripheral  Gram Stain (02-18-22):    Growth in aerobic and anaerobic bottles: Gram Negative Rods  Final Report (02-18-22):    Growth in aerobic and anaerobic bottles: Klebsiella oxytoca/Raoutella    ornithinolytica    ***Blood Panel PCR results on this specimen are available    approximately 3 hours after the Gram stain result.***    Gram stain, PCR, and/or culture results may not always    correspond due to difference in methodologies.    ************************************************************    This PCR assay was performed by multiplex PCR. This    Assay tests for 66 bacterial and resistance gene targets.    Please refer to [facility] test directory    at [email or web address] for details.  Organism: Blood Culture PCR  Klebsiella oxytoca /Raoutella ornithinolytica (02-18-22)  Organism: Klebsiella oxytoca /Raoutella ornithinolytica (02-18-22)    Sensitivities:      -  Amikacin: S <=16      -  Ampicillin: R >16 These ampicillin results predict results for amoxicillin      -  Ampicillin/Sulbactam: S 8/4 Enterobacter, Klebsiella aerogenes, Citrobacter, and Serratia may develop resistance during prolonged therapy (3-4 days)      -  Aztreonam: R >16      -  Cefazolin: R 16 Enterobacter, Klebsiella aerogenes, Citrobacter, and Serratia may develop resistance during prolonged therapy (3-4 days)      -  Cefepime: S <=2      -  Cefoxitin: S <=8      -  Ceftriaxone: S <=1 Enterobacter, Klebsiella aerogenes, Citrobacter, and Serratia may develop resistance during prolonged therapy      -  Ciprofloxacin: S <=0.25      -  Ertapenem: S <=0.5      -  Gentamicin: S <=2      -  Imipenem: S <=1      -  Levofloxacin: S <=0.5      -  Meropenem: S <=1      -  Piperacillin/Tazobactam: S <=8      -  Tobramycin: S <=2      -  Trimethoprim/Sulfamethoxazole: S <=0.5/9.5      Method Type: NATACHA  Organism: Blood Culture PCR (02-18-22)    Sensitivities:      -  Klebsiella oxytoca: Detec      Method Type: PCR        RADIOLOGY & ADDITIONAL TESTS:          Imaging Personally Reviewed:  [ ] YES  [ ] NO    Consultant(s) Notes Reviewed:  [ ] YES  [ ] NO    Care Discussed with Consultants/Other Providers [x ] YES  [ ] NO

## 2022-02-22 NOTE — PROGRESS NOTE ADULT - SUBJECTIVE AND OBJECTIVE BOX
TMAX - 98.2    On day # 6 Cefepime / Zithromax rx completed    Vital Signs Last 24 Hrs  T(C): 36.4 (2022 05:49), Max: 36.7 (2022 18:00)  T(F): 97.6 (2022 05:49), Max: 98 (2022 18:00)  HR: 82 (2022 11:12) (76 - 85)  BP: 112/74 (2022 11:12) (112/74 - 120/77)  RR: 18 (2022 11:12) (18 - 18)  SpO2: 94% (2022 11:12) (94% - 100%)  Supplemental O2: on NC O2 now    Awakens, alert, feeling a little better today and trying to eat more - no further N/V/D and no BM reported.  No c/o SOB but still with intermittent cough and trying to expectorate the mucous but with difficulty.  O2 Sat's remain between 93 -99% on 2-3  Liters NC O2.    PHYSICAL EXAM   General: 80 y/o white male awakens, alert, with NC O2 in place, pleasant and cooperative, sitting uprignt in bed now, still with a dry mouth but in NAD  HEENT: conj pale, sclerae anicteric, PERRLA, no oral lesions noted but mucosa and tongue still appear somewhat dry  Neck: supple, no nodes noted  Heart: RR  Lungs: few moist rhonchi scattered  bilaterally and with decreased BS at the bases  Abdomen: BS+, soft, nontender to palpation, no masses or HS-megaly detected  Back: no CVA or Spinal tenderness noted  Extremities: well-healed bilateral TKR scars                    1+ edema LE's                     arms and hands still with some swelling   Skin: warm, dry, no rash noted  Khalil in place and draining clear yellow urine now - 3500cc  output recorded over the prior 24hrs.    I&O's Summary :  2022 07:01  -  2022 07:00  IN: 1500 mL / OUT: 3500 mL / NET: -2000 mL  2022 07:01  -  2022 17:08  IN: 420 mL / OUT: 0 mL / NET: 420 mL    LABS:  CBC Full  -  ( 2022 08:09 )  WBC Count : 20.59 K/uL  RBC Count : 2.66 M/uL  Hemoglobin : 8.0 g/dL  Hematocrit : 24.1 %  Platelet Count - Automated : 208 K/uL  Mean Cell Volume : 90.6 fl  Mean Cell Hemoglobin : 30.1 pg  Mean Cell Hemoglobin Concentration : 33.2 g/dL  Auto Neutrophil # : 15.79 K/uL  Auto Lymphocyte # : 1.24 K/uL  Auto Monocyte # : 0.75 K/uL  Auto Eosinophil # : 0.29 K/uL  Auto Basophil # : 0.08 K/uL  Auto Neutrophil % : 76.7 %  Auto Lymphocyte % : 6.0 %  Auto Monocyte % : 3.6 %  Auto Eosinophil % : 1.4 %  Auto Basophil % : 0.4 %      143  |  109<H>  |  75<H>  ----------------------------<  108<H>  3.9   |  28  |  1.83<H>  Ca    7.4<L>      2022 08:09  Phos  4.8       Mg     2.2       TPro  5.9<L>  /  Alb  1.6<L>  /  TBili  0.6  /  DBili  x   /  AST  91<H>  /  ALT  109<H>  /  AlkPhos  175<H>    LIVER FUNCTIONS - ( 2022 08:09 )  Alb: 1.6 g/dL / Pro: 5.9 gm/dL / ALK PHOS: 175 U/L / ALT: 109 U/L / AST: 91 U/L / GGT: x           Sedimentation Rate, Erythrocyte: 89 mm/hr ( @ 08:09)  Sedimentation Rate, Erythrocyte: 75 mm/hr ( @ 07:24)  Sedimentation Rate, Erythrocyte: 62 mm/hr ( @ 02:22)    C-Reactive Protein, Serum (22 @ 10:55)    C-Reactive Protein, Serum: 44 mg/L  C-Reactive Protein, Serum (22 @ 10:51)    C-Reactive Protein, Serum: 47 mg/L  C-Reactive Protein, Serum (22 @ 09:23)    C-Reactive Protein, Serum: 83 mg/L    Procalcitonin, Serum (22 @ 10:55)    Procalcitonin, Serum: 1.18:   Procalcitonin, Serum (22 @ 10:51)    Procalcitonin, Serum: 2.09:   Procalcitonin, Serum (22 @ 09:23)    Procalcitonin, Serum: 17.20:   Procalcitonin, Serum (22 @ 09:20)    Procalcitonin, Serum: 81.70:    Lactate, Blood (02.15.22 @ 21:14)    Lactate, Blood: 1.5 mmol/L  Lactate, Blood (02.15.22 @ 18:24)    Lactate, Blood: 2.5: Elevated lactate. Consider ordering follow-up lactate to trend. mmol/L  Lactate, Blood (02.15.22 @ 14:15)    Lactate, Blood: 3.9: Elevated lactate. Consider ordering follow-up lactate to trend. mmol/L    A1C with Estimated Average Glucose (22 @ 09:30)    A1C with Estimated Average Glucose Result: 7.6: Method: Immunoassay       Reference Range                4.0-5.6%       High risk (prediabetic)        5.7-6.4%       Diabetic, diagnostic             >=6.5%       ADA diabetic treatment goal       <7.0%    Estimated Average Glucose: 171    Acute Hepatitis Panel (22 @ 09:37)    Hepatitis C Virus Interpretation: Nonreact: Hepatitis C AB    Hepatitis C Virus S/CO Ratio: 0.12 S/CO    Hepatitis B Core IgM Antibody: Nonreact    Hepatitis B Surface Antigen: Nonreact    Hepatitis A IgM Antibody: Nonreact    Hepatitis B Surface Antibody (22 @ 09:37)    Hepatitis B Surface Antibody: Nonreact    C3 Complement, Serum (22 @ 09:37)    C3 Complement, Serum: 115 mg/dL  C4 Complement, Serum (22 @ 09:37)    C4 Complement, Serum: 26 mg/dL    Glomerular Basement Membrane Ab IgG (22 @ 21:11)    Glomerular Basement Membrane Ab Ig: Negative        MICROBIOLOGY:  Flu With COVID-19 By [facility] (22 @ 06:11)    SARS-CoV-2 Result: NotDetec: EUA/IVD    Influenza A Result: NotDetec: EUA/IVD    Influenza B Result: NotDetec: EUA/IVD    Specimen Source: .Sputum Sputum ( @ 01:01)  Culture Results:   Normal Respiratory Alonzo present ( @ 01:01)    Gram Stain:   Rare polymorphonuclear leukocytes per low power field  Moderate Squamous epithelial cells per low power field  Numerous Yeast per oil power field  Moderate Gram Positive Rods per oil power field  Few Gram Negative Rods per oil power field  Rare Gram positive cocci in pairs per oil power field    Specimen Source: Clean Catch Clean Catch (Midstream) ( @ 08:49)  Culture Results:   >100,000 CFU/ml Klebsiella oxytoca/Raoutella ornithinolytica  Multiple Morphological Strains ( @ 08:49)    -  Tigecycline: S <=2    -  Tobramycin: S <=2    -  Trimethoprim/Sulfamethoxazole: S <=0.5/9.5    -  Amikacin: S <=16    -  Amoxicillin/Clavulanic Acid: S <=8/4    -  Ampicillin: R >16 These ampicillin results predict results for amoxicillin    -  Ampicillin/Sulbactam: S 8/4 Enterobacter, Klebsiella aerogenes, Citrobacter, and Serratia may develop resistance during prolonged therapy (3-4 days)    -  Aztreonam: S <=4    -  Cefazolin: R 16    -  Cefepime: S <=2    -  Cefoxitin: S <=8    -  Ceftriaxone: S <=1 Enterobacter, Klebsiella aerogenes, Citrobacter, and Serratia may develop resistance during prolonged therapy    -  Ciprofloxacin: S <=0.25    -  Gentamicin: S <=2    -  Imipenem: S <=1    -  Ertapenem: S <=0.5    -  Levofloxacin: S <=0.5    -  Meropenem: S <=1    -  Nitrofurantoin: S <=32 Should not be used to treat pyelonephritis    -  Piperacillin/Tazobactam: S <=8    Method Type: NATACHA    Specimen Source: .Blood Blood-Peripheral (02-15 @ 19:01)  Culture Results:   Growth in aerobic and anaerobic bottles: Klebsiella oxytoca/Raoutella  ornithinolytica    -  Klebsiella oxytoca: Detec    Gram Stain:   Growth in aerobic and anaerobic bottles: Gram Negative Rods    -  Meropenem: S <=1    -  Ertapenem: S <=0.5    -  Gentamicin: S <=2    -  Imipenem: S <=1    -  Levofloxacin: S <=0.5    -  Piperacillin/Tazobactam: S <=8    -  Tobramycin: S <=2    -  Trimethoprim/Sulfamethoxazole: S <=0.5/9.5    -  Amikacin: S <=16    -  Ampicillin: R >16 These ampicillin results predict results for amoxicillin    -  Ampicillin/Sulbactam: S 8/4 Enterobacter, Klebsiella aerogenes, Citrobacter, and Serratia may develop resistance during prolonged therapy (3-4 days)    -  Aztreonam: R >16    -  Cefazolin: R 16 Enterobacter, Klebsiella aerogenes, Citrobacter, and Serratia may develop resistance during prolonged therapy (3-4 days)    -  Cefepime: S <=2    -  Cefoxitin: S <=8    -  Ceftriaxone: S <=1 Enterobacter, Klebsiella aerogenes, Citrobacter, and Serratia may develop resistance during prolonged therapy    -  Ciprofloxacin: S <=0.25    Organism Identification: Blood Culture PCR  Klebsiella oxytoca /Raoutella ornithinolytica    Method Type: PCR    Method Type: NATACHA    Specimen Source: .Blood Blood-Peripheral (02-15 @ 19:01)  Culture Results:   Growth in aerobic and anaerobic bottles: Klebsiella oxytoca/Raoutella  ornithinolytica  See previous culture [id number] (02-15 @ 19:01)        Legionella Antigen, Urine: Negative ( @ 09:14)    MRSA/MSSA PCR (22 @ 09:20)    MRSA PCR Result.: NotDetec:    Staph Aureus PCR Result: NotDetec    Respiratory Viral Panel with COVID-19 by [facility] (02.15.22 @ 14:34)    Rapid RVP Result: NotDetec    SARS-CoV-2: NotDetec:     RADIOLOGY:  < from: CT Abdomen and Pelvis w/ Oral Cont (22 @ 13:36) >  ACC: [id number] EXAM:  CT ABDOMEN AND PELVIS OC                        PROCEDURE DATE:  2022    INTERPRETATION:  CLINICAL INFORMATION: Bacteremia  COMPARISON: 2/15  PROCEDURE:  CT of the Abdomen and Pelvis was performed without intravenous contrast.  Intravenous contrast: None.  Oral contrast: Positive contrast was administered.  Sagittal and coronal reformats were performed.  FINDINGS:  VISUALIZED CHEST: Bibasilar atelectasis and trace effusions.  ABDOMEN AND PELVIS:  LIVER: Within normal limits.  BILE DUCTS: Normal caliber.  GALLBLADDER: Cholelithiasis.  SPLEEN: Within normal limits.  PANCREAS: Within normal limits.  ADRENALS: Within normal limits.  KIDNEYS/URETERS: Within normal limits.  BLADDER: Khalil catheter.  REPRODUCTIVE ORGANS: No pelvic masses.  BOWEL: No bowel obstruction. Appendix is normal. Moderate hiatal hernia.  PERITONEUM: No ascites.  VESSELS: Atherosclerotic changes.  RETROPERITONEUM/LYMPH NODES: No lymphadenopathy.  ABDOMINAL WALL: Within normal limits.  BONES: Within normal limits.  IMPRESSION:  No acute findings. Multiple gallstones.    < from: US Duplex Venous Lower Ext Complete, Bilateral (22 @ 04:56) >  ACC: [id number] EXAM:  US DPLX LWR EXT VEINS COMPL BI                        PROCEDURE DATE:  2022    INTERPRETATION:  CLINICAL INFORMATION: Lower extremity swelling.  COMPARISON: None available.  TECHNIQUE: Duplex sonography of theBILATERAL LOWER extremity veins with   color and spectral Doppler, with and without compression.  IMPRESSION:  RIGHT: Limited calf vein visualization. Nonvisualization of the calf   veins. No evidence of deep venous thrombosis at or above the knee.  LEFT: Limited calf vein visualization. Acute nonocclusive above-the-knee   deep venous thrombosis. Occlusive DVT in the greater saphenous vein.    < from: US Abdomen Upper Quadrant Right (22 @ 12:03) >  ACC: [id number] EXAM:  US ABDOMEN RT UPR QUADRANT                        PROCEDURE DATE:  2022    INTERPRETATION:  CLINICAL INFORMATION: Elevated LFTs.  COMPARISON: CT abdomen/pelvis 2/15/2022.  TECHNIQUE: Sonography of the right upper quadrant.  IMPRESSION: Multiple gallstones. Gallbladder wall thickening. No   pericholecystic fluid. No evidence for intrahepatic or extrahepatic   biliary ductal dilatation.    < from: Xray Chest 1 View-PORTABLE IMMEDIATE (Xray Chest 1 View-PORTABLE IMMEDIATE .) (22 @ 01:26) >  ACC: [id number] EXAM:  XR CHEST PORTABLE IMMED 1V                        PROCEDURE DATE:  2022    IMPRESSION:  Right IJ line HD catheter in satisfactory position with no pneumothorax,   new    < from: CT Abdomen and Pelvis No Cont (02.15.22 @ 23:57) >  ACC: [id number] EXAM:  CT ABDOMEN AND PELVIS                        PROCEDURE DATE:  02/15/2022    INTERPRETATION:  CLINICAL INFORMATION:  Abdominal distension  COMPARISON: None.  CONTRAST/COMPLICATIONS:  IV Contrast: None  Oral Contrast:None  Complications: None  PROCEDURE:  Axial CT images were acquired through the abdomen and pelvis obtained   without intravenous contrast. Coronal and sagittal reformatted images   provided.  FINDINGS: This examination is limited by patient motion artifact and the   lack of oral and intravenous contrast.  LOWER CHEST: Bibasilar atelectasis. Coronary artery calcifications.  LIVER: Within normal limits.  BILE DUCTS: Normal caliber.  GALLBLADDER: Cholelithiasis..  SPLEEN: Within normal limits.  PANCREAS: Within normal limits.  ADRENALS: Within normal limits.  KIDNEYS/URETERS: No definite renal stone or hydronephrosis  BLADDER: Distended with Khalil catheter. No bladder wall thickening.  REPRODUCTIVE ORGANS: Prostate gland mildly enlarged.  BOWEL: Evaluation of bowel is limited without distention with oral   contrast, however there is no bowel obstruction. Few colonic diverticula   without acute diverticulitis. Small bowel loops are not dilated.   Unremarkable appendix without appendicitis. Stomach is underdistended for   adequate evaluation, however suggestion of a moderate hiatal hernia.   Appendix  PERITONEUM: No free air significant ascites.  VESSELS:  Limited without intravenous contrast. There is calcific   atherosclerosis of the abdominal aorta without gross aneurysmal   dilatation. 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  RETROPERITONEUM: No lymphadenopathy.  ABDOMINAL WALL: Small fat-containing bilateral inguinal hernias.  BONES: Degenerative changes of the spine. Bilateral L5 spondylolysis with   slight grade 1 anterolisthesis of L5 on S1.  MPRESSION:  There is no bowel obstruction, significant ascites or free   intraperitoneal air.  Moderate hiatal hernia.  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    < from: Xray Chest 1 View- PORTABLE-Urgent (02.15.22 @ 14:32) >  ACC: [id number] EXAM:  XR CHEST PORTABLE URGENT 1V                        PROCEDURE DATE:  02/15/2022    INTERPRETATION:  AP semierect chest on February 15, 2022 at 1:55 PM.   Patient has sepsis.  Heart enlargement again noted.  There aremild mid lower lung field infiltrates medially new since 2020.  IMPRESSION: Bilateral infiltrates as above.    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 ago, s/p  Bilateral Cataract Surgeries, admitted via the ER to [facility] on 2/15/22 with c/o diarrhea x 2 weeks at home along with decreased po intake and mild nausea with a few episodes of vomiting bilious material.  W/u revealed      Rx'ed empirically with Rocephin + Zithromax after Blood and Urine Cx's were obtained.    Sepsis with Klebsiella Oxytoca ( AKA Raoutella ornithinolytica ) Bacteremia as identified from 2 out of 2 Blood Cx's from 2/15/22  - ? , GI, or Pulmonary source with Bilateral lower lobe PNA seen on initial CXR and Cholelithiasis with GB wall thickening seen on Sono.  Ab rx changed to Cefepime with Zithromax on 22 and patient remains with low - normal temps.  Sensitivities of the Kleb Oxytoca isolated from Blood Cx's noted above and sensitive to Cefepime.  Urine Legionella Ag is negative and now Urine Cx from 22 reported with >100,000 Klebsiella oxytoca ( multiple morphologic strains ) and now with sensitivities completed and noted to be the same as the Blood Cx isolates.  Sputum Cx finalized as normal alonzo.  CT of the Abdomen + Pelvis with oral contrast done 22  resulted with no acute findings, moderate Hiatal Hernia, and multiple Gallstones.  S/p transfusion of 2 Units of PRBC's on 22 for H+H decreased to 6.5 / 19.8 and noted with continued elevated WBC's and ESR although CRP and Procalcitonin are trending downwards along with continued slow improvement in renal function.    Suggestions:  Will continue present ab rx with Cefepime, s/p completed rx with Zithromax  for rx of Sepsis due to Klebsiella Bacteremia and UTI along with Bilateral PNA.  Follow-up temps and labs closely.  Follow-up CXR and monitor O2 Sat's.  Discussed with patient  again in detail at the bedside TMAX - 98.2    On day # 6 Cefepime / Zithromax rx completed    Vital Signs Last 24 Hrs  T(C): 36.4 (2022 05:49), Max: 36.7 (2022 18:00)  T(F): 97.6 (2022 05:49), Max: 98 (2022 18:00)  HR: 82 (2022 11:12) (76 - 85)  BP: 112/74 (2022 11:12) (112/74 - 120/77)  RR: 18 (2022 11:12) (18 - 18)  SpO2: 94% (2022 11:12) (94% - 100%)  Supplemental O2: on NC O2 now    Awakens, alert, feeling a little better today and trying to eat more - no further N/V/D and no BM reported.  No c/o SOB but still with intermittent cough and trying to expectorate the mucous but with difficulty.  O2 Sat's remain between 93 -99% on 2-3  Liters NC O2.    PHYSICAL EXAM   General: 82 y/o white male awakens, alert, with NC O2 in place, pleasant and cooperative, sitting uprignt in bed now, still with a dry mouth but in NAD  HEENT: conj pale, sclerae anicteric, PERRLA, no oral lesions noted but mucosa and tongue still appear somewhat dry  Neck: supple, no nodes noted  Heart: RR  Lungs: few moist rhonchi scattered  bilaterally and with decreased BS at the bases  Abdomen: BS+, soft, nontender to palpation, no masses or HS-megaly detected  Back: no CVA or Spinal tenderness noted  Extremities: well-healed bilateral TKR scars                    1+ edema LE's                     arms and hands still with some swelling   Skin: warm, dry, no rash noted  Khalil in place and draining clear yellow urine now - 3500cc  output recorded over the prior 24hrs.    I&O's Summary :  2022 07:01  -  2022 07:00  IN: 1500 mL / OUT: 3500 mL / NET: -2000 mL  2022 07:01  -  2022 17:08  IN: 420 mL / OUT: 0 mL / NET: 420 mL    LABS:  CBC Full  -  ( 2022 08:09 )  WBC Count : 20.59 K/uL  RBC Count : 2.66 M/uL  Hemoglobin : 8.0 g/dL  Hematocrit : 24.1 %  Platelet Count - Automated : 208 K/uL  Mean Cell Volume : 90.6 fl  Mean Cell Hemoglobin : 30.1 pg  Mean Cell Hemoglobin Concentration : 33.2 g/dL  Auto Neutrophil # : 15.79 K/uL  Auto Lymphocyte # : 1.24 K/uL  Auto Monocyte # : 0.75 K/uL  Auto Eosinophil # : 0.29 K/uL  Auto Basophil # : 0.08 K/uL  Auto Neutrophil % : 76.7 %  Auto Lymphocyte % : 6.0 %  Auto Monocyte % : 3.6 %  Auto Eosinophil % : 1.4 %  Auto Basophil % : 0.4 %      143  |  109<H>  |  75<H>  ----------------------------<  108<H>  3.9   |  28  |  1.83<H>  Ca    7.4<L>      2022 08:09  Phos  4.8       Mg     2.2       TPro  5.9<L>  /  Alb  1.6<L>  /  TBili  0.6  /  DBili  x   /  AST  91<H>  /  ALT  109<H>  /  AlkPhos  175<H>    LIVER FUNCTIONS - ( 2022 08:09 )  Alb: 1.6 g/dL / Pro: 5.9 gm/dL / ALK PHOS: 175 U/L / ALT: 109 U/L / AST: 91 U/L / GGT: x           Sedimentation Rate, Erythrocyte: 89 mm/hr ( @ 08:09)  Sedimentation Rate, Erythrocyte: 75 mm/hr ( @ 07:24)  Sedimentation Rate, Erythrocyte: 62 mm/hr ( @ 02:22)    C-Reactive Protein, Serum (22 @ 10:55)    C-Reactive Protein, Serum: 44 mg/L  C-Reactive Protein, Serum (22 @ 10:51)    C-Reactive Protein, Serum: 47 mg/L  C-Reactive Protein, Serum (22 @ 09:23)    C-Reactive Protein, Serum: 83 mg/L    Procalcitonin, Serum (22 @ 10:55)    Procalcitonin, Serum: 1.18:   Procalcitonin, Serum (22 @ 10:51)    Procalcitonin, Serum: 2.09:   Procalcitonin, Serum (22 @ 09:23)    Procalcitonin, Serum: 17.20:   Procalcitonin, Serum (22 @ 09:20)    Procalcitonin, Serum: 81.70:    Lactate, Blood (02.15.22 @ 21:14)    Lactate, Blood: 1.5 mmol/L  Lactate, Blood (02.15.22 @ 18:24)    Lactate, Blood: 2.5: Elevated lactate. Consider ordering follow-up lactate to trend. mmol/L  Lactate, Blood (02.15.22 @ 14:15)    Lactate, Blood: 3.9: Elevated lactate. Consider ordering follow-up lactate to trend. mmol/L    A1C with Estimated Average Glucose (22 @ 09:30)    A1C with Estimated Average Glucose Result: 7.6: Method: Immunoassay       Reference Range                4.0-5.6%       High risk (prediabetic)        5.7-6.4%       Diabetic, diagnostic             >=6.5%       ADA diabetic treatment goal       <7.0%    Estimated Average Glucose: 171    Acute Hepatitis Panel (22 @ 09:37)    Hepatitis C Virus Interpretation: Nonreact: Hepatitis C AB    Hepatitis C Virus S/CO Ratio: 0.12 S/CO    Hepatitis B Core IgM Antibody: Nonreact    Hepatitis B Surface Antigen: Nonreact    Hepatitis A IgM Antibody: Nonreact    Hepatitis B Surface Antibody (22 @ 09:37)    Hepatitis B Surface Antibody: Nonreact    C3 Complement, Serum (22 @ 09:37)    C3 Complement, Serum: 115 mg/dL  C4 Complement, Serum (22 @ 09:37)    C4 Complement, Serum: 26 mg/dL    Glomerular Basement Membrane Ab IgG (22 @ 21:11)    Glomerular Basement Membrane Ab Ig: Negative        MICROBIOLOGY:  Flu With COVID-19 By [facility] (22 @ 06:11)    SARS-CoV-2 Result: NotDetec: EUA/IVD    Influenza A Result: NotDetec: EUA/IVD    Influenza B Result: NotDetec: EUA/IVD    Specimen Source: .Sputum Sputum ( @ 01:01)  Culture Results:   Normal Respiratory Alonzo present ( @ 01:01)    Gram Stain:   Rare polymorphonuclear leukocytes per low power field  Moderate Squamous epithelial cells per low power field  Numerous Yeast per oil power field  Moderate Gram Positive Rods per oil power field  Few Gram Negative Rods per oil power field  Rare Gram positive cocci in pairs per oil power field    Specimen Source: Clean Catch Clean Catch (Midstream) ( @ 08:49)  Culture Results:   >100,000 CFU/ml Klebsiella oxytoca/Raoutella ornithinolytica  Multiple Morphological Strains ( @ 08:49)    -  Tigecycline: S <=2    -  Tobramycin: S <=2    -  Trimethoprim/Sulfamethoxazole: S <=0.5/9.5    -  Amikacin: S <=16    -  Amoxicillin/Clavulanic Acid: S <=8/4    -  Ampicillin: R >16 These ampicillin results predict results for amoxicillin    -  Ampicillin/Sulbactam: S 8/4 Enterobacter, Klebsiella aerogenes, Citrobacter, and Serratia may develop resistance during prolonged therapy (3-4 days)    -  Aztreonam: S <=4    -  Cefazolin: R 16    -  Cefepime: S <=2    -  Cefoxitin: S <=8    -  Ceftriaxone: S <=1 Enterobacter, Klebsiella aerogenes, Citrobacter, and Serratia may develop resistance during prolonged therapy    -  Ciprofloxacin: S <=0.25    -  Gentamicin: S <=2    -  Imipenem: S <=1    -  Ertapenem: S <=0.5    -  Levofloxacin: S <=0.5    -  Meropenem: S <=1    -  Nitrofurantoin: S <=32 Should not be used to treat pyelonephritis    -  Piperacillin/Tazobactam: S <=8    Method Type: NATACHA    Specimen Source: .Blood Blood-Peripheral (02-15 @ 19:01)  Culture Results:   Growth in aerobic and anaerobic bottles: Klebsiella oxytoca/Raoutella  ornithinolytica    -  Klebsiella oxytoca: Detec    Gram Stain:   Growth in aerobic and anaerobic bottles: Gram Negative Rods    -  Meropenem: S <=1    -  Ertapenem: S <=0.5    -  Gentamicin: S <=2    -  Imipenem: S <=1    -  Levofloxacin: S <=0.5    -  Piperacillin/Tazobactam: S <=8    -  Tobramycin: S <=2    -  Trimethoprim/Sulfamethoxazole: S <=0.5/9.5    -  Amikacin: S <=16    -  Ampicillin: R >16 These ampicillin results predict results for amoxicillin    -  Ampicillin/Sulbactam: S 8/4 Enterobacter, Klebsiella aerogenes, Citrobacter, and Serratia may develop resistance during prolonged therapy (3-4 days)    -  Aztreonam: R >16    -  Cefazolin: R 16 Enterobacter, Klebsiella aerogenes, Citrobacter, and Serratia may develop resistance during prolonged therapy (3-4 days)    -  Cefepime: S <=2    -  Cefoxitin: S <=8    -  Ceftriaxone: S <=1 Enterobacter, Klebsiella aerogenes, Citrobacter, and Serratia may develop resistance during prolonged therapy    -  Ciprofloxacin: S <=0.25    Organism Identification: Blood Culture PCR  Klebsiella oxytoca /Raoutella ornithinolytica    Method Type: PCR    Method Type: ANTACHA    Specimen Source: .Blood Blood-Peripheral (02-15 @ 19:01)  Culture Results:   Growth in aerobic and anaerobic bottles: Klebsiella oxytoca/Raoutella  ornithinolytica  See previous culture [id number] (02-15 @ 19:01)        Legionella Antigen, Urine: Negative ( @ 09:14)    MRSA/MSSA PCR (22 @ 09:20)    MRSA PCR Result.: NotDetec:    Staph Aureus PCR Result: NotDetec    Respiratory Viral Panel with COVID-19 by [facility] (02.15.22 @ 14:34)    Rapid RVP Result: NotDetec    SARS-CoV-2: NotDetec:     RADIOLOGY:  < from: CT Abdomen and Pelvis w/ Oral Cont (22 @ 13:36) >  ACC: [id number] EXAM:  CT ABDOMEN AND PELVIS OC                        PROCEDURE DATE:  2022    INTERPRETATION:  CLINICAL INFORMATION: Bacteremia  COMPARISON: 2/15  PROCEDURE:  CT of the Abdomen and Pelvis was performed without intravenous contrast.  Intravenous contrast: None.  Oral contrast: Positive contrast was administered.  Sagittal and coronal reformats were performed.  FINDINGS:  VISUALIZED CHEST: Bibasilar atelectasis and trace effusions.  ABDOMEN AND PELVIS:  LIVER: Within normal limits.  BILE DUCTS: Normal caliber.  GALLBLADDER: Cholelithiasis.  SPLEEN: Within normal limits.  PANCREAS: Within normal limits.  ADRENALS: Within normal limits.  KIDNEYS/URETERS: Within normal limits.  BLADDER: Khalil catheter.  REPRODUCTIVE ORGANS: No pelvic masses.  BOWEL: No bowel obstruction. Appendix is normal. Moderate hiatal hernia.  PERITONEUM: No ascites.  VESSELS: Atherosclerotic changes.  RETROPERITONEUM/LYMPH NODES: No lymphadenopathy.  ABDOMINAL WALL: Within normal limits.  BONES: Within normal limits.  IMPRESSION:  No acute findings. Multiple gallstones.    < from: US Duplex Venous Lower Ext Complete, Bilateral (22 @ 04:56) >  ACC: [id number] EXAM:  US DPLX LWR EXT VEINS COMPL BI                        PROCEDURE DATE:  2022    INTERPRETATION:  CLINICAL INFORMATION: Lower extremity swelling.  COMPARISON: None available.  TECHNIQUE: Duplex sonography of theBILATERAL LOWER extremity veins with   color and spectral Doppler, with and without compression.  IMPRESSION:  RIGHT: Limited calf vein visualization. Nonvisualization of the calf   veins. No evidence of deep venous thrombosis at or above the knee.  LEFT: Limited calf vein visualization. Acute nonocclusive above-the-knee   deep venous thrombosis. Occlusive DVT in the greater saphenous vein.    < from: US Abdomen Upper Quadrant Right (22 @ 12:03) >  ACC: [id number] EXAM:  US ABDOMEN RT UPR QUADRANT                        PROCEDURE DATE:  2022    INTERPRETATION:  CLINICAL INFORMATION: Elevated LFTs.  COMPARISON: CT abdomen/pelvis 2/15/2022.  TECHNIQUE: Sonography of the right upper quadrant.  IMPRESSION: Multiple gallstones. Gallbladder wall thickening. No   pericholecystic fluid. No evidence for intrahepatic or extrahepatic   biliary ductal dilatation.    < from: Xray Chest 1 View-PORTABLE IMMEDIATE (Xray Chest 1 View-PORTABLE IMMEDIATE .) (22 @ 01:26) >  ACC: [id number] EXAM:  XR CHEST PORTABLE IMMED 1V                        PROCEDURE DATE:  2022    IMPRESSION:  Right IJ line HD catheter in satisfactory position with no pneumothorax,   new    < from: CT Abdomen and Pelvis No Cont (02.15.22 @ 23:57) >  ACC: [id number] EXAM:  CT ABDOMEN AND PELVIS                        PROCEDURE DATE:  02/15/2022    INTERPRETATION:  CLINICAL INFORMATION:  Abdominal distension  COMPARISON: None.  CONTRAST/COMPLICATIONS:  IV Contrast: None  Oral Contrast:None  Complications: None  PROCEDURE:  Axial CT images were acquired through the abdomen and pelvis obtained   without intravenous contrast. Coronal and sagittal reformatted images   provided.  FINDINGS: This examination is limited by patient motion artifact and the   lack of oral and intravenous contrast.  LOWER CHEST: Bibasilar atelectasis. Coronary artery calcifications.  LIVER: Within normal limits.  BILE DUCTS: Normal caliber.  GALLBLADDER: Cholelithiasis..  SPLEEN: Within normal limits.  PANCREAS: Within normal limits.  ADRENALS: Within normal limits.  KIDNEYS/URETERS: No definite renal stone or hydronephrosis  BLADDER: Distended with Khalil catheter. No bladder wall thickening.  REPRODUCTIVE ORGANS: Prostate gland mildly enlarged.  BOWEL: Evaluation of bowel is limited without distention with oral   contrast, however there is no bowel obstruction. Few colonic diverticula   without acute diverticulitis. Small bowel loops are not dilated.   Unremarkable appendix without appendicitis. Stomach is underdistended for   adequate evaluation, however suggestion of a moderate hiatal hernia.   Appendix  PERITONEUM: No free air significant ascites.  VESSELS:  Limited without intravenous contrast. There is calcific   atherosclerosis of the abdominal aorta without gross aneurysmal   dilatation. 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  RETROPERITONEUM: No lymphadenopathy.  ABDOMINAL WALL: Small fat-containing bilateral inguinal hernias.  BONES: Degenerative changes of the spine. Bilateral L5 spondylolysis with   slight grade 1 anterolisthesis of L5 on S1.  MPRESSION:  There is no bowel obstruction, significant ascites or free   intraperitoneal air.  Moderate hiatal hernia.  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    < from: Xray Chest 1 View- PORTABLE-Urgent (02.15.22 @ 14:32) >  ACC: [id number] EXAM:  XR CHEST PORTABLE URGENT 1V                        PROCEDURE DATE:  02/15/2022    INTERPRETATION:  AP semierect chest on February 15, 2022 at 1:55 PM.   Patient has sepsis.  Heart enlargement again noted.  There aremild mid lower lung field infiltrates medially new since 2020.  IMPRESSION: Bilateral infiltrates as above.    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 ago, s/p  Bilateral Cataract Surgeries, admitted via the ER to [facility] on 2/15/22 with c/o diarrhea x 2 weeks at home along with decreased po intake and mild nausea with a few episodes of vomiting bilious material.  W/u revealed      Rx'ed empirically with Rocephin + Zithromax after Blood and Urine Cx's were obtained.    Sepsis with Klebsiella Oxytoca ( AKA Raoutella ornithinolytica ) Bacteremia as identified from 2 out of 2 Blood Cx's from 2/15/22  - ? , GI, or Pulmonary source with Bilateral lower lobe PNA seen on initial CXR and Cholelithiasis with GB wall thickening seen on Sono.  Ab rx changed to Cefepime with Zithromax on 22 and patient remains with low - normal temps.  Sensitivities of the Kleb Oxytoca isolated from Blood Cx's noted above and sensitive to Cefepime.  Urine Legionella Ag is negative and now Urine Cx from 22 reported with >100,000 Klebsiella oxytoca ( multiple morphologic strains ) and now with sensitivities completed and noted to be the same as the Blood Cx isolates.  Sputum Cx finalized as normal alonzo.  CT of the Abdomen + Pelvis with oral contrast done 22  resulted with no acute findings, moderate Hiatal Hernia, and multiple Gallstones.  S/p transfusion of 2 Units of PRBC's on 22 for H+H decreased to 6.5 / 19.8 and noted with continued elevated WBC's and ESR although CRP and Procalcitonin are trending downwards and there is continued slow improvement in renal function.    Suggestions:  Will continue present ab rx with Cefepime, s/p completed rx with Zithromax  for rx of Sepsis due to Klebsiella Bacteremia and UTI along with Bilateral PNA.  Follow-up temps and labs closely.  Follow-up CXR and monitor O2 Sat's.  Discussed with patient  again in detail at the bedside TMAX - 98.2    On day # 6 Cefepime / Zithromax rx completed    Vital Signs Last 24 Hrs  T(C): 36.4 (2022 05:49), Max: 36.7 (2022 18:00)  T(F): 97.6 (2022 05:49), Max: 98 (2022 18:00)  HR: 82 (2022 11:12) (76 - 85)  BP: 112/74 (2022 11:12) (112/74 - 120/77)  RR: 18 (2022 11:12) (18 - 18)  SpO2: 94% (2022 11:12) (94% - 100%)  Supplemental O2: on NC O2 now    Awakens, alert, feeling a little better today and trying to eat more - no further N/V/D and no BM reported.  No c/o SOB but still with intermittent cough and trying to expectorate the mucous but with difficulty.  O2 Sat's remain between 93 -99% on 2-3  Liters NC O2.    PHYSICAL EXAM   General: 82 y/o white male awakens, alert, with NC O2 in place, pleasant and cooperative, sitting uprignt in bed now, still with a dry mouth but in NAD  HEENT: conj pale, sclerae anicteric, PERRLA, no oral lesions noted but mucosa and tongue still appear somewhat dry  Neck: supple, no nodes noted  Heart: RR  Lungs: few moist rhonchi scattered  bilaterally and with decreased BS at the bases  Abdomen: BS+, soft, nontender to palpation, no masses or HS-megaly detected  Back: no CVA or Spinal tenderness noted  Extremities: well-healed bilateral TKR scars                    1+ edema LE's                     arms and hands still with some swelling   Skin: warm, dry, no rash noted  Khalil in place and draining clear yellow urine now - 3500cc  output recorded over the prior 24hrs.    I&O's Summary :  2022 07:01  -  2022 07:00  IN: 1500 mL / OUT: 3500 mL / NET: -2000 mL  2022 07:01  -  2022 17:08  IN: 420 mL / OUT: 0 mL / NET: 420 mL    LABS:  CBC Full  -  ( 2022 08:09 )  WBC Count : 20.59 K/uL  RBC Count : 2.66 M/uL  Hemoglobin : 8.0 g/dL  Hematocrit : 24.1 %  Platelet Count - Automated : 208 K/uL  Mean Cell Volume : 90.6 fl  Mean Cell Hemoglobin : 30.1 pg  Mean Cell Hemoglobin Concentration : 33.2 g/dL  Auto Neutrophil # : 15.79 K/uL  Auto Lymphocyte # : 1.24 K/uL  Auto Monocyte # : 0.75 K/uL  Auto Eosinophil # : 0.29 K/uL  Auto Basophil # : 0.08 K/uL  Auto Neutrophil % : 76.7 %  Auto Lymphocyte % : 6.0 %  Auto Monocyte % : 3.6 %  Auto Eosinophil % : 1.4 %  Auto Basophil % : 0.4 %      143  |  109<H>  |  75<H>  ----------------------------<  108<H>  3.9   |  28  |  1.83<H>  Ca    7.4<L>      2022 08:09  Phos  4.8       Mg     2.2       TPro  5.9<L>  /  Alb  1.6<L>  /  TBili  0.6  /  DBili  x   /  AST  91<H>  /  ALT  109<H>  /  AlkPhos  175<H>    LIVER FUNCTIONS - ( 2022 08:09 )  Alb: 1.6 g/dL / Pro: 5.9 gm/dL / ALK PHOS: 175 U/L / ALT: 109 U/L / AST: 91 U/L / GGT: x           Sedimentation Rate, Erythrocyte: 89 mm/hr ( @ 08:09)  Sedimentation Rate, Erythrocyte: 75 mm/hr ( @ 07:24)  Sedimentation Rate, Erythrocyte: 62 mm/hr ( @ 02:22)    C-Reactive Protein, Serum (22 @ 10:55)    C-Reactive Protein, Serum: 44 mg/L  C-Reactive Protein, Serum (22 @ 10:51)    C-Reactive Protein, Serum: 47 mg/L  C-Reactive Protein, Serum (22 @ 09:23)    C-Reactive Protein, Serum: 83 mg/L    Procalcitonin, Serum (22 @ 10:55)    Procalcitonin, Serum: 1.18:   Procalcitonin, Serum (22 @ 10:51)    Procalcitonin, Serum: 2.09:   Procalcitonin, Serum (22 @ 09:23)    Procalcitonin, Serum: 17.20:   Procalcitonin, Serum (22 @ 09:20)    Procalcitonin, Serum: 81.70:    Lactate, Blood (02.15.22 @ 21:14)    Lactate, Blood: 1.5 mmol/L  Lactate, Blood (02.15.22 @ 18:24)    Lactate, Blood: 2.5: Elevated lactate. Consider ordering follow-up lactate to trend. mmol/L  Lactate, Blood (02.15.22 @ 14:15)    Lactate, Blood: 3.9: Elevated lactate. Consider ordering follow-up lactate to trend. mmol/L    A1C with Estimated Average Glucose (22 @ 09:30)    A1C with Estimated Average Glucose Result: 7.6: Method: Immunoassay       Reference Range                4.0-5.6%       High risk (prediabetic)        5.7-6.4%       Diabetic, diagnostic             >=6.5%       ADA diabetic treatment goal       <7.0%    Estimated Average Glucose: 171    Acute Hepatitis Panel (22 @ 09:37)    Hepatitis C Virus Interpretation: Nonreact: Hepatitis C AB    Hepatitis C Virus S/CO Ratio: 0.12 S/CO    Hepatitis B Core IgM Antibody: Nonreact    Hepatitis B Surface Antigen: Nonreact    Hepatitis A IgM Antibody: Nonreact    Hepatitis B Surface Antibody (22 @ 09:37)    Hepatitis B Surface Antibody: Nonreact    C3 Complement, Serum (22 @ 09:37)    C3 Complement, Serum: 115 mg/dL  C4 Complement, Serum (22 @ 09:37)    C4 Complement, Serum: 26 mg/dL    Glomerular Basement Membrane Ab IgG (22 @ 21:11)    Glomerular Basement Membrane Ab Ig: Negative        MICROBIOLOGY:  Flu With COVID-19 By [facility] (22 @ 06:11)    SARS-CoV-2 Result: NotDetec: EUA/IVD    Influenza A Result: NotDetec: EUA/IVD    Influenza B Result: NotDetec: EUA/IVD    Specimen Source: .Sputum Sputum ( @ 01:01)  Culture Results:   Normal Respiratory Alonzo present ( @ 01:01)    Gram Stain:   Rare polymorphonuclear leukocytes per low power field  Moderate Squamous epithelial cells per low power field  Numerous Yeast per oil power field  Moderate Gram Positive Rods per oil power field  Few Gram Negative Rods per oil power field  Rare Gram positive cocci in pairs per oil power field    Specimen Source: Clean Catch Clean Catch (Midstream) ( @ 08:49)  Culture Results:   >100,000 CFU/ml Klebsiella oxytoca/Raoutella ornithinolytica  Multiple Morphological Strains ( @ 08:49)    -  Tigecycline: S <=2    -  Tobramycin: S <=2    -  Trimethoprim/Sulfamethoxazole: S <=0.5/9.5    -  Amikacin: S <=16    -  Amoxicillin/Clavulanic Acid: S <=8/4    -  Ampicillin: R >16 These ampicillin results predict results for amoxicillin    -  Ampicillin/Sulbactam: S 8/4 Enterobacter, Klebsiella aerogenes, Citrobacter, and Serratia may develop resistance during prolonged therapy (3-4 days)    -  Aztreonam: S <=4    -  Cefazolin: R 16    -  Cefepime: S <=2    -  Cefoxitin: S <=8    -  Ceftriaxone: S <=1 Enterobacter, Klebsiella aerogenes, Citrobacter, and Serratia may develop resistance during prolonged therapy    -  Ciprofloxacin: S <=0.25    -  Gentamicin: S <=2    -  Imipenem: S <=1    -  Ertapenem: S <=0.5    -  Levofloxacin: S <=0.5    -  Meropenem: S <=1    -  Nitrofurantoin: S <=32 Should not be used to treat pyelonephritis    -  Piperacillin/Tazobactam: S <=8    Method Type: NATACHA    Specimen Source: .Blood Blood-Peripheral (02-15 @ 19:01)  Culture Results:   Growth in aerobic and anaerobic bottles: Klebsiella oxytoca/Raoutella  ornithinolytica    -  Klebsiella oxytoca: Detec    Gram Stain:   Growth in aerobic and anaerobic bottles: Gram Negative Rods    -  Meropenem: S <=1    -  Ertapenem: S <=0.5    -  Gentamicin: S <=2    -  Imipenem: S <=1    -  Levofloxacin: S <=0.5    -  Piperacillin/Tazobactam: S <=8    -  Tobramycin: S <=2    -  Trimethoprim/Sulfamethoxazole: S <=0.5/9.5    -  Amikacin: S <=16    -  Ampicillin: R >16 These ampicillin results predict results for amoxicillin    -  Ampicillin/Sulbactam: S 8/4 Enterobacter, Klebsiella aerogenes, Citrobacter, and Serratia may develop resistance during prolonged therapy (3-4 days)    -  Aztreonam: R >16    -  Cefazolin: R 16 Enterobacter, Klebsiella aerogenes, Citrobacter, and Serratia may develop resistance during prolonged therapy (3-4 days)    -  Cefepime: S <=2    -  Cefoxitin: S <=8    -  Ceftriaxone: S <=1 Enterobacter, Klebsiella aerogenes, Citrobacter, and Serratia may develop resistance during prolonged therapy    -  Ciprofloxacin: S <=0.25    Organism Identification: Blood Culture PCR  Klebsiella oxytoca /Raoutella ornithinolytica    Method Type: PCR    Method Type: NATACHA    Specimen Source: .Blood Blood-Peripheral (02-15 @ 19:01)  Culture Results:   Growth in aerobic and anaerobic bottles: Klebsiella oxytoca/Raoutella  ornithinolytica  See previous culture [id number] (02-15 @ 19:01)        Legionella Antigen, Urine: Negative ( @ 09:14)    MRSA/MSSA PCR (22 @ 09:20)    MRSA PCR Result.: NotDetec:    Staph Aureus PCR Result: NotDetec    Respiratory Viral Panel with COVID-19 by [facility] (02.15.22 @ 14:34)    Rapid RVP Result: NotDetec    SARS-CoV-2: NotDetec:     RADIOLOGY:  < from: CT Abdomen and Pelvis w/ Oral Cont (22 @ 13:36) >  ACC: [id number] EXAM:  CT ABDOMEN AND PELVIS OC                        PROCEDURE DATE:  2022    INTERPRETATION:  CLINICAL INFORMATION: Bacteremia  COMPARISON: 2/15  PROCEDURE:  CT of the Abdomen and Pelvis was performed without intravenous contrast.  Intravenous contrast: None.  Oral contrast: Positive contrast was administered.  Sagittal and coronal reformats were performed.  FINDINGS:  VISUALIZED CHEST: Bibasilar atelectasis and trace effusions.  ABDOMEN AND PELVIS:  LIVER: Within normal limits.  BILE DUCTS: Normal caliber.  GALLBLADDER: Cholelithiasis.  SPLEEN: Within normal limits.  PANCREAS: Within normal limits.  ADRENALS: Within normal limits.  KIDNEYS/URETERS: Within normal limits.  BLADDER: Khalil catheter.  REPRODUCTIVE ORGANS: No pelvic masses.  BOWEL: No bowel obstruction. Appendix is normal. Moderate hiatal hernia.  PERITONEUM: No ascites.  VESSELS: Atherosclerotic changes.  RETROPERITONEUM/LYMPH NODES: No lymphadenopathy.  ABDOMINAL WALL: Within normal limits.  BONES: Within normal limits.  IMPRESSION:  No acute findings. Multiple gallstones.    < from: US Duplex Venous Lower Ext Complete, Bilateral (22 @ 04:56) >  ACC: [id number] EXAM:  US DPLX LWR EXT VEINS COMPL BI                        PROCEDURE DATE:  2022    INTERPRETATION:  CLINICAL INFORMATION: Lower extremity swelling.  COMPARISON: None available.  TECHNIQUE: Duplex sonography of theBILATERAL LOWER extremity veins with   color and spectral Doppler, with and without compression.  IMPRESSION:  RIGHT: Limited calf vein visualization. Nonvisualization of the calf   veins. No evidence of deep venous thrombosis at or above the knee.  LEFT: Limited calf vein visualization. Acute nonocclusive above-the-knee   deep venous thrombosis. Occlusive DVT in the greater saphenous vein.    < from: US Abdomen Upper Quadrant Right (22 @ 12:03) >  ACC: [id number] EXAM:  US ABDOMEN RT UPR QUADRANT                        PROCEDURE DATE:  2022    INTERPRETATION:  CLINICAL INFORMATION: Elevated LFTs.  COMPARISON: CT abdomen/pelvis 2/15/2022.  TECHNIQUE: Sonography of the right upper quadrant.  IMPRESSION: Multiple gallstones. Gallbladder wall thickening. No   pericholecystic fluid. No evidence for intrahepatic or extrahepatic   biliary ductal dilatation.    < from: Xray Chest 1 View-PORTABLE IMMEDIATE (Xray Chest 1 View-PORTABLE IMMEDIATE .) (22 @ 01:26) >  ACC: [id number] EXAM:  XR CHEST PORTABLE IMMED 1V                        PROCEDURE DATE:  2022    IMPRESSION:  Right IJ line HD catheter in satisfactory position with no pneumothorax,   new    < from: CT Abdomen and Pelvis No Cont (02.15.22 @ 23:57) >  ACC: [id number] EXAM:  CT ABDOMEN AND PELVIS                        PROCEDURE DATE:  02/15/2022    INTERPRETATION:  CLINICAL INFORMATION:  Abdominal distension  COMPARISON: None.  CONTRAST/COMPLICATIONS:  IV Contrast: None  Oral Contrast:None  Complications: None  PROCEDURE:  Axial CT images were acquired through the abdomen and pelvis obtained   without intravenous contrast. Coronal and sagittal reformatted images   provided.  FINDINGS: This examination is limited by patient motion artifact and the   lack of oral and intravenous contrast.  LOWER CHEST: Bibasilar atelectasis. Coronary artery calcifications.  LIVER: Within normal limits.  BILE DUCTS: Normal caliber.  GALLBLADDER: Cholelithiasis..  SPLEEN: Within normal limits.  PANCREAS: Within normal limits.  ADRENALS: Within normal limits.  KIDNEYS/URETERS: No definite renal stone or hydronephrosis  BLADDER: Distended with Khalil catheter. No bladder wall thickening.  REPRODUCTIVE ORGANS: Prostate gland mildly enlarged.  BOWEL: Evaluation of bowel is limited without distention with oral   contrast, however there is no bowel obstruction. Few colonic diverticula   without acute diverticulitis. Small bowel loops are not dilated.   Unremarkable appendix without appendicitis. Stomach is underdistended for   adequate evaluation, however suggestion of a moderate hiatal hernia.   Appendix  PERITONEUM: No free air significant ascites.  VESSELS:  Limited without intravenous contrast. There is calcific   atherosclerosis of the abdominal aorta without gross aneurysmal   dilatation. 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  RETROPERITONEUM: No lymphadenopathy.  ABDOMINAL WALL: Small fat-containing bilateral inguinal hernias.  BONES: Degenerative changes of the spine. Bilateral L5 spondylolysis with   slight grade 1 anterolisthesis of L5 on S1.  MPRESSION:  There is no bowel obstruction, significant ascites or free   intraperitoneal air.  Moderate hiatal hernia.  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    < from: Xray Chest 1 View- PORTABLE-Urgent (02.15.22 @ 14:32) >  ACC: [id number] EXAM:  XR CHEST PORTABLE URGENT 1V                        PROCEDURE DATE:  02/15/2022    INTERPRETATION:  AP semierect chest on February 15, 2022 at 1:55 PM.   Patient has sepsis.  Heart enlargement again noted.  There aremild mid lower lung field infiltrates medially new since 2020.  IMPRESSION: Bilateral infiltrates as above.    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 ago, s/p  Bilateral Cataract Surgeries, admitted via the ER to [facility] on 2/15/22 with c/o diarrhea x 2 weeks at home along with decreased po intake and mild nausea with a few episodes of vomiting bilious material.  W/u revealed a markedly elevated WBC's of 37,940.  an elevated Lactate to 3.9, and his BUN / Creat were found to be elevated at 169 / 12.9 and Glucose was elevated as well. Further w/u with CXR and CT of the Abdomen + Pelvis was done and revealed Bilateral Infiltrates, Cholelithiasis, a moderate Hiatal Hernia, but no bowel obstruction was seen.  Patient was admitted to the CCU for further care and he was rx'ed with 5 Liters of NS and rx'ed empirically with Rocephin + Zithromax after Blood and Urine Cx's were obtained and he underwent HD x 1 on 2/15/22.  Subsequently found to have Sepsis with Klebsiella Oxytoca ( AKA Raoutella ornithinolytica ) Bacteremia as identified from 2 out of 2 Blood Cx's from 2/15/22  - ? , GI, or Pulmonary source with Bilateral lower lobe PNA seen on initial CXR and Cholelithiasis with GB wall thickening seen on Sono.  Ab rx changed to Cefepime with Zithromax on 22 and patient remains with low - normal temps.  Sensitivities of the Kleb Oxytoca isolated from Blood Cx's noted above and sensitive to Cefepime.  Urine Legionella Ag is negative and now Urine Cx from 22 reported with >100,000 Klebsiella oxytoca ( multiple morphologic strains ) and now with sensitivities completed and noted to be the same as the Blood Cx isolates.  Sputum Cx finalized as normal alonzo.  CT of the Abdomen + Pelvis with oral contrast done 22  resulted with no acute findings, moderate Hiatal Hernia, and multiple Gallstones.  S/p transfusion of 2 Units of PRBC's on 22 for H+H decreased to 6.5 / 19.8 and noted with continued elevated WBC's and ESR although CRP and Procalcitonin are trending downwards and there is continued slow improvement in renal function.    Suggestions:  Will continue present ab rx with Cefepime, s/p completed rx with Zithromax  for rx of Sepsis due to Klebsiella Bacteremia and UTI along with Bilateral PNA.  Follow-up temps and labs closely.  Follow-up CXR and monitor O2 Sat's.  Discussed with patient  again in detail at the bedside

## 2022-02-22 NOTE — PROGRESS NOTE ADULT - ASSESSMENT
81M w/ HTN, bladder tumor removal, melanoma. Presents w/ diarrhea and weakness. Admitted to ICU w/ rapif afib, septic shock likely from UTI vs pneumonia, RADHA w/ hyperkalemia and significant uremia and thrombocytopenia. S/p 1 HD treatment 2/16/22        Gram Negative bacterial Pneumonia  CXR: b/l lower lobe infiltrate   Septic Shock POA; resolved   acute respiratory failure with hypoxia; resolved now   cont w/ IV abx.     Klebsiella Bacteremia   Septic Shock POA; resolved   ID on board   cont w/ IV abx.     Klebsiella UTI  as above     Afib w/ RVR   in the setting of sepsis   s/p Amiodarone, HR now controlled   cont to monitor   AC contraindicated at this time     Acute blood loss anemia   episode of melena while on Heparin drip  s/p 2U pRBC transfusion  pt will need GI follow up   cont w/ protonix     RADHA   likely combination of prerenal, ATN and obstructive uropathy;   s/p HD x1; now making urine;   cont w/ IVF and monitor renal function      thrombocytopenia   Secondary to Bacteremia  no active bleeding   cont to monitor     Acute Left LE DVT  Doppler: Acute nonocclusive above-the-knee deep venous thrombosis. Occlusive DVT in the greater saphenous vein.  AC contraindicated due to GI bleed.       Diabetes Mellitus   HgA1c: 7.8%  cont w/ FS monitoring w/ insulins s/s coverage     #PPx   - SCDs, heparin drip on hold

## 2022-02-22 NOTE — PROGRESS NOTE ADULT - SUBJECTIVE AND OBJECTIVE BOX
NEPHROLOGY PROGRESS NOTE    CHIEF COMPLAINT:  RADHA    HPI:  Renal function continues to improve with good urine output.   UO 3.5 liters    EXAM:  T(F): 97.6 (02-22-22 @ 05:49)  HR: 82 (02-22-22 @ 11:12)  BP: 112/74 (02-22-22 @ 11:12)  RR: 18 (02-22-22 @ 11:12)  SpO2: 94% (02-22-22 @ 11:12)    Conversant, in no apparent distress  Normal respiratory effort, lungs clear bilaterally  Heart RRR with no murmur, no peripheral edema         LABS                             8.0    20.59 )-----------( 208      ( 22 Feb 2022 08:09 )             24.1          02-22    143  |  109<H>  |  75<H>  ----------------------------<  108<H>  3.9   |  28  |  1.83<H>    Ca    7.4<L>      22 Feb 2022 08:09  Phos  4.8     02-22  Mg     2.2     02-22    TPro  5.9<L>  /  Alb  1.6<L>  /  TBili  0.6  /  DBili  x   /  AST  91<H>  /  ALT  109<H>  /  AlkPhos  175<H>  02-22      Assessment   RADHA pre renal azotemia; ischemic ATN s/p dialysis x 1  Nonoliguric, recovery phase continues without significant electrolyte wasting    Plan:  Monitor daily BMP, fluid balance  IVF restarted

## 2022-02-23 ENCOUNTER — TRANSCRIPTION ENCOUNTER (OUTPATIENT)
Age: 82
End: 2022-02-23

## 2022-02-23 LAB
ANION GAP SERPL CALC-SCNC: 5 MMOL/L — SIGNIFICANT CHANGE UP (ref 5–17)
BASOPHILS # BLD AUTO: 0.06 K/UL — SIGNIFICANT CHANGE UP (ref 0–0.2)
BASOPHILS NFR BLD AUTO: 0.3 % — SIGNIFICANT CHANGE UP (ref 0–2)
BUN SERPL-MCNC: 57 MG/DL — HIGH (ref 7–23)
CALCIUM SERPL-MCNC: 7.6 MG/DL — LOW (ref 8.5–10.1)
CHLORIDE SERPL-SCNC: 109 MMOL/L — HIGH (ref 96–108)
CO2 SERPL-SCNC: 28 MMOL/L — SIGNIFICANT CHANGE UP (ref 22–31)
CREAT SERPL-MCNC: 1.65 MG/DL — HIGH (ref 0.5–1.3)
EOSINOPHIL # BLD AUTO: 0.38 K/UL — SIGNIFICANT CHANGE UP (ref 0–0.5)
EOSINOPHIL NFR BLD AUTO: 2 % — SIGNIFICANT CHANGE UP (ref 0–6)
GLUCOSE BLDC GLUCOMTR-MCNC: 118 MG/DL — HIGH (ref 70–99)
GLUCOSE BLDC GLUCOMTR-MCNC: 136 MG/DL — HIGH (ref 70–99)
GLUCOSE BLDC GLUCOMTR-MCNC: 162 MG/DL — HIGH (ref 70–99)
GLUCOSE BLDC GLUCOMTR-MCNC: 217 MG/DL — HIGH (ref 70–99)
GLUCOSE SERPL-MCNC: 112 MG/DL — HIGH (ref 70–99)
HCT VFR BLD CALC: 24.9 % — LOW (ref 39–50)
HGB BLD-MCNC: 8.1 G/DL — LOW (ref 13–17)
IMM GRANULOCYTES NFR BLD AUTO: 9.2 % — HIGH (ref 0–1.5)
LYMPHOCYTES # BLD AUTO: 1.65 K/UL — SIGNIFICANT CHANGE UP (ref 1–3.3)
LYMPHOCYTES # BLD AUTO: 8.7 % — LOW (ref 13–44)
MCHC RBC-ENTMCNC: 30.1 PG — SIGNIFICANT CHANGE UP (ref 27–34)
MCHC RBC-ENTMCNC: 32.5 G/DL — SIGNIFICANT CHANGE UP (ref 32–36)
MCV RBC AUTO: 92.6 FL — SIGNIFICANT CHANGE UP (ref 80–100)
MONOCYTES # BLD AUTO: 0.88 K/UL — SIGNIFICANT CHANGE UP (ref 0–0.9)
MONOCYTES NFR BLD AUTO: 4.6 % — SIGNIFICANT CHANGE UP (ref 2–14)
NEUTROPHILS # BLD AUTO: 14.26 K/UL — HIGH (ref 1.8–7.4)
NEUTROPHILS NFR BLD AUTO: 75.2 % — SIGNIFICANT CHANGE UP (ref 43–77)
NRBC # BLD: 0 /100 WBCS — SIGNIFICANT CHANGE UP (ref 0–0)
PLATELET # BLD AUTO: 213 K/UL — SIGNIFICANT CHANGE UP (ref 150–400)
POTASSIUM SERPL-MCNC: 3.6 MMOL/L — SIGNIFICANT CHANGE UP (ref 3.5–5.3)
POTASSIUM SERPL-SCNC: 3.6 MMOL/L — SIGNIFICANT CHANGE UP (ref 3.5–5.3)
RBC # BLD: 2.69 M/UL — LOW (ref 4.2–5.8)
RBC # FLD: 16 % — HIGH (ref 10.3–14.5)
SODIUM SERPL-SCNC: 142 MMOL/L — SIGNIFICANT CHANGE UP (ref 135–145)
WBC # BLD: 18.97 K/UL — HIGH (ref 3.8–10.5)
WBC # FLD AUTO: 18.97 K/UL — HIGH (ref 3.8–10.5)

## 2022-02-23 PROCEDURE — 99233 SBSQ HOSP IP/OBS HIGH 50: CPT

## 2022-02-23 RX ORDER — SODIUM CHLORIDE 9 MG/ML
1000 INJECTION, SOLUTION INTRAVENOUS
Refills: 0 | Status: DISCONTINUED | OUTPATIENT
Start: 2022-02-23 | End: 2022-02-24

## 2022-02-23 RX ADMIN — SODIUM CHLORIDE 50 MILLILITER(S): 9 INJECTION, SOLUTION INTRAVENOUS at 18:53

## 2022-02-23 RX ADMIN — PANTOPRAZOLE SODIUM 40 MILLIGRAM(S): 20 TABLET, DELAYED RELEASE ORAL at 06:34

## 2022-02-23 RX ADMIN — CHLORHEXIDINE GLUCONATE 1 APPLICATION(S): 213 SOLUTION TOPICAL at 06:33

## 2022-02-23 RX ADMIN — LATANOPROST 1 DROP(S): 0.05 SOLUTION/ DROPS OPHTHALMIC; TOPICAL at 21:19

## 2022-02-23 RX ADMIN — CEFEPIME 100 MILLIGRAM(S): 1 INJECTION, POWDER, FOR SOLUTION INTRAMUSCULAR; INTRAVENOUS at 17:06

## 2022-02-23 RX ADMIN — CEFEPIME 100 MILLIGRAM(S): 1 INJECTION, POWDER, FOR SOLUTION INTRAMUSCULAR; INTRAVENOUS at 06:33

## 2022-02-23 RX ADMIN — Medication 1 MILLIGRAM(S): at 21:19

## 2022-02-23 RX ADMIN — ATORVASTATIN CALCIUM 40 MILLIGRAM(S): 80 TABLET, FILM COATED ORAL at 21:19

## 2022-02-23 RX ADMIN — Medication 4: at 08:05

## 2022-02-23 RX ADMIN — Medication 2: at 17:05

## 2022-02-23 RX ADMIN — SODIUM CHLORIDE 75 MILLILITER(S): 9 INJECTION, SOLUTION INTRAVENOUS at 06:53

## 2022-02-23 RX ADMIN — PANTOPRAZOLE SODIUM 40 MILLIGRAM(S): 20 TABLET, DELAYED RELEASE ORAL at 17:05

## 2022-02-23 NOTE — PROGRESS NOTE ADULT - SUBJECTIVE AND OBJECTIVE BOX
Patient is a 81y old  Male who presents with a chief complaint of hypotension and renal failure (23 Feb 2022 17:30)      INTERVAL HPI/ OVERNIGHT EVENTS: Pt was seen and examined at bedside today, 91 on RA pt was placed back on NC, pt admits to feeling better, denies any CP or SOB, admits to feeling overall better    MEDICATIONS  (STANDING):  ALPRAZolam 1 milliGRAM(s) Oral at bedtime  atorvastatin 40 milliGRAM(s) Oral at bedtime  cefepime   IVPB      cefepime   IVPB 1000 milliGRAM(s) IV Intermittent every 12 hours  chlorhexidine 2% Cloths 1 Application(s) Topical <User Schedule>  insulin lispro (ADMELOG) corrective regimen sliding scale   SubCutaneous Before meals and at bedtime  lactated ringers. 1000 milliLiter(s) (50 mL/Hr) IV Continuous <Continuous>  latanoprost 0.005% Ophthalmic Solution 1 Drop(s) Both EYES at bedtime  pantoprazole   Suspension 40 milliGRAM(s) Oral two times a day    MEDICATIONS  (PRN):  ondansetron Injectable 4 milliGRAM(s) IV Push every 4 hours PRN Nausea and/or Vomiting  sodium chloride 0.9% lock flush 10 milliLiter(s) IV Push every 1 hour PRN Pre/post blood products, medications, blood draw, and to maintain line patency      Allergies    No Known Allergies    Intolerances        REVIEW OF SYSTEMS:    Unable to examine due to [ ] Encephalopathy [ ] Advanced Dementia [ ] Expressive Aphasia [ ] Non-verbal patient    CONSTITUTIONAL: No fever, positive generalized weakness/Fatigue, No weight loss  EYES: No eye pain, visual disturbances, or discharge  ENMT:  No difficulty hearing, tinnitus, vertigo; No sinus or throat pain  NECK: No pain or stiffness  RESPIRATORY: No shortness of breath,  cough, wheezing, sputum or hemoptysis   CARDIOVASCULAR: No chest pain, palpitations, or leg swelling  GASTROINTESTINAL: No abdominal pain. No nausea, vomiting, diarrhea or constipation. No melena or hematochezia.  GENITOURINARY: No dysuria, frequency, hematuria, or incontinence  NEUROLOGICAL: No headaches, Dizziness, memory loss, loss of strength, numbness, or tremors  SKIN: No itching, burning, rashes, or lesions   MUSCULOSKELETAL: No joint pain or swelling; No muscle, back, or extremity pain  PSYCHIATRIC: No depression, anxiety, mood swings, or difficulty sleeping  HEME/LYMPH: No easy bruising, or bleeding gums      Vital Signs Last 24 Hrs  T(C): 36.9 (23 Feb 2022 17:37), Max: 36.9 (23 Feb 2022 17:37)  T(F): 98.4 (23 Feb 2022 17:37), Max: 98.4 (23 Feb 2022 17:37)  HR: 80 (23 Feb 2022 17:37) (73 - 87)  BP: 127/77 (23 Feb 2022 17:37) (102/65 - 127/77)  BP(mean): --  RR: 18 (23 Feb 2022 17:37) (18 - 18)  SpO2: 95% (23 Feb 2022 17:37) (94% - 96%)    PHYSICAL EXAM:  GENERAL: NAD on NC, obese   HEAD:  Atraumatic, Normocephalic  EYES: conjunctiva and sclera clear  ENMT: Moist mucous membranes  NECK: Supple, No JVD, Normal thyroid  CHEST/LUNG: Clear to Auscultation bilaterally; No rales, rhonchi, wheezing, or rubs  HEART: Regular rate and rhythm; No murmurs, rubs, or gallops  ABDOMEN: Soft, Nontender, Nondistended; Bowel sounds present  EXTREMITIES:  2+ Peripheral Pulses, No clubbing, cyanosis, or edema  SKIN: No rashes or lesions  NERVOUS SYSTEM:  Alert & Oriented X3, Good concentration; Motor Strength 5/5 B/L upper and lower extremities    LABS:                        8.1    18.97 )-----------( 213      ( 23 Feb 2022 07:48 )             24.9     02-23    142  |  109<H>  |  57<H>  ----------------------------<  112<H>  3.6   |  28  |  1.65<H>    Ca    7.6<L>      23 Feb 2022 07:48  Phos  4.8     02-22  Mg     2.2     02-22    TPro  5.9<L>  /  Alb  1.6<L>  /  TBili  0.6  /  DBili  x   /  AST  91<H>  /  ALT  109<H>  /  AlkPhos  175<H>  02-22        CAPILLARY BLOOD GLUCOSE      POCT Blood Glucose.: 162 mg/dL (23 Feb 2022 16:44)  POCT Blood Glucose.: 136 mg/dL (23 Feb 2022 11:02)  POCT Blood Glucose.: 217 mg/dL (23 Feb 2022 07:54)  POCT Blood Glucose.: 126 mg/dL (22 Feb 2022 21:13)        Culture - Sputum (collected 02-17-22)  Source: .Sputum Sputum  Gram Stain (02-18-22):    Rare polymorphonuclear leukocytes per low power field    Moderate Squamous epithelial cells per low power field    Numerous Yeast per oil power field    Moderate Gram Positive Rods per oil power field    Few Gram Negative Rods per oil power field    Rare Gram positive cocci in pairs per oil power field  Final Report (02-18-22):    Normal Respiratory Nerissa present        RADIOLOGY & ADDITIONAL TESTS:          Imaging Personally Reviewed:  [ ] YES  [ ] NO    Consultant(s) Notes Reviewed:  [ ] YES  [ ] NO    Care Discussed with Consultants/Other Providers [x ] YES  [ ] NO

## 2022-02-23 NOTE — CONSULT NOTE ADULT - ASSESSMENT
HPI:  81 year old male pmh htn dm presents with 2 weeks of diarrhea and weakness. presented to ED and found to be in rapid afib with elevated cr. last cr feb 2021 was 1.  rec'd 5 liters ns by ed/ems.  cr improved slighlty. no abd pain, no fever no chest pain.   (15 Feb 2022 20:29)  ----- As Above -------- Patient was seen earlier today. History obtained from patient, chart , daughter and MD  The patient presented with two weeks of diarrhea. He had seen his PMD during the two weeks and was prescribed Erythromycin which did not help. He eventually had to com to the ER.  Patient was noted to be in afib and developed a DVT. The patient was started on IV heparin. Unfortunately. he developed documented melena. It stopped once the heparin was discontinued.  Patient has no history of GI bleeds, does not take NSAIDs routinely and denies abdominal pain  Patient is due for a repeat colonoscopy.     Melena - Etiology is probably upper GI tract. Started while patient was on heparin and stopped when it was discontinued. No prior history. Tolerating Regular diet. 1) EGD Thursday 2) PPI 3) Hold anticoagulants   ==== Discussed with daughter for ~ 20 minutes.

## 2022-02-23 NOTE — PROGRESS NOTE ADULT - SUBJECTIVE AND OBJECTIVE BOX
NEPHROLOGY PROGRESS NOTE    CHIEF COMPLAINT:  RADHA    HPI:  Renal function recovered from Cr 13 to 1.6 mg/dL.  UO 2.8 liters.  Patient states he is thirsty and drinking alot.  Also on IV LR.      ROS:  no SOB    EXAM:  T(F): 97.4 (02-23-22 @ 05:09)  HR: 78 (02-23-22 @ 05:09)  BP: 102/65 (02-23-22 @ 05:09)  RR: 18 (02-23-22 @ 05:09)  SpO2: 94% (02-23-22 @ 05:09)    Conversant, in no apparent distress  Normal respiratory effort, lungs clear bilaterally  Heart RRR with no murmur, no peripheral edema         LABS                             8.1    18.97 )-----------( 213      ( 23 Feb 2022 07:48 )             24.9          02-23    142  |  109<H>  |  57<H>  ----------------------------<  112<H>  3.6   |  28  |  1.65<H>    Ca    7.6<L>      23 Feb 2022 07:48  Phos  4.8     02-22  Mg     2.2     02-22    TPro  5.9<L>  /  Alb  1.6<L>  /  TBili  0.6  /  DBili  x   /  AST  91<H>  /  ALT  109<H>  /  AlkPhos  175<H>  02-22      ASSESSMENT  RADHA pre renal azotemia; ischemic ATN s/p dialysis x 1  Nonoliguric, recovery phase continues without significant electrolyte wasting    RECOMMEND  Monitor daily BMP, fluid balance  Can taper IVF off as his diuresis slows

## 2022-02-23 NOTE — PROGRESS NOTE ADULT - ASSESSMENT
81M w/ HTN, bladder tumor removal, melanoma. Presents w/ diarrhea and weakness. Admitted to ICU w/ rapif afib, septic shock likely from UTI vs pneumonia, RADHA w/ hyperkalemia and significant uremia and thrombocytopenia. S/p 1 HD treatment 2/16/22      Gram Negative bacterial Pneumonia  CXR: b/l lower lobe infiltrate   Septic Shock POA; resolved   acute respiratory failure with hypoxia  cont w/ IV abx.     Klebsiella Bacteremia   Septic Shock POA; resolved   ID on board   cont w/ IV abx.     Klebsiella UTI  as above     Afib w/ RVR   in the setting of sepsis   s/p Amiodarone, HR now controlled   cont to monitor   AC contraindicated at this time     Acute blood loss anemia   episode of melena while on Heparin drip  s/p 2U pRBC transfusion  pt will need GI follow up   cont w/ protonix     RADHA   likely combination of prerenal, ATN and obstructive uropathy;   s/p HD x1; now making urine;   cont w/ IVF and monitor renal function      thrombocytopenia   Secondary to Bacteremia  no active bleeding   cont to monitor     Acute Left LE DVT  Doppler: Acute nonocclusive above-the-knee deep venous thrombosis. Occlusive DVT in the greater saphenous vein.  AC contraindicated due to GI bleed.       Diabetes Mellitus   HgA1c: 7.8%  cont w/ FS monitoring w/ insulins s/s coverage     #PPx   - SCDs, heparin drip on hold    81M w/ HTN, bladder tumor removal, melanoma. Presents w/ diarrhea and weakness. Admitted to ICU w/ rapif afib, septic shock likely from UTI vs pneumonia, RADHA w/ hyperkalemia and significant uremia and thrombocytopenia. S/p 1 HD treatment 2/16/22      Gram Negative bacterial Pneumonia  CXR: b/l lower lobe infiltrate   Septic Shock POA; resolved   acute respiratory failure with hypoxia  cont w/ IV abx.     Klebsiella Bacteremia   Septic Shock POA; resolved   ID on board   cont w/ IV abx.     Klebsiella UTI  as above     Afib w/ RVR   in the setting of sepsis   s/p Amiodarone, HR now controlled   cont to monitor   AC contraindicated at this time     Acute blood loss anemia   episode of melena while on Heparin drip  s/p 2U pRBC transfusion  pt will need GI follow up   cont w/ protonix     RADHA   likely combination of prerenal, ATN and obstructive uropathy;   s/p HD x1; now making urine;   cont w/ IVF and monitor renal function      thrombocytopenia   Secondary to Bacteremia  no active bleeding   cont to monitor     Acute Left LE DVT  Doppler: Acute nonocclusive above-the-knee deep venous thrombosis. Occlusive DVT in the greater saphenous vein.  AC contraindicated due to GI bleed.       Diabetes Mellitus   HgA1c: 7.8%  cont w/ FS monitoring w/ insulins s/s coverage     #PPx   - SCDs, heparin drip on hold     plan discussed with beck Cornejo

## 2022-02-23 NOTE — CONSULT NOTE ADULT - SUBJECTIVE AND OBJECTIVE BOX
HPI:  81 year old male pmh htn dm presents with 2 weeks of diarrhea and weakness. presented to ED and found to be in rapid afib with elevated cr. last cr feb 2021 was 1.  rec'd 5 liters ns by ed/ems.  cr improved slighlty. no abd pain, no fever no chest pain.   (15 Feb 2022 20:29)  ----- As Above -------- Patient was seen earlier today  The patient presented with two weeks of diarrhea. He had seen his PMD during the two weeks and was prescribed Erythromycin which did not help. He eventually had to com to the ER.  Patient was noted to be in afib and developed a DVT. The patient was started on IV heparin. Unfortunately. he developed documented melena. It stopped once the heparin was discontinued.  Patient has no history of GI bleeds, does not take NSAIDs routinely      PAST MEDICAL & SURGICAL HISTORY:  Melanoma  head    HTN (hypertension)    HLD (hyperlipidemia)    Diabetes mellitus    Bladder tumor    History of cataract surgery  left eye    S/P TKR (total knee replacement), left  2015    H/O total knee replacement, right  2015    History of melanoma excision        MEDICATIONS  (STANDING):  ALPRAZolam 1 milliGRAM(s) Oral at bedtime  atorvastatin 40 milliGRAM(s) Oral at bedtime  cefepime   IVPB      cefepime   IVPB 1000 milliGRAM(s) IV Intermittent every 12 hours  chlorhexidine 2% Cloths 1 Application(s) Topical <User Schedule>  insulin lispro (ADMELOG) corrective regimen sliding scale   SubCutaneous Before meals and at bedtime  lactated ringers. 1000 milliLiter(s) (50 mL/Hr) IV Continuous <Continuous>  latanoprost 0.005% Ophthalmic Solution 1 Drop(s) Both EYES at bedtime  pantoprazole   Suspension 40 milliGRAM(s) Oral two times a day    MEDICATIONS  (PRN):  ondansetron Injectable 4 milliGRAM(s) IV Push every 4 hours PRN Nausea and/or Vomiting  sodium chloride 0.9% lock flush 10 milliLiter(s) IV Push every 1 hour PRN Pre/post blood products, medications, blood draw, and to maintain line patency      Allergies    No Known Allergies    Intolerances        FAMILY HISTORY:      REVIEW OF SYSTEMS:    CONSTITUTIONAL: No fever, weight loss, or fatigue  EYES: No eye pain, visual disturbances, or discharge  ENMT:  No difficulty hearing, tinnitus, vertigo; No sinus or throat pain  NECK: No pain or stiffness  BREASTS: No pain, masses, or nipple discharge  RESPIRATORY: No cough, wheezing, chills or hemoptysis; No shortness of breath  CARDIOVASCULAR: No chest pain, palpitations, dizziness, or leg swelling  GASTROINTESTINAL: No abdominal or epigastric pain. No nausea, vomiting, or hematemesis; No diarrhea or constipation. No melena or hematochezia.  GENITOURINARY: No dysuria, frequency, hematuria, or incontinence  NEUROLOGICAL: No headaches, memory loss, loss of strength, numbness, or tremors  SKIN: No itching, burning, rashes, or lesions   LYMPH NODES: No enlarged glands  ENDOCRINE: No heat or cold intolerance; No hair loss  MUSCULOSKELETAL: No joint pain or swelling; No muscle, back, or extremity pain  PSYCHIATRIC: No depression, anxiety, mood swings, or difficulty sleeping  HEME/LYMPH: No easy bruising, or bleeding gums  ALLERGY AND IMMUNOLOGIC: No hives or eczema          SOCIAL HISTORY:    FAMILY HISTORY:      Vital Signs Last 24 Hrs  T(C): 36.9 (23 Feb 2022 17:37), Max: 36.9 (23 Feb 2022 17:37)  T(F): 98.4 (23 Feb 2022 17:37), Max: 98.4 (23 Feb 2022 17:37)  HR: 78 (23 Feb 2022 20:10) (73 - 87)  BP: 134/78 (23 Feb 2022 20:10) (102/65 - 134/78)  BP(mean): --  RR: 18 (23 Feb 2022 17:37) (18 - 18)  SpO2: 94% (23 Feb 2022 20:10) (94% - 96%)    PHYSICAL EXAM:    GENERAL: NAD, well-groomed, well-developed  HEAD:  Atraumatic, Normocephalic  EYES: EOMI, PERRLA, conjunctiva and sclera clear  ENMT: No tonsillar erythema, exudates, or enlargement; Moist mucous membranes, Good dentition, No lesions  NECK: Supple, No JVD, Normal thyroid  NERVOUS SYSTEM:  Alert & Oriented X3, Good concentration; Motor Strength 5/5 B/L upper and lower extremities; DTRs 2+ intact and symmetric  CHEST/LUNG: Clear to percussion bilaterally; No rales, rhonchi, wheezing, or rubs  HEART: Regular rate and rhythm; No murmurs, rubs, or gallops  ABDOMEN: Soft, Nontender, Nondistended; Bowel sounds present  EXTREMITIES:  2+ Peripheral Pulses, No clubbing, cyanosis, or edema  LYMPH: No lymphadenopathy noted   RECTAL: No masses, prostate normal size and smooth, Guaiaci negative   BREAST: No palpable masses, skin no lesions no retractions, no discharges. adnexal no palpable masses noted   GYN: uterus normal size, adnexal, no palpable masses, no CMT, no uterine discharge   SKIN: No rashes or lesions    LABS:                        8.1    18.97 )-----------( 213      ( 23 Feb 2022 07:48 )             24.9       CBC:  02-23 @ 07:48  WBC  18.97  HGB 8.1  HCT 24.9 Plate 213  MCV 92.6  02-22 @ 08:09  WBC  20.59  HGB 8.0  HCT 24.1 Plate 208  MCV 90.6  02-21 @ 07:24  WBC  24.50  HGB 8.9  HCT 26.5 Plate 185  MCV 89.5  02-21 @ 00:30  WBC  24.59  HGB 8.9  HCT 26.4 Plate 172  MCV 89.2  02-20 @ 09:29  WBC  24.19  HGB 6.5  HCT 19.8 Plate 115  MCV 93.8  02-20 @ 03:39  WBC  23.19  HGB 6.8  HCT 20.6 Plate 166  MCV 93.6  02-19 @ 16:33  WBC  25.64  HGB 7.3  HCT 21.9 Plate 113  MCV 92.8  02-19 @ 08:17  WBC  24.77  HGB 7.4  HCT 22.2 Plate 154  MCV 92.1  02-18 @ 02:22  WBC  21.88  HGB 8.3  HCT 24.1 Plate 96  MCV 89.6  02-17 @ 16:54  WBC  20.27  HGB 8.7  HCT 25.4 Plate 80  MCV 89.4  02-17 @ 05:39  WBC  25.84  HGB 9.9  HCT 29.4 Plate 74  MCV 90.5           23 Feb 2022 07:48    142    |  109    |  57     ----------------------------<  112    3.6     |  28     |  1.65   22 Feb 2022 08:09    143    |  109    |  75     ----------------------------<  108    3.9     |  28     |  1.83   21 Feb 2022 07:24    140    |  108    |  101    ----------------------------<  112    3.9     |  25     |  2.17   20 Feb 2022 03:39    141    |  109    |  116    ----------------------------<  144    3.9     |  25     |  2.91   19 Feb 2022 08:17    140    |  108    |  127    ----------------------------<  94     3.7     |  23     |  3.95   18 Feb 2022 02:22    140    |  107    |  139    ----------------------------<  115    3.6     |  23     |  5.71   17 Feb 2022 16:54    139    |  106    |  148    ----------------------------<  214    3.7     |  22     |  6.46     Ca    7.6        23 Feb 2022 07:48  Ca    7.4        22 Feb 2022 08:09  Ca    7.6        21 Feb 2022 07:24  Ca    7.0        20 Feb 2022 03:39  Ca    7.4        19 Feb 2022 08:17  Ca    7.1        18 Feb 2022 02:22  Ca    7.2        17 Feb 2022 16:54  Phos  4.8       22 Feb 2022 08:09  Phos  4.4       19 Feb 2022 08:17  Phos  4.4       18 Feb 2022 02:22  Phos  4.9       17 Feb 2022 05:39  Mg     2.2       22 Feb 2022 08:09  Mg     2.9       19 Feb 2022 08:17  Mg     3.0       18 Feb 2022 02:22  Mg     3.2       17 Feb 2022 05:39    TPro  5.9    /  Alb  1.6    /  TBili  0.6    /  DBili  x      /  AST  91     /  ALT  109    /  AlkPhos  175    22 Feb 2022 08:09  TPro  5.5    /  Alb  1.5    /  TBili  0.6    /  DBili  x      /  AST  112    /  ALT  91     /  AlkPhos  174    21 Feb 2022 07:24  TPro  5.2    /  Alb  1.3    /  TBili  0.8    /  DBili  x      /  AST  61     /  ALT  46     /  AlkPhos  158    19 Feb 2022 08:17  TPro  5.1    /  Alb  1.3    /  TBili  0.8    /  DBili  x      /  AST  61     /  ALT  40     /  AlkPhos  142    18 Feb 2022 02:22  TPro  5.0    /  Alb  1.3    /  TBili  0.7    /  DBili  x      /  AST  93     /  ALT  43     /  AlkPhos  143    17 Feb 2022 05:39        C-Reactive Protein, Serum: 44 mg/L (02-22 @ 10:55)  C-Reactive Protein, Serum: 47 mg/L (02-21 @ 10:51)  C-Reactive Protein, Serum: 83 mg/L (02-18 @ 09:23)      RADIOLOGY & ADDITIONAL STUDIES: HPI:  81 year old male pmh htn dm presents with 2 weeks of diarrhea and weakness. presented to ED and found to be in rapid afib with elevated cr. last cr feb 2021 was 1.  rec'd 5 liters ns by ed/ems.  cr improved slighlty. no abd pain, no fever no chest pain.   (15 Feb 2022 20:29)  ----- As Above -------- Patient was seen earlier today. History obtained from patient, chart , daughter and MD  The patient presented with two weeks of diarrhea. He had seen his PMD during the two weeks and was prescribed Erythromycin which did not help. He eventually had to com to the ER.  Patient was noted to be in afib and developed a DVT. The patient was started on IV heparin. Unfortunately. he developed documented melena. It stopped once the heparin was discontinued.  Patient has no history of GI bleeds, does not take NSAIDs routinely and denies abdominal pain  Patient is due for a repeat colonoscopy.       PAST MEDICAL & SURGICAL HISTORY:  Melanoma  head    HTN (hypertension)    HLD (hyperlipidemia)    Diabetes mellitus    Bladder tumor    History of cataract surgery  left eye    S/P TKR (total knee replacement), left  2015    H/O total knee replacement, right  2015    History of melanoma excision        MEDICATIONS  (STANDING):  ALPRAZolam 1 milliGRAM(s) Oral at bedtime  atorvastatin 40 milliGRAM(s) Oral at bedtime  cefepime   IVPB      cefepime   IVPB 1000 milliGRAM(s) IV Intermittent every 12 hours  chlorhexidine 2% Cloths 1 Application(s) Topical <User Schedule>  insulin lispro (ADMELOG) corrective regimen sliding scale   SubCutaneous Before meals and at bedtime  lactated ringers. 1000 milliLiter(s) (50 mL/Hr) IV Continuous <Continuous>  latanoprost 0.005% Ophthalmic Solution 1 Drop(s) Both EYES at bedtime  pantoprazole   Suspension 40 milliGRAM(s) Oral two times a day    MEDICATIONS  (PRN):  ondansetron Injectable 4 milliGRAM(s) IV Push every 4 hours PRN Nausea and/or Vomiting  sodium chloride 0.9% lock flush 10 milliLiter(s) IV Push every 1 hour PRN Pre/post blood products, medications, blood draw, and to maintain line patency      Allergies    No Known Allergies    Intolerances        FAMILY HISTORY:      REVIEW OF SYSTEMS:    CONSTITUTIONAL: No fever, weight loss,  EYES: No eye pain, visual disturbances, or discharge  ENMT:  No difficulty hearing, tinnitus, vertigo; No sinus or throat pain  NECK: No pain or stiffness  BREASTS: No pain, masses, or nipple discharge  RESPIRATORY: No cough, wheezing, chills or hemoptysis; No shortness of breath  CARDIOVASCULAR: No chest pain, palpitations, dizziness, or leg swelling  GASTROINTESTINAL: See above   GENITOURINARY: No dysuria, frequency, hematuria, or incontinence  NEUROLOGICAL: No headaches, numbness, or tremors  SKIN: No itching, burning, rashes, or lesions   LYMPH NODES: No enlarged glands  ENDOCRINE: No heat or cold intolerance; No hair loss  MUSCULOSKELETAL: No joint pain or swelling; No muscle, back, or extremity pain  PSYCHIATRIC: No depression, anxiety, mood swings, or difficulty sleeping  HEME/LYMPH: No easy bruising, or bleeding gums  ALLERGY AND IMMUNOLOGIC: No hives or eczema          SOCIAL HISTORY:    FAMILY HISTORY:      Vital Signs Last 24 Hrs  T(C): 36.9 (23 Feb 2022 17:37), Max: 36.9 (23 Feb 2022 17:37)  T(F): 98.4 (23 Feb 2022 17:37), Max: 98.4 (23 Feb 2022 17:37)  HR: 78 (23 Feb 2022 20:10) (73 - 87)  BP: 134/78 (23 Feb 2022 20:10) (102/65 - 134/78)  BP(mean): --  RR: 18 (23 Feb 2022 17:37) (18 - 18)  SpO2: 94% (23 Feb 2022 20:10) (94% - 96%)    PHYSICAL EXAM:    GENERAL: NAD, well-groomed, well-developed  HEAD:  Atraumatic, Normocephalic  EYES: EOMI, PERRLA, conjunctiva and sclera clear  NECK: Supple, No JVD, Normal thyroid  NERVOUS SYSTEM:  Alert & Oriented X3, Good concentration;   CHEST/LUNG: Clear to percussion bilaterally; No rales, rhonchi, wheezing, or rubs  HEART: Regular rate and rhythm; No murmurs, rubs, or gallops  ABDOMEN: Soft, Nontender, Nondistended; Bowel sounds present  EXTREMITIES:  2+ Peripheral Pulses, No clubbing, cyanosis, or edema  LYMPH: No lymphadenopathy noted   RECTAL: Refused.  SKIN: No rashes or lesions    LABS:                        8.1    18.97 )-----------( 213      ( 23 Feb 2022 07:48 )             24.9       CBC:  02-23 @ 07:48  WBC  18.97  HGB 8.1  HCT 24.9 Plate 213  MCV 92.6  02-22 @ 08:09  WBC  20.59  HGB 8.0  HCT 24.1 Plate 208  MCV 90.6  02-21 @ 07:24  WBC  24.50  HGB 8.9  HCT 26.5 Plate 185  MCV 89.5  02-21 @ 00:30  WBC  24.59  HGB 8.9  HCT 26.4 Plate 172  MCV 89.2  02-20 @ 09:29  WBC  24.19  HGB 6.5  HCT 19.8 Plate 115  MCV 93.8  02-20 @ 03:39  WBC  23.19  HGB 6.8  HCT 20.6 Plate 166  MCV 93.6  02-19 @ 16:33  WBC  25.64  HGB 7.3  HCT 21.9 Plate 113  MCV 92.8  02-19 @ 08:17  WBC  24.77  HGB 7.4  HCT 22.2 Plate 154  MCV 92.1  02-18 @ 02:22  WBC  21.88  HGB 8.3  HCT 24.1 Plate 96  MCV 89.6  02-17 @ 16:54  WBC  20.27  HGB 8.7  HCT 25.4 Plate 80  MCV 89.4  02-17 @ 05:39  WBC  25.84  HGB 9.9  HCT 29.4 Plate 74  MCV 90.5           23 Feb 2022 07:48    142    |  109    |  57     ----------------------------<  112    3.6     |  28     |  1.65   22 Feb 2022 08:09    143    |  109    |  75     ----------------------------<  108    3.9     |  28     |  1.83   21 Feb 2022 07:24    140    |  108    |  101    ----------------------------<  112    3.9     |  25     |  2.17   20 Feb 2022 03:39    141    |  109    |  116    ----------------------------<  144    3.9     |  25     |  2.91   19 Feb 2022 08:17    140    |  108    |  127    ----------------------------<  94     3.7     |  23     |  3.95   18 Feb 2022 02:22    140    |  107    |  139    ----------------------------<  115    3.6     |  23     |  5.71   17 Feb 2022 16:54    139    |  106    |  148    ----------------------------<  214    3.7     |  22     |  6.46     Ca    7.6        23 Feb 2022 07:48  Ca    7.4        22 Feb 2022 08:09  Ca    7.6        21 Feb 2022 07:24  Ca    7.0        20 Feb 2022 03:39  Ca    7.4        19 Feb 2022 08:17  Ca    7.1        18 Feb 2022 02:22  Ca    7.2        17 Feb 2022 16:54  Phos  4.8       22 Feb 2022 08:09  Phos  4.4       19 Feb 2022 08:17  Phos  4.4       18 Feb 2022 02:22  Phos  4.9       17 Feb 2022 05:39  Mg     2.2       22 Feb 2022 08:09  Mg     2.9       19 Feb 2022 08:17  Mg     3.0       18 Feb 2022 02:22  Mg     3.2       17 Feb 2022 05:39    TPro  5.9    /  Alb  1.6    /  TBili  0.6    /  DBili  x      /  AST  91     /  ALT  109    /  AlkPhos  175    22 Feb 2022 08:09  TPro  5.5    /  Alb  1.5    /  TBili  0.6    /  DBili  x      /  AST  112    /  ALT  91     /  AlkPhos  174    21 Feb 2022 07:24  TPro  5.2    /  Alb  1.3    /  TBili  0.8    /  DBili  x      /  AST  61     /  ALT  46     /  AlkPhos  158    19 Feb 2022 08:17  TPro  5.1    /  Alb  1.3    /  TBili  0.8    /  DBili  x      /  AST  61     /  ALT  40     /  AlkPhos  142    18 Feb 2022 02:22  TPro  5.0    /  Alb  1.3    /  TBili  0.7    /  DBili  x      /  AST  93     /  ALT  43     /  AlkPhos  143    17 Feb 2022 05:39        C-Reactive Protein, Serum: 44 mg/L (02-22 @ 10:55)  C-Reactive Protein, Serum: 47 mg/L (02-21 @ 10:51)  C-Reactive Protein, Serum: 83 mg/L (02-18 @ 09:23)      RADIOLOGY & ADDITIONAL STUDIES:  < from: CT Abdomen and Pelvis w/ Oral Cont (02.19.22 @ 13:36) >    ACC: [id number] EXAM:  CT ABDOMEN AND PELVIS OC                          PROCEDURE DATE:  02/19/2022          INTERPRETATION:  CLINICAL INFORMATION: Bacteremia    COMPARISON: 2/15    PROCEDURE:  CT of the Abdomen and Pelvis was performed without intravenous contrast.  Intravenous contrast: None.  Oral contrast: Positive contrast was administered.  Sagittal and coronal reformats were performed.    FINDINGS:    VISUALIZED CHEST: Bibasilar atelectasis and trace effusions.    ABDOMEN AND PELVIS:  LIVER: Within normal limits.  BILE DUCTS: Normal caliber.  GALLBLADDER: Cholelithiasis.  SPLEEN: Within normal limits.  PANCREAS: Within normal limits.  ADRENALS: Within normal limits.  KIDNEYS/URETERS: Within normal limits.    BLADDER: Khalil catheter.  REPRODUCTIVE ORGANS: No pelvic masses.    BOWEL: No bowel obstruction. Appendix is normal. Moderate hiatal hernia.  PERITONEUM: No ascites.  VESSELS: Atherosclerotic changes.  RETROPERITONEUM/LYMPH NODES: No lymphadenopathy.  ABDOMINAL WALL: Within normal limits.  BONES: Within normal limits.    IMPRESSION:  No acute findings. Multiple gallstones.        --- End of Report ---            JORDANA LIMON MD; Attending Radiologist  This document has been electronically signed. Feb 19 2022  1:57PM    < end of copied text >

## 2022-02-23 NOTE — PROGRESS NOTE ADULT - SUBJECTIVE AND OBJECTIVE BOX
TMAX - 98.2    On day # 7 Cefepime    Vital Signs Last 24 Hrs  T(C): 36.8 (2022 12:21), Max: 36.8 (2022 18:00)  T(F): 98.2 (2022 12:21), Max: 98.2 (2022 18:00)  HR: 73 (2022 12:21) (73 - 87)  BP: 108/69 (2022 12:21) (102/65 - 108/69)  RR: 18 (2022 12:21) (18 - 18)  SpO2: 96% (2022 12:21) (94% - 96%)  Supplemental O2:  on NC O2    Awakens, alert, feeling a little better again today.  No c/o SOB at present and claims his cough is diminishing now.  Remains on  3 liters NC O2 with O2 Sat's between 94 - 96%.  Drinking more fluids and trying to eat more food - claims he forces himself to eat about 1/2 of the meals and no c/o N/V/D and also has not had any BM's as yet.    PHYSICAL EXAM   General: 82 y/o white male awakens, alert, with NC O2 in place, pleasant and cooperative, sitting uprignt in bed now, still with a dry mouth but in NAD  HEENT: conj pale, sclerae anicteric, PERRLA, no oral lesions noted but mucosa and tongue still appear somewhat dry  Neck: supple, no nodes noted  Heart: RR  Lungs: decreased BS at the bases  Abdomen: BS+, soft, nontender to palpation, no masses or HS-megaly detected  Back: no CVA or Spinal tenderness noted  Extremities: well-healed bilateral TKR scars                    1+ edema LE's                     arms and hands still with some swelling   Skin: warm, dry, no rash noted  Khalil in place and draining clear yellow urine now - 2800cc  output recorded over the prior 24hrs.    I&O's Summary :  2022 07:01  -  2022 07:00  IN: 1490 mL / OUT: 2800 mL / NET: -1310 mL  2022 07:01  -  2022 17:34  IN: 0 mL / OUT: 1150 mL / NET: -1150 mL    LABS:  CBC Full  -  ( 2022 07:48 )  WBC Count : 18.97 K/uL  RBC Count : 2.69 M/uL  Hemoglobin : 8.1 g/dL  Hematocrit : 24.9 %  Platelet Count - Automated : 213 K/uL  Mean Cell Volume : 92.6 fl  Mean Cell Hemoglobin : 30.1 pg  Mean Cell Hemoglobin Concentration : 32.5 g/dL  Auto Neutrophil # : 14.26 K/uL  Auto Lymphocyte # : 1.65 K/uL  Auto Monocyte # : 0.88 K/uL  Auto Eosinophil # : 0.38 K/uL  Auto Basophil # : 0.06 K/uL  Auto Neutrophil % : 75.2 %  Auto Lymphocyte % : 8.7 %  Auto Monocyte % : 4.6 %  Auto Eosinophil % : 2.0 %  Auto Basophil % : 0.3 %      142  |  109<H>  |  57<H>  ----------------------------<  112<H>  3.6   |  28  |  1.65<H>  Ca    7.6<L>      2022 07:48  Phos  4.8       Mg     2.2       TPro  5.9<L>  /  Alb  1.6<L>  /  TBili  0.6  /  DBili  x   /  AST  91<H>  /  ALT  109<H>  /  AlkPhos  175<H>    LIVER FUNCTIONS - ( 2022 08:09 )  Alb: 1.6 g/dL / Pro: 5.9 gm/dL / ALK PHOS: 175 U/L / ALT: 109 U/L / AST: 91 U/L / GGT: x           Sedimentation Rate, Erythrocyte: 89 mm/hr ( @ 08:09)  Sedimentation Rate, Erythrocyte: 75 mm/hr ( @ 07:24)  Sedimentation Rate, Erythrocyte: 62 mm/hr ( @ 02:22)    C-Reactive Protein, Serum (22 @ 10:55)    C-Reactive Protein, Serum: 44 mg/L  C-Reactive Protein, Serum (22 @ 10:51)    C-Reactive Protein, Serum: 47 mg/L  C-Reactive Protein, Serum (22 @ 09:23)    C-Reactive Protein, Serum: 83 mg/L    Procalcitonin, Serum (22 @ 10:55)    Procalcitonin, Serum: 1.18:   Procalcitonin, Serum (22 @ 10:51)    Procalcitonin, Serum: 2.09:   Procalcitonin, Serum (22 @ 09:23)    Procalcitonin, Serum: 17.20:   Procalcitonin, Serum (22 @ 09:20)    Procalcitonin, Serum: 81.70:    Lactate, Blood (02.15.22 @ 21:14)    Lactate, Blood: 1.5 mmol/L  Lactate, Blood (02.15.22 @ 18:24)    Lactate, Blood: 2.5: Elevated lactate. Consider ordering follow-up lactate to trend. mmol/L  Lactate, Blood (02.15.22 @ 14:15)    Lactate, Blood: 3.9: Elevated lactate. Consider ordering follow-up lactate to trend. mmol/L    A1C with Estimated Average Glucose (22 @ 09:30)    A1C with Estimated Average Glucose Result: 7.6: Method: Immunoassay       Reference Range                4.0-5.6%       High risk (prediabetic)        5.7-6.4%       Diabetic, diagnostic             >=6.5%       ADA diabetic treatment goal       <7.0%    Estimated Average Glucose: 171    Acute Hepatitis Panel (22 @ 09:37)    Hepatitis C Virus Interpretation: Nonreact: Hepatitis C AB    Hepatitis C Virus S/CO Ratio: 0.12 S/CO    Hepatitis B Core IgM Antibody: Nonreact    Hepatitis B Surface Antigen: Nonreact    Hepatitis A IgM Antibody: Nonreact    Hepatitis B Surface Antibody (22 @ 09:37)    Hepatitis B Surface Antibody: Nonreact    C3 Complement, Serum (22 @ 09:37)    C3 Complement, Serum: 115 mg/dL  C4 Complement, Serum (22 @ 09:37)    C4 Complement, Serum: 26 mg/dL    Glomerular Basement Membrane Ab IgG (22 @ 21:11)    Glomerular Basement Membrane Ab Ig: Negative        MICROBIOLOGY:    Flu With COVID-19 By [facility] (22 @ 06:11)    SARS-CoV-2 Result: NotDetec: EUA/IVD    Influenza A Result: NotDetec: EUA/IVD    Influenza B Result: NotDetec: EUA/IVD    Specimen Source: .Sputum Sputum ( @ 01:01)  Culture Results:   Normal Respiratory Alonzo present ( @ 01:01)    Gram Stain:   Rare polymorphonuclear leukocytes per low power field  Moderate Squamous epithelial cells per low power field  Numerous Yeast per oil power field  Moderate Gram Positive Rods per oil power field  Few Gram Negative Rods per oil power field  Rare Gram positive cocci in pairs per oil power field    Specimen Source: Clean Catch Clean Catch (Midstream) ( @ 08:49)  Culture Results:   >100,000 CFU/ml Klebsiella oxytoca/Raoutella ornithinolytica  Multiple Morphological Strains ( @ 08:49)    -  Tigecycline: S <=2    -  Tobramycin: S <=2    -  Trimethoprim/Sulfamethoxazole: S <=0.5/9.5    -  Amikacin: S <=16    -  Amoxicillin/Clavulanic Acid: S <=8/4    -  Ampicillin: R >16 These ampicillin results predict results for amoxicillin    -  Ampicillin/Sulbactam: S 8/4 Enterobacter, Klebsiella aerogenes, Citrobacter, and Serratia may develop resistance during prolonged therapy (3-4 days)    -  Aztreonam: S <=4    -  Cefazolin: R 16    -  Cefepime: S <=2    -  Cefoxitin: S <=8    -  Ceftriaxone: S <=1 Enterobacter, Klebsiella aerogenes, Citrobacter, and Serratia may develop resistance during prolonged therapy    -  Ciprofloxacin: S <=0.25    -  Gentamicin: S <=2    -  Imipenem: S <=1    -  Ertapenem: S <=0.5    -  Levofloxacin: S <=0.5    -  Meropenem: S <=1    -  Nitrofurantoin: S <=32 Should not be used to treat pyelonephritis    -  Piperacillin/Tazobactam: S <=8    Method Type: NATACHA    Specimen Source: .Blood Blood-Peripheral (02-15 @ 19:01)  Culture Results:   Growth in aerobic and anaerobic bottles: Klebsiella oxytoca/Raoutella  ornithinolytica    -  Klebsiella oxytoca: Detec    Gram Stain:   Growth in aerobic and anaerobic bottles: Gram Negative Rods    -  Meropenem: S <=1    -  Ertapenem: S <=0.5    -  Gentamicin: S <=2    -  Imipenem: S <=1    -  Levofloxacin: S <=0.5    -  Piperacillin/Tazobactam: S <=8    -  Tobramycin: S <=2    -  Trimethoprim/Sulfamethoxazole: S <=0.5/9.5    -  Amikacin: S <=16    -  Ampicillin: R >16 These ampicillin results predict results for amoxicillin    -  Ampicillin/Sulbactam: S 8/4 Enterobacter, Klebsiella aerogenes, Citrobacter, and Serratia may develop resistance during prolonged therapy (3-4 days)    -  Aztreonam: R >16    -  Cefazolin: R 16 Enterobacter, Klebsiella aerogenes, Citrobacter, and Serratia may develop resistance during prolonged therapy (3-4 days)    -  Cefepime: S <=2    -  Cefoxitin: S <=8    -  Ceftriaxone: S <=1 Enterobacter, Klebsiella aerogenes, Citrobacter, and Serratia may develop resistance during prolonged therapy    -  Ciprofloxacin: S <=0.25    Organism Identification: Blood Culture PCR  Klebsiella oxytoca /Raoutella ornithinolytica    Method Type: PCR    Method Type: NATACHA    Specimen Source: .Blood Blood-Peripheral (02-15 @ 19:01)  Culture Results:   Growth in aerobic and anaerobic bottles: Klebsiella oxytoca/Raoutella  ornithinolytica  See previous culture [id number] (02-15 @ 19:01)        Legionella Antigen, Urine: Negative ( @ 09:14)    MRSA/MSSA PCR (22 @ 09:20)    MRSA PCR Result.: NotDetec:    Staph Aureus PCR Result: NotDetec    Respiratory Viral Panel with COVID-19 by [facility] (02.15.22 @ 14:34)    Rapid RVP Result: NotDetec    SARS-CoV-2: NotDetec:     RADIOLOGY:  < from: CT Abdomen and Pelvis w/ Oral Cont (22 @ 13:36) >  ACC: [id number] EXAM:  CT ABDOMEN AND PELVIS OC                        PROCEDURE DATE:  2022    INTERPRETATION:  CLINICAL INFORMATION: Bacteremia  COMPARISON: 2/15  PROCEDURE:  CT of the Abdomen and Pelvis was performed without intravenous contrast.  Intravenous contrast: None.  Oral contrast: Positive contrast was administered.  Sagittal and coronal reformats were performed.  FINDINGS:  VISUALIZED CHEST: Bibasilar atelectasis and trace effusions.  ABDOMEN AND PELVIS:  LIVER: Within normal limits.  BILE DUCTS: Normal caliber.  GALLBLADDER: Cholelithiasis.  SPLEEN: Within normal limits.  PANCREAS: Within normal limits.  ADRENALS: Within normal limits.  KIDNEYS/URETERS: Within normal limits.  BLADDER: Khalil catheter.  REPRODUCTIVE ORGANS: No pelvic masses.  BOWEL: No bowel obstruction. Appendix is normal. Moderate hiatal hernia.  PERITONEUM: No ascites.  VESSELS: Atherosclerotic changes.  RETROPERITONEUM/LYMPH NODES: No lymphadenopathy.  ABDOMINAL WALL: Within normal limits.  BONES: Within normal limits.  IMPRESSION:  No acute findings. Multiple gallstones.    < from: US Duplex Venous Lower Ext Complete, Bilateral (22 @ 04:56) >  ACC: [id number] EXAM:  US DPLX LWR EXT VEINS COMPL BI                        PROCEDURE DATE:  2022    INTERPRETATION:  CLINICAL INFORMATION: Lower extremity swelling.  COMPARISON: None available.  TECHNIQUE: Duplex sonography of theBILATERAL LOWER extremity veins with   color and spectral Doppler, with and without compression.  IMPRESSION:  RIGHT: Limited calf vein visualization. Nonvisualization of the calf   veins. No evidence of deep venous thrombosis at or above the knee.  LEFT: Limited calf vein visualization. Acute nonocclusive above-the-knee   deep venous thrombosis. Occlusive DVT in the greater saphenous vein.    < from: US Abdomen Upper Quadrant Right (22 @ 12:03) >  ACC: [id number] EXAM:  US ABDOMEN RT UPR QUADRANT                        PROCEDURE DATE:  2022    INTERPRETATION:  CLINICAL INFORMATION: Elevated LFTs.  COMPARISON: CT abdomen/pelvis 2/15/2022.  TECHNIQUE: Sonography of the right upper quadrant.  IMPRESSION: Multiple gallstones. Gallbladder wall thickening. No   pericholecystic fluid. No evidence for intrahepatic or extrahepatic   biliary ductal dilatation.    < from: Xray Chest 1 View-PORTABLE IMMEDIATE (Xray Chest 1 View-PORTABLE IMMEDIATE .) (22 @ 01:26) >  ACC: [id number] EXAM:  XR CHEST PORTABLE IMMED 1V                        PROCEDURE DATE:  2022    IMPRESSION:  Right IJ line HD catheter in satisfactory position with no pneumothorax,   new    < from: CT Abdomen and Pelvis No Cont (02.15.22 @ 23:57) >  ACC: [id number] EXAM:  CT ABDOMEN AND PELVIS                        PROCEDURE DATE:  02/15/2022    INTERPRETATION:  CLINICAL INFORMATION:  Abdominal distension  COMPARISON: None.  CONTRAST/COMPLICATIONS:  IV Contrast: None  Oral Contrast:None  Complications: None  PROCEDURE:  Axial CT images were acquired through the abdomen and pelvis obtained   without intravenous contrast. Coronal and sagittal reformatted images   provided.  FINDINGS: This examination is limited by patient motion artifact and the   lack of oral and intravenous contrast.  LOWER CHEST: Bibasilar atelectasis. Coronary artery calcifications.  LIVER: Within normal limits.  BILE DUCTS: Normal caliber.  GALLBLADDER: Cholelithiasis..  SPLEEN: Within normal limits.  PANCREAS: Within normal limits.  ADRENALS: Within normal limits.  KIDNEYS/URETERS: No definite renal stone or hydronephrosis  BLADDER: Distended with Khalil catheter. No bladder wall thickening.  REPRODUCTIVE ORGANS: Prostate gland mildly enlarged.  BOWEL: Evaluation of bowel is limited without distention with oral   contrast, however there is no bowel obstruction. Few colonic diverticula   without acute diverticulitis. Small bowel loops are not dilated.   Unremarkable appendix without appendicitis. Stomach is underdistended for   adequate evaluation, however suggestion of a moderate hiatal hernia.   Appendix  PERITONEUM: No free air significant ascites.  VESSELS:  Limited without intravenous contrast. There is calcific   atherosclerosis of the abdominal aorta without gross aneurysmal   dilatation. 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  RETROPERITONEUM: No lymphadenopathy.  ABDOMINAL WALL: Small fat-containing bilateral inguinal hernias.  BONES: Degenerative changes of the spine. Bilateral L5 spondylolysis with   slight grade 1 anterolisthesis of L5 on S1.  MPRESSION:  There is no bowel obstruction, significant ascites or free   intraperitoneal air.  Moderate hiatal hernia.  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    < from: Xray Chest 1 View- PORTABLE-Urgent (02.15.22 @ 14:32) >  ACC: [id number] EXAM:  XR CHEST PORTABLE URGENT 1V                        PROCEDURE DATE:  02/15/2022    INTERPRETATION:  AP semierect chest on February 15, 2022 at 1:55 PM.   Patient has sepsis.  Heart enlargement again noted.  There aremild mid lower lung field infiltrates medially new since 2020.  IMPRESSION: Bilateral infiltrates as above.    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 ago, s/p  Bilateral Cataract Surgeries, admitted via the ER to [facility] on 2/15/22 with c/o diarrhea x 2 weeks at home along with decreased po intake and mild nausea with a few episodes of vomiting bilious material.  W/u revealed      Rx'ed empirically with Rocephin + Zithromax after Blood and Urine Cx's were obtained.    Sepsis with Klebsiella Oxytoca ( AKA Raoutella ornithinolytica ) Bacteremia as identified from 2 out of 2 Blood Cx's from 2/15/22  - ? , GI, or Pulmonary source with Bilateral lower lobe PNA seen on initial CXR and Cholelithiasis with GB wall thickening seen on Sono.  Ab rx changed to Cefepime with Zithromax on 22 and patient remains with low - normal temps.  Sensitivities of the Kleb Oxytoca isolated from Blood Cx's noted above and sensitive to Cefepime.  Urine Legionella Ag is negative and now Urine Cx from 22 reported with >100,000 Klebsiella oxytoca ( multiple morphologic strains ) and now with sensitivities completed and noted to be the same as the Blood Cx isolates.  Sputum Cx finalized as normal alonzo.  CT of the Abdomen + Pelvis with oral contrast done 22  resulted with no acute findings, moderate Hiatal Hernia, and multiple Gallstones.  S/p transfusion of 2 Units of PRBC's on 22 for H+H decreased to 6.5 / 19.8 and noted with continued elevated WBC's and ESR although CRP and Procalcitonin are trending downwards and there is continued slow improvement in renal function.    Suggestions:  Will continue present ab rx with Cefepime, s/p completed rx with Zithromax  for rx of Sepsis due to Klebsiella Bacteremia and UTI along with Bilateral PNA.  Follow-up temps and labs closely.  Follow-up CXR and monitor O2 Sat's.  Discussed with patient  again in detail at the bedside and with his son-in-law Antione via phone ( [phone] ) at patient's request. TMAX - 98.2    On day # 7 Cefepime    Vital Signs Last 24 Hrs  T(C): 36.8 (2022 12:21), Max: 36.8 (2022 18:00)  T(F): 98.2 (2022 12:21), Max: 98.2 (2022 18:00)  HR: 73 (2022 12:21) (73 - 87)  BP: 108/69 (2022 12:21) (102/65 - 108/69)  RR: 18 (2022 12:21) (18 - 18)  SpO2: 96% (2022 12:21) (94% - 96%)  Supplemental O2:  on NC O2    Awakens, alert, feeling a little better again today.  No c/o SOB at present and claims his cough is diminishing now.  Remains on  3 liters NC O2 with O2 Sat's between 94 - 96%.  Drinking more fluids and trying to eat more food - claims he forces himself to eat about 1/2 of the meals and no c/o N/V/D and also has not had any BM's as yet.    PHYSICAL EXAM   General: 82 y/o white male awakens, alert, with NC O2 in place, pleasant and cooperative, sitting uprignt in bed now, still with a dry mouth but in NAD  HEENT: conj pale, sclerae anicteric, PERRLA, no oral lesions noted but mucosa and tongue still appear somewhat dry  Neck: supple, no nodes noted  Heart: RR  Lungs: decreased BS at the bases  Abdomen: BS+, soft, nontender to palpation, no masses or HS-megaly detected  Back: no CVA or Spinal tenderness noted  Extremities: well-healed bilateral TKR scars                    1+ edema LE's                     arms and hands still with some swelling   Skin: warm, dry, no rash noted  Khalil in place and draining clear yellow urine now - 2800cc  output recorded over the prior 24hrs.    I&O's Summary :  2022 07:01  -  2022 07:00  IN: 1490 mL / OUT: 2800 mL / NET: -1310 mL  2022 07:01  -  2022 17:34  IN: 0 mL / OUT: 1150 mL / NET: -1150 mL    LABS:  CBC Full  -  ( 2022 07:48 )  WBC Count : 18.97 K/uL  RBC Count : 2.69 M/uL  Hemoglobin : 8.1 g/dL  Hematocrit : 24.9 %  Platelet Count - Automated : 213 K/uL  Mean Cell Volume : 92.6 fl  Mean Cell Hemoglobin : 30.1 pg  Mean Cell Hemoglobin Concentration : 32.5 g/dL  Auto Neutrophil # : 14.26 K/uL  Auto Lymphocyte # : 1.65 K/uL  Auto Monocyte # : 0.88 K/uL  Auto Eosinophil # : 0.38 K/uL  Auto Basophil # : 0.06 K/uL  Auto Neutrophil % : 75.2 %  Auto Lymphocyte % : 8.7 %  Auto Monocyte % : 4.6 %  Auto Eosinophil % : 2.0 %  Auto Basophil % : 0.3 %      142  |  109<H>  |  57<H>  ----------------------------<  112<H>  3.6   |  28  |  1.65<H>  Ca    7.6<L>      2022 07:48  Phos  4.8       Mg     2.2       TPro  5.9<L>  /  Alb  1.6<L>  /  TBili  0.6  /  DBili  x   /  AST  91<H>  /  ALT  109<H>  /  AlkPhos  175<H>    LIVER FUNCTIONS - ( 2022 08:09 )  Alb: 1.6 g/dL / Pro: 5.9 gm/dL / ALK PHOS: 175 U/L / ALT: 109 U/L / AST: 91 U/L / GGT: x           Sedimentation Rate, Erythrocyte: 89 mm/hr ( @ 08:09)  Sedimentation Rate, Erythrocyte: 75 mm/hr ( @ 07:24)  Sedimentation Rate, Erythrocyte: 62 mm/hr ( @ 02:22)    C-Reactive Protein, Serum (22 @ 10:55)    C-Reactive Protein, Serum: 44 mg/L  C-Reactive Protein, Serum (22 @ 10:51)    C-Reactive Protein, Serum: 47 mg/L  C-Reactive Protein, Serum (22 @ 09:23)    C-Reactive Protein, Serum: 83 mg/L    Procalcitonin, Serum (22 @ 10:55)    Procalcitonin, Serum: 1.18:   Procalcitonin, Serum (22 @ 10:51)    Procalcitonin, Serum: 2.09:   Procalcitonin, Serum (22 @ 09:23)    Procalcitonin, Serum: 17.20:   Procalcitonin, Serum (22 @ 09:20)    Procalcitonin, Serum: 81.70:    Lactate, Blood (02.15.22 @ 21:14)    Lactate, Blood: 1.5 mmol/L  Lactate, Blood (02.15.22 @ 18:24)    Lactate, Blood: 2.5: Elevated lactate. Consider ordering follow-up lactate to trend. mmol/L  Lactate, Blood (02.15.22 @ 14:15)    Lactate, Blood: 3.9: Elevated lactate. Consider ordering follow-up lactate to trend. mmol/L    A1C with Estimated Average Glucose (22 @ 09:30)    A1C with Estimated Average Glucose Result: 7.6: Method: Immunoassay       Reference Range                4.0-5.6%       High risk (prediabetic)        5.7-6.4%       Diabetic, diagnostic             >=6.5%       ADA diabetic treatment goal       <7.0%    Estimated Average Glucose: 171    Acute Hepatitis Panel (22 @ 09:37)    Hepatitis C Virus Interpretation: Nonreact: Hepatitis C AB    Hepatitis C Virus S/CO Ratio: 0.12 S/CO    Hepatitis B Core IgM Antibody: Nonreact    Hepatitis B Surface Antigen: Nonreact    Hepatitis A IgM Antibody: Nonreact    Hepatitis B Surface Antibody (22 @ 09:37)    Hepatitis B Surface Antibody: Nonreact    C3 Complement, Serum (22 @ 09:37)    C3 Complement, Serum: 115 mg/dL  C4 Complement, Serum (22 @ 09:37)    C4 Complement, Serum: 26 mg/dL    Glomerular Basement Membrane Ab IgG (22 @ 21:11)    Glomerular Basement Membrane Ab Ig: Negative        MICROBIOLOGY:    Flu With COVID-19 By [facility] (22 @ 06:11)    SARS-CoV-2 Result: NotDetec: EUA/IVD    Influenza A Result: NotDetec: EUA/IVD    Influenza B Result: NotDetec: EUA/IVD    Specimen Source: .Sputum Sputum ( @ 01:01)  Culture Results:   Normal Respiratory Alonzo present ( @ 01:01)    Gram Stain:   Rare polymorphonuclear leukocytes per low power field  Moderate Squamous epithelial cells per low power field  Numerous Yeast per oil power field  Moderate Gram Positive Rods per oil power field  Few Gram Negative Rods per oil power field  Rare Gram positive cocci in pairs per oil power field    Specimen Source: Clean Catch Clean Catch (Midstream) ( @ 08:49)  Culture Results:   >100,000 CFU/ml Klebsiella oxytoca/Raoutella ornithinolytica  Multiple Morphological Strains ( @ 08:49)    -  Tigecycline: S <=2    -  Tobramycin: S <=2    -  Trimethoprim/Sulfamethoxazole: S <=0.5/9.5    -  Amikacin: S <=16    -  Amoxicillin/Clavulanic Acid: S <=8/4    -  Ampicillin: R >16 These ampicillin results predict results for amoxicillin    -  Ampicillin/Sulbactam: S 8/4 Enterobacter, Klebsiella aerogenes, Citrobacter, and Serratia may develop resistance during prolonged therapy (3-4 days)    -  Aztreonam: S <=4    -  Cefazolin: R 16    -  Cefepime: S <=2    -  Cefoxitin: S <=8    -  Ceftriaxone: S <=1 Enterobacter, Klebsiella aerogenes, Citrobacter, and Serratia may develop resistance during prolonged therapy    -  Ciprofloxacin: S <=0.25    -  Gentamicin: S <=2    -  Imipenem: S <=1    -  Ertapenem: S <=0.5    -  Levofloxacin: S <=0.5    -  Meropenem: S <=1    -  Nitrofurantoin: S <=32 Should not be used to treat pyelonephritis    -  Piperacillin/Tazobactam: S <=8    Method Type: NATACHA    Specimen Source: .Blood Blood-Peripheral (02-15 @ 19:01)  Culture Results:   Growth in aerobic and anaerobic bottles: Klebsiella oxytoca/Raoutella  ornithinolytica    -  Klebsiella oxytoca: Detec    Gram Stain:   Growth in aerobic and anaerobic bottles: Gram Negative Rods    -  Meropenem: S <=1    -  Ertapenem: S <=0.5    -  Gentamicin: S <=2    -  Imipenem: S <=1    -  Levofloxacin: S <=0.5    -  Piperacillin/Tazobactam: S <=8    -  Tobramycin: S <=2    -  Trimethoprim/Sulfamethoxazole: S <=0.5/9.5    -  Amikacin: S <=16    -  Ampicillin: R >16 These ampicillin results predict results for amoxicillin    -  Ampicillin/Sulbactam: S 8/4 Enterobacter, Klebsiella aerogenes, Citrobacter, and Serratia may develop resistance during prolonged therapy (3-4 days)    -  Aztreonam: R >16    -  Cefazolin: R 16 Enterobacter, Klebsiella aerogenes, Citrobacter, and Serratia may develop resistance during prolonged therapy (3-4 days)    -  Cefepime: S <=2    -  Cefoxitin: S <=8    -  Ceftriaxone: S <=1 Enterobacter, Klebsiella aerogenes, Citrobacter, and Serratia may develop resistance during prolonged therapy    -  Ciprofloxacin: S <=0.25    Organism Identification: Blood Culture PCR  Klebsiella oxytoca /Raoutella ornithinolytica    Method Type: PCR    Method Type: NATACHA    Specimen Source: .Blood Blood-Peripheral (02-15 @ 19:01)  Culture Results:   Growth in aerobic and anaerobic bottles: Klebsiella oxytoca/Raoutella  ornithinolytica  See previous culture [id number] (02-15 @ 19:01)        Legionella Antigen, Urine: Negative ( @ 09:14)    MRSA/MSSA PCR (22 @ 09:20)    MRSA PCR Result.: NotDetec:    Staph Aureus PCR Result: NotDetec    Respiratory Viral Panel with COVID-19 by [facility] (02.15.22 @ 14:34)    Rapid RVP Result: NotDetec    SARS-CoV-2: NotDetec:     RADIOLOGY:  < from: CT Abdomen and Pelvis w/ Oral Cont (22 @ 13:36) >  ACC: [id number] EXAM:  CT ABDOMEN AND PELVIS OC                        PROCEDURE DATE:  2022    INTERPRETATION:  CLINICAL INFORMATION: Bacteremia  COMPARISON: 2/15  PROCEDURE:  CT of the Abdomen and Pelvis was performed without intravenous contrast.  Intravenous contrast: None.  Oral contrast: Positive contrast was administered.  Sagittal and coronal reformats were performed.  FINDINGS:  VISUALIZED CHEST: Bibasilar atelectasis and trace effusions.  ABDOMEN AND PELVIS:  LIVER: Within normal limits.  BILE DUCTS: Normal caliber.  GALLBLADDER: Cholelithiasis.  SPLEEN: Within normal limits.  PANCREAS: Within normal limits.  ADRENALS: Within normal limits.  KIDNEYS/URETERS: Within normal limits.  BLADDER: Khalil catheter.  REPRODUCTIVE ORGANS: No pelvic masses.  BOWEL: No bowel obstruction. Appendix is normal. Moderate hiatal hernia.  PERITONEUM: No ascites.  VESSELS: Atherosclerotic changes.  RETROPERITONEUM/LYMPH NODES: No lymphadenopathy.  ABDOMINAL WALL: Within normal limits.  BONES: Within normal limits.  IMPRESSION:  No acute findings. Multiple gallstones.    < from: US Duplex Venous Lower Ext Complete, Bilateral (22 @ 04:56) >  ACC: [id number] EXAM:  US DPLX LWR EXT VEINS COMPL BI                        PROCEDURE DATE:  2022    INTERPRETATION:  CLINICAL INFORMATION: Lower extremity swelling.  COMPARISON: None available.  TECHNIQUE: Duplex sonography of theBILATERAL LOWER extremity veins with   color and spectral Doppler, with and without compression.  IMPRESSION:  RIGHT: Limited calf vein visualization. Nonvisualization of the calf   veins. No evidence of deep venous thrombosis at or above the knee.  LEFT: Limited calf vein visualization. Acute nonocclusive above-the-knee   deep venous thrombosis. Occlusive DVT in the greater saphenous vein.    < from: US Abdomen Upper Quadrant Right (22 @ 12:03) >  ACC: [id number] EXAM:  US ABDOMEN RT UPR QUADRANT                        PROCEDURE DATE:  2022    INTERPRETATION:  CLINICAL INFORMATION: Elevated LFTs.  COMPARISON: CT abdomen/pelvis 2/15/2022.  TECHNIQUE: Sonography of the right upper quadrant.  IMPRESSION: Multiple gallstones. Gallbladder wall thickening. No   pericholecystic fluid. No evidence for intrahepatic or extrahepatic   biliary ductal dilatation.    < from: Xray Chest 1 View-PORTABLE IMMEDIATE (Xray Chest 1 View-PORTABLE IMMEDIATE .) (22 @ 01:26) >  ACC: [id number] EXAM:  XR CHEST PORTABLE IMMED 1V                        PROCEDURE DATE:  2022    IMPRESSION:  Right IJ line HD catheter in satisfactory position with no pneumothorax,   new    < from: CT Abdomen and Pelvis No Cont (02.15.22 @ 23:57) >  ACC: [id number] EXAM:  CT ABDOMEN AND PELVIS                        PROCEDURE DATE:  02/15/2022    INTERPRETATION:  CLINICAL INFORMATION:  Abdominal distension  COMPARISON: None.  CONTRAST/COMPLICATIONS:  IV Contrast: None  Oral Contrast:None  Complications: None  PROCEDURE:  Axial CT images were acquired through the abdomen and pelvis obtained   without intravenous contrast. Coronal and sagittal reformatted images   provided.  FINDINGS: This examination is limited by patient motion artifact and the   lack of oral and intravenous contrast.  LOWER CHEST: Bibasilar atelectasis. Coronary artery calcifications.  LIVER: Within normal limits.  BILE DUCTS: Normal caliber.  GALLBLADDER: Cholelithiasis..  SPLEEN: Within normal limits.  PANCREAS: Within normal limits.  ADRENALS: Within normal limits.  KIDNEYS/URETERS: No definite renal stone or hydronephrosis  BLADDER: Distended with Khalil catheter. No bladder wall thickening.  REPRODUCTIVE ORGANS: Prostate gland mildly enlarged.  BOWEL: Evaluation of bowel is limited without distention with oral   contrast, however there is no bowel obstruction. Few colonic diverticula   without acute diverticulitis. Small bowel loops are not dilated.   Unremarkable appendix without appendicitis. Stomach is underdistended for   adequate evaluation, however suggestion of a moderate hiatal hernia.   Appendix  PERITONEUM: No free air significant ascites.  VESSELS:  Limited without intravenous contrast. There is calcific   atherosclerosis of the abdominal aorta without gross aneurysmal   dilatation. 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  RETROPERITONEUM: No lymphadenopathy.  ABDOMINAL WALL: Small fat-containing bilateral inguinal hernias.  BONES: Degenerative changes of the spine. Bilateral L5 spondylolysis with   slight grade 1 anterolisthesis of L5 on S1.  MPRESSION:  There is no bowel obstruction, significant ascites or free   intraperitoneal air.  Moderate hiatal hernia.  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    < from: Xray Chest 1 View- PORTABLE-Urgent (02.15.22 @ 14:32) >  ACC: [id number] EXAM:  XR CHEST PORTABLE URGENT 1V                        PROCEDURE DATE:  02/15/2022    INTERPRETATION:  AP semierect chest on February 15, 2022 at 1:55 PM.   Patient has sepsis.  Heart enlargement again noted.  There aremild mid lower lung field infiltrates medially new since 2020.  IMPRESSION: Bilateral infiltrates as above.    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 ago, s/p  Bilateral Cataract Surgeries, admitted via the ER to [facility] on 2/15/22 with c/o diarrhea x 2 weeks at home along with decreased po intake and mild nausea with a few episodes of vomiting bilious material.  W/u revealed  a markedly elevated WBC's of 37,940.  an elevated Lactate to 3.9, and his BUN / Creat were found to be elevated at 169 / 12.9 and Glucose was elevated as well. Further w/u with CXR and CT of the Abdomen + Pelvis was done and revealed Bilateral Infiltrates, Cholelithiasis, a moderate Hiatal Hernia, but no bowel obstruction was seen.  Patient was admitted to the CCU for further care and he was Rx'ed empirically with Rocephin + Zithromax after Blood and Urine Cx's were obtained and he underwent HD urgently x 1 on 2/15/22 PM. and rec'd 5 Liters of NS as well. Subsequently found to have Sepsis with Klebsiella Oxytoca ( AKA Raoutella ornithinolytica ) Bacteremia as identified from 2 out of 2 Blood Cx's from 2/15/22  - ? , GI, or Pulmonary source with Bilateral lower lobe PNA seen on initial CXR and Cholelithiasis with GB wall thickening seen on Sono.  Ab rx changed to Cefepime with Zithromax on 22 and patient remains with low - normal temps.  Sensitivities of the Kleb Oxytoca isolated from Blood Cx's noted above and sensitive to Cefepime.  Urine Legionella Ag is negative and now Urine Cx from 22 reported with >100,000 Klebsiella oxytoca ( multiple morphologic strains ) and now with sensitivities completed and noted to be the same as the Blood Cx isolates.  Sputum Cx finalized as normal alonzo.  CT of the Abdomen + Pelvis with oral contrast done 22  resulted with no acute findings, moderate Hiatal Hernia, and multiple Gallstones.  S/p transfusion of 2 Units of PRBC's on 22 for H+H decreased to 6.5 / 19.8 and noted with continued elevated WBC's and ESR although CRP and Procalcitonin are trending downwards and there is continued slow improvement in renal function.    Suggestions:  Will continue present ab rx with Cefepime, s/p completed rx with Zithromax  for rx of Sepsis due to Klebsiella Bacteremia and UTI along with Bilateral PNA.  Follow-up temps and labs closely.  Follow-up CXR and monitor O2 Sat's.  Discussed with patient  again in detail at the bedside and with his son-in-law Antione via phone ( [phone] ) at patient's request.

## 2022-02-24 ENCOUNTER — RESULT REVIEW (OUTPATIENT)
Age: 82
End: 2022-02-24

## 2022-02-24 LAB
% GAMMA, URINE: 11.3 % — SIGNIFICANT CHANGE UP
ALBUMIN 24H MFR UR ELPH: 40.8 % — SIGNIFICANT CHANGE UP
ALPHA1 GLOB 24H MFR UR ELPH: 15.2 % — SIGNIFICANT CHANGE UP
ALPHA2 GLOB 24H MFR UR ELPH: 17 % — SIGNIFICANT CHANGE UP
ANION GAP SERPL CALC-SCNC: 5 MMOL/L — SIGNIFICANT CHANGE UP (ref 5–17)
B-GLOBULIN 24H MFR UR ELPH: 15.7 % — SIGNIFICANT CHANGE UP
BASOPHILS # BLD AUTO: 0.03 K/UL — SIGNIFICANT CHANGE UP (ref 0–0.2)
BASOPHILS NFR BLD AUTO: 0.2 % — SIGNIFICANT CHANGE UP (ref 0–2)
BUN SERPL-MCNC: 44 MG/DL — HIGH (ref 7–23)
CALCIUM SERPL-MCNC: 7.9 MG/DL — LOW (ref 8.5–10.1)
CHLORIDE SERPL-SCNC: 109 MMOL/L — HIGH (ref 96–108)
CO2 SERPL-SCNC: 26 MMOL/L — SIGNIFICANT CHANGE UP (ref 22–31)
CREAT SERPL-MCNC: 1.51 MG/DL — HIGH (ref 0.5–1.3)
CRP SERPL-MCNC: 35 MG/L — HIGH
EOSINOPHIL # BLD AUTO: 0.26 K/UL — SIGNIFICANT CHANGE UP (ref 0–0.5)
EOSINOPHIL NFR BLD AUTO: 1.6 % — SIGNIFICANT CHANGE UP (ref 0–6)
ERYTHROCYTE [SEDIMENTATION RATE] IN BLOOD: 90 MM/HR — HIGH (ref 0–20)
FLUAV AG NPH QL: SIGNIFICANT CHANGE UP
FLUBV AG NPH QL: SIGNIFICANT CHANGE UP
GLUCOSE BLDC GLUCOMTR-MCNC: 113 MG/DL — HIGH (ref 70–99)
GLUCOSE BLDC GLUCOMTR-MCNC: 118 MG/DL — HIGH (ref 70–99)
GLUCOSE BLDC GLUCOMTR-MCNC: 125 MG/DL — HIGH (ref 70–99)
GLUCOSE BLDC GLUCOMTR-MCNC: 162 MG/DL — HIGH (ref 70–99)
GLUCOSE SERPL-MCNC: 111 MG/DL — HIGH (ref 70–99)
HCT VFR BLD CALC: 23.8 % — LOW (ref 39–50)
HGB BLD-MCNC: 7.9 G/DL — LOW (ref 13–17)
IMM GRANULOCYTES NFR BLD AUTO: 5.5 % — HIGH (ref 0–1.5)
INTERPRETATION 24H UR IFE-IMP: SIGNIFICANT CHANGE UP
INTERPRETATION 24H UR IFE-IMP: SIGNIFICANT CHANGE UP
LYMPHOCYTES # BLD AUTO: 1.36 K/UL — SIGNIFICANT CHANGE UP (ref 1–3.3)
LYMPHOCYTES # BLD AUTO: 8.5 % — LOW (ref 13–44)
M PROTEIN 24H UR ELPH-MRATE: SIGNIFICANT CHANGE UP
MCHC RBC-ENTMCNC: 30.7 PG — SIGNIFICANT CHANGE UP (ref 27–34)
MCHC RBC-ENTMCNC: 33.2 G/DL — SIGNIFICANT CHANGE UP (ref 32–36)
MCV RBC AUTO: 92.6 FL — SIGNIFICANT CHANGE UP (ref 80–100)
MONOCYTES # BLD AUTO: 0.91 K/UL — HIGH (ref 0–0.9)
MONOCYTES NFR BLD AUTO: 5.7 % — SIGNIFICANT CHANGE UP (ref 2–14)
NEUTROPHILS # BLD AUTO: 12.5 K/UL — HIGH (ref 1.8–7.4)
NEUTROPHILS NFR BLD AUTO: 78.5 % — HIGH (ref 43–77)
NRBC # BLD: 0 /100 WBCS — SIGNIFICANT CHANGE UP (ref 0–0)
PLATELET # BLD AUTO: 224 K/UL — SIGNIFICANT CHANGE UP (ref 150–400)
POTASSIUM SERPL-MCNC: 3.7 MMOL/L — SIGNIFICANT CHANGE UP (ref 3.5–5.3)
POTASSIUM SERPL-SCNC: 3.7 MMOL/L — SIGNIFICANT CHANGE UP (ref 3.5–5.3)
PROCALCITONIN SERPL-MCNC: 0.57 NG/ML — HIGH (ref 0.02–0.1)
PROT ?TM UR-MCNC: 75 MG/DL — HIGH (ref 0–12)
PROT PATTERN 24H UR ELPH-IMP: SIGNIFICANT CHANGE UP
RBC # BLD: 2.57 M/UL — LOW (ref 4.2–5.8)
RBC # FLD: 15.6 % — HIGH (ref 10.3–14.5)
SARS-COV-2 RNA SPEC QL NAA+PROBE: SIGNIFICANT CHANGE UP
SODIUM SERPL-SCNC: 140 MMOL/L — SIGNIFICANT CHANGE UP (ref 135–145)
TOTAL VOLUME - 24 HOUR: SIGNIFICANT CHANGE UP ML
URINE CREATININE CALCULATION: SIGNIFICANT CHANGE UP G/24 H (ref 1–2)
WBC # BLD: 15.93 K/UL — HIGH (ref 3.8–10.5)
WBC # FLD AUTO: 15.93 K/UL — HIGH (ref 3.8–10.5)

## 2022-02-24 PROCEDURE — 88312 SPECIAL STAINS GROUP 1: CPT | Mod: 26

## 2022-02-24 PROCEDURE — 99233 SBSQ HOSP IP/OBS HIGH 50: CPT

## 2022-02-24 PROCEDURE — 88305 TISSUE EXAM BY PATHOLOGIST: CPT | Mod: 26

## 2022-02-24 RX ORDER — SUCRALFATE 1 G
1 TABLET ORAL
Refills: 0 | Status: DISCONTINUED | OUTPATIENT
Start: 2022-02-24 | End: 2022-03-01

## 2022-02-24 RX ADMIN — PANTOPRAZOLE SODIUM 40 MILLIGRAM(S): 20 TABLET, DELAYED RELEASE ORAL at 17:25

## 2022-02-24 RX ADMIN — CEFEPIME 100 MILLIGRAM(S): 1 INJECTION, POWDER, FOR SOLUTION INTRAMUSCULAR; INTRAVENOUS at 05:27

## 2022-02-24 RX ADMIN — PANTOPRAZOLE SODIUM 40 MILLIGRAM(S): 20 TABLET, DELAYED RELEASE ORAL at 05:28

## 2022-02-24 RX ADMIN — CHLORHEXIDINE GLUCONATE 1 APPLICATION(S): 213 SOLUTION TOPICAL at 05:28

## 2022-02-24 RX ADMIN — ATORVASTATIN CALCIUM 40 MILLIGRAM(S): 80 TABLET, FILM COATED ORAL at 22:31

## 2022-02-24 RX ADMIN — LATANOPROST 1 DROP(S): 0.05 SOLUTION/ DROPS OPHTHALMIC; TOPICAL at 22:31

## 2022-02-24 RX ADMIN — Medication 1 GRAM(S): at 17:38

## 2022-02-24 RX ADMIN — Medication 1 MILLIGRAM(S): at 22:31

## 2022-02-24 RX ADMIN — CEFEPIME 100 MILLIGRAM(S): 1 INJECTION, POWDER, FOR SOLUTION INTRAMUSCULAR; INTRAVENOUS at 17:25

## 2022-02-24 RX ADMIN — Medication 1 GRAM(S): at 22:34

## 2022-02-24 NOTE — PROGRESS NOTE ADULT - SUBJECTIVE AND OBJECTIVE BOX
Procedure:           Upper GI endoscopy with biopsy 02-24-22 @ 14:49    Indications:                 Upper GI bleed    Monitored Anesthesia Care Provided by :     ____________________________________________________________________________________________________  Procedure:           Pre-Anesthesia Assessment:                       - Prior to the procedure, a History and Physical was performed, and patient                        medications and allergies were reviewed. The patient is competent. The risks                        and benefits of the procedure and the sedation options and risks were                        discussed with the patient. All questions were answered and informed consent                        was obtained. Patient identification and proposed procedure were verified by                        the physician, the nurse and the anesthesiologist in the procedure room.                        Mental Status Examination: alert and oriented. Airway Examination: normal                        oropharyngeal airway and neck mobility. Respiratory Examination: clear to                        auscultation. CV Examination: normal. Prophylactic Antibiotics: The patient                        does not require prophylactic antibiotics.                        Grade Assessment:   After                        reviewing the risks and benefits, the patient was deemed in satisfactory                        condition to undergo the procedure. The anesthesia plan was to use monitored                        anesthesia care (MAC). Immediately prior to administration of medications,                        the patient was re-assessed for adequacy to receive sedatives. The heart        		     rate, respiratory rate, oxygen saturations, blood pressure, adequacy of                        pulmonary ventilation, and response to care were monitored throughout the                        procedure. The physical status of the patient was re-assessed after the                        procedure.                       After obtaining informed consent, the endoscope was passed under direct                        vision. Throughout the procedure, the patient's blood pressure, pulse, and                        oxygen saturations were monitored continuously. The Endoscope was introduced                        through the mouth, and advanced to the second part of duodenum. Retroflexion was done in the stomach. The upper GI                        endoscopy was accomplished with ease. The patient tolerated the procedure                        well.    ESOPHAGUS:     WNL             Moderate Sized Hiatal hernia    STOMACH:   Mild antral gastritis s/p biopsy    DUODENUM:   Two large DUs with surrounding edema s/p biopsy      Assessment : As above     PLAN :   PPI / Carafate / Advance diet  / Hold anticoagulants

## 2022-02-24 NOTE — PROGRESS NOTE ADULT - SUBJECTIVE AND OBJECTIVE BOX
Patient is a 81y old  Male who presents with a chief complaint of hypotension and renal failure (24 Feb 2022 14:47)      INTERVAL HPI/ OVERNIGHT EVENTS: Pt was seen and examined at bedside today, No significant overnight events, Pt admits to feeling weak otherwise denies any complaints.      MEDICATIONS  (STANDING):  ALPRAZolam 1 milliGRAM(s) Oral at bedtime  atorvastatin 40 milliGRAM(s) Oral at bedtime  cefepime   IVPB      cefepime   IVPB 1000 milliGRAM(s) IV Intermittent every 12 hours  chlorhexidine 2% Cloths 1 Application(s) Topical <User Schedule>  insulin lispro (ADMELOG) corrective regimen sliding scale   SubCutaneous Before meals and at bedtime  latanoprost 0.005% Ophthalmic Solution 1 Drop(s) Both EYES at bedtime  pantoprazole   Suspension 40 milliGRAM(s) Oral two times a day  sucralfate 1 Gram(s) Oral four times a day    MEDICATIONS  (PRN):  ondansetron Injectable 4 milliGRAM(s) IV Push every 4 hours PRN Nausea and/or Vomiting  sodium chloride 0.9% lock flush 10 milliLiter(s) IV Push every 1 hour PRN Pre/post blood products, medications, blood draw, and to maintain line patency      Allergies    No Known Allergies    Intolerances        REVIEW OF SYSTEMS:    Unable to examine due to [ ] Encephalopathy [ ] Advanced Dementia [ ] Expressive Aphasia [ ] Non-verbal patient    CONSTITUTIONAL: No fever, positive generalized weakness/Fatigue, No weight loss  EYES: No eye pain, visual disturbances, or discharge  ENMT:  No difficulty hearing, tinnitus, vertigo; No sinus or throat pain  NECK: No pain or stiffness  RESPIRATORY: No shortness of breath,  cough, wheezing, sputum or hemoptysis   CARDIOVASCULAR: No chest pain, palpitations, or leg swelling  GASTROINTESTINAL: No abdominal pain. No nausea, vomiting, diarrhea or constipation. No melena or hematochezia.  GENITOURINARY: No dysuria, frequency, hematuria, or incontinence  NEUROLOGICAL: No headaches, Dizziness, memory loss, loss of strength, numbness, or tremors  SKIN: No itching, burning, rashes, or lesions   MUSCULOSKELETAL: No joint pain or swelling; No muscle, back, or extremity pain  PSYCHIATRIC: No depression, anxiety, mood swings, or difficulty sleeping  HEME/LYMPH: No easy bruising, or bleeding gums      Vital Signs Last 24 Hrs  T(C): 36.7 (24 Feb 2022 15:23), Max: 37 (24 Feb 2022 00:21)  T(F): 98 (24 Feb 2022 15:23), Max: 98.6 (24 Feb 2022 00:21)  HR: 76 (24 Feb 2022 15:38) (72 - 80)  BP: 113/70 (24 Feb 2022 15:38) (113/70 - 142/76)  BP(mean): --  RR: 18 (24 Feb 2022 15:38) (16 - 23)  SpO2: 95% (24 Feb 2022 15:38) (93% - 99%)    PHYSICAL EXAM:  GENERAL: NAD on NC, obese   HEAD:  Atraumatic, Normocephalic  EYES: conjunctiva and sclera clear  ENMT: Moist mucous membranes  NECK: Supple, No JVD, Normal thyroid  CHEST/LUNG: Clear to Auscultation bilaterally; No rales, rhonchi, wheezing, or rubs  HEART: Regular rate and rhythm; No murmurs, rubs, or gallops  ABDOMEN: Soft, Nontender, Nondistended; Bowel sounds present  EXTREMITIES:  2+ Peripheral Pulses, No clubbing, cyanosis, or edema  SKIN: No rashes or lesions  NERVOUS SYSTEM:  Alert & Oriented X3, Good concentration; Motor Strength 5/5 B/L upper and lower extremities    LABS:                        7.9    15.93 )-----------( 224      ( 24 Feb 2022 07:03 )             23.8     02-24    140  |  109<H>  |  44<H>  ----------------------------<  111<H>  3.7   |  26  |  1.51<H>    Ca    7.9<L>      24 Feb 2022 07:03          CAPILLARY BLOOD GLUCOSE      POCT Blood Glucose.: 118 mg/dL (24 Feb 2022 16:01)  POCT Blood Glucose.: 113 mg/dL (24 Feb 2022 11:06)  POCT Blood Glucose.: 162 mg/dL (24 Feb 2022 07:40)  POCT Blood Glucose.: 118 mg/dL (23 Feb 2022 22:08)  POCT Blood Glucose.: 162 mg/dL (23 Feb 2022 16:44)          RADIOLOGY & ADDITIONAL TESTS:          Imaging Personally Reviewed:  [ ] YES  [ ] NO    Consultant(s) Notes Reviewed:  [ ] YES  [ ] NO    Care Discussed with Consultants/Other Providers [x ] YES  [ ] NO

## 2022-02-24 NOTE — PROGRESS NOTE ADULT - ASSESSMENT
81M w/ HTN, bladder tumor removal, melanoma. Presents w/ diarrhea and weakness. Admitted to ICU w/ rapif afib, septic shock likely from UTI vs pneumonia, RADHA w/ hyperkalemia and significant uremia and thrombocytopenia. S/p 1 HD treatment 2/16/22      Gram Negative bacterial Pneumonia  CXR: b/l lower lobe infiltrate   Septic Shock POA; resolved   acute respiratory failure with hypoxia  cont w/ IV abx.     Klebsiella Bacteremia   Septic Shock POA; resolved   ID on board   cont w/ IV abx.     Klebsiella UTI  as above     Afib w/ RVR   in the setting of sepsis   s/p Amiodarone, HR now controlled   cont to monitor   AC contraindicated at this time     Acute blood loss anemia   episode of melena while on Heparin drip  s/p 2U pRBC transfusion  GI consult appreciated   Pt underwent EGD today 2/24 found to have 2x duodenal ulcers   cont w/ Protonix and Carafate   will f/u w/ GI on when to initiate AC     RADHA   likely combination of prerenal, ATN and obstructive uropathy;   s/p HD x1; now making urine;   cont w/ IVF and monitor renal function      thrombocytopenia   Secondary to Bacteremia  no active bleeding   resolved     Acute Left LE DVT  Doppler: Acute nonocclusive above-the-knee deep venous thrombosis. Occlusive DVT in the greater saphenous vein.  AC contraindicated due to GI bleed.     Diabetes Mellitus   HgA1c: 7.8%  cont w/ FS monitoring w/ insulins s/s coverage     #PPx   - SCDs, heparin drip on hold     plan discussed with Son in law at bedside.

## 2022-02-24 NOTE — BRIEF OPERATIVE NOTE - NSICDXBRIEFPOSTOP_GEN_ALL_CORE_FT
POST-OP DIAGNOSIS:  Duodenal bulb ulcer 24-Feb-2022 14:59:20  Mane Mata  Hiatal hernia 24-Feb-2022 14:59:30  Mane Mata  Gastritis 24-Feb-2022 14:59:40  Mane Mata

## 2022-02-24 NOTE — PROGRESS NOTE ADULT - SUBJECTIVE AND OBJECTIVE BOX
TMAX - 98.6    On day # 8 Cefepime    Vital Signs Last 24 Hrs  T(C): 36.5 (2022 18:00), Max: 37 (2022 00:21)  T(F): 97.7 (2022 18:00), Max: 98.6 (2022 00:21)  HR: 82 (2022 18:00) (72 - 86)  BP: 119/76 (2022 18:00) (113/70 - 146/78)  RR: 18 (2022 18:00) (16 - 23)  SpO2: 96% (2022 18:00) (93% - 99%)  Supplemental O2: on NC O2     Awakens, alert, feeling better today but c/o fatigue.  S/p EGD with Bx earlier today  with findings of Gastritis, a Duodenal Bulb Ulcer, and a Hiatal Hernia.  Still c/o dry mouth and being thirsty.  Claims his appetite is fair and he is forcing himself to eat, but no N/V/D.  O2 sat's are between 93 -95 % on 2 Liters NC O2    PHYSICAL EXAM  General: 82 y/o white male awakens, alert, with NC O2 in place, pleasant and cooperative, sitting semi-upright in bed now, still with a dry mouth but in NAD  HEENT: conj pale, sclerae anicteric, PERRLA, no oral lesions noted but mucosa and tongue still appear somewhat dry  Neck: supple, no nodes noted  Heart: RR  Lungs: decreased BS at the bases  Abdomen: BS+, soft, nontender to palpation, no masses or HS-megaly detected  Back: no CVA or Spinal tenderness noted  Extremities: well-healed bilateral TKR scars                    1+ edema LE's                     arms and hands still with some swelling   Skin: warm, dry, no rash noted  Khalil in place and draining clear yellow urine now - 3550cc  output recorded over the prior 24hrs.      I&O's Summary :  2022 07:01  -  2022 07:00  IN: 1300 mL / OUT: 3550 mL / NET: -2250 mL  2022 07:01  -  2022 18:48-  IN: 0 mL / OUT: 1850 mL / NET: -1850 mL    LABS:  CBC Full  -  ( 2022 07:03 )  WBC Count : 15.93 K/uL  RBC Count : 2.57 M/uL  Hemoglobin : 7.9 g/dL  Hematocrit : 23.8 %  Platelet Count - Automated : 224 K/uL  Mean Cell Volume : 92.6 fl  Mean Cell Hemoglobin : 30.7 pg  Mean Cell Hemoglobin Concentration : 33.2 g/dL  Auto Neutrophil # : 12.50 K/uL  Auto Lymphocyte # : 1.36 K/uL  Auto Monocyte # : 0.91 K/uL  Auto Eosinophil # : 0.26 K/uL  Auto Basophil # : 0.03 K/uL  Auto Neutrophil % : 78.5 %  Auto Lymphocyte % : 8.5 %  Auto Monocyte % : 5.7 %  Auto Eosinophil % : 1.6 %  Auto Basophil % : 0.2 %      140  |  109<H>  |  44<H>  ----------------------------<  111<H>  3.7   |  26  |  1.51<H>  Ca    7.9<L>      2022 07:03    Sedimentation Rate, Erythrocyte: 90 mm/hr ( @ 07:03)  Sedimentation Rate, Erythrocyte: 89 mm/hr ( @ 08:09)  Sedimentation Rate, Erythrocyte: 75 mm/hr ( @ :24)  Sedimentation Rate, Erythrocyte: 62 mm/hr ( @ :22)    C-Reactive Protein, Serum (22 @ 10:48)    C-Reactive Protein, Serum: 35 mg/L  C-Reactive Protein, Serum (22 @ 10:55)    C-Reactive Protein, Serum: 44 mg/L  C-Reactive Protein, Serum (22 @ 10:51)    C-Reactive Protein, Serum: 47 mg/L  C-Reactive Protein, Serum (22 @ 09:23)    C-Reactive Protein, Serum: 83 mg/L    Procalcitonin, Serum (22 @ 10:48)    Procalcitonin, Serum: 0.57:   Procalcitonin, Serum (22 @ 10:55)    Procalcitonin, Serum: 1.18:   Procalcitonin, Serum (22 @ 10:51)    Procalcitonin, Serum: 2.09:   Procalcitonin, Serum (22 @ 09:23)    Procalcitonin, Serum: 17.20:   Procalcitonin, Serum (22 @ 09:20)    Procalcitonin, Serum: 81.70:    Lactate, Blood (02.15.22 @ 21:14)    Lactate, Blood: 1.5 mmol/L  Lactate, Blood (02.15.22 @ 18:24)    Lactate, Blood: 2.5: Elevated lactate. Consider ordering follow-up lactate to trend. mmol/L  Lactate, Blood (02.15.22 @ 14:15)    Lactate, Blood: 3.9: Elevated lactate. Consider ordering follow-up lactate to trend. mmol/L    A1C with Estimated Average Glucose (22 @ 09:30)    A1C with Estimated Average Glucose Result: 7.6: Method: Immunoassay       Reference Range                4.0-5.6%       High risk (prediabetic)        5.7-6.4%       Diabetic, diagnostic             >=6.5%       ADA diabetic treatment goal       <7.0%    Estimated Average Glucose: 171    Acute Hepatitis Panel (22 @ 09:37)    Hepatitis C Virus Interpretation: Nonreact: Hepatitis C AB    Hepatitis C Virus S/CO Ratio: 0.12 S/CO    Hepatitis B Core IgM Antibody: Nonreact    Hepatitis B Surface Antigen: Nonreact    Hepatitis A IgM Antibody: Nonreact    Hepatitis B Surface Antibody (22 @ 09:37)    Hepatitis B Surface Antibody: Nonreact    C3 Complement, Serum (22 @ 09:37)    C3 Complement, Serum: 115 mg/dL  C4 Complement, Serum (22 @ 09:37)    C4 Complement, Serum: 26 mg/dL    Glomerular Basement Membrane Ab IgG (22 @ 21:11)    Glomerular Basement Membrane Ab Ig: Negative        MICROBIOLOGY:    Flu With COVID-19 By [facility] (22 @ 06:11)    SARS-CoV-2 Result: NotDetec: EUA/IVD    Influenza A Result: NotDetec: EUA/IVD    Influenza B Result: NotDetec: EUA/IVD    Specimen Source: .Sputum Sputum ( @ 01:01)  Culture Results:   Normal Respiratory Alonzo present ( @ 01:01)    Gram Stain:   Rare polymorphonuclear leukocytes per low power field  Moderate Squamous epithelial cells per low power field  Numerous Yeast per oil power field  Moderate Gram Positive Rods per oil power field  Few Gram Negative Rods per oil power field  Rare Gram positive cocci in pairs per oil power field    Specimen Source: Clean Catch Clean Catch (Midstream) ( @ 08:49)  Culture Results:   >100,000 CFU/ml Klebsiella oxytoca/Raoutella ornithinolytica  Multiple Morphological Strains ( @ 08:49)    -  Tigecycline: S <=2    -  Tobramycin: S <=2    -  Trimethoprim/Sulfamethoxazole: S <=0.5/9.5    -  Amikacin: S <=16    -  Amoxicillin/Clavulanic Acid: S <=8/4    -  Ampicillin: R >16 These ampicillin results predict results for amoxicillin    -  Ampicillin/Sulbactam: S 8/4 Enterobacter, Klebsiella aerogenes, Citrobacter, and Serratia may develop resistance during prolonged therapy (3-4 days)    -  Aztreonam: S <=4    -  Cefazolin: R 16    -  Cefepime: S <=2    -  Cefoxitin: S <=8    -  Ceftriaxone: S <=1 Enterobacter, Klebsiella aerogenes, Citrobacter, and Serratia may develop resistance during prolonged therapy    -  Ciprofloxacin: S <=0.25    -  Gentamicin: S <=2    -  Imipenem: S <=1    -  Ertapenem: S <=0.5    -  Levofloxacin: S <=0.5    -  Meropenem: S <=1    -  Nitrofurantoin: S <=32 Should not be used to treat pyelonephritis    -  Piperacillin/Tazobactam: S <=8    Method Type: NATACHA    Specimen Source: .Blood Blood-Peripheral (02-15 @ 19:01)  Culture Results:   Growth in aerobic and anaerobic bottles: Klebsiella oxytoca/Raoutella  ornithinolytica    -  Klebsiella oxytoca: Detec    Gram Stain:   Growth in aerobic and anaerobic bottles: Gram Negative Rods    -  Meropenem: S <=1    -  Ertapenem: S <=0.5    -  Gentamicin: S <=2    -  Imipenem: S <=1    -  Levofloxacin: S <=0.5    -  Piperacillin/Tazobactam: S <=8    -  Tobramycin: S <=2    -  Trimethoprim/Sulfamethoxazole: S <=0.5/9.5    -  Amikacin: S <=16    -  Ampicillin: R >16 These ampicillin results predict results for amoxicillin    -  Ampicillin/Sulbactam: S 8/4 Enterobacter, Klebsiella aerogenes, Citrobacter, and Serratia may develop resistance during prolonged therapy (3-4 days)    -  Aztreonam: R >16    -  Cefazolin: R 16 Enterobacter, Klebsiella aerogenes, Citrobacter, and Serratia may develop resistance during prolonged therapy (3-4 days)    -  Cefepime: S <=2    -  Cefoxitin: S <=8    -  Ceftriaxone: S <=1 Enterobacter, Klebsiella aerogenes, Citrobacter, and Serratia may develop resistance during prolonged therapy    -  Ciprofloxacin: S <=0.25    Organism Identification: Blood Culture PCR  Klebsiella oxytoca /Raoutella ornithinolytica    Method Type: PCR    Method Type: NATACHA    Specimen Source: .Blood Blood-Peripheral (02-15 @ 19:01)  Culture Results:   Growth in aerobic and anaerobic bottles: Klebsiella oxytoca/Raoutella  ornithinolytica  See previous culture [id number] (02-15 @ 19:01)        Legionella Antigen, Urine: Negative ( @ 09:14)    MRSA/MSSA PCR (22 @ 09:20)    MRSA PCR Result.: NotDetec:    Staph Aureus PCR Result: NotDete    Respiratory Viral Panel with COVID-19 by [facility] (02.15.22 @ 14:34)    Rapid RVP Result: NotDetec    SARS-CoV-2: NotDetec:     RADIOLOGY:  < from: CT Abdomen and Pelvis w/ Oral Cont (22 @ 13:36) >  ACC: [id number] EXAM:  CT ABDOMEN AND PELVIS OC                        PROCEDURE DATE:  2022    INTERPRETATION:  CLINICAL INFORMATION: Bacteremia  COMPARISON: 2/15  PROCEDURE:  CT of the Abdomen and Pelvis was performed without intravenous contrast.  Intravenous contrast: None.  Oral contrast: Positive contrast was administered.  Sagittal and coronal reformats were performed.  FINDINGS:  VISUALIZED CHEST: Bibasilar atelectasis and trace effusions.  ABDOMEN AND PELVIS:  LIVER: Within normal limits.  BILE DUCTS: Normal caliber.  GALLBLADDER: Cholelithiasis.  SPLEEN: Within normal limits.  PANCREAS: Within normal limits.  ADRENALS: Within normal limits.  KIDNEYS/URETERS: Within normal limits.  BLADDER: Khalil catheter.  REPRODUCTIVE ORGANS: No pelvic masses.  BOWEL: No bowel obstruction. Appendix is normal. Moderate hiatal hernia.  PERITONEUM: No ascites.  VESSELS: Atherosclerotic changes.  RETROPERITONEUM/LYMPH NODES: No lymphadenopathy.  ABDOMINAL WALL: Within normal limits.  BONES: Within normal limits.  IMPRESSION:  No acute findings. Multiple gallstones.    < from: US Duplex Venous Lower Ext Complete, Bilateral (22 @ 04:56) >  ACC: [id number] EXAM:  US DPLX LWR EXT VEINS COMPL BI                        PROCEDURE DATE:  2022    INTERPRETATION:  CLINICAL INFORMATION: Lower extremity swelling.  COMPARISON: None available.  TECHNIQUE: Duplex sonography of theBILATERAL LOWER extremity veins with   color and spectral Doppler, with and without compression.  IMPRESSION:  RIGHT: Limited calf vein visualization. Nonvisualization of the calf   veins. No evidence of deep venous thrombosis at or above the knee.  LEFT: Limited calf vein visualization. Acute nonocclusive above-the-knee   deep venous thrombosis. Occlusive DVT in the greater saphenous vein.    < from: US Abdomen Upper Quadrant Right (22 @ 12:03) >  ACC: [id number] EXAM:  US ABDOMEN RT UPR QUADRANT                        PROCEDURE DATE:  2022    INTERPRETATION:  CLINICAL INFORMATION: Elevated LFTs.  COMPARISON: CT abdomen/pelvis 2/15/2022.  TECHNIQUE: Sonography of the right upper quadrant.  IMPRESSION: Multiple gallstones. Gallbladder wall thickening. No   pericholecystic fluid. No evidence for intrahepatic or extrahepatic   biliary ductal dilatation.    < from: Xray Chest 1 View-PORTABLE IMMEDIATE (Xray Chest 1 View-PORTABLE IMMEDIATE .) (22 @ 01:26) >  ACC: [id number] EXAM:  XR CHEST PORTABLE IMMED 1V                        PROCEDURE DATE:  2022    IMPRESSION:  Right IJ line HD catheter in satisfactory position with no pneumothorax,   new    < from: CT Abdomen and Pelvis No Cont (02.15.22 @ 23:57) >  ACC: [id number] EXAM:  CT ABDOMEN AND PELVIS                        PROCEDURE DATE:  02/15/2022    INTERPRETATION:  CLINICAL INFORMATION:  Abdominal distension  COMPARISON: None.  CONTRAST/COMPLICATIONS:  IV Contrast: None  Oral Contrast:None  Complications: None  PROCEDURE:  Axial CT images were acquired through the abdomen and pelvis obtained   without intravenous contrast. Coronal and sagittal reformatted images   provided.  FINDINGS: This examination is limited by patient motion artifact and the   lack of oral and intravenous contrast.  LOWER CHEST: Bibasilar atelectasis. Coronary artery calcifications.  LIVER: Within normal limits.  BILE DUCTS: Normal caliber.  GALLBLADDER: Cholelithiasis..  SPLEEN: Within normal limits.  PANCREAS: Within normal limits.  ADRENALS: Within normal limits.  KIDNEYS/URETERS: No definite renal stone or hydronephrosis  BLADDER: Distended with Khalil catheter. No bladder wall thickening.  REPRODUCTIVE ORGANS: Prostate gland mildly enlarged.  BOWEL: Evaluation of bowel is limited without distention with oral   contrast, however there is no bowel obstruction. Few colonic diverticula   without acute diverticulitis. Small bowel loops are not dilated.   Unremarkable appendix without appendicitis. Stomach is underdistended for   adequate evaluation, however suggestion of a moderate hiatal hernia.   Appendix  PERITONEUM: No free air significant ascites.  VESSELS:  Limited without intravenous contrast. There is calcific   atherosclerosis of the abdominal aorta without gross aneurysmal   dilatation. 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  RETROPERITONEUM: No lymphadenopathy.  ABDOMINAL WALL: Small fat-containing bilateral inguinal hernias.  BONES: Degenerative changes of the spine. Bilateral L5 spondylolysis with   slight grade 1 anterolisthesis of L5 on S1.  MPRESSION:  There is no bowel obstruction, significant ascites or free   intraperitoneal air.  Moderate hiatal hernia.  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    < from: Xray Chest 1 View- PORTABLE-Urgent (02.15.22 @ 14:32) >  ACC: [id number] EXAM:  XR CHEST PORTABLE URGENT 1V                        PROCEDURE DATE:  02/15/2022    INTERPRETATION:  AP semierect chest on February 15, 2022 at 1:55 PM.   Patient has sepsis.  Heart enlargement again noted.  There aremild mid lower lung field infiltrates medially new since 2020.  IMPRESSION: Bilateral infiltrates as above.      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 ago, s/p  Bilateral Cataract Surgeries, admitted via the ER to [facility] on 2/15/22 with c/o diarrhea x 2 weeks at home along with decreased po intake and mild nausea with a few episodes of vomiting bilious material.  W/u revealed      Rx'ed empirically with Rocephin + Zithromax after Blood and Urine Cx's were obtained.    Sepsis with Klebsiella Oxytoca ( AKA Raoutella ornithinolytica ) Bacteremia as identified from 2 out of 2 Blood Cx's from 2/15/22  - ? , GI, or Pulmonary source with Bilateral lower lobe PNA seen on initial CXR and Cholelithiasis with GB wall thickening seen on Sono.  Ab rx changed to Cefepime with Zithromax on 22 and patient remains with low - normal temps.  Sensitivities of the Kleb Oxytoca isolated from Blood Cx's noted above and sensitive to Cefepime.  Urine Legionella Ag is negative and now Urine Cx from 22 reported with >100,000 Klebsiella oxytoca ( multiple morphologic strains ) and now with sensitivities completed and noted to be the same as the Blood Cx isolates.  Sputum Cx finalized as normal alonzo.  CT of the Abdomen + Pelvis with oral contrast done 22  resulted with no acute findings, moderate Hiatal Hernia, and multiple Gallstones.  S/p transfusion of 2 Units of PRBC's on 22 for H+H decreased to 6.5 / 19.8 and noted with continued elevated WBC's and ESR although CRP and Procalcitonin are trending downwards and there is continued slow improvement in renal function.  S/p EGD and BX today and found to have Gastritis, a Duodenal Bulb Ulcer, and a Hiatal Hernia.    Suggestions:    Will continue present ab rx with Cefepime, s/p completed rx with Zithromax  for rx of Sepsis due to Klebsiella Bacteremia and UTI along with Bilateral PNA.  Follow-up temps and labs closely.  Follow-up CXR and monitor O2 Sat's.  Discussed with patient  again in detail at the bedside and with his son-in-law Antione via phone ( [phone] ) at patient's request.  TMAX - 98.6    On day # 8 Cefepime    Vital Signs Last 24 Hrs  T(C): 36.5 (2022 18:00), Max: 37 (2022 00:21)  T(F): 97.7 (2022 18:00), Max: 98.6 (2022 00:21)  HR: 82 (2022 18:00) (72 - 86)  BP: 119/76 (2022 18:00) (113/70 - 146/78)  RR: 18 (2022 18:00) (16 - 23)  SpO2: 96% (2022 18:00) (93% - 99%)  Supplemental O2: on NC O2     Awakens, alert, feeling better today but c/o fatigue.  S/p EGD with Bx earlier today  with findings of Gastritis, a Duodenal Bulb Ulcer, and a Hiatal Hernia.  Still c/o dry mouth and being thirsty.  Claims his appetite is fair and he is forcing himself to eat, but no N/V/D.  O2 sat's are between 93 -95 % on 2 Liters NC O2    PHYSICAL EXAM  General: 80 y/o white male awakens, alert, with NC O2 in place, pleasant and cooperative, sitting semi-upright in bed now, still with a dry mouth but in NAD  HEENT: conj pale, sclerae anicteric, PERRLA, no oral lesions noted but mucosa and tongue still appear somewhat dry  Neck: supple, no nodes noted  Heart: RR  Lungs: decreased BS at the bases  Abdomen: BS+, soft, nontender to palpation, no masses or HS-megaly detected  Back: no CVA or Spinal tenderness noted  Extremities: well-healed bilateral TKR scars                    1+ edema LE's                     arms and hands still with some swelling   Skin: warm, dry, no rash noted  Khalil in place and draining clear yellow urine now - 3550cc  output recorded over the prior 24hrs.      I&O's Summary :  2022 07:01  -  2022 07:00  IN: 1300 mL / OUT: 3550 mL / NET: -2250 mL  2022 07:01  -  2022 18:48-  IN: 0 mL / OUT: 1850 mL / NET: -1850 mL    LABS:  CBC Full  -  ( 2022 07:03 )  WBC Count : 15.93 K/uL  RBC Count : 2.57 M/uL  Hemoglobin : 7.9 g/dL  Hematocrit : 23.8 %  Platelet Count - Automated : 224 K/uL  Mean Cell Volume : 92.6 fl  Mean Cell Hemoglobin : 30.7 pg  Mean Cell Hemoglobin Concentration : 33.2 g/dL  Auto Neutrophil # : 12.50 K/uL  Auto Lymphocyte # : 1.36 K/uL  Auto Monocyte # : 0.91 K/uL  Auto Eosinophil # : 0.26 K/uL  Auto Basophil # : 0.03 K/uL  Auto Neutrophil % : 78.5 %  Auto Lymphocyte % : 8.5 %  Auto Monocyte % : 5.7 %  Auto Eosinophil % : 1.6 %  Auto Basophil % : 0.2 %      140  |  109<H>  |  44<H>  ----------------------------<  111<H>  3.7   |  26  |  1.51<H>  Ca    7.9<L>      2022 07:03    Sedimentation Rate, Erythrocyte: 90 mm/hr ( @ 07:03)  Sedimentation Rate, Erythrocyte: 89 mm/hr ( @ 08:09)  Sedimentation Rate, Erythrocyte: 75 mm/hr ( @ :24)  Sedimentation Rate, Erythrocyte: 62 mm/hr ( @ :22)    C-Reactive Protein, Serum (22 @ 10:48)    C-Reactive Protein, Serum: 35 mg/L  C-Reactive Protein, Serum (22 @ 10:55)    C-Reactive Protein, Serum: 44 mg/L  C-Reactive Protein, Serum (22 @ 10:51)    C-Reactive Protein, Serum: 47 mg/L  C-Reactive Protein, Serum (22 @ 09:23)    C-Reactive Protein, Serum: 83 mg/L    Procalcitonin, Serum (22 @ 10:48)    Procalcitonin, Serum: 0.57:   Procalcitonin, Serum (22 @ 10:55)    Procalcitonin, Serum: 1.18:   Procalcitonin, Serum (22 @ 10:51)    Procalcitonin, Serum: 2.09:   Procalcitonin, Serum (22 @ 09:23)    Procalcitonin, Serum: 17.20:   Procalcitonin, Serum (22 @ 09:20)    Procalcitonin, Serum: 81.70:    Lactate, Blood (02.15.22 @ 21:14)    Lactate, Blood: 1.5 mmol/L  Lactate, Blood (02.15.22 @ 18:24)    Lactate, Blood: 2.5: Elevated lactate. Consider ordering follow-up lactate to trend. mmol/L  Lactate, Blood (02.15.22 @ 14:15)    Lactate, Blood: 3.9: Elevated lactate. Consider ordering follow-up lactate to trend. mmol/L    A1C with Estimated Average Glucose (22 @ 09:30)    A1C with Estimated Average Glucose Result: 7.6: Method: Immunoassay       Reference Range                4.0-5.6%       High risk (prediabetic)        5.7-6.4%       Diabetic, diagnostic             >=6.5%       ADA diabetic treatment goal       <7.0%    Estimated Average Glucose: 171    Acute Hepatitis Panel (22 @ 09:37)    Hepatitis C Virus Interpretation: Nonreact: Hepatitis C AB    Hepatitis C Virus S/CO Ratio: 0.12 S/CO    Hepatitis B Core IgM Antibody: Nonreact    Hepatitis B Surface Antigen: Nonreact    Hepatitis A IgM Antibody: Nonreact    Hepatitis B Surface Antibody (22 @ 09:37)    Hepatitis B Surface Antibody: Nonreact    C3 Complement, Serum (22 @ 09:37)    C3 Complement, Serum: 115 mg/dL  C4 Complement, Serum (22 @ 09:37)    C4 Complement, Serum: 26 mg/dL    Glomerular Basement Membrane Ab IgG (22 @ 21:11)    Glomerular Basement Membrane Ab Ig: Negative        MICROBIOLOGY:    Flu With COVID-19 By [facility] (22 @ 06:11)    SARS-CoV-2 Result: NotDetec: EUA/IVD    Influenza A Result: NotDetec: EUA/IVD    Influenza B Result: NotDetec: EUA/IVD    Specimen Source: .Sputum Sputum ( @ 01:01)  Culture Results:   Normal Respiratory Alonzo present ( @ 01:01)    Gram Stain:   Rare polymorphonuclear leukocytes per low power field  Moderate Squamous epithelial cells per low power field  Numerous Yeast per oil power field  Moderate Gram Positive Rods per oil power field  Few Gram Negative Rods per oil power field  Rare Gram positive cocci in pairs per oil power field    Specimen Source: Clean Catch Clean Catch (Midstream) ( @ 08:49)  Culture Results:   >100,000 CFU/ml Klebsiella oxytoca/Raoutella ornithinolytica  Multiple Morphological Strains ( @ 08:49)    -  Tigecycline: S <=2    -  Tobramycin: S <=2    -  Trimethoprim/Sulfamethoxazole: S <=0.5/9.5    -  Amikacin: S <=16    -  Amoxicillin/Clavulanic Acid: S <=8/4    -  Ampicillin: R >16 These ampicillin results predict results for amoxicillin    -  Ampicillin/Sulbactam: S 8/4 Enterobacter, Klebsiella aerogenes, Citrobacter, and Serratia may develop resistance during prolonged therapy (3-4 days)    -  Aztreonam: S <=4    -  Cefazolin: R 16    -  Cefepime: S <=2    -  Cefoxitin: S <=8    -  Ceftriaxone: S <=1 Enterobacter, Klebsiella aerogenes, Citrobacter, and Serratia may develop resistance during prolonged therapy    -  Ciprofloxacin: S <=0.25    -  Gentamicin: S <=2    -  Imipenem: S <=1    -  Ertapenem: S <=0.5    -  Levofloxacin: S <=0.5    -  Meropenem: S <=1    -  Nitrofurantoin: S <=32 Should not be used to treat pyelonephritis    -  Piperacillin/Tazobactam: S <=8    Method Type: NATACHA    Specimen Source: .Blood Blood-Peripheral (02-15 @ 19:01)  Culture Results:   Growth in aerobic and anaerobic bottles: Klebsiella oxytoca/Raoutella  ornithinolytica    -  Klebsiella oxytoca: Detec    Gram Stain:   Growth in aerobic and anaerobic bottles: Gram Negative Rods    -  Meropenem: S <=1    -  Ertapenem: S <=0.5    -  Gentamicin: S <=2    -  Imipenem: S <=1    -  Levofloxacin: S <=0.5    -  Piperacillin/Tazobactam: S <=8    -  Tobramycin: S <=2    -  Trimethoprim/Sulfamethoxazole: S <=0.5/9.5    -  Amikacin: S <=16    -  Ampicillin: R >16 These ampicillin results predict results for amoxicillin    -  Ampicillin/Sulbactam: S 8/4 Enterobacter, Klebsiella aerogenes, Citrobacter, and Serratia may develop resistance during prolonged therapy (3-4 days)    -  Aztreonam: R >16    -  Cefazolin: R 16 Enterobacter, Klebsiella aerogenes, Citrobacter, and Serratia may develop resistance during prolonged therapy (3-4 days)    -  Cefepime: S <=2    -  Cefoxitin: S <=8    -  Ceftriaxone: S <=1 Enterobacter, Klebsiella aerogenes, Citrobacter, and Serratia may develop resistance during prolonged therapy    -  Ciprofloxacin: S <=0.25    Organism Identification: Blood Culture PCR  Klebsiella oxytoca /Raoutella ornithinolytica    Method Type: PCR    Method Type: NATACHA    Specimen Source: .Blood Blood-Peripheral (02-15 @ 19:01)  Culture Results:   Growth in aerobic and anaerobic bottles: Klebsiella oxytoca/Raoutella  ornithinolytica  See previous culture [id number] (02-15 @ 19:01)        Legionella Antigen, Urine: Negative ( @ 09:14)    MRSA/MSSA PCR (22 @ 09:20)    MRSA PCR Result.: NotDetec:    Staph Aureus PCR Result: NotDete    Respiratory Viral Panel with COVID-19 by [facility] (02.15.22 @ 14:34)    Rapid RVP Result: NotDetec    SARS-CoV-2: NotDetec:     RADIOLOGY:  < from: CT Abdomen and Pelvis w/ Oral Cont (22 @ 13:36) >  ACC: [id number] EXAM:  CT ABDOMEN AND PELVIS OC                        PROCEDURE DATE:  2022    INTERPRETATION:  CLINICAL INFORMATION: Bacteremia  COMPARISON: 2/15  PROCEDURE:  CT of the Abdomen and Pelvis was performed without intravenous contrast.  Intravenous contrast: None.  Oral contrast: Positive contrast was administered.  Sagittal and coronal reformats were performed.  FINDINGS:  VISUALIZED CHEST: Bibasilar atelectasis and trace effusions.  ABDOMEN AND PELVIS:  LIVER: Within normal limits.  BILE DUCTS: Normal caliber.  GALLBLADDER: Cholelithiasis.  SPLEEN: Within normal limits.  PANCREAS: Within normal limits.  ADRENALS: Within normal limits.  KIDNEYS/URETERS: Within normal limits.  BLADDER: Khalil catheter.  REPRODUCTIVE ORGANS: No pelvic masses.  BOWEL: No bowel obstruction. Appendix is normal. Moderate hiatal hernia.  PERITONEUM: No ascites.  VESSELS: Atherosclerotic changes.  RETROPERITONEUM/LYMPH NODES: No lymphadenopathy.  ABDOMINAL WALL: Within normal limits.  BONES: Within normal limits.  IMPRESSION:  No acute findings. Multiple gallstones.    < from: US Duplex Venous Lower Ext Complete, Bilateral (22 @ 04:56) >  ACC: [id number] EXAM:  US DPLX LWR EXT VEINS COMPL BI                        PROCEDURE DATE:  2022    INTERPRETATION:  CLINICAL INFORMATION: Lower extremity swelling.  COMPARISON: None available.  TECHNIQUE: Duplex sonography of theBILATERAL LOWER extremity veins with   color and spectral Doppler, with and without compression.  IMPRESSION:  RIGHT: Limited calf vein visualization. Nonvisualization of the calf   veins. No evidence of deep venous thrombosis at or above the knee.  LEFT: Limited calf vein visualization. Acute nonocclusive above-the-knee   deep venous thrombosis. Occlusive DVT in the greater saphenous vein.    < from: US Abdomen Upper Quadrant Right (22 @ 12:03) >  ACC: [id number] EXAM:  US ABDOMEN RT UPR QUADRANT                        PROCEDURE DATE:  2022    INTERPRETATION:  CLINICAL INFORMATION: Elevated LFTs.  COMPARISON: CT abdomen/pelvis 2/15/2022.  TECHNIQUE: Sonography of the right upper quadrant.  IMPRESSION: Multiple gallstones. Gallbladder wall thickening. No   pericholecystic fluid. No evidence for intrahepatic or extrahepatic   biliary ductal dilatation.    < from: Xray Chest 1 View-PORTABLE IMMEDIATE (Xray Chest 1 View-PORTABLE IMMEDIATE .) (22 @ 01:26) >  ACC: [id number] EXAM:  XR CHEST PORTABLE IMMED 1V                        PROCEDURE DATE:  2022    IMPRESSION:  Right IJ line HD catheter in satisfactory position with no pneumothorax,   new    < from: CT Abdomen and Pelvis No Cont (02.15.22 @ 23:57) >  ACC: [id number] EXAM:  CT ABDOMEN AND PELVIS                        PROCEDURE DATE:  02/15/2022    INTERPRETATION:  CLINICAL INFORMATION:  Abdominal distension  COMPARISON: None.  CONTRAST/COMPLICATIONS:  IV Contrast: None  Oral Contrast:None  Complications: None  PROCEDURE:  Axial CT images were acquired through the abdomen and pelvis obtained   without intravenous contrast. Coronal and sagittal reformatted images   provided.  FINDINGS: This examination is limited by patient motion artifact and the   lack of oral and intravenous contrast.  LOWER CHEST: Bibasilar atelectasis. Coronary artery calcifications.  LIVER: Within normal limits.  BILE DUCTS: Normal caliber.  GALLBLADDER: Cholelithiasis..  SPLEEN: Within normal limits.  PANCREAS: Within normal limits.  ADRENALS: Within normal limits.  KIDNEYS/URETERS: No definite renal stone or hydronephrosis  BLADDER: Distended with Khalil catheter. No bladder wall thickening.  REPRODUCTIVE ORGANS: Prostate gland mildly enlarged.  BOWEL: Evaluation of bowel is limited without distention with oral   contrast, however there is no bowel obstruction. Few colonic diverticula   without acute diverticulitis. Small bowel loops are not dilated.   Unremarkable appendix without appendicitis. Stomach is underdistended for   adequate evaluation, however suggestion of a moderate hiatal hernia.   Appendix  PERITONEUM: No free air significant ascites.  VESSELS:  Limited without intravenous contrast. There is calcific   atherosclerosis of the abdominal aorta without gross aneurysmal   dilatation. 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  RETROPERITONEUM: No lymphadenopathy.  ABDOMINAL WALL: Small fat-containing bilateral inguinal hernias.  BONES: Degenerative changes of the spine. Bilateral L5 spondylolysis with   slight grade 1 anterolisthesis of L5 on S1.  MPRESSION:  There is no bowel obstruction, significant ascites or free   intraperitoneal air.  Moderate hiatal hernia.  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    < from: Xray Chest 1 View- PORTABLE-Urgent (02.15.22 @ 14:32) >  ACC: [id number] EXAM:  XR CHEST PORTABLE URGENT 1V                        PROCEDURE DATE:  02/15/2022    INTERPRETATION:  AP semierect chest on February 15, 2022 at 1:55 PM.   Patient has sepsis.  Heart enlargement again noted.  There aremild mid lower lung field infiltrates medially new since 2020.  IMPRESSION: Bilateral infiltrates as above.      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 ago, s/p  Bilateral Cataract Surgeries, admitted via the ER to [facility] on 2/15/22 with c/o diarrhea x 2 weeks at home along with decreased po intake and mild nausea with a few episodes of vomiting bilious material.  W/u revealed  a markedly elevated WBC's of 37,940.  an elevated Lactate to 3.9, and his BUN / Creat were found to be elevated at 169 / 12.9 and Glucose was elevated as well. Further w/u with CXR and CT of the Abdomen + Pelvis was done and revealed Bilateral Infiltrates, Cholelithiasis, a moderate Hiatal Hernia, but no bowel obstruction was seen.  Patient was admitted to the CCU for further care and he was Rx'ed empirically with Rocephin + Zithromax after Blood and Urine Cx's were obtained and he underwent HD urgently x 1 on 2/15/22 PM. and rec'd 5 Liters of NS as well. Subsequently found to have Sepsis with Klebsiella Oxytoca ( AKA Raoutella ornithinolytica ) Bacteremia as identified from 2 out of 2 Blood Cx's from 2/15/22  - ? , GI, or Pulmonary source with Bilateral lower lobe PNA seen on initial CXR and Cholelithiasis with GB wall thickening seen on Sono.  Ab rx changed to Cefepime with Zithromax on 22 and patient remains with low - normal temps.  Sensitivities of the Kleb Oxytoca isolated from Blood Cx's noted above and sensitive to Cefepime.  Urine Legionella Ag is negative and now Urine Cx from 22 reported with >100,000 Klebsiella oxytoca ( multiple morphologic strains ) and now with sensitivities completed and noted to be the same as the Blood Cx isolates.  Sputum Cx finalized as normal alonzo.  CT of the Abdomen + Pelvis with oral contrast done 22  resulted with no acute findings, moderate Hiatal Hernia, and multiple Gallstones.  S/p transfusion of 2 Units of PRBC's on 22 for H+H decreased to 6.5 / 19.8 and noted with continued elevated WBC's and ESR although CRP and Procalcitonin are trending downwards and there is continued slow improvement in renal function.  S/p EGD and BX today and found to have Gastritis, a Duodenal Bulb Ulcer, and a Hiatal Hernia.    Suggestions:    Will continue present ab rx with Cefepime, s/p completed rx with Zithromax  for rx of Sepsis due to Klebsiella Bacteremia and UTI along with Bilateral PNA.  Follow-up temps and labs closely.  Follow-up CXR and monitor O2 Sat's.  Discussed with patient  again in detail at the bedside and with his son-in-law Antione via phone ( [phone] ) at patient's request.

## 2022-02-24 NOTE — PROGRESS NOTE ADULT - SUBJECTIVE AND OBJECTIVE BOX
[facility] NEPHROLOGY SERVICES, [facility]  NEPHROLOGY AND HYPERTENSION  [address]  [address]  [place] [address]  [phone]    MD ANU OH, MD MYRNA ALVAREZ, MD FIONA ALCALA, MD MARIBELL ESQUEDA, MD GRACIE TIWARI, MD ALLA GUEVARA MD          Patient events noted    MEDICATIONS  (STANDING):  ALPRAZolam 1 milliGRAM(s) Oral at bedtime  atorvastatin 40 milliGRAM(s) Oral at bedtime  cefepime   IVPB      cefepime   IVPB 1000 milliGRAM(s) IV Intermittent every 12 hours  chlorhexidine 2% Cloths 1 Application(s) Topical <User Schedule>  insulin lispro (ADMELOG) corrective regimen sliding scale   SubCutaneous Before meals and at bedtime  latanoprost 0.005% Ophthalmic Solution 1 Drop(s) Both EYES at bedtime  pantoprazole   Suspension 40 milliGRAM(s) Oral two times a day  sucralfate 1 Gram(s) Oral four times a day    MEDICATIONS  (PRN):  ondansetron Injectable 4 milliGRAM(s) IV Push every 4 hours PRN Nausea and/or Vomiting  sodium chloride 0.9% lock flush 10 milliLiter(s) IV Push every 1 hour PRN Pre/post blood products, medications, blood draw, and to maintain line patency      02-23-22 @ 07:01  -  02-24-22 @ 07:00  --------------------------------------------------------  IN: 1300 mL / OUT: 3550 mL / NET: -2250 mL    02-24-22 @ 07:01  -  02-24-22 @ 18:23  --------------------------------------------------------  IN: 0 mL / OUT: 1850 mL / NET: -1850 mL      PHYSICAL EXAM:      T(C): 36.8 (02-24-22 @ 17:37), Max: 37 (02-24-22 @ 00:21)  HR: 86 (02-24-22 @ 17:37) (72 - 86)  BP: 118/76 (02-24-22 @ 17:37) (113/70 - 146/78)  RR: 18 (02-24-22 @ 17:37) (16 - 23)  SpO2: 99% (02-24-22 @ 17:37) (93% - 99%)  Wt(kg): --  Lungs clear  Heart S1S2  Abd soft NT ND  Extremities:   1 edema                                    7.9    15.93 )-----------( 224      ( 24 Feb 2022 07:03 )             23.8     02-24    140  |  109<H>  |  44<H>  ----------------------------<  111<H>  3.7   |  26  |  1.51<H>    Ca    7.9<L>      24 Feb 2022 07:03          Creatinine Trend: 1.51<--, 1.65<--, 1.83<--, 2.17<--, 2.91<--, 3.95<--        ASSESSMENT  RADHA pre renal azotemia; ischemic ATN s/p dialysis x 1  Nonoliguric, recovery phase continues without significant electrolyte wasting    RECOMMEND  Monitor daily BMP, fluid balance  Can taper IVF     Severino Lynn MD

## 2022-02-25 LAB
ANION GAP SERPL CALC-SCNC: 5 MMOL/L — SIGNIFICANT CHANGE UP (ref 5–17)
BUN SERPL-MCNC: 30 MG/DL — HIGH (ref 7–23)
CALCIUM SERPL-MCNC: 7.7 MG/DL — LOW (ref 8.5–10.1)
CHLORIDE SERPL-SCNC: 109 MMOL/L — HIGH (ref 96–108)
CO2 SERPL-SCNC: 26 MMOL/L — SIGNIFICANT CHANGE UP (ref 22–31)
CREAT SERPL-MCNC: 1.35 MG/DL — HIGH (ref 0.5–1.3)
GLUCOSE BLDC GLUCOMTR-MCNC: 140 MG/DL — HIGH (ref 70–99)
GLUCOSE BLDC GLUCOMTR-MCNC: 161 MG/DL — HIGH (ref 70–99)
GLUCOSE BLDC GLUCOMTR-MCNC: 174 MG/DL — HIGH (ref 70–99)
GLUCOSE BLDC GLUCOMTR-MCNC: 216 MG/DL — HIGH (ref 70–99)
GLUCOSE SERPL-MCNC: 146 MG/DL — HIGH (ref 70–99)
HCT VFR BLD CALC: 23.2 % — LOW (ref 39–50)
HGB BLD-MCNC: 7.5 G/DL — LOW (ref 13–17)
MCHC RBC-ENTMCNC: 30.2 PG — SIGNIFICANT CHANGE UP (ref 27–34)
MCHC RBC-ENTMCNC: 32.3 G/DL — SIGNIFICANT CHANGE UP (ref 32–36)
MCV RBC AUTO: 93.5 FL — SIGNIFICANT CHANGE UP (ref 80–100)
NRBC # BLD: 0 /100 WBCS — SIGNIFICANT CHANGE UP (ref 0–0)
PLATELET # BLD AUTO: 211 K/UL — SIGNIFICANT CHANGE UP (ref 150–400)
POTASSIUM SERPL-MCNC: 3.7 MMOL/L — SIGNIFICANT CHANGE UP (ref 3.5–5.3)
POTASSIUM SERPL-SCNC: 3.7 MMOL/L — SIGNIFICANT CHANGE UP (ref 3.5–5.3)
RBC # BLD: 2.48 M/UL — LOW (ref 4.2–5.8)
RBC # FLD: 15.5 % — HIGH (ref 10.3–14.5)
SODIUM SERPL-SCNC: 140 MMOL/L — SIGNIFICANT CHANGE UP (ref 135–145)
WBC # BLD: 14.35 K/UL — HIGH (ref 3.8–10.5)
WBC # FLD AUTO: 14.35 K/UL — HIGH (ref 3.8–10.5)

## 2022-02-25 PROCEDURE — 99232 SBSQ HOSP IP/OBS MODERATE 35: CPT

## 2022-02-25 RX ORDER — CEFEPIME 1 G/1
2000 INJECTION, POWDER, FOR SOLUTION INTRAMUSCULAR; INTRAVENOUS EVERY 12 HOURS
Refills: 0 | Status: DISCONTINUED | OUTPATIENT
Start: 2022-02-25 | End: 2022-03-01

## 2022-02-25 RX ADMIN — LATANOPROST 1 DROP(S): 0.05 SOLUTION/ DROPS OPHTHALMIC; TOPICAL at 21:21

## 2022-02-25 RX ADMIN — ATORVASTATIN CALCIUM 40 MILLIGRAM(S): 80 TABLET, FILM COATED ORAL at 21:21

## 2022-02-25 RX ADMIN — Medication 1 GRAM(S): at 05:33

## 2022-02-25 RX ADMIN — PANTOPRAZOLE SODIUM 40 MILLIGRAM(S): 20 TABLET, DELAYED RELEASE ORAL at 05:33

## 2022-02-25 RX ADMIN — Medication 1 MILLIGRAM(S): at 21:21

## 2022-02-25 RX ADMIN — CEFEPIME 100 MILLIGRAM(S): 1 INJECTION, POWDER, FOR SOLUTION INTRAMUSCULAR; INTRAVENOUS at 17:28

## 2022-02-25 RX ADMIN — CHLORHEXIDINE GLUCONATE 1 APPLICATION(S): 213 SOLUTION TOPICAL at 05:29

## 2022-02-25 RX ADMIN — Medication 2: at 07:58

## 2022-02-25 RX ADMIN — Medication 1 GRAM(S): at 17:28

## 2022-02-25 RX ADMIN — Medication 1 GRAM(S): at 11:20

## 2022-02-25 RX ADMIN — Medication 4: at 11:19

## 2022-02-25 RX ADMIN — Medication 2: at 16:22

## 2022-02-25 RX ADMIN — PANTOPRAZOLE SODIUM 40 MILLIGRAM(S): 20 TABLET, DELAYED RELEASE ORAL at 17:28

## 2022-02-25 RX ADMIN — CEFEPIME 100 MILLIGRAM(S): 1 INJECTION, POWDER, FOR SOLUTION INTRAMUSCULAR; INTRAVENOUS at 05:32

## 2022-02-25 NOTE — PROGRESS NOTE ADULT - ASSESSMENT
81M w/ HTN, bladder tumor removal, melanoma. Presents w/ diarrhea and weakness. Admitted to ICU w/ rapif afib, septic shock likely from UTI vs pneumonia, RADHA w/ hyperkalemia and significant uremia and thrombocytopenia. S/p 1 HD treatment 2/16/22      Gram Negative bacterial Pneumonia  CXR: b/l lower lobe infiltrate   Septic Shock POA; resolved   acute respiratory failure with hypoxia  cont w/ IV abx.     Klebsiella Bacteremia   Septic Shock POA; resolved   ID on board   cont w/ IV abx.     Klebsiella UTI  as above     Acute Respiratory Failure with hypoxia   presumed secondary to PNA   will continue to titrate oxygen down as tolerated     Afib w/ RVR   in the setting of sepsis   s/p Amiodarone, HR now controlled   cont to monitor   AC contraindicated at this time     Acute blood loss anemia   episode of melena while on Heparin drip  s/p 2U pRBC transfusion  GI consult appreciated   Pt underwent EGD 2/24 found to have 2x duodenal ulcers   cont w/ Protonix and Carafate   potentially will restart AC in 2 days.     RADHA   likely combination of prerenal, ATN and obstructive uropathy;   s/p HD x1; now making urine;   cont w/ IVF and monitor renal function      thrombocytopenia   Secondary to Bacteremia  no active bleeding   resolved     Acute Left LE DVT  Doppler: Acute nonocclusive above-the-knee deep venous thrombosis. Occlusive DVT in the greater saphenous vein.  AC contraindicated due to GI bleed.     Diabetes Mellitus   HgA1c: 7.8%  cont w/ FS monitoring w/ insulins s/s coverage     #PPx   - SCDs, heparin drip on hold     No answer at phone numbers listed in chart

## 2022-02-25 NOTE — PROGRESS NOTE ADULT - SUBJECTIVE AND OBJECTIVE BOX
Patient is a 81y old  Male who presents with a chief complaint of hypotension and renal failure (24 Feb 2022 18:48)      HPI:  81 year old male pmh htn dm presents with 2 weeks of diarrhea and weakness. presented to ED and found to be in rapid afib with elevated cr. last cr feb 2021 was 1.  rec'd 5 liters ns by ed/ems.  cr improved slighlty. no abd pain, no fever no chest pain.   (15 Feb 2022 20:29)      INTERVAL HPI/OVERNIGHT EVENTS:  The patient denies melena, hematochezia, hematemesis, nausea, vomiting, abdominal pain, constipation, diarrhea, or change in bowel movements Tolerating diet    MEDICATIONS  (STANDING):  ALPRAZolam 1 milliGRAM(s) Oral at bedtime  atorvastatin 40 milliGRAM(s) Oral at bedtime  cefepime   IVPB      cefepime   IVPB 1000 milliGRAM(s) IV Intermittent every 12 hours  chlorhexidine 2% Cloths 1 Application(s) Topical <User Schedule>  insulin lispro (ADMELOG) corrective regimen sliding scale   SubCutaneous Before meals and at bedtime  latanoprost 0.005% Ophthalmic Solution 1 Drop(s) Both EYES at bedtime  pantoprazole   Suspension 40 milliGRAM(s) Oral two times a day  sucralfate 1 Gram(s) Oral four times a day    MEDICATIONS  (PRN):  ondansetron Injectable 4 milliGRAM(s) IV Push every 4 hours PRN Nausea and/or Vomiting  sodium chloride 0.9% lock flush 10 milliLiter(s) IV Push every 1 hour PRN Pre/post blood products, medications, blood draw, and to maintain line patency      FAMILY HISTORY:      Allergies    No Known Allergies    Intolerances        PMH/PSH:  Melanoma    HTN (hypertension)    HLD (hyperlipidemia)    Diabetes mellitus    Bladder tumor    History of cataract surgery    S/P TKR (total knee replacement), left    H/O total knee replacement, right    History of melanoma excision          REVIEW OF SYSTEMS:  CONSTITUTIONAL: No fever, weight loss,   EYES: No eye pain, visual disturbances, or discharge  ENMT:  No difficulty hearing, tinnitus, vertigo; No sinus or throat pain  NECK: No pain or stiffness  BREASTS: No pain, masses, or nipple discharge  RESPIRATORY: No cough, wheezing, chills or hemoptysis; No shortness of breath  CARDIOVASCULAR: No chest pain, palpitations, dizziness, or leg swelling  GASTROINTESTINAL: See above   GENITOURINARY: No dysuria, frequency, hematuria, or incontinence  NEUROLOGICAL: No headaches, memory loss, loss of strength, numbness, or tremors  SKIN: No itching, burning, rashes, or lesions   LYMPH NODES: No enlarged glands  ENDOCRINE: No heat or cold intolerance; No hair loss  MUSCULOSKELETAL: No joint pain or swelling; No muscle, back, or extremity pain  PSYCHIATRIC: No depression, anxiety, mood swings, or difficulty sleeping  HEME/LYMPH: No easy bruising, or bleeding gums  ALLERGY AND IMMUNOLOGIC: No hives or eczema    Vital Signs Last 24 Hrs  T(C): 36.8 (25 Feb 2022 11:48), Max: 36.9 (24 Feb 2022 16:30)  T(F): 98.3 (25 Feb 2022 11:48), Max: 98.4 (24 Feb 2022 16:30)  HR: 76 (25 Feb 2022 11:48) (76 - 86)  BP: 137/83 (25 Feb 2022 11:48) (113/70 - 146/78)  BP(mean): --  RR: 18 (25 Feb 2022 11:48) (17 - 18)  SpO2: 97% (25 Feb 2022 11:48) (95% - 99%)    PHYSICAL EXAM:  GENERAL: NAD, well-groomed, well-developed  HEAD:  Atraumatic, Normocephalic  EYES: EOMI, PERRLA, conjunctiva and sclera clear  NECK: Supple, No JVD, Normal thyroid  NERVOUS SYSTEM:  Alert & Oriented X3, Good concentration; Motor Strength 5/5 B/L upper and lower extremities  CHEST/LUNG: Clear to percussion bilaterally; No rales, rhonchi, wheezing, or rubs  HEART: Regular rate and rhythm; No murmurs, rubs, or gallops  ABDOMEN: Soft, Nontender, Nondistended; Bowel sounds present  EXTREMITIES:  2+ Peripheral Pulses, No clubbing, cyanosis, or edema  LYMPH: No lymphadenopathy noted  SKIN: No rashes or lesions    LAB                          7.5    14.35 )-----------( 211      ( 25 Feb 2022 07:41 )             23.2       CBC:  02-25 @ 07:41  WBC 14.35   Hgb 7.5   Hct 23.2   Plts 211  MCV 93.5  02-24 @ 07:03  WBC 15.93   Hgb 7.9   Hct 23.8   Plts 224  MCV 92.6  02-23 @ 07:48  WBC 18.97   Hgb 8.1   Hct 24.9   Plts 213  MCV 92.6  02-22 @ 08:09  WBC 20.59   Hgb 8.0   Hct 24.1   Plts 208  MCV 90.6  02-21 @ 07:24  WBC 24.50   Hgb 8.9   Hct 26.5   Plts 185  MCV 89.5  02-21 @ 00:30  WBC 24.59   Hgb 8.9   Hct 26.4   Plts 172  MCV 89.2  02-20 @ 09:29  WBC 24.19   Hgb 6.5   Hct 19.8   Plts 115  MCV 93.8  02-20 @ 03:39  WBC 23.19   Hgb 6.8   Hct 20.6   Plts 166  MCV 93.6  02-19 @ 16:33  WBC 25.64   Hgb 7.3   Hct 21.9   Plts 113  MCV 92.8  02-19 @ 08:17  WBC 24.77   Hgb 7.4   Hct 22.2   Plts 154  MCV 92.1      Chemistry:  02-25 @ 07:41  Na+ 140  K+ 3.7  Cl- 109  CO2 26  BUN 30  Cr 1.35     02-24 @ 07:03  Na+ 140  K+ 3.7  Cl- 109  CO2 26  BUN 44  Cr 1.51     02-23 @ 07:48  Na+ 142  K+ 3.6  Cl- 109  CO2 28  BUN 57  Cr 1.65     02-22 @ 08:09  Na+ 143  K+ 3.9  Cl- 109  CO2 28  BUN 75  Cr 1.83     02-21 @ 07:24  Na+ 140  K+ 3.9  Cl- 108  CO2 25    Cr 2.17     02-20 @ 03:39  Na+ 141  K+ 3.9  Cl- 109  CO2 25    Cr 2.91     02-19 @ 08:17  Na+ 140  K+ 3.7  Cl- 108  CO2 23    Cr 3.95         Glucose, Serum: 146 mg/dL (02-25 @ 07:41)  Glucose, Serum: 111 mg/dL (02-24 @ 07:03)  Glucose, Serum: 112 mg/dL (02-23 @ 07:48)  Glucose, Serum: 108 mg/dL (02-22 @ 08:09)  Glucose, Serum: 112 mg/dL (02-21 @ 07:24)  Glucose, Serum: 144 mg/dL (02-20 @ 03:39)  Glucose, Serum: 94 mg/dL (02-19 @ 08:17)      25 Feb 2022 07:41    140    |  109    |  30     ----------------------------<  146    3.7     |  26     |  1.35   24 Feb 2022 07:03    140    |  109    |  44     ----------------------------<  111    3.7     |  26     |  1.51   23 Feb 2022 07:48    142    |  109    |  57     ----------------------------<  112    3.6     |  28     |  1.65   22 Feb 2022 08:09    143    |  109    |  75     ----------------------------<  108    3.9     |  28     |  1.83   21 Feb 2022 07:24    140    |  108    |  101    ----------------------------<  112    3.9     |  25     |  2.17   20 Feb 2022 03:39    141    |  109    |  116    ----------------------------<  144    3.9     |  25     |  2.91   19 Feb 2022 08:17    140    |  108    |  127    ----------------------------<  94     3.7     |  23     |  3.95     Ca    7.7        25 Feb 2022 07:41  Ca    7.9        24 Feb 2022 07:03  Ca    7.6        23 Feb 2022 07:48  Ca    7.4        22 Feb 2022 08:09  Ca    7.6        21 Feb 2022 07:24  Ca    7.0        20 Feb 2022 03:39  Ca    7.4        19 Feb 2022 08:17  Phos  4.8       22 Feb 2022 08:09  Phos  4.4       19 Feb 2022 08:17  Mg     2.2       22 Feb 2022 08:09  Mg     2.9       19 Feb 2022 08:17    TPro  5.9    /  Alb  1.6    /  TBili  0.6    /  DBili  x      /  AST  91     /  ALT  109    /  AlkPhos  175    22 Feb 2022 08:09  TPro  5.5    /  Alb  1.5    /  TBili  0.6    /  DBili  x      /  AST  112    /  ALT  91     /  AlkPhos  174    21 Feb 2022 07:24  TPro  5.2    /  Alb  1.3    /  TBili  0.8    /  DBili  x      /  AST  61     /  ALT  46     /  AlkPhos  158    19 Feb 2022 08:17              CAPILLARY BLOOD GLUCOSE      POCT Blood Glucose.: 216 mg/dL (25 Feb 2022 10:54)  POCT Blood Glucose.: 174 mg/dL (25 Feb 2022 07:23)  POCT Blood Glucose.: 125 mg/dL (24 Feb 2022 21:18)  POCT Blood Glucose.: 118 mg/dL (24 Feb 2022 16:01)      C-Reactive Protein, Serum: 35 mg/L (02-24 @ 10:48)  C-Reactive Protein, Serum: 44 mg/L (02-22 @ 10:55)  C-Reactive Protein, Serum: 47 mg/L (02-21 @ 10:51)      RADIOLOGY & ADDITIONAL TESTS:    Imaging Personally Reviewed:  [ ] YES  [ ] NO    Consultant(s) Notes Reviewed:  [ ] YES  [ ] NO    Care Discussed with Consultants/Other Providers [ ] YES  [ ] NO

## 2022-02-25 NOTE — PROGRESS NOTE ADULT - SUBJECTIVE AND OBJECTIVE BOX
TMAX - 98.6    On day # 9 Cefepime    Vital Signs Last 24 Hrs  T(C): 36.8 (2022 17:10), Max: 37 (2022 16:05)  T(F): 98.2 (2022 17:10), Max: 98.6 (2022 16:05)  HR: 80 (2022 17:10) (76 - 85)  BP: 113/75 (2022 17:10) (113/72 - 137/83)  RR: 18 (2022 17:47) (18 - 18)  SpO2: 95% (2022 17:47) (89% - 97%)  Supplemental O2:  on RA now    Awake, alert, c/o feeling weak but otherwise feeling better.  Was OOB with PT and reportedly walked in place with a walker and assistance.  No c/o SOB, cp, or abdominal pain but appetite remains poor although he forces himself to eat.  No c/o N/V/D.  Still c/o feeling thirsty though.  O2 Sat's are between 95 -97% now.    PHYSICAL EXAM  General: 80 y/o white male awake, alert, on RA at present,  pleasant and cooperative, sitting semi-upright in bed now, still with a dry mouth but in NAD  HEENT: conj pale, sclerae anicteric, PERRLA, no oral lesions noted but mucosa and tongue still appear somewhat dry  Neck: supple, no nodes noted  Heart: RR  Lungs: decreased BS at the bases  Abdomen: BS+, soft, nontender to palpation, no masses or HS-megaly detected  Back: no CVA or Spinal tenderness noted  Extremities: well-healed bilateral TKR scars                    1+ edema LE's                     arms and hands still with some swelling   Skin: warm, dry, no rash noted  Khalil in place and draining clear yellow urine now - 3250cc  output recorded over the prior 24hrs.    I&O's Summary :  2022 07:01  -  2022 07:00  IN: 550 mL / OUT: 3250 mL / NET: -2700 mL  2022 07:01  -  2022 17:59  IN: 0 mL / OUT: 600 mL / NET: -600 mL    LABS:  CBC Full  -  ( 2022 07:41 )  WBC Count : 14.35 K/uL  RBC Count : 2.48 M/uL  Hemoglobin : 7.5 g/dL  Hematocrit : 23.2 %  Platelet Count - Automated : 211 K/uL  Mean Cell Volume : 93.5 fl  Mean Cell Hemoglobin : 30.2 pg  Mean Cell Hemoglobin Concentration : 32.3 g/dL  Auto Neutrophil # : x  Auto Lymphocyte # : x  Auto Monocyte # : x  Auto Eosinophil # : x  Auto Basophil # : x  Auto Neutrophil % : x  Auto Lymphocyte % : x  Auto Monocyte % : x  Auto Eosinophil % : x  Auto Basophil % : x      140  |  109<H>  |  30<H>  ----------------------------<  146<H>  3.7   |  26  |  1.35<H>  Ca    7.7<L>      2022 07:41    Sedimentation Rate, Erythrocyte: 90 mm/hr ( @ 07:03)  Sedimentation Rate, Erythrocyte: 89 mm/hr ( @ 08:09)  Sedimentation Rate, Erythrocyte: 75 mm/hr ( @ 07:24)  Sedimentation Rate, Erythrocyte: 62 mm/hr ( @ :22)    C-Reactive Protein, Serum (22 @ 10:48)    C-Reactive Protein, Serum: 35 mg/L  C-Reactive Protein, Serum (22 @ 10:55)    C-Reactive Protein, Serum: 44 mg/L  C-Reactive Protein, Serum (22 @ 10:51)    C-Reactive Protein, Serum: 47 mg/L  C-Reactive Protein, Serum (22 @ 09:23)    C-Reactive Protein, Serum: 83 mg/L    Procalcitonin, Serum (22 @ 10:48)    Procalcitonin, Serum: 0.57:   Procalcitonin, Serum (22 @ 10:55)    Procalcitonin, Serum: 1.18:   Procalcitonin, Serum (22 @ 10:51)    Procalcitonin, Serum: 2.09:   Procalcitonin, Serum (22 @ 09:23)    Procalcitonin, Serum: 17.20:   Procalcitonin, Serum (22 @ 09:20)    Procalcitonin, Serum: 81.70:    Lactate, Blood (02.15.22 @ 21:14)    Lactate, Blood: 1.5 mmol/L  Lactate, Blood (02.15.22 @ 18:24)    Lactate, Blood: 2.5: Elevated lactate. Consider ordering follow-up lactate to trend. mmol/L  Lactate, Blood (02.15.22 @ 14:15)    Lactate, Blood: 3.9: Elevated lactate. Consider ordering follow-up lactate to trend. mmol/L    A1C with Estimated Average Glucose (22 @ 09:30)    A1C with Estimated Average Glucose Result: 7.6: Method: Immunoassay       Reference Range                4.0-5.6%       High risk (prediabetic)        5.7-6.4%       Diabetic, diagnostic             >=6.5%       ADA diabetic treatment goal       <7.0%    Estimated Average Glucose: 171    Acute Hepatitis Panel (22 @ 09:37)    Hepatitis C Virus Interpretation: Nonreact: Hepatitis C AB    Hepatitis C Virus S/CO Ratio: 0.12 S/CO    Hepatitis B Core IgM Antibody: Nonreact    Hepatitis B Surface Antigen: Nonreact    Hepatitis A IgM Antibody: Nonreact    Hepatitis B Surface Antibody (22 @ 09:37)    Hepatitis B Surface Antibody: Nonreact    C3 Complement, Serum (22 @ 09:37)    C3 Complement, Serum: 115 mg/dL  C4 Complement, Serum (22 @ 09:37)    C4 Complement, Serum: 26 mg/dL    Glomerular Basement Membrane Ab IgG (22 @ 21:11)    Glomerular Basement Membrane Ab Ig: Negative        MICROBIOLOGY:    Flu With COVID-19 By [facility] (22 @ 06:11)    SARS-CoV-2 Result: NotDetec: EUA/IVD    Influenza A Result: NotDetec: EUA/IVD    Influenza B Result: NotDetec: EUA/IVD    Specimen Source: .Sputum Sputum ( @ 01:01)  Culture Results:   Normal Respiratory Alonzo present ( @ 01:01)    Gram Stain:   Rare polymorphonuclear leukocytes per low power field  Moderate Squamous epithelial cells per low power field  Numerous Yeast per oil power field  Moderate Gram Positive Rods per oil power field  Few Gram Negative Rods per oil power field  Rare Gram positive cocci in pairs per oil power field    Specimen Source: Clean Catch Clean Catch (Midstream) ( @ 08:49)  Culture Results:   >100,000 CFU/ml Klebsiella oxytoca/Raoutella ornithinolytica  Multiple Morphological Strains ( @ 08:49)    -  Tigecycline: S <=2    -  Tobramycin: S <=2    -  Trimethoprim/Sulfamethoxazole: S <=0.5/9.5    -  Amikacin: S <=16    -  Amoxicillin/Clavulanic Acid: S <=8/4    -  Ampicillin: R >16 These ampicillin results predict results for amoxicillin    -  Ampicillin/Sulbactam: S 8/4 Enterobacter, Klebsiella aerogenes, Citrobacter, and Serratia may develop resistance during prolonged therapy (3-4 days)    -  Aztreonam: S <=4    -  Cefazolin: R 16    -  Cefepime: S <=2    -  Cefoxitin: S <=8    -  Ceftriaxone: S <=1 Enterobacter, Klebsiella aerogenes, Citrobacter, and Serratia may develop resistance during prolonged therapy    -  Ciprofloxacin: S <=0.25    -  Gentamicin: S <=2    -  Imipenem: S <=1    -  Ertapenem: S <=0.5    -  Levofloxacin: S <=0.5    -  Meropenem: S <=1    -  Nitrofurantoin: S <=32 Should not be used to treat pyelonephritis    -  Piperacillin/Tazobactam: S <=8    Method Type: NATACHA    Specimen Source: .Blood Blood-Peripheral (02-15 @ 19:01)  Culture Results:   Growth in aerobic and anaerobic bottles: Klebsiella oxytoca/Raoutella  ornithinolytica    -  Klebsiella oxytoca: Detec    Gram Stain:   Growth in aerobic and anaerobic bottles: Gram Negative Rods    -  Meropenem: S <=1    -  Ertapenem: S <=0.5    -  Gentamicin: S <=2    -  Imipenem: S <=1    -  Levofloxacin: S <=0.5    -  Piperacillin/Tazobactam: S <=8    -  Tobramycin: S <=2    -  Trimethoprim/Sulfamethoxazole: S <=0.5/9.5    -  Amikacin: S <=16    -  Ampicillin: R >16 These ampicillin results predict results for amoxicillin    -  Ampicillin/Sulbactam: S 8/4 Enterobacter, Klebsiella aerogenes, Citrobacter, and Serratia may develop resistance during prolonged therapy (3-4 days)    -  Aztreonam: R >16    -  Cefazolin: R 16 Enterobacter, Klebsiella aerogenes, Citrobacter, and Serratia may develop resistance during prolonged therapy (3-4 days)    -  Cefepime: S <=2    -  Cefoxitin: S <=8    -  Ceftriaxone: S <=1 Enterobacter, Klebsiella aerogenes, Citrobacter, and Serratia may develop resistance during prolonged therapy    -  Ciprofloxacin: S <=0.25    Organism Identification: Blood Culture PCR  Klebsiella oxytoca /Raoutella ornithinolytica    Method Type: PCR    Method Type: NATACHA    Specimen Source: .Blood Blood-Peripheral (02-15 @ 19:01)  Culture Results:   Growth in aerobic and anaerobic bottles: Klebsiella oxytoca/Raoutella  ornithinolytica  See previous culture [id number] (02-15 @ 19:01)        Legionella Antigen, Urine: Negative ( @ 09:14)    MRSA/MSSA PCR (22 @ 09:20)    MRSA PCR Result.: NotDetec:    Staph Aureus PCR Result: NotDete    Respiratory Viral Panel with COVID-19 by [facility] (02.15.22 @ 14:34)    Rapid RVP Result: NotDete    SARS-CoV-2: NotDetec:       RADIOLOGY:  < from: CT Abdomen and Pelvis w/ Oral Cont (22 @ 13:36) >  ACC: [id number] EXAM:  CT ABDOMEN AND PELVIS OC                        PROCEDURE DATE:  2022    INTERPRETATION:  CLINICAL INFORMATION: Bacteremia  COMPARISON: 2/15  PROCEDURE:  CT of the Abdomen and Pelvis was performed without intravenous contrast.  Intravenous contrast: None.  Oral contrast: Positive contrast was administered.  Sagittal and coronal reformats were performed.  FINDINGS:  VISUALIZED CHEST: Bibasilar atelectasis and trace effusions.  ABDOMEN AND PELVIS:  LIVER: Within normal limits.  BILE DUCTS: Normal caliber.  GALLBLADDER: Cholelithiasis.  SPLEEN: Within normal limits.  PANCREAS: Within normal limits.  ADRENALS: Within normal limits.  KIDNEYS/URETERS: Within normal limits.  BLADDER: Khalil catheter.  REPRODUCTIVE ORGANS: No pelvic masses.  BOWEL: No bowel obstruction. Appendix is normal. Moderate hiatal hernia.  PERITONEUM: No ascites.  VESSELS: Atherosclerotic changes.  RETROPERITONEUM/LYMPH NODES: No lymphadenopathy.  ABDOMINAL WALL: Within normal limits.  BONES: Within normal limits.  IMPRESSION:  No acute findings. Multiple gallstones.    < from: US Duplex Venous Lower Ext Complete, Bilateral (22 @ 04:56) >  ACC: [id number] EXAM:  US DPLX LWR EXT VEINS COMPL BI                        PROCEDURE DATE:  2022    INTERPRETATION:  CLINICAL INFORMATION: Lower extremity swelling.  COMPARISON: None available.  TECHNIQUE: Duplex sonography of theBILATERAL LOWER extremity veins with   color and spectral Doppler, with and without compression.  IMPRESSION:  RIGHT: Limited calf vein visualization. Nonvisualization of the calf   veins. No evidence of deep venous thrombosis at or above the knee.  LEFT: Limited calf vein visualization. Acute nonocclusive above-the-knee   deep venous thrombosis. Occlusive DVT in the greater saphenous vein.    < from: US Abdomen Upper Quadrant Right (22 @ 12:03) >  ACC: [id number] EXAM:  US ABDOMEN RT UPR QUADRANT                        PROCEDURE DATE:  2022    INTERPRETATION:  CLINICAL INFORMATION: Elevated LFTs.  COMPARISON: CT abdomen/pelvis 2/15/2022.  TECHNIQUE: Sonography of the right upper quadrant.  IMPRESSION: Multiple gallstones. Gallbladder wall thickening. No   pericholecystic fluid. No evidence for intrahepatic or extrahepatic   biliary ductal dilatation.    < from: Xray Chest 1 View-PORTABLE IMMEDIATE (Xray Chest 1 View-PORTABLE IMMEDIATE .) (22 @ 01:26) >  ACC: [id number] EXAM:  XR CHEST PORTABLE IMMED 1V                        PROCEDURE DATE:  2022    IMPRESSION:  Right IJ line HD catheter in satisfactory position with no pneumothorax,   new    < from: CT Abdomen and Pelvis No Cont (02.15.22 @ 23:57) >  ACC: [id number] EXAM:  CT ABDOMEN AND PELVIS                        PROCEDURE DATE:  02/15/2022    INTERPRETATION:  CLINICAL INFORMATION:  Abdominal distension  COMPARISON: None.  CONTRAST/COMPLICATIONS:  IV Contrast: None  Oral Contrast:None  Complications: None  PROCEDURE:  Axial CT images were acquired through the abdomen and pelvis obtained   without intravenous contrast. Coronal and sagittal reformatted images   provided.  FINDINGS: This examination is limited by patient motion artifact and the   lack of oral and intravenous contrast.  LOWER CHEST: Bibasilar atelectasis. Coronary artery calcifications.  LIVER: Within normal limits.  BILE DUCTS: Normal caliber.  GALLBLADDER: Cholelithiasis..  SPLEEN: Within normal limits.  PANCREAS: Within normal limits.  ADRENALS: Within normal limits.  KIDNEYS/URETERS: No definite renal stone or hydronephrosis  BLADDER: Distended with Hkalil catheter. No bladder wall thickening.  REPRODUCTIVE ORGANS: Prostate gland mildly enlarged.  BOWEL: Evaluation of bowel is limited without distention with oral   contrast, however there is no bowel obstruction. Few colonic diverticula   without acute diverticulitis. Small bowel loops are not dilated.   Unremarkable appendix without appendicitis. Stomach is underdistended for   adequate evaluation, however suggestion of a moderate hiatal hernia.   Appendix  PERITONEUM: No free air significant ascites.  VESSELS:  Limited without intravenous contrast. There is calcific   atherosclerosis of the abdominal aorta without gross aneurysmal   dilatation. 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  RETROPERITONEUM: No lymphadenopathy.  ABDOMINAL WALL: Small fat-containing bilateral inguinal hernias.  BONES: Degenerative changes of the spine. Bilateral L5 spondylolysis with   slight grade 1 anterolisthesis of L5 on S1.  MPRESSION:  There is no bowel obstruction, significant ascites or free   intraperitoneal air.  Moderate hiatal hernia.  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    < from: Xray Chest 1 View- PORTABLE-Urgent (02.15.22 @ 14:32) >  ACC: [id number] EXAM:  XR CHEST PORTABLE URGENT 1V                        PROCEDURE DATE:  02/15/2022    INTERPRETATION:  AP semierect chest on February 15, 2022 at 1:55 PM.   Patient has sepsis.  Heart enlargement again noted.  There aremild mid lower lung field infiltrates medially new since 2020.  IMPRESSION: Bilateral infiltrates as above.    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 ago, s/p  Bilateral Cataract Surgeries, admitted via the ER to [facility] on 2/15/22 with c/o diarrhea x 2 weeks at home along with decreased po intake and mild nausea with a few episodes of vomiting bilious material.  W/u revealed      Rx'ed empirically with Rocephin + Zithromax after Blood and Urine Cx's were obtained.    Sepsis with Klebsiella Oxytoca ( AKA Raoutella ornithinolytica ) Bacteremia as identified from 2 out of 2 Blood Cx's from 2/15/22  - ? , GI, or Pulmonary source with Bilateral lower lobe PNA seen on initial CXR and Cholelithiasis with GB wall thickening seen on Sono.  Ab rx changed to Cefepime with Zithromax on 22 and patient remains with low - normal temps.  Sensitivities of the Kleb Oxytoca isolated from Blood Cx's noted above and sensitive to Cefepime.  Urine Legionella Ag is negative and now Urine Cx from 22 reported with >100,000 Klebsiella oxytoca ( multiple morphologic strains ) and now with sensitivities completed and noted to be the same as the Blood Cx isolates.  Sputum Cx finalized as normal alonzo.  CT of the Abdomen + Pelvis with oral contrast done 22  resulted with no acute findings, moderate Hiatal Hernia, and multiple Gallstones.  S/p transfusion of 2 Units of PRBC's on 22 for H+H decreased to 6.5 / 19.8 and noted with continued elevated WBC's and ESR although CRP and Procalcitonin are trending downwards and there is continued slow improvement in renal function.  S/p EGD and BX today and found to have Gastritis, a Duodenal Bulb Ulcer, and a Hiatal Hernia.    Suggestions:  Will continue present ab rx with Cefepime, s/p completed rx with Zithromax  for rx of Sepsis due to Klebsiella Bacteremia and UTI along with Bilateral PNA.  Follow-up temps and labs closely.  Follow-up CXR and monitor O2 Sat's.  Discussed with patient  again in detail at the bedside and with his son-in-law Antione via phone ( [phone]) again last PM at patient's request.  TMAX - 98.6    On day # 9 Cefepime    Vital Signs Last 24 Hrs  T(C): 36.8 (2022 17:10), Max: 37 (2022 16:05)  T(F): 98.2 (2022 17:10), Max: 98.6 (2022 16:05)  HR: 80 (2022 17:10) (76 - 85)  BP: 113/75 (2022 17:10) (113/72 - 137/83)  RR: 18 (2022 17:47) (18 - 18)  SpO2: 95% (2022 17:47) (89% - 97%)  Supplemental O2:  on RA now    Awake, alert, c/o feeling weak but otherwise feeling better.  Was OOB with PT and reportedly walked in place with a walker and assistance.  No c/o SOB, cp, or abdominal pain but appetite remains poor although he forces himself to eat.  No c/o N/V/D.  Still c/o feeling thirsty though.  O2 Sat's are between 95 -97% now.    PHYSICAL EXAM  General: 80 y/o white male awake, alert, on RA at present,  pleasant and cooperative, sitting semi-upright in bed now, still with a dry mouth but in NAD  HEENT: conj pale, sclerae anicteric, PERRLA, no oral lesions noted but mucosa and tongue still appear somewhat dry  Neck: supple, no nodes noted  Heart: RR  Lungs: decreased BS at the bases  Abdomen: BS+, soft, nontender to palpation, no masses or HS-megaly detected  Back: no CVA or Spinal tenderness noted  Extremities: well-healed bilateral TKR scars                    1+ edema LE's                     arms and hands still with some swelling   Skin: warm, dry, no rash noted  Khalil in place and draining clear yellow urine now - 3250cc  output recorded over the prior 24hrs.    I&O's Summary :  2022 07:01  -  2022 07:00  IN: 550 mL / OUT: 3250 mL / NET: -2700 mL  2022 07:01  -  2022 17:59  IN: 0 mL / OUT: 600 mL / NET: -600 mL    LABS:  CBC Full  -  ( 2022 07:41 )  WBC Count : 14.35 K/uL  RBC Count : 2.48 M/uL  Hemoglobin : 7.5 g/dL  Hematocrit : 23.2 %  Platelet Count - Automated : 211 K/uL  Mean Cell Volume : 93.5 fl  Mean Cell Hemoglobin : 30.2 pg  Mean Cell Hemoglobin Concentration : 32.3 g/dL  Auto Neutrophil # : x  Auto Lymphocyte # : x  Auto Monocyte # : x  Auto Eosinophil # : x  Auto Basophil # : x  Auto Neutrophil % : x  Auto Lymphocyte % : x  Auto Monocyte % : x  Auto Eosinophil % : x  Auto Basophil % : x      140  |  109<H>  |  30<H>  ----------------------------<  146<H>  3.7   |  26  |  1.35<H>  Ca    7.7<L>      2022 07:41    Sedimentation Rate, Erythrocyte: 90 mm/hr ( @ 07:03)  Sedimentation Rate, Erythrocyte: 89 mm/hr ( @ 08:09)  Sedimentation Rate, Erythrocyte: 75 mm/hr ( @ 07:24)  Sedimentation Rate, Erythrocyte: 62 mm/hr ( @ :22)    C-Reactive Protein, Serum (22 @ 10:48)    C-Reactive Protein, Serum: 35 mg/L  C-Reactive Protein, Serum (22 @ 10:55)    C-Reactive Protein, Serum: 44 mg/L  C-Reactive Protein, Serum (22 @ 10:51)    C-Reactive Protein, Serum: 47 mg/L  C-Reactive Protein, Serum (22 @ 09:23)    C-Reactive Protein, Serum: 83 mg/L    Procalcitonin, Serum (22 @ 10:48)    Procalcitonin, Serum: 0.57:   Procalcitonin, Serum (22 @ 10:55)    Procalcitonin, Serum: 1.18:   Procalcitonin, Serum (22 @ 10:51)    Procalcitonin, Serum: 2.09:   Procalcitonin, Serum (22 @ 09:23)    Procalcitonin, Serum: 17.20:   Procalcitonin, Serum (22 @ 09:20)    Procalcitonin, Serum: 81.70:    Lactate, Blood (02.15.22 @ 21:14)    Lactate, Blood: 1.5 mmol/L  Lactate, Blood (02.15.22 @ 18:24)    Lactate, Blood: 2.5: Elevated lactate. Consider ordering follow-up lactate to trend. mmol/L  Lactate, Blood (02.15.22 @ 14:15)    Lactate, Blood: 3.9: Elevated lactate. Consider ordering follow-up lactate to trend. mmol/L    A1C with Estimated Average Glucose (22 @ 09:30)    A1C with Estimated Average Glucose Result: 7.6: Method: Immunoassay       Reference Range                4.0-5.6%       High risk (prediabetic)        5.7-6.4%       Diabetic, diagnostic             >=6.5%       ADA diabetic treatment goal       <7.0%    Estimated Average Glucose: 171    Acute Hepatitis Panel (22 @ 09:37)    Hepatitis C Virus Interpretation: Nonreact: Hepatitis C AB    Hepatitis C Virus S/CO Ratio: 0.12 S/CO    Hepatitis B Core IgM Antibody: Nonreact    Hepatitis B Surface Antigen: Nonreact    Hepatitis A IgM Antibody: Nonreact    Hepatitis B Surface Antibody (22 @ 09:37)    Hepatitis B Surface Antibody: Nonreact    C3 Complement, Serum (22 @ 09:37)    C3 Complement, Serum: 115 mg/dL  C4 Complement, Serum (22 @ 09:37)    C4 Complement, Serum: 26 mg/dL    Glomerular Basement Membrane Ab IgG (22 @ 21:11)    Glomerular Basement Membrane Ab Ig: Negative        MICROBIOLOGY:    Flu With COVID-19 By [facility] (22 @ 06:11)    SARS-CoV-2 Result: NotDetec: EUA/IVD    Influenza A Result: NotDetec: EUA/IVD    Influenza B Result: NotDetec: EUA/IVD    Specimen Source: .Sputum Sputum ( @ 01:01)  Culture Results:   Normal Respiratory Alonzo present ( @ 01:01)    Gram Stain:   Rare polymorphonuclear leukocytes per low power field  Moderate Squamous epithelial cells per low power field  Numerous Yeast per oil power field  Moderate Gram Positive Rods per oil power field  Few Gram Negative Rods per oil power field  Rare Gram positive cocci in pairs per oil power field    Specimen Source: Clean Catch Clean Catch (Midstream) ( @ 08:49)  Culture Results:   >100,000 CFU/ml Klebsiella oxytoca/Raoutella ornithinolytica  Multiple Morphological Strains ( @ 08:49)    -  Tigecycline: S <=2    -  Tobramycin: S <=2    -  Trimethoprim/Sulfamethoxazole: S <=0.5/9.5    -  Amikacin: S <=16    -  Amoxicillin/Clavulanic Acid: S <=8/4    -  Ampicillin: R >16 These ampicillin results predict results for amoxicillin    -  Ampicillin/Sulbactam: S 8/4 Enterobacter, Klebsiella aerogenes, Citrobacter, and Serratia may develop resistance during prolonged therapy (3-4 days)    -  Aztreonam: S <=4    -  Cefazolin: R 16    -  Cefepime: S <=2    -  Cefoxitin: S <=8    -  Ceftriaxone: S <=1 Enterobacter, Klebsiella aerogenes, Citrobacter, and Serratia may develop resistance during prolonged therapy    -  Ciprofloxacin: S <=0.25    -  Gentamicin: S <=2    -  Imipenem: S <=1    -  Ertapenem: S <=0.5    -  Levofloxacin: S <=0.5    -  Meropenem: S <=1    -  Nitrofurantoin: S <=32 Should not be used to treat pyelonephritis    -  Piperacillin/Tazobactam: S <=8    Method Type: NATACHA    Specimen Source: .Blood Blood-Peripheral (02-15 @ 19:01)  Culture Results:   Growth in aerobic and anaerobic bottles: Klebsiella oxytoca/Raoutella  ornithinolytica    -  Klebsiella oxytoca: Detec    Gram Stain:   Growth in aerobic and anaerobic bottles: Gram Negative Rods    -  Meropenem: S <=1    -  Ertapenem: S <=0.5    -  Gentamicin: S <=2    -  Imipenem: S <=1    -  Levofloxacin: S <=0.5    -  Piperacillin/Tazobactam: S <=8    -  Tobramycin: S <=2    -  Trimethoprim/Sulfamethoxazole: S <=0.5/9.5    -  Amikacin: S <=16    -  Ampicillin: R >16 These ampicillin results predict results for amoxicillin    -  Ampicillin/Sulbactam: S 8/4 Enterobacter, Klebsiella aerogenes, Citrobacter, and Serratia may develop resistance during prolonged therapy (3-4 days)    -  Aztreonam: R >16    -  Cefazolin: R 16 Enterobacter, Klebsiella aerogenes, Citrobacter, and Serratia may develop resistance during prolonged therapy (3-4 days)    -  Cefepime: S <=2    -  Cefoxitin: S <=8    -  Ceftriaxone: S <=1 Enterobacter, Klebsiella aerogenes, Citrobacter, and Serratia may develop resistance during prolonged therapy    -  Ciprofloxacin: S <=0.25    Organism Identification: Blood Culture PCR  Klebsiella oxytoca /Raoutella ornithinolytica    Method Type: PCR    Method Type: NATACHA    Specimen Source: .Blood Blood-Peripheral (02-15 @ 19:01)  Culture Results:   Growth in aerobic and anaerobic bottles: Klebsiella oxytoca/Raoutella  ornithinolytica  See previous culture [id number] (02-15 @ 19:01)        Legionella Antigen, Urine: Negative ( @ 09:14)    MRSA/MSSA PCR (22 @ 09:20)    MRSA PCR Result.: NotDetec:    Staph Aureus PCR Result: NotDete    Respiratory Viral Panel with COVID-19 by [facility] (02.15.22 @ 14:34)    Rapid RVP Result: NotDete    SARS-CoV-2: NotDetec:       RADIOLOGY:  < from: CT Abdomen and Pelvis w/ Oral Cont (22 @ 13:36) >  ACC: [id number] EXAM:  CT ABDOMEN AND PELVIS OC                        PROCEDURE DATE:  2022    INTERPRETATION:  CLINICAL INFORMATION: Bacteremia  COMPARISON: 2/15  PROCEDURE:  CT of the Abdomen and Pelvis was performed without intravenous contrast.  Intravenous contrast: None.  Oral contrast: Positive contrast was administered.  Sagittal and coronal reformats were performed.  FINDINGS:  VISUALIZED CHEST: Bibasilar atelectasis and trace effusions.  ABDOMEN AND PELVIS:  LIVER: Within normal limits.  BILE DUCTS: Normal caliber.  GALLBLADDER: Cholelithiasis.  SPLEEN: Within normal limits.  PANCREAS: Within normal limits.  ADRENALS: Within normal limits.  KIDNEYS/URETERS: Within normal limits.  BLADDER: Khalil catheter.  REPRODUCTIVE ORGANS: No pelvic masses.  BOWEL: No bowel obstruction. Appendix is normal. Moderate hiatal hernia.  PERITONEUM: No ascites.  VESSELS: Atherosclerotic changes.  RETROPERITONEUM/LYMPH NODES: No lymphadenopathy.  ABDOMINAL WALL: Within normal limits.  BONES: Within normal limits.  IMPRESSION:  No acute findings. Multiple gallstones.    < from: US Duplex Venous Lower Ext Complete, Bilateral (22 @ 04:56) >  ACC: [id number] EXAM:  US DPLX LWR EXT VEINS COMPL BI                        PROCEDURE DATE:  2022    INTERPRETATION:  CLINICAL INFORMATION: Lower extremity swelling.  COMPARISON: None available.  TECHNIQUE: Duplex sonography of theBILATERAL LOWER extremity veins with   color and spectral Doppler, with and without compression.  IMPRESSION:  RIGHT: Limited calf vein visualization. Nonvisualization of the calf   veins. No evidence of deep venous thrombosis at or above the knee.  LEFT: Limited calf vein visualization. Acute nonocclusive above-the-knee   deep venous thrombosis. Occlusive DVT in the greater saphenous vein.    < from: US Abdomen Upper Quadrant Right (22 @ 12:03) >  ACC: [id number] EXAM:  US ABDOMEN RT UPR QUADRANT                        PROCEDURE DATE:  2022    INTERPRETATION:  CLINICAL INFORMATION: Elevated LFTs.  COMPARISON: CT abdomen/pelvis 2/15/2022.  TECHNIQUE: Sonography of the right upper quadrant.  IMPRESSION: Multiple gallstones. Gallbladder wall thickening. No   pericholecystic fluid. No evidence for intrahepatic or extrahepatic   biliary ductal dilatation.    < from: Xray Chest 1 View-PORTABLE IMMEDIATE (Xray Chest 1 View-PORTABLE IMMEDIATE .) (22 @ 01:26) >  ACC: [id number] EXAM:  XR CHEST PORTABLE IMMED 1V                        PROCEDURE DATE:  2022    IMPRESSION:  Right IJ line HD catheter in satisfactory position with no pneumothorax,   new    < from: CT Abdomen and Pelvis No Cont (02.15.22 @ 23:57) >  ACC: [id number] EXAM:  CT ABDOMEN AND PELVIS                        PROCEDURE DATE:  02/15/2022    INTERPRETATION:  CLINICAL INFORMATION:  Abdominal distension  COMPARISON: None.  CONTRAST/COMPLICATIONS:  IV Contrast: None  Oral Contrast:None  Complications: None  PROCEDURE:  Axial CT images were acquired through the abdomen and pelvis obtained   without intravenous contrast. Coronal and sagittal reformatted images   provided.  FINDINGS: This examination is limited by patient motion artifact and the   lack of oral and intravenous contrast.  LOWER CHEST: Bibasilar atelectasis. Coronary artery calcifications.  LIVER: Within normal limits.  BILE DUCTS: Normal caliber.  GALLBLADDER: Cholelithiasis..  SPLEEN: Within normal limits.  PANCREAS: Within normal limits.  ADRENALS: Within normal limits.  KIDNEYS/URETERS: No definite renal stone or hydronephrosis  BLADDER: Distended with Khalil catheter. No bladder wall thickening.  REPRODUCTIVE ORGANS: Prostate gland mildly enlarged.  BOWEL: Evaluation of bowel is limited without distention with oral   contrast, however there is no bowel obstruction. Few colonic diverticula   without acute diverticulitis. Small bowel loops are not dilated.   Unremarkable appendix without appendicitis. Stomach is underdistended for   adequate evaluation, however suggestion of a moderate hiatal hernia.   Appendix  PERITONEUM: No free air significant ascites.  VESSELS:  Limited without intravenous contrast. There is calcific   atherosclerosis of the abdominal aorta without gross aneurysmal   dilatation. 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  RETROPERITONEUM: No lymphadenopathy.  ABDOMINAL WALL: Small fat-containing bilateral inguinal hernias.  BONES: Degenerative changes of the spine. Bilateral L5 spondylolysis with   slight grade 1 anterolisthesis of L5 on S1.  MPRESSION:  There is no bowel obstruction, significant ascites or free   intraperitoneal air.  Moderate hiatal hernia.  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    < from: Xray Chest 1 View- PORTABLE-Urgent (02.15.22 @ 14:32) >  ACC: [id number] EXAM:  XR CHEST PORTABLE URGENT 1V                        PROCEDURE DATE:  02/15/2022    INTERPRETATION:  AP semierect chest on February 15, 2022 at 1:55 PM.   Patient has sepsis.  Heart enlargement again noted.  There aremild mid lower lung field infiltrates medially new since 2020.  IMPRESSION: Bilateral infiltrates as above.    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 ago, s/p  Bilateral Cataract Surgeries, admitted via the ER to [facility] on 2/15/22 with c/o diarrhea x 2 weeks at home along with decreased po intake and mild nausea with a few episodes of vomiting bilious material.  W/u revealed a markedly elevated WBC's of 37,940.  an elevated Lactate to 3.9, and his BUN / Creat were found to be elevated at 169 / 12.9 and Glucose was elevated as well. Further w/u with CXR and CT of the Abdomen + Pelvis was done and revealed Bilateral Infiltrates, Cholelithiasis, a moderate Hiatal Hernia, but no bowel obstruction was seen.  Patient was admitted to the CCU for further care and he was Rx'ed empirically with Rocephin + Zithromax after Blood and Urine Cx's were obtained and he underwent HD urgently x 1 on 2/15/22 PM. and rec'd 5 Liters of NS as well. Subsequently found to have Sepsis with Klebsiella Oxytoca ( AKA Raoutella ornithinolytica ) Bacteremia as identified from 2 out of 2 Blood Cx's from 2/15/22  - ? , GI, or Pulmonary source with Bilateral lower lobe PNA seen on initial CXR and Cholelithiasis with GB wall thickening seen on Sono.  Ab rx changed to Cefepime with Zithromax on 22 and patient remains with low - normal temps.  Sensitivities of the Kleb Oxytoca isolated from Blood Cx's noted above and sensitive to Cefepime.  Urine Legionella Ag is negative and now Urine Cx from 22 reported with >100,000 Klebsiella oxytoca ( multiple morphologic strains ) and now with sensitivities completed and noted to be the same as the Blood Cx isolates.  Sputum Cx finalized as normal alonzo.  CT of the Abdomen + Pelvis with oral contrast done 22  resulted with no acute findings, moderate Hiatal Hernia, and multiple Gallstones.  S/p transfusion of 2 Units of PRBC's on 22 for H+H decreased to 6.5 / 19.8 and noted with continued elevated WBC's and ESR although CRP and Procalcitonin are trending downwards and there is continued slow improvement in renal function.  S/p EGD and BX today and found to have Gastritis, a Duodenal Bulb Ulcer, and a Hiatal Hernia.    Suggestions:  Will continue present ab rx with Cefepime, s/p completed rx with Zithromax  for rx of Sepsis due to Klebsiella Bacteremia and UTI along with Bilateral PNA.  Follow-up temps and labs closely.  Follow-up CXR and monitor O2 Sat's.  Discussed with patient  again in detail at the bedside and with his son-in-law Antione via phone ( [phone]) again last PM at patient's request.

## 2022-02-25 NOTE — CHART NOTE - NSCHARTNOTEFT_GEN_A_CORE
Pt seen for follow -up on medical floor w/ gram negative bacterial pneumonia ; klebsiella bacteremia ; klebsiella UTI ; A - fib w/VR ; Acute blood loss anemia ( EGD on 2/24 w/ duodenal ulcers x 2) ; RADHA ; thrombocytopenia Acute left LE DVT ; DM.     Factors impacting intake: [ x] none [ ] nausea  [ ] vomiting [ ] diarrhea [ ] constipation  [ ]chewing problems [ ] swallowing issues  [ ] other:     Diet Prescription: Diet, Consistent Carbohydrate Renal/No Snacks (02-23-22 @ 10:38)    Intake: Pt consumes 50 - 75% meals. Denies N/V/D/C/Chewing/Swallowing issues, Receptive to a nutritional supplement. Reviewed pressure ulcer nutrition Therapy & provided written literature.     Current Weight: 2/22 - 236.3 (107.2 kg) Edema -2/23 - 2+ ( L & R) Leg  / 3+ ( L & R) Arm ; 2/15 - 238.5 (108.1 kg) 2/16 -1+ generalized edema  % Weight Change - .9 % /2.2 kg loss x 7 days    Physical Appearance - Nutrition focused physical exam conducted ;   Subcutaneous fat loss: [mild ] Orbital fat pads region, [WDL ]Buccal fat region, [WDL ]Triceps region,  [WDL ]Ribs region.  Muscle wasting: [wdl ]Temples region, [MILD ]Clavicle region, [wdl ]Shoulder region, [unable ]Scapula region, [WDL ]Interosseous region,  [WDL ]thigh region, [WDL ]Calf region      Pertinent Medications: MEDICATIONS  (STANDING):  ALPRAZolam 1 milliGRAM(s) Oral at bedtime  atorvastatin 40 milliGRAM(s) Oral at bedtime  cefepime   IVPB 2000 milliGRAM(s) IV Intermittent every 12 hours  chlorhexidine 2% Cloths 1 Application(s) Topical <User Schedule>  insulin lispro (ADMELOG) corrective regimen sliding scale   SubCutaneous Before meals and at bedtime  latanoprost 0.005% Ophthalmic Solution 1 Drop(s) Both EYES at bedtime  pantoprazole   Suspension 40 milliGRAM(s) Oral two times a day  sucralfate 1 Gram(s) Oral four times a day    MEDICATIONS  (PRN):  ondansetron Injectable 4 milliGRAM(s) IV Push every 4 hours PRN Nausea and/or Vomiting  sodium chloride 0.9% lock flush 10 milliLiter(s) IV Push every 1 hour PRN Pre/post blood products, medications, blood draw, and to maintain line patency    Pertinent Labs: 02-25 Na140 mmol/L Glu 146 mg/dL<H> K+ 3.7 mmol/L Cr  1.35 mg/dL<H> BUN 30 mg/dL<H> 02-22 Phos 4.8 mg/dL<H> 02-22 Alb 1.6 g/dL<L>02-22  U/L<H> AST 91 U/L<H> Alkaline Phosphatase 175 U/L<H>02-17-22 @ 09:30 A1C 7.6  02-16-22 @ 15:18 A1C 7.8       CAPILLARY BLOOD GLUCOSE      POCT Blood Glucose.: 216 mg/dL (25 Feb 2022 10:54)  POCT Blood Glucose.: 174 mg/dL (25 Feb 2022 07:23)  POCT Blood Glucose.: 125 mg/dL (24 Feb 2022 21:18)  POCT Blood Glucose.: 118 mg/dL (24 Feb 2022 16:01)    Skin: 2/16 - bilateral sacral fold suspected DTI                   Sacrum stage 1    Estimated Needs:   [x ] no change since previous assessment ( 2/17 )  [ ] recalculated:     Previous Nutrition Diagnosis:   Nutrition Diagnosis:    Nutrition Diagnosis:  Nutrition diagnosis no...        Nutrition Diagnosis is [ ] ongoing  [ ] resolved [ x] not applicable     New Nutrition Diagnosis: [x ] not applicable       Interventions:   Recommend  [x ] Change Diet To: CCHO w/ evening snack  [x ] Nutrition Supplement - Glucerna Shake 8 oz daily (220 luan, 10 gm pro)   [ ] Nutrition Support  [ ] Other:     Monitoring and Evaluation:   [x ] PO intake [ x ] Tolerance to diet prescription [ x ] weights [ x ] labs[ x ] follow up per protocol  [ ] other:

## 2022-02-25 NOTE — PROGRESS NOTE ADULT - ASSESSMENT
HPI:  81 year old male pmh htn dm presents with 2 weeks of diarrhea and weakness. presented to ED and found to be in rapid afib with elevated cr. last cr feb 2021 was 1.  rec'd 5 liters ns by ed/ems.  cr improved slighlty. no abd pain, no fever no chest pain.   (15 Feb 2022 20:29)  ----- As Above -------- Patient was seen earlier today. History obtained from patient, chart , daughter and MD  The patient presented with two weeks of diarrhea. He had seen his PMD during the two weeks and was prescribed Erythromycin which did not help. He eventually had to com to the ER.  Patient was noted to be in afib and developed a DVT. The patient was started on IV heparin. Unfortunately. he developed documented melena. It stopped once the heparin was discontinued.  Patient has no history of GI bleeds, does not take NSAIDs routinely and denies abdominal pain  Patient is due for a repeat colonoscopy.     Melena - Etiology is duodenal ulcers  HGB 7.5 ( HGB probably drifting downwards from rehydration ) Tolerating Regular diet. 1) Carafate  2) PPI 3) Hold anticoagulants for 2 additional days 4) Hold NSAIDs  ==== Stable from GI point of view for discharge  ==== Discussed with daughter for ~ 15 minutes yesterday.  HPI:  81 year old male pmh htn dm presents with 2 weeks of diarrhea and weakness. presented to ED and found to be in rapid afib with elevated cr. last cr feb 2021 was 1.  rec'd 5 liters ns by ed/ems.  cr improved slighlty. no abd pain, no fever no chest pain.   (15 Feb 2022 20:29)  ----- As Above -------- Patient was seen earlier today. History obtained from patient, chart , daughter and MD  The patient presented with two weeks of diarrhea. He had seen his PMD during the two weeks and was prescribed Erythromycin which did not help. He eventually had to com to the ER.  Patient was noted to be in afib and developed a DVT. The patient was started on IV heparin. Unfortunately. he developed documented melena. It stopped once the heparin was discontinued.  Patient has no history of GI bleeds, does not take NSAIDs routinely and denies abdominal pain  Patient is due for a repeat colonoscopy.     A) Melena - Etiology is duodenal ulcers  HGB 7.5 ( HGB probably drifting downwards from rehydration ) Tolerating Regular diet. 1) Carafate  2) PPI 3) Hold anticoagulants for 2 additional days 4) Hold NSAIDs  B) Elevated LFTs - Normal CT scan earlier this admission . Probably secondary to medications - 1) F/U labs 2) Ultrasound if they rise  ==== Stable from GI point of view for discharge  ==== Discussed with daughter for ~ 15 minutes yesterday.

## 2022-02-25 NOTE — PROGRESS NOTE ADULT - SUBJECTIVE AND OBJECTIVE BOX
Patient is a 81y old  Male who presents with a chief complaint of hypotension and renal failure (25 Feb 2022 15:22)      INTERVAL HPI/ OVERNIGHT EVENTS: Pt was seen and examined at bedside today, No significant overnight events, pt denies any new complaints, continues to feel weak      MEDICATIONS  (STANDING):  ALPRAZolam 1 milliGRAM(s) Oral at bedtime  atorvastatin 40 milliGRAM(s) Oral at bedtime  cefepime   IVPB 2000 milliGRAM(s) IV Intermittent every 12 hours  chlorhexidine 2% Cloths 1 Application(s) Topical <User Schedule>  insulin lispro (ADMELOG) corrective regimen sliding scale   SubCutaneous Before meals and at bedtime  latanoprost 0.005% Ophthalmic Solution 1 Drop(s) Both EYES at bedtime  pantoprazole   Suspension 40 milliGRAM(s) Oral two times a day  sucralfate 1 Gram(s) Oral four times a day    MEDICATIONS  (PRN):  ondansetron Injectable 4 milliGRAM(s) IV Push every 4 hours PRN Nausea and/or Vomiting  sodium chloride 0.9% lock flush 10 milliLiter(s) IV Push every 1 hour PRN Pre/post blood products, medications, blood draw, and to maintain line patency      Allergies    No Known Allergies    Intolerances        REVIEW OF SYSTEMS:    Unable to examine due to [ ] Encephalopathy [ ] Advanced Dementia [ ] Expressive Aphasia [ ] Non-verbal patient    CONSTITUTIONAL: No fever, positive generalized weakness/Fatigue, No weight loss  EYES: No eye pain, visual disturbances, or discharge  ENMT:  No difficulty hearing, tinnitus, vertigo; No sinus or throat pain  NECK: No pain or stiffness  RESPIRATORY: No shortness of breath,  cough, wheezing, sputum or hemoptysis   CARDIOVASCULAR: No chest pain, palpitations, or leg swelling  GASTROINTESTINAL: No abdominal pain. No nausea, vomiting, diarrhea or constipation. No melena or hematochezia.  GENITOURINARY: No dysuria, frequency, hematuria, or incontinence  NEUROLOGICAL: No headaches, Dizziness, memory loss, loss of strength, numbness, or tremors  SKIN: No itching, burning, rashes, or lesions   MUSCULOSKELETAL: No joint pain or swelling; No muscle, back, or extremity pain  PSYCHIATRIC: No depression, anxiety, mood swings, or difficulty sleeping  HEME/LYMPH: No easy bruising, or bleeding gums    Vital Signs Last 24 Hrs  T(C): 36.8 (25 Feb 2022 17:10), Max: 37 (25 Feb 2022 16:05)  T(F): 98.2 (25 Feb 2022 17:10), Max: 98.6 (25 Feb 2022 16:05)  HR: 80 (25 Feb 2022 17:10) (76 - 85)  BP: 113/75 (25 Feb 2022 17:10) (113/72 - 137/83)  BP(mean): --  RR: 18 (25 Feb 2022 17:47) (18 - 18)  SpO2: 95% (25 Feb 2022 17:47) (89% - 97%)    PHYSICAL EXAM:  GENERAL: NAD on NC, obese   HEAD:  Atraumatic, Normocephalic  EYES: conjunctiva and sclera clear  ENMT: Moist mucous membranes  NECK: Supple, No JVD, Normal thyroid  CHEST/LUNG: Clear to Auscultation bilaterally; No rales, rhonchi, wheezing, or rubs  HEART: Regular rate and rhythm; No murmurs, rubs, or gallops  ABDOMEN: Soft, Nontender, Nondistended; Bowel sounds present  EXTREMITIES:  2+ Peripheral Pulses, No clubbing, cyanosis, or edema  SKIN: No rashes or lesions  NERVOUS SYSTEM:  Alert & Oriented X3, Good concentration; Motor Strength 5/5 B/L upper and lower extremities    LABS:                        7.5    14.35 )-----------( 211      ( 25 Feb 2022 07:41 )             23.2     02-25    140  |  109<H>  |  30<H>  ----------------------------<  146<H>  3.7   |  26  |  1.35<H>    Ca    7.7<L>      25 Feb 2022 07:41          CAPILLARY BLOOD GLUCOSE      POCT Blood Glucose.: 161 mg/dL (25 Feb 2022 15:50)  POCT Blood Glucose.: 216 mg/dL (25 Feb 2022 10:54)  POCT Blood Glucose.: 174 mg/dL (25 Feb 2022 07:23)  POCT Blood Glucose.: 125 mg/dL (24 Feb 2022 21:18)          RADIOLOGY & ADDITIONAL TESTS:          Imaging Personally Reviewed:  [ ] YES  [ ] NO    Consultant(s) Notes Reviewed:  [ ] YES  [ ] NO    Care Discussed with Consultants/Other Providers [x ] YES  [ ] NO

## 2022-02-26 LAB
ANION GAP SERPL CALC-SCNC: 5 MMOL/L — SIGNIFICANT CHANGE UP (ref 5–17)
BUN SERPL-MCNC: 26 MG/DL — HIGH (ref 7–23)
CALCIUM SERPL-MCNC: 7.5 MG/DL — LOW (ref 8.5–10.1)
CHLORIDE SERPL-SCNC: 108 MMOL/L — SIGNIFICANT CHANGE UP (ref 96–108)
CO2 SERPL-SCNC: 26 MMOL/L — SIGNIFICANT CHANGE UP (ref 22–31)
CREAT SERPL-MCNC: 1.33 MG/DL — HIGH (ref 0.5–1.3)
CRP SERPL-MCNC: 37 MG/L — HIGH
ERYTHROCYTE [SEDIMENTATION RATE] IN BLOOD: 106 MM/HR — HIGH (ref 0–20)
GLUCOSE BLDC GLUCOMTR-MCNC: 120 MG/DL — HIGH (ref 70–99)
GLUCOSE BLDC GLUCOMTR-MCNC: 156 MG/DL — HIGH (ref 70–99)
GLUCOSE BLDC GLUCOMTR-MCNC: 162 MG/DL — HIGH (ref 70–99)
GLUCOSE BLDC GLUCOMTR-MCNC: 212 MG/DL — HIGH (ref 70–99)
GLUCOSE SERPL-MCNC: 155 MG/DL — HIGH (ref 70–99)
HCT VFR BLD CALC: 25.4 % — LOW (ref 39–50)
HGB BLD-MCNC: 7.9 G/DL — LOW (ref 13–17)
MCHC RBC-ENTMCNC: 29.8 PG — SIGNIFICANT CHANGE UP (ref 27–34)
MCHC RBC-ENTMCNC: 31.1 G/DL — LOW (ref 32–36)
MCV RBC AUTO: 95.8 FL — SIGNIFICANT CHANGE UP (ref 80–100)
NRBC # BLD: 0 /100 WBCS — SIGNIFICANT CHANGE UP (ref 0–0)
PLATELET # BLD AUTO: 228 K/UL — SIGNIFICANT CHANGE UP (ref 150–400)
POTASSIUM SERPL-MCNC: 3.9 MMOL/L — SIGNIFICANT CHANGE UP (ref 3.5–5.3)
POTASSIUM SERPL-SCNC: 3.9 MMOL/L — SIGNIFICANT CHANGE UP (ref 3.5–5.3)
PROCALCITONIN SERPL-MCNC: 0.26 NG/ML — HIGH (ref 0.02–0.1)
RBC # BLD: 2.65 M/UL — LOW (ref 4.2–5.8)
RBC # FLD: 15 % — HIGH (ref 10.3–14.5)
SODIUM SERPL-SCNC: 139 MMOL/L — SIGNIFICANT CHANGE UP (ref 135–145)
WBC # BLD: 12.07 K/UL — HIGH (ref 3.8–10.5)
WBC # FLD AUTO: 12.07 K/UL — HIGH (ref 3.8–10.5)

## 2022-02-26 PROCEDURE — 71045 X-RAY EXAM CHEST 1 VIEW: CPT | Mod: 26

## 2022-02-26 PROCEDURE — 99232 SBSQ HOSP IP/OBS MODERATE 35: CPT

## 2022-02-26 RX ADMIN — Medication 1 GRAM(S): at 11:23

## 2022-02-26 RX ADMIN — Medication 1 GRAM(S): at 00:10

## 2022-02-26 RX ADMIN — CHLORHEXIDINE GLUCONATE 1 APPLICATION(S): 213 SOLUTION TOPICAL at 05:38

## 2022-02-26 RX ADMIN — Medication 1 GRAM(S): at 17:04

## 2022-02-26 RX ADMIN — Medication 1 MILLIGRAM(S): at 22:16

## 2022-02-26 RX ADMIN — Medication 2: at 11:23

## 2022-02-26 RX ADMIN — PANTOPRAZOLE SODIUM 40 MILLIGRAM(S): 20 TABLET, DELAYED RELEASE ORAL at 05:37

## 2022-02-26 RX ADMIN — Medication 4: at 17:03

## 2022-02-26 RX ADMIN — CEFEPIME 100 MILLIGRAM(S): 1 INJECTION, POWDER, FOR SOLUTION INTRAMUSCULAR; INTRAVENOUS at 17:06

## 2022-02-26 RX ADMIN — CEFEPIME 100 MILLIGRAM(S): 1 INJECTION, POWDER, FOR SOLUTION INTRAMUSCULAR; INTRAVENOUS at 05:35

## 2022-02-26 RX ADMIN — Medication 2: at 07:52

## 2022-02-26 RX ADMIN — Medication 1 GRAM(S): at 05:37

## 2022-02-26 RX ADMIN — Medication 1 GRAM(S): at 23:47

## 2022-02-26 RX ADMIN — PANTOPRAZOLE SODIUM 40 MILLIGRAM(S): 20 TABLET, DELAYED RELEASE ORAL at 17:04

## 2022-02-26 RX ADMIN — ATORVASTATIN CALCIUM 40 MILLIGRAM(S): 80 TABLET, FILM COATED ORAL at 22:16

## 2022-02-26 RX ADMIN — LATANOPROST 1 DROP(S): 0.05 SOLUTION/ DROPS OPHTHALMIC; TOPICAL at 22:16

## 2022-02-26 NOTE — PROGRESS NOTE ADULT - ASSESSMENT
81M w/ HTN, bladder tumor removal, melanoma. Presents w/ diarrhea and weakness. Admitted to ICU w/ rapif afib, septic shock likely from UTI vs pneumonia, RADHA w/ hyperkalemia and significant uremia and thrombocytopenia. S/p 1 HD treatment 2/16/22      Gram Negative bacterial Pneumonia  CXR: b/l lower lobe infiltrate   Septic Shock POA; resolved   acute respiratory failure with hypoxia; cont to titrate oxygen down as tolerated   cont w/ IV abx.     Klebsiella Bacteremia   Septic Shock POA; resolved   ID on board   cont w/ IV abx.     Klebsiella UTI  as above     Acute Respiratory Failure with hypoxia   presumed secondary to PNA   repeat CXR that not show no obvious pleural effusion   will continue to titrate oxygen down as tolerated     Afib w/ RVR   in the setting of sepsis   s/p Amiodarone, HR now controlled   cont to monitor   AC contraindicated at this time     Acute blood loss anemia   episode of melena while on Heparin drip  s/p 2U pRBC transfusion  GI consult appreciated   Pt underwent EGD 2/24 found to have 2x duodenal ulcers   cont w/ Protonix and Carafate    will restart AC on monday     RADHA   likely combination of prerenal, ATN and obstructive uropathy;   s/p HD x1; now making urine;   cont w/ IVF and monitor renal function      thrombocytopenia   Secondary to Bacteremia  no active bleeding   resolved     Acute Left LE DVT  Doppler: Acute nonocclusive above-the-knee deep venous thrombosis. Occlusive DVT in the greater saphenous vein.  AC contraindicated due to GI bleed.     Diabetes Mellitus   HgA1c: 7.8%  cont w/ FS monitoring w/ insulins s/s coverage     #PPx   - SCDs, heparin drip on hold     plan discussed with Louis

## 2022-02-26 NOTE — PROGRESS NOTE ADULT - SUBJECTIVE AND OBJECTIVE BOX
Patient is a 81y old  Male who presents with a chief complaint of hypotension and renal failure (25 Feb 2022 17:58)      INTERVAL HPI/ OVERNIGHT EVENTS: Pt was seen and examined at bedside today, No significant overnight events, pt continues to feel better, denies any abdominal pain, BRBPR or melena today, continues to feel weak      MEDICATIONS  (STANDING):  ALPRAZolam 1 milliGRAM(s) Oral at bedtime  atorvastatin 40 milliGRAM(s) Oral at bedtime  cefepime   IVPB 2000 milliGRAM(s) IV Intermittent every 12 hours  chlorhexidine 2% Cloths 1 Application(s) Topical <User Schedule>  insulin lispro (ADMELOG) corrective regimen sliding scale   SubCutaneous Before meals and at bedtime  latanoprost 0.005% Ophthalmic Solution 1 Drop(s) Both EYES at bedtime  pantoprazole   Suspension 40 milliGRAM(s) Oral two times a day  sucralfate 1 Gram(s) Oral four times a day    MEDICATIONS  (PRN):  ondansetron Injectable 4 milliGRAM(s) IV Push every 4 hours PRN Nausea and/or Vomiting  sodium chloride 0.9% lock flush 10 milliLiter(s) IV Push every 1 hour PRN Pre/post blood products, medications, blood draw, and to maintain line patency      Allergies    No Known Allergies    Intolerances        REVIEW OF SYSTEMS:    Unable to examine due to [ ] Encephalopathy [ ] Advanced Dementia [ ] Expressive Aphasia [ ] Non-verbal patient    CONSTITUTIONAL: No fever, positive generalized weakness/Fatigue, No weight loss  EYES: No eye pain, visual disturbances, or discharge  ENMT:  No difficulty hearing, tinnitus, vertigo; No sinus or throat pain  NECK: No pain or stiffness  RESPIRATORY: No shortness of breath,  cough, wheezing, sputum or hemoptysis   CARDIOVASCULAR: No chest pain, palpitations, or leg swelling  GASTROINTESTINAL: No abdominal pain. No nausea, vomiting, diarrhea or constipation. No melena or hematochezia.  GENITOURINARY: No dysuria, frequency, hematuria, or incontinence  NEUROLOGICAL: No headaches, Dizziness, memory loss, loss of strength, numbness, or tremors  SKIN: No itching, burning, rashes, or lesions   MUSCULOSKELETAL: No joint pain or swelling; No muscle, back, or extremity pain  PSYCHIATRIC: No depression, anxiety, mood swings, or difficulty sleeping  HEME/LYMPH: No easy bruising, or bleeding gums      Vital Signs Last 24 Hrs  T(C): 36.4 (26 Feb 2022 11:30), Max: 37 (25 Feb 2022 16:05)  T(F): 97.6 (26 Feb 2022 11:30), Max: 98.6 (25 Feb 2022 16:05)  HR: 79 (26 Feb 2022 11:30) (74 - 80)  BP: 118/70 (26 Feb 2022 11:30) (111/71 - 128/79)  BP(mean): --  RR: 18 (26 Feb 2022 11:30) (18 - 18)  SpO2: 97% (26 Feb 2022 11:30) (74% - 97%)    PHYSICAL EXAM:  GENERAL: NAD on NC, obese   HEAD:  Atraumatic, Normocephalic  EYES: conjunctiva and sclera clear  ENMT: Moist mucous membranes  NECK: Supple, No JVD, Normal thyroid  CHEST/LUNG: Clear to Auscultation bilaterally; No rales, rhonchi, wheezing, or rubs  HEART: Regular rate and rhythm; No murmurs, rubs, or gallops  ABDOMEN: Soft, Nontender, Nondistended; Bowel sounds present  EXTREMITIES:  2+ Peripheral Pulses, No clubbing, cyanosis, or edema  SKIN: No rashes or lesions  NERVOUS SYSTEM:  Alert & Oriented X3, Good concentration; Motor Strength 5/5 B/L upper and lower extremities    LABS:                        7.9    12.07 )-----------( 228      ( 26 Feb 2022 07:44 )             25.4     02-26    139  |  108  |  26<H>  ----------------------------<  155<H>  3.9   |  26  |  1.33<H>    Ca    7.5<L>      26 Feb 2022 07:44          CAPILLARY BLOOD GLUCOSE      POCT Blood Glucose.: 156 mg/dL (26 Feb 2022 11:04)  POCT Blood Glucose.: 162 mg/dL (26 Feb 2022 07:36)  POCT Blood Glucose.: 140 mg/dL (25 Feb 2022 21:17)  POCT Blood Glucose.: 161 mg/dL (25 Feb 2022 15:50)          RADIOLOGY & ADDITIONAL TESTS:          Imaging Personally Reviewed:  [ ] YES  [ ] NO    Consultant(s) Notes Reviewed:  [ ] YES  [ ] NO    Care Discussed with Consultants/Other Providers [x ] YES  [ ] NO

## 2022-02-26 NOTE — PROGRESS NOTE ADULT - SUBJECTIVE AND OBJECTIVE BOX
TMAX - 98.5    On day # 10 Cefepime    Vital Signs Last 24 Hrs  T(C): 36.7 (2022 17:00), Max: 36.8 (2022 05:07)  T(F): 98 (2022 17:00), Max: 98.3 (2022 05:07)  HR: 76 (2022 17:00) (74 - 79)  BP: 111/67 (2022 17:00) (111/67 - 118/70)  RR: 19 (2022 17:00) (18 - 19)  SpO2: 95% (2022 17:00) (74% - 97%)  Supplemental O2:  on NC O2    Awake, alert, feeling a little better today.  No c/o SOB and no cp or abdominal pain now.  Claims his cough is decreased now.  Still fatigued and appetite is fair.  O2 Sat's between 74 - 97 % on 1 Liter NC O2 now.    PHYSICAL EXAM  General: 80 y/o white male awake, alert, on NC O2 now,  pleasant and cooperative, lying in semi-reclining position in bed now, in NAD  HEENT: conj pale, sclerae anicteric, PERRLA, no oral lesions noted but mucosa and tongue still appear somewhat dry  Neck: supple, no nodes noted  Heart: RR  Lungs: decreased BS at the bases  Abdomen: BS+, soft, nontender to palpation, no masses or HS-megaly detected  Back: no CVA or Spinal tenderness noted  Extremities: well-healed bilateral TKR scars                    1+ edema LE's                     arms and hands with some decreasing swelling now  Skin: warm, dry, no rash noted  Khalil in place and draining clear yellow urine now - 2900cc  output recorded over the prior 24hrs.    I&O's Summary :  2022 07:01  -  2022 07:00  IN: 50 mL / OUT: 2900 mL / NET: -2850 mL  2022 07:01  -  2022 17:56  IN: 0 mL / OUT: 900 mL / NET: -900 mL    LABS:  CBC Full  -  ( 2022 07:44 )  WBC Count : 12.07 K/uL  RBC Count : 2.65 M/uL  Hemoglobin : 7.9 g/dL  Hematocrit : 25.4 %  Platelet Count - Automated : 228 K/uL  Mean Cell Volume : 95.8 fl  Mean Cell Hemoglobin : 29.8 pg  Mean Cell Hemoglobin Concentration : 31.1 g/dL  Auto Neutrophil # : x  Auto Lymphocyte # : x  Auto Monocyte # : x  Auto Eosinophil # : x  Auto Basophil # : x  Auto Neutrophil % : x  Auto Lymphocyte % : x  Auto Monocyte % : x  Auto Eosinophil % : x  Auto Basophil % : x      139  |  108  |  26<H>  ----------------------------<  155<H>  3.9   |  26  |  1.33<H>  Ca    7.5<L>      2022 07:44    Sedimentation Rate, Erythrocyte: 106 mm/hr ( @ 07:44)  Sedimentation Rate, Erythrocyte: 90 mm/hr ( @ 07:03)  Sedimentation Rate, Erythrocyte: 89 mm/hr ( @ 08:09)  Sedimentation Rate, Erythrocyte: 75 mm/hr ( @ 07:24)  Sedimentation Rate, Erythrocyte: 62 mm/hr ( @ :22)    C-Reactive Protein, Serum (22 @ 11:13)    C-Reactive Protein, Serum: 37 mg/L  C-Reactive Protein, Serum (22 @ 10:48)    C-Reactive Protein, Serum: 35 mg/L  C-Reactive Protein, Serum (22 @ 10:55)    C-Reactive Protein, Serum: 44 mg/L  C-Reactive Protein, Serum (22 @ 10:51)    C-Reactive Protein, Serum: 47 mg/L  C-Reactive Protein, Serum (22 @ 09:23)    C-Reactive Protein, Serum: 83 mg/L    Procalcitonin, Serum (22 @ 11:13)    Procalcitonin, Serum: 0.26:   Procalcitonin, Serum (22 @ 10:48)    Procalcitonin, Serum: 0.57:   Procalcitonin, Serum (22 @ 10:55)    Procalcitonin, Serum: 1.18:   Procalcitonin, Serum (22 @ 10:51)    Procalcitonin, Serum: 2.09:   Procalcitonin, Serum (22 @ 09:23)    Procalcitonin, Serum: 17.20:   Procalcitonin, Serum (22 @ 09:20)    Procalcitonin, Serum: 81.70:    Lactate, Blood (02.15.22 @ 21:14)    Lactate, Blood: 1.5 mmol/L  Lactate, Blood (02.15.22 @ 18:24)    Lactate, Blood: 2.5: Elevated lactate. Consider ordering follow-up lactate to trend. mmol/L  Lactate, Blood (02.15.22 @ 14:15)    Lactate, Blood: 3.9: Elevated lactate. Consider ordering follow-up lactate to trend. mmol/L    A1C with Estimated Average Glucose (22 @ 09:30)    A1C with Estimated Average Glucose Result: 7.6: Method: Immunoassay       Reference Range                4.0-5.6%       High risk (prediabetic)        5.7-6.4%       Diabetic, diagnostic             >=6.5%       ADA diabetic treatment goal       <7.0%    Estimated Average Glucose: 171    Acute Hepatitis Panel (22 @ 09:37)    Hepatitis C Virus Interpretation: Nonreact: Hepatitis C AB    Hepatitis C Virus S/CO Ratio: 0.12 S/CO    Hepatitis B Core IgM Antibody: Nonreact    Hepatitis B Surface Antigen: Nonreact    Hepatitis A IgM Antibody: Nonreact    Hepatitis B Surface Antibody (22 @ 09:37)    Hepatitis B Surface Antibody: Nonreact    C3 Complement, Serum (22 @ 09:37)    C3 Complement, Serum: 115 mg/dL  C4 Complement, Serum (22 @ 09:37)    C4 Complement, Serum: 26 mg/dL    Glomerular Basement Membrane Ab IgG (22 @ 21:11)    Glomerular Basement Membrane Ab Ig: Negative        MICROBIOLOGY:    Flu With COVID-19 By [facility] (22 @ 06:11)    SARS-CoV-2 Result: NotDetec: EUA/IVD    Influenza A Result: NotDetec: EUA/IVD    Influenza B Result: NotDetec: EUA/IVD    Specimen Source: .Sputum Sputum ( @ 01:01)  Culture Results:   Normal Respiratory Alonzo present ( @ 01:01)    Gram Stain:   Rare polymorphonuclear leukocytes per low power field  Moderate Squamous epithelial cells per low power field  Numerous Yeast per oil power field  Moderate Gram Positive Rods per oil power field  Few Gram Negative Rods per oil power field  Rare Gram positive cocci in pairs per oil power field    Specimen Source: Clean Catch Clean Catch (Midstream) ( @ 08:49)  Culture Results:   >100,000 CFU/ml Klebsiella oxytoca/Raoutella ornithinolytica  Multiple Morphological Strains ( @ 08:49)    -  Tigecycline: S <=2    -  Tobramycin: S <=2    -  Trimethoprim/Sulfamethoxazole: S <=0.5/9.5    -  Amikacin: S <=16    -  Amoxicillin/Clavulanic Acid: S <=8/4    -  Ampicillin: R >16 These ampicillin results predict results for amoxicillin    -  Ampicillin/Sulbactam: S 8/4 Enterobacter, Klebsiella aerogenes, Citrobacter, and Serratia may develop resistance during prolonged therapy (3-4 days)    -  Aztreonam: S <=4    -  Cefazolin: R 16    -  Cefepime: S <=2    -  Cefoxitin: S <=8    -  Ceftriaxone: S <=1 Enterobacter, Klebsiella aerogenes, Citrobacter, and Serratia may develop resistance during prolonged therapy    -  Ciprofloxacin: S <=0.25    -  Gentamicin: S <=2    -  Imipenem: S <=1    -  Ertapenem: S <=0.5    -  Levofloxacin: S <=0.5    -  Meropenem: S <=1    -  Nitrofurantoin: S <=32 Should not be used to treat pyelonephritis    -  Piperacillin/Tazobactam: S <=8    Method Type: NATACHA    Specimen Source: .Blood Blood-Peripheral (02-15 @ 19:01)  Culture Results:   Growth in aerobic and anaerobic bottles: Klebsiella oxytoca/Raoutella  ornithinolytica    -  Klebsiella oxytoca: Detec    Gram Stain:   Growth in aerobic and anaerobic bottles: Gram Negative Rods    -  Meropenem: S <=1    -  Ertapenem: S <=0.5    -  Gentamicin: S <=2    -  Imipenem: S <=1    -  Levofloxacin: S <=0.5    -  Piperacillin/Tazobactam: S <=8    -  Tobramycin: S <=2    -  Trimethoprim/Sulfamethoxazole: S <=0.5/9.5    -  Amikacin: S <=16    -  Ampicillin: R >16 These ampicillin results predict results for amoxicillin    -  Ampicillin/Sulbactam: S 8/4 Enterobacter, Klebsiella aerogenes, Citrobacter, and Serratia may develop resistance during prolonged therapy (3-4 days)    -  Aztreonam: R >16    -  Cefazolin: R 16 Enterobacter, Klebsiella aerogenes, Citrobacter, and Serratia may develop resistance during prolonged therapy (3-4 days)    -  Cefepime: S <=2    -  Cefoxitin: S <=8    -  Ceftriaxone: S <=1 Enterobacter, Klebsiella aerogenes, Citrobacter, and Serratia may develop resistance during prolonged therapy    -  Ciprofloxacin: S <=0.25    Organism Identification: Blood Culture PCR  Klebsiella oxytoca /Raoutella ornithinolytica    Method Type: PCR    Method Type: NATACHA    Specimen Source: .Blood Blood-Peripheral (02-15 @ 19:01)  Culture Results:   Growth in aerobic and anaerobic bottles: Klebsiella oxytoca/Raoutella  ornithinolytica  See previous culture [id number] (02-15 @ 19:01)        Legionella Antigen, Urine: Negative ( @ 09:14)    MRSA/MSSA PCR (22 @ 09:20)    MRSA PCR Result.: NotDetec:    Staph Aureus PCR Result: NotDetec    Respiratory Viral Panel with COVID-19 by [facility] (02.15.22 @ 14:34)    Rapid RVP Result: NotDetec    SARS-CoV-2: NotDetec:       RADIOLOGY:  < from: CT Abdomen and Pelvis w/ Oral Cont (22 @ 13:36) >  ACC: [id number] EXAM:  CT ABDOMEN AND PELVIS OC                        PROCEDURE DATE:  2022    INTERPRETATION:  CLINICAL INFORMATION: Bacteremia  COMPARISON: 2/15  PROCEDURE:  CT of the Abdomen and Pelvis was performed without intravenous contrast.  Intravenous contrast: None.  Oral contrast: Positive contrast was administered.  Sagittal and coronal reformats were performed.  FINDINGS:  VISUALIZED CHEST: Bibasilar atelectasis and trace effusions.  ABDOMEN AND PELVIS:  LIVER: Within normal limits.  BILE DUCTS: Normal caliber.  GALLBLADDER: Cholelithiasis.  SPLEEN: Within normal limits.  PANCREAS: Within normal limits.  ADRENALS: Within normal limits.  KIDNEYS/URETERS: Within normal limits.  BLADDER: Khalil catheter.  REPRODUCTIVE ORGANS: No pelvic masses.  BOWEL: No bowel obstruction. Appendix is normal. Moderate hiatal hernia.  PERITONEUM: No ascites.  VESSELS: Atherosclerotic changes.  RETROPERITONEUM/LYMPH NODES: No lymphadenopathy.  ABDOMINAL WALL: Within normal limits.  BONES: Within normal limits.  IMPRESSION:  No acute findings. Multiple gallstones.    < from: US Duplex Venous Lower Ext Complete, Bilateral (22 @ 04:56) >  ACC: [id number] EXAM:  US DPLX LWR EXT VEINS COMPL BI                        PROCEDURE DATE:  2022    INTERPRETATION:  CLINICAL INFORMATION: Lower extremity swelling.  COMPARISON: None available.  TECHNIQUE: Duplex sonography of theBILATERAL LOWER extremity veins with   color and spectral Doppler, with and without compression.  IMPRESSION:  RIGHT: Limited calf vein visualization. Nonvisualization of the calf   veins. No evidence of deep venous thrombosis at or above the knee.  LEFT: Limited calf vein visualization. Acute nonocclusive above-the-knee   deep venous thrombosis. Occlusive DVT in the greater saphenous vein.    < from: US Abdomen Upper Quadrant Right (22 @ 12:03) >  ACC: [id number] EXAM:  US ABDOMEN RT UPR QUADRANT                        PROCEDURE DATE:  2022    INTERPRETATION:  CLINICAL INFORMATION: Elevated LFTs.  COMPARISON: CT abdomen/pelvis 2/15/2022.  TECHNIQUE: Sonography of the right upper quadrant.  IMPRESSION: Multiple gallstones. Gallbladder wall thickening. No   pericholecystic fluid. No evidence for intrahepatic or extrahepatic   biliary ductal dilatation.    < from: Xray Chest 1 View-PORTABLE IMMEDIATE (Xray Chest 1 View-PORTABLE IMMEDIATE .) (22 @ 01:26) >  ACC: [id number] EXAM:  XR CHEST PORTABLE IMMED 1V                        PROCEDURE DATE:  2022    IMPRESSION:  Right IJ line HD catheter in satisfactory position with no pneumothorax,   new    < from: CT Abdomen and Pelvis No Cont (02.15.22 @ 23:57) >  ACC: [id number] EXAM:  CT ABDOMEN AND PELVIS                        PROCEDURE DATE:  02/15/2022    INTERPRETATION:  CLINICAL INFORMATION:  Abdominal distension  COMPARISON: None.  CONTRAST/COMPLICATIONS:  IV Contrast: None  Oral Contrast:None  Complications: None  PROCEDURE:  Axial CT images were acquired through the abdomen and pelvis obtained   without intravenous contrast. Coronal and sagittal reformatted images   provided.  FINDINGS: This examination is limited by patient motion artifact and the   lack of oral and intravenous contrast.  LOWER CHEST: Bibasilar atelectasis. Coronary artery calcifications.  LIVER: Within normal limits.  BILE DUCTS: Normal caliber.  GALLBLADDER: Cholelithiasis..  SPLEEN: Within normal limits.  PANCREAS: Within normal limits.  ADRENALS: Within normal limits.  KIDNEYS/URETERS: No definite renal stone or hydronephrosis  BLADDER: Distended with Khalil catheter. No bladder wall thickening.  REPRODUCTIVE ORGANS: Prostate gland mildly enlarged.  BOWEL: Evaluation of bowel is limited without distention with oral   contrast, however there is no bowel obstruction. Few colonic diverticula   without acute diverticulitis. Small bowel loops are not dilated.   Unremarkable appendix without appendicitis. Stomach is underdistended for   adequate evaluation, however suggestion of a moderate hiatal hernia.   Appendix  PERITONEUM: No free air significant ascites.  VESSELS:  Limited without intravenous contrast. There is calcific   atherosclerosis of the abdominal aorta without gross aneurysmal   dilatation. 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  RETROPERITONEUM: No lymphadenopathy.  ABDOMINAL WALL: Small fat-containing bilateral inguinal hernias.  BONES: Degenerative changes of the spine. Bilateral L5 spondylolysis with   slight grade 1 anterolisthesis of L5 on S1.  MPRESSION:  There is no bowel obstruction, significant ascites or free   intraperitoneal air.  Moderate hiatal hernia.  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    < from: Xray Chest 1 View- PORTABLE-Urgent (02.15.22 @ 14:32) >  ACC: [id number] EXAM:  XR CHEST PORTABLE URGENT 1V                        PROCEDURE DATE:  02/15/2022    INTERPRETATION:  AP semierect chest on February 15, 2022 at 1:55 PM.   Patient has sepsis.  Heart enlargement again noted.  There aremild mid lower lung field infiltrates medially new since 2020.  IMPRESSION: Bilateral infiltrates as above.      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 ago, s/p  Bilateral Cataract Surgeries, admitted via the ER to [facility] on 2/15/22 with c/o diarrhea x 2 weeks at home along with decreased po intake and mild nausea with a few episodes of vomiting bilious material.  W/u revealed        Rx'ed empirically with Rocephin + Zithromax after Blood and Urine Cx's were obtained.    Sepsis with Klebsiella Oxytoca ( AKA Raoutella ornithinolytica ) Bacteremia as identified from 2 out of 2 Blood Cx's from 2/15/22  - ? , GI, or Pulmonary source with Bilateral lower lobe PNA seen on initial CXR and Cholelithiasis with GB wall thickening seen on Sono.  Ab rx changed to Cefepime with Zithromax on 22 and patient remains with low - normal temps.  Sensitivities of the Kleb Oxytoca isolated from Blood Cx's noted above and sensitive to Cefepime.  Urine Legionella Ag is negative and now Urine Cx from 22 reported with >100,000 Klebsiella oxytoca ( multiple morphologic strains ) and now with sensitivities completed and noted to be the same as the Blood Cx isolates.  Sputum Cx finalized as normal alonzo.  CT of the Abdomen + Pelvis with oral contrast done 22  resulted with no acute findings, moderate Hiatal Hernia, and multiple Gallstones.  S/p transfusion of 2 Units of PRBC's on 22 for H+H decreased to 6.5 / 19.8 and noted with continued elevated WBC's and ESR although CRP and Procalcitonin are trending downwards and there is continued slow improvement in renal function.  S/p EGD and BX on 22 and found to have Gastritis, a Duodenal Bulb Ulcer, and a Hiatal Hernia.    Suggestions:  Will continue present ab rx with Cefepime, s/p completed rx with Zithromax  for rx of Sepsis due to Klebsiella  oxytoca Bacteremia and UTI along with Bilateral PNA.  Follow-up temps and labs closely.  Follow-up CXR and monitor O2 Sat's.  Discussed with patient again in detail at the bedside  TMAX - 98.5    On day # 10 Cefepime    Vital Signs Last 24 Hrs  T(C): 36.7 (2022 17:00), Max: 36.8 (2022 05:07)  T(F): 98 (2022 17:00), Max: 98.3 (2022 05:07)  HR: 76 (2022 17:00) (74 - 79)  BP: 111/67 (2022 17:00) (111/67 - 118/70)  RR: 19 (2022 17:00) (18 - 19)  SpO2: 95% (2022 17:00) (74% - 97%)  Supplemental O2:  on NC O2    Awake, alert, feeling a little better today.  No c/o SOB and no cp or abdominal pain now.  Claims his cough is decreased now.  Still fatigued and appetite is fair.  O2 Sat's between 74 - 97 % on 1 Liter NC O2 now.    PHYSICAL EXAM  General: 82 y/o white male awake, alert, on NC O2 now,  pleasant and cooperative, lying in semi-reclining position in bed now, in NAD  HEENT: conj pale, sclerae anicteric, PERRLA, no oral lesions noted but mucosa and tongue still appear somewhat dry  Neck: supple, no nodes noted  Heart: RR  Lungs: decreased BS at the bases  Abdomen: BS+, soft, nontender to palpation, no masses or HS-megaly detected  Back: no CVA or Spinal tenderness noted  Extremities: well-healed bilateral TKR scars                    1+ edema LE's                     arms and hands with some decreasing swelling now  Skin: warm, dry, no rash noted  Khalil in place and draining clear yellow urine now - 2900cc  output recorded over the prior 24hrs.    I&O's Summary :  2022 07:01  -  2022 07:00  IN: 50 mL / OUT: 2900 mL / NET: -2850 mL  2022 07:01  -  2022 17:56  IN: 0 mL / OUT: 900 mL / NET: -900 mL    LABS:  CBC Full  -  ( 2022 07:44 )  WBC Count : 12.07 K/uL  RBC Count : 2.65 M/uL  Hemoglobin : 7.9 g/dL  Hematocrit : 25.4 %  Platelet Count - Automated : 228 K/uL  Mean Cell Volume : 95.8 fl  Mean Cell Hemoglobin : 29.8 pg  Mean Cell Hemoglobin Concentration : 31.1 g/dL  Auto Neutrophil # : x  Auto Lymphocyte # : x  Auto Monocyte # : x  Auto Eosinophil # : x  Auto Basophil # : x  Auto Neutrophil % : x  Auto Lymphocyte % : x  Auto Monocyte % : x  Auto Eosinophil % : x  Auto Basophil % : x      139  |  108  |  26<H>  ----------------------------<  155<H>  3.9   |  26  |  1.33<H>  Ca    7.5<L>      2022 07:44    Sedimentation Rate, Erythrocyte: 106 mm/hr ( @ 07:44)  Sedimentation Rate, Erythrocyte: 90 mm/hr ( @ 07:03)  Sedimentation Rate, Erythrocyte: 89 mm/hr ( @ 08:09)  Sedimentation Rate, Erythrocyte: 75 mm/hr ( @ 07:24)  Sedimentation Rate, Erythrocyte: 62 mm/hr ( @ :22)    C-Reactive Protein, Serum (22 @ 11:13)    C-Reactive Protein, Serum: 37 mg/L  C-Reactive Protein, Serum (22 @ 10:48)    C-Reactive Protein, Serum: 35 mg/L  C-Reactive Protein, Serum (22 @ 10:55)    C-Reactive Protein, Serum: 44 mg/L  C-Reactive Protein, Serum (22 @ 10:51)    C-Reactive Protein, Serum: 47 mg/L  C-Reactive Protein, Serum (22 @ 09:23)    C-Reactive Protein, Serum: 83 mg/L    Procalcitonin, Serum (22 @ 11:13)    Procalcitonin, Serum: 0.26:   Procalcitonin, Serum (22 @ 10:48)    Procalcitonin, Serum: 0.57:   Procalcitonin, Serum (22 @ 10:55)    Procalcitonin, Serum: 1.18:   Procalcitonin, Serum (22 @ 10:51)    Procalcitonin, Serum: 2.09:   Procalcitonin, Serum (22 @ 09:23)    Procalcitonin, Serum: 17.20:   Procalcitonin, Serum (22 @ 09:20)    Procalcitonin, Serum: 81.70:    Lactate, Blood (02.15.22 @ 21:14)    Lactate, Blood: 1.5 mmol/L  Lactate, Blood (02.15.22 @ 18:24)    Lactate, Blood: 2.5: Elevated lactate. Consider ordering follow-up lactate to trend. mmol/L  Lactate, Blood (02.15.22 @ 14:15)    Lactate, Blood: 3.9: Elevated lactate. Consider ordering follow-up lactate to trend. mmol/L    A1C with Estimated Average Glucose (22 @ 09:30)    A1C with Estimated Average Glucose Result: 7.6: Method: Immunoassay       Reference Range                4.0-5.6%       High risk (prediabetic)        5.7-6.4%       Diabetic, diagnostic             >=6.5%       ADA diabetic treatment goal       <7.0%    Estimated Average Glucose: 171    Acute Hepatitis Panel (22 @ 09:37)    Hepatitis C Virus Interpretation: Nonreact: Hepatitis C AB    Hepatitis C Virus S/CO Ratio: 0.12 S/CO    Hepatitis B Core IgM Antibody: Nonreact    Hepatitis B Surface Antigen: Nonreact    Hepatitis A IgM Antibody: Nonreact    Hepatitis B Surface Antibody (22 @ 09:37)    Hepatitis B Surface Antibody: Nonreact    C3 Complement, Serum (22 @ 09:37)    C3 Complement, Serum: 115 mg/dL  C4 Complement, Serum (22 @ 09:37)    C4 Complement, Serum: 26 mg/dL    Glomerular Basement Membrane Ab IgG (22 @ 21:11)    Glomerular Basement Membrane Ab Ig: Negative        MICROBIOLOGY:    Flu With COVID-19 By [facility] (22 @ 06:11)    SARS-CoV-2 Result: NotDetec: EUA/IVD    Influenza A Result: NotDetec: EUA/IVD    Influenza B Result: NotDetec: EUA/IVD    Specimen Source: .Sputum Sputum ( @ 01:01)  Culture Results:   Normal Respiratory Alonzo present ( @ 01:01)    Gram Stain:   Rare polymorphonuclear leukocytes per low power field  Moderate Squamous epithelial cells per low power field  Numerous Yeast per oil power field  Moderate Gram Positive Rods per oil power field  Few Gram Negative Rods per oil power field  Rare Gram positive cocci in pairs per oil power field    Specimen Source: Clean Catch Clean Catch (Midstream) ( @ 08:49)  Culture Results:   >100,000 CFU/ml Klebsiella oxytoca/Raoutella ornithinolytica  Multiple Morphological Strains ( @ 08:49)    -  Tigecycline: S <=2    -  Tobramycin: S <=2    -  Trimethoprim/Sulfamethoxazole: S <=0.5/9.5    -  Amikacin: S <=16    -  Amoxicillin/Clavulanic Acid: S <=8/4    -  Ampicillin: R >16 These ampicillin results predict results for amoxicillin    -  Ampicillin/Sulbactam: S 8/4 Enterobacter, Klebsiella aerogenes, Citrobacter, and Serratia may develop resistance during prolonged therapy (3-4 days)    -  Aztreonam: S <=4    -  Cefazolin: R 16    -  Cefepime: S <=2    -  Cefoxitin: S <=8    -  Ceftriaxone: S <=1 Enterobacter, Klebsiella aerogenes, Citrobacter, and Serratia may develop resistance during prolonged therapy    -  Ciprofloxacin: S <=0.25    -  Gentamicin: S <=2    -  Imipenem: S <=1    -  Ertapenem: S <=0.5    -  Levofloxacin: S <=0.5    -  Meropenem: S <=1    -  Nitrofurantoin: S <=32 Should not be used to treat pyelonephritis    -  Piperacillin/Tazobactam: S <=8    Method Type: NATACHA    Specimen Source: .Blood Blood-Peripheral (02-15 @ 19:01)  Culture Results:   Growth in aerobic and anaerobic bottles: Klebsiella oxytoca/Raoutella  ornithinolytica    -  Klebsiella oxytoca: Detec    Gram Stain:   Growth in aerobic and anaerobic bottles: Gram Negative Rods    -  Meropenem: S <=1    -  Ertapenem: S <=0.5    -  Gentamicin: S <=2    -  Imipenem: S <=1    -  Levofloxacin: S <=0.5    -  Piperacillin/Tazobactam: S <=8    -  Tobramycin: S <=2    -  Trimethoprim/Sulfamethoxazole: S <=0.5/9.5    -  Amikacin: S <=16    -  Ampicillin: R >16 These ampicillin results predict results for amoxicillin    -  Ampicillin/Sulbactam: S 8/4 Enterobacter, Klebsiella aerogenes, Citrobacter, and Serratia may develop resistance during prolonged therapy (3-4 days)    -  Aztreonam: R >16    -  Cefazolin: R 16 Enterobacter, Klebsiella aerogenes, Citrobacter, and Serratia may develop resistance during prolonged therapy (3-4 days)    -  Cefepime: S <=2    -  Cefoxitin: S <=8    -  Ceftriaxone: S <=1 Enterobacter, Klebsiella aerogenes, Citrobacter, and Serratia may develop resistance during prolonged therapy    -  Ciprofloxacin: S <=0.25    Organism Identification: Blood Culture PCR  Klebsiella oxytoca /Raoutella ornithinolytica    Method Type: PCR    Method Type: NATACHA    Specimen Source: .Blood Blood-Peripheral (02-15 @ 19:01)  Culture Results:   Growth in aerobic and anaerobic bottles: Klebsiella oxytoca/Raoutella  ornithinolytica  See previous culture [id number] (02-15 @ 19:01)        Legionella Antigen, Urine: Negative ( @ 09:14)    MRSA/MSSA PCR (22 @ 09:20)    MRSA PCR Result.: NotDetec:    Staph Aureus PCR Result: NotDetec    Respiratory Viral Panel with COVID-19 by [facility] (02.15.22 @ 14:34)    Rapid RVP Result: NotDetec    SARS-CoV-2: NotDetec:       RADIOLOGY:  < from: CT Abdomen and Pelvis w/ Oral Cont (22 @ 13:36) >  ACC: [id number] EXAM:  CT ABDOMEN AND PELVIS OC                        PROCEDURE DATE:  2022    INTERPRETATION:  CLINICAL INFORMATION: Bacteremia  COMPARISON: 2/15  PROCEDURE:  CT of the Abdomen and Pelvis was performed without intravenous contrast.  Intravenous contrast: None.  Oral contrast: Positive contrast was administered.  Sagittal and coronal reformats were performed.  FINDINGS:  VISUALIZED CHEST: Bibasilar atelectasis and trace effusions.  ABDOMEN AND PELVIS:  LIVER: Within normal limits.  BILE DUCTS: Normal caliber.  GALLBLADDER: Cholelithiasis.  SPLEEN: Within normal limits.  PANCREAS: Within normal limits.  ADRENALS: Within normal limits.  KIDNEYS/URETERS: Within normal limits.  BLADDER: Khalil catheter.  REPRODUCTIVE ORGANS: No pelvic masses.  BOWEL: No bowel obstruction. Appendix is normal. Moderate hiatal hernia.  PERITONEUM: No ascites.  VESSELS: Atherosclerotic changes.  RETROPERITONEUM/LYMPH NODES: No lymphadenopathy.  ABDOMINAL WALL: Within normal limits.  BONES: Within normal limits.  IMPRESSION:  No acute findings. Multiple gallstones.    < from: US Duplex Venous Lower Ext Complete, Bilateral (22 @ 04:56) >  ACC: [id number] EXAM:  US DPLX LWR EXT VEINS COMPL BI                        PROCEDURE DATE:  2022    INTERPRETATION:  CLINICAL INFORMATION: Lower extremity swelling.  COMPARISON: None available.  TECHNIQUE: Duplex sonography of theBILATERAL LOWER extremity veins with   color and spectral Doppler, with and without compression.  IMPRESSION:  RIGHT: Limited calf vein visualization. Nonvisualization of the calf   veins. No evidence of deep venous thrombosis at or above the knee.  LEFT: Limited calf vein visualization. Acute nonocclusive above-the-knee   deep venous thrombosis. Occlusive DVT in the greater saphenous vein.    < from: US Abdomen Upper Quadrant Right (22 @ 12:03) >  ACC: [id number] EXAM:  US ABDOMEN RT UPR QUADRANT                        PROCEDURE DATE:  2022    INTERPRETATION:  CLINICAL INFORMATION: Elevated LFTs.  COMPARISON: CT abdomen/pelvis 2/15/2022.  TECHNIQUE: Sonography of the right upper quadrant.  IMPRESSION: Multiple gallstones. Gallbladder wall thickening. No   pericholecystic fluid. No evidence for intrahepatic or extrahepatic   biliary ductal dilatation.    < from: Xray Chest 1 View-PORTABLE IMMEDIATE (Xray Chest 1 View-PORTABLE IMMEDIATE .) (22 @ 01:26) >  ACC: [id number] EXAM:  XR CHEST PORTABLE IMMED 1V                        PROCEDURE DATE:  2022    IMPRESSION:  Right IJ line HD catheter in satisfactory position with no pneumothorax,   new    < from: CT Abdomen and Pelvis No Cont (02.15.22 @ 23:57) >  ACC: [id number] EXAM:  CT ABDOMEN AND PELVIS                        PROCEDURE DATE:  02/15/2022    INTERPRETATION:  CLINICAL INFORMATION:  Abdominal distension  COMPARISON: None.  CONTRAST/COMPLICATIONS:  IV Contrast: None  Oral Contrast:None  Complications: None  PROCEDURE:  Axial CT images were acquired through the abdomen and pelvis obtained   without intravenous contrast. Coronal and sagittal reformatted images   provided.  FINDINGS: This examination is limited by patient motion artifact and the   lack of oral and intravenous contrast.  LOWER CHEST: Bibasilar atelectasis. Coronary artery calcifications.  LIVER: Within normal limits.  BILE DUCTS: Normal caliber.  GALLBLADDER: Cholelithiasis..  SPLEEN: Within normal limits.  PANCREAS: Within normal limits.  ADRENALS: Within normal limits.  KIDNEYS/URETERS: No definite renal stone or hydronephrosis  BLADDER: Distended with Khalil catheter. No bladder wall thickening.  REPRODUCTIVE ORGANS: Prostate gland mildly enlarged.  BOWEL: Evaluation of bowel is limited without distention with oral   contrast, however there is no bowel obstruction. Few colonic diverticula   without acute diverticulitis. Small bowel loops are not dilated.   Unremarkable appendix without appendicitis. Stomach is underdistended for   adequate evaluation, however suggestion of a moderate hiatal hernia.   Appendix  PERITONEUM: No free air significant ascites.  VESSELS:  Limited without intravenous contrast. There is calcific   atherosclerosis of the abdominal aorta without gross aneurysmal   dilatation. 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  RETROPERITONEUM: No lymphadenopathy.  ABDOMINAL WALL: Small fat-containing bilateral inguinal hernias.  BONES: Degenerative changes of the spine. Bilateral L5 spondylolysis with   slight grade 1 anterolisthesis of L5 on S1.  MPRESSION:  There is no bowel obstruction, significant ascites or free   intraperitoneal air.  Moderate hiatal hernia.  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    < from: Xray Chest 1 View- PORTABLE-Urgent (02.15.22 @ 14:32) >  ACC: [id number] EXAM:  XR CHEST PORTABLE URGENT 1V                        PROCEDURE DATE:  02/15/2022    INTERPRETATION:  AP semierect chest on February 15, 2022 at 1:55 PM.   Patient has sepsis.  Heart enlargement again noted.  There aremild mid lower lung field infiltrates medially new since 2020.  IMPRESSION: Bilateral infiltrates as above.      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 ago, s/p  Bilateral Cataract Surgeries, admitted via the ER to [facility] on 2/15/22 with c/o diarrhea x 2 weeks at home along with decreased po intake and mild nausea with a few episodes of vomiting bilious material.  W/u revealed  a markedly elevated WBC's of 37,940.  an elevated Lactate to 3.9, and his BUN / Creat were found to be elevated at 169 / 12.9 and Glucose was elevated as well. Further w/u with CXR and CT of the Abdomen + Pelvis was done and revealed Bilateral Infiltrates, Cholelithiasis, a moderate Hiatal Hernia, but no bowel obstruction was seen.  Patient was admitted to the CCU for further care and he was Rx'ed empirically with Rocephin + Zithromax after Blood and Urine Cx's were obtained and he underwent HD urgently x 1 on 2/15/22 PM. and rec'd 5 Liters of NS as well. Subsequently found to have Sepsis with Klebsiella Oxytoca ( AKA Raoutella ornithinolytica ) Bacteremia as identified from 2 out of 2 Blood Cx's from 2/15/22  - ? , GI, or Pulmonary source with Bilateral lower lobe PNA seen on initial CXR and Cholelithiasis with GB wall thickening seen on Sono.  Ab rx changed to Cefepime with Zithromax on 22 and patient remains with low - normal temps.  Sensitivities of the Kleb Oxytoca isolated from Blood Cx's noted above and sensitive to Cefepime.  Urine Legionella Ag is negative and now Urine Cx from 22 reported with >100,000 Klebsiella oxytoca ( multiple morphologic strains ) and now with sensitivities completed and noted to be the same as the Blood Cx isolates.  Sputum Cx finalized as normal alonzo.  CT of the Abdomen + Pelvis with oral contrast done 22  resulted with no acute findings, moderate Hiatal Hernia, and multiple Gallstones.  S/p transfusion of 2 Units of PRBC's on 22 for H+H decreased to 6.5 / 19.8 and noted with continued elevated WBC's and ESR although CRP and Procalcitonin are trending downwards and there is continued slow improvement in renal function.  S/p EGD and BX on 22 and found to have Gastritis, a Duodenal Bulb Ulcer, and a Hiatal Hernia.    Suggestions:  Will continue present ab rx with Cefepime, s/p completed rx with Zithromax  for rx of Sepsis due to Klebsiella  oxytoca Bacteremia and UTI along with Bilateral PNA.  Follow-up temps and labs closely.  Follow-up CXR and monitor O2 Sat's.  Discussed with patient again in detail at the bedside

## 2022-02-27 LAB
GLUCOSE BLDC GLUCOMTR-MCNC: 123 MG/DL — HIGH (ref 70–99)
GLUCOSE BLDC GLUCOMTR-MCNC: 135 MG/DL — HIGH (ref 70–99)
GLUCOSE BLDC GLUCOMTR-MCNC: 201 MG/DL — HIGH (ref 70–99)
GLUCOSE BLDC GLUCOMTR-MCNC: 209 MG/DL — HIGH (ref 70–99)
HCT VFR BLD CALC: 25.1 % — LOW (ref 39–50)
HGB BLD-MCNC: 8 G/DL — LOW (ref 13–17)
MCHC RBC-ENTMCNC: 29.9 PG — SIGNIFICANT CHANGE UP (ref 27–34)
MCHC RBC-ENTMCNC: 31.9 G/DL — LOW (ref 32–36)
MCV RBC AUTO: 93.7 FL — SIGNIFICANT CHANGE UP (ref 80–100)
NRBC # BLD: 0 /100 WBCS — SIGNIFICANT CHANGE UP (ref 0–0)
PLATELET # BLD AUTO: 225 K/UL — SIGNIFICANT CHANGE UP (ref 150–400)
RBC # BLD: 2.68 M/UL — LOW (ref 4.2–5.8)
RBC # FLD: 14.7 % — HIGH (ref 10.3–14.5)
WBC # BLD: 10.42 K/UL — SIGNIFICANT CHANGE UP (ref 3.8–10.5)
WBC # FLD AUTO: 10.42 K/UL — SIGNIFICANT CHANGE UP (ref 3.8–10.5)

## 2022-02-27 PROCEDURE — 99232 SBSQ HOSP IP/OBS MODERATE 35: CPT

## 2022-02-27 RX ORDER — MIDODRINE HYDROCHLORIDE 2.5 MG/1
5 TABLET ORAL THREE TIMES A DAY
Refills: 0 | Status: DISCONTINUED | OUTPATIENT
Start: 2022-02-27 | End: 2022-03-01

## 2022-02-27 RX ORDER — ENOXAPARIN SODIUM 100 MG/ML
110 INJECTION SUBCUTANEOUS EVERY 12 HOURS
Refills: 0 | Status: DISCONTINUED | OUTPATIENT
Start: 2022-02-28 | End: 2022-03-01

## 2022-02-27 RX ADMIN — LATANOPROST 1 DROP(S): 0.05 SOLUTION/ DROPS OPHTHALMIC; TOPICAL at 22:36

## 2022-02-27 RX ADMIN — Medication 1 GRAM(S): at 11:42

## 2022-02-27 RX ADMIN — Medication 1 MILLIGRAM(S): at 22:36

## 2022-02-27 RX ADMIN — ATORVASTATIN CALCIUM 40 MILLIGRAM(S): 80 TABLET, FILM COATED ORAL at 22:36

## 2022-02-27 RX ADMIN — Medication 4: at 11:42

## 2022-02-27 RX ADMIN — Medication 4: at 16:26

## 2022-02-27 RX ADMIN — CHLORHEXIDINE GLUCONATE 1 APPLICATION(S): 213 SOLUTION TOPICAL at 06:10

## 2022-02-27 RX ADMIN — CEFEPIME 100 MILLIGRAM(S): 1 INJECTION, POWDER, FOR SOLUTION INTRAMUSCULAR; INTRAVENOUS at 06:09

## 2022-02-27 RX ADMIN — PANTOPRAZOLE SODIUM 40 MILLIGRAM(S): 20 TABLET, DELAYED RELEASE ORAL at 17:01

## 2022-02-27 RX ADMIN — Medication 1 GRAM(S): at 06:08

## 2022-02-27 RX ADMIN — Medication 1 GRAM(S): at 17:01

## 2022-02-27 RX ADMIN — CEFEPIME 100 MILLIGRAM(S): 1 INJECTION, POWDER, FOR SOLUTION INTRAMUSCULAR; INTRAVENOUS at 17:01

## 2022-02-27 RX ADMIN — PANTOPRAZOLE SODIUM 40 MILLIGRAM(S): 20 TABLET, DELAYED RELEASE ORAL at 06:08

## 2022-02-27 NOTE — PROGRESS NOTE ADULT - SUBJECTIVE AND OBJECTIVE BOX
Patient is a 81y old  Male who presents with a chief complaint of hypotension and renal failure (26 Feb 2022 17:56)      INTERVAL HPI/ OVERNIGHT EVENTS: Pt was seen and examined at bedside today, No significant overnight events, pt continues to feel weak, denies SOB or abdominal pain     MEDICATIONS  (STANDING):  ALPRAZolam 1 milliGRAM(s) Oral at bedtime  atorvastatin 40 milliGRAM(s) Oral at bedtime  cefepime   IVPB 2000 milliGRAM(s) IV Intermittent every 12 hours  chlorhexidine 2% Cloths 1 Application(s) Topical <User Schedule>  insulin lispro (ADMELOG) corrective regimen sliding scale   SubCutaneous Before meals and at bedtime  latanoprost 0.005% Ophthalmic Solution 1 Drop(s) Both EYES at bedtime  pantoprazole   Suspension 40 milliGRAM(s) Oral two times a day  sucralfate 1 Gram(s) Oral four times a day    MEDICATIONS  (PRN):  ondansetron Injectable 4 milliGRAM(s) IV Push every 4 hours PRN Nausea and/or Vomiting  sodium chloride 0.9% lock flush 10 milliLiter(s) IV Push every 1 hour PRN Pre/post blood products, medications, blood draw, and to maintain line patency      Allergies    No Known Allergies    Intolerances        REVIEW OF SYSTEMS:    Unable to examine due to [ ] Encephalopathy [ ] Advanced Dementia [ ] Expressive Aphasia [ ] Non-verbal patient    CONSTITUTIONAL: No fever, positive generalized weakness/Fatigue, No weight loss  EYES: No eye pain, visual disturbances, or discharge  ENMT:  No difficulty hearing, tinnitus, vertigo; No sinus or throat pain  NECK: No pain or stiffness  RESPIRATORY: No shortness of breath,  cough, wheezing, sputum or hemoptysis   CARDIOVASCULAR: No chest pain, palpitations, or leg swelling  GASTROINTESTINAL: No abdominal pain. No nausea, vomiting, diarrhea or constipation. No melena or hematochezia.  GENITOURINARY: No dysuria, frequency, hematuria, or incontinence  NEUROLOGICAL: No headaches, Dizziness, memory loss, loss of strength, numbness, or tremors  SKIN: No itching, burning, rashes, or lesions   MUSCULOSKELETAL: No joint pain or swelling; No muscle, back, or extremity pain  PSYCHIATRIC: No depression, anxiety, mood swings, or difficulty sleeping  HEME/LYMPH: No easy bruising, or bleeding gums      Vital Signs Last 24 Hrs  T(C): 36.6 (27 Feb 2022 11:30), Max: 36.7 (26 Feb 2022 17:00)  T(F): 97.8 (27 Feb 2022 11:30), Max: 98 (26 Feb 2022 17:00)  HR: 69 (27 Feb 2022 11:30) (69 - 76)  BP: 103/67 (27 Feb 2022 11:30) (103/67 - 132/80)  BP(mean): --  RR: 19 (27 Feb 2022 11:30) (18 - 19)  SpO2: 96% (27 Feb 2022 11:30) (94% - 96%)    PHYSICAL EXAM:  GENERAL: NAD on NC, obese   HEAD:  Atraumatic, Normocephalic  EYES: conjunctiva and sclera clear  ENMT: Moist mucous membranes  NECK: Supple, No JVD, Normal thyroid  CHEST/LUNG: Clear to Auscultation bilaterally; No rales, rhonchi, wheezing, or rubs  HEART: Regular rate and rhythm; No murmurs, rubs, or gallops  ABDOMEN: Soft, Nontender, Nondistended; Bowel sounds present  EXTREMITIES:  2+ Peripheral Pulses, No clubbing, cyanosis, or edema  SKIN: No rashes or lesions  NERVOUS SYSTEM:  Alert & Oriented X3, Good concentration; Motor Strength 5/5 B/L upper and lower extremities    LABS:                        8.0    10.42 )-----------( 225      ( 27 Feb 2022 07:17 )             25.1     02-26    139  |  108  |  26<H>  ----------------------------<  155<H>  3.9   |  26  |  1.33<H>    Ca    7.5<L>      26 Feb 2022 07:44          CAPILLARY BLOOD GLUCOSE      POCT Blood Glucose.: 201 mg/dL (27 Feb 2022 11:13)  POCT Blood Glucose.: 123 mg/dL (27 Feb 2022 07:35)  POCT Blood Glucose.: 120 mg/dL (26 Feb 2022 21:17)  POCT Blood Glucose.: 212 mg/dL (26 Feb 2022 16:32)          RADIOLOGY & ADDITIONAL TESTS:          Imaging Personally Reviewed:  [ ] YES  [ ] NO    Consultant(s) Notes Reviewed:  [ ] YES  [ ] NO    Care Discussed with Consultants/Other Providers [x ] YES  [ ] NO

## 2022-02-27 NOTE — PROGRESS NOTE ADULT - ASSESSMENT
81M w/ HTN, bladder tumor removal, melanoma. Presents w/ diarrhea and weakness. Admitted to ICU w/ rapif afib, septic shock likely from UTI vs pneumonia, RADHA w/ hyperkalemia and significant uremia and thrombocytopenia. S/p 1 HD treatment 2/16/22      Gram Negative bacterial Pneumonia  CXR: b/l lower lobe infiltrate   Septic Shock POA; resolved   acute respiratory failure with hypoxia; cont to titrate oxygen down as tolerated   cont w/ IV abx.     Klebsiella Bacteremia   Septic Shock POA; resolved   ID on board   cont w/ IV abx.     Klebsiella UTI  as above     Acute Respiratory Failure with hypoxia   presumed secondary to PNA   repeat CXR that not show no obvious pleural effusion   will continue to titrate oxygen down as tolerated     Afib w/ RVR   in the setting of sepsis   s/p Amiodarone, HR now controlled   cont to monitor   AC contraindicated at this time     Acute blood loss anemia   episode of melena while on Heparin drip  s/p 2U pRBC transfusion  GI consult appreciated   Pt underwent EGD 2/24 found to have 2x duodenal ulcers   cont w/ Protonix and Carafate   will restart AC tomorrow     RADHA   likely combination of prerenal, ATN and obstructive uropathy;   s/p HD x1; now making urine;   cont w/ IVF and monitor renal function      thrombocytopenia   Secondary to Bacteremia  no active bleeding   resolved     Acute Left LE DVT  Doppler: Acute nonocclusive above-the-knee deep venous thrombosis. Occlusive DVT in the greater saphenous vein.  AC contraindicated due to GI bleed.     Diabetes Mellitus   HgA1c: 7.8%  cont w/ FS monitoring w/ insulins s/s coverage     #PPx   - SCDs, heparin drip on hold    81M w/ HTN, bladder tumor removal, melanoma. Presents w/ diarrhea and weakness. Admitted to ICU w/ rapif afib, septic shock likely from UTI vs pneumonia, RADHA w/ hyperkalemia and significant uremia and thrombocytopenia. S/p 1 HD treatment 2/16/22      Gram Negative bacterial Pneumonia  CXR: b/l lower lobe infiltrate   Septic Shock POA; resolved   acute respiratory failure with hypoxia; cont to titrate oxygen down as tolerated   cont w/ IV abx.     Klebsiella Bacteremia   Septic Shock POA; resolved   ID on board   cont w/ IV abx.     Klebsiella UTI  as above     Acute Respiratory Failure with hypoxia   presumed secondary to PNA   repeat CXR that not show no obvious pleural effusion   will continue to titrate oxygen down as tolerated     Afib w/ RVR   in the setting of sepsis   s/p Amiodarone, HR now controlled       Acute blood loss anemia   episode of melena while on Heparin drip  s/p 2U pRBC transfusion  GI consult appreciated   Pt underwent EGD 2/24 found to have 2x duodenal ulcers   cont w/ Protonix and Carafate   will restart AC tomorrow     RADHA   likely combination of prerenal, ATN and obstructive uropathy;   s/p HD x1; now making urine;   cont w/ IVF and monitor renal function      thrombocytopenia   Secondary to Bacteremia  resolved     Acute Left LE DVT  Doppler: Acute nonocclusive above-the-knee deep venous thrombosis. Occlusive DVT in the greater saphenous vein.  will resume AC tomm and monitor     Diabetes Mellitus   HgA1c: 7.8%  cont w/ FS monitoring w/ insulins s/s coverage     #PPx   Resume AC tomorrow

## 2022-02-28 LAB
ALBUMIN SERPL ELPH-MCNC: 1.5 G/DL — LOW (ref 3.3–5)
ALP SERPL-CCNC: 104 U/L — SIGNIFICANT CHANGE UP (ref 40–120)
ALT FLD-CCNC: 51 U/L — SIGNIFICANT CHANGE UP (ref 12–78)
ANION GAP SERPL CALC-SCNC: 4 MMOL/L — LOW (ref 5–17)
AST SERPL-CCNC: 27 U/L — SIGNIFICANT CHANGE UP (ref 15–37)
BASOPHILS # BLD AUTO: 0.05 K/UL — SIGNIFICANT CHANGE UP (ref 0–0.2)
BASOPHILS NFR BLD AUTO: 0.5 % — SIGNIFICANT CHANGE UP (ref 0–2)
BILIRUB DIRECT SERPL-MCNC: 0.1 MG/DL — SIGNIFICANT CHANGE UP (ref 0–0.3)
BILIRUB INDIRECT FLD-MCNC: 0.4 MG/DL — SIGNIFICANT CHANGE UP (ref 0.2–1)
BILIRUB SERPL-MCNC: 0.5 MG/DL — SIGNIFICANT CHANGE UP (ref 0.2–1.2)
BUN SERPL-MCNC: 23 MG/DL — SIGNIFICANT CHANGE UP (ref 7–23)
CALCIUM SERPL-MCNC: 8 MG/DL — LOW (ref 8.5–10.1)
CHLORIDE SERPL-SCNC: 110 MMOL/L — HIGH (ref 96–108)
CO2 SERPL-SCNC: 26 MMOL/L — SIGNIFICANT CHANGE UP (ref 22–31)
CREAT SERPL-MCNC: 1.2 MG/DL — SIGNIFICANT CHANGE UP (ref 0.5–1.3)
CRP SERPL-MCNC: 24 MG/L — HIGH
EOSINOPHIL # BLD AUTO: 0.22 K/UL — SIGNIFICANT CHANGE UP (ref 0–0.5)
EOSINOPHIL NFR BLD AUTO: 2.3 % — SIGNIFICANT CHANGE UP (ref 0–6)
ERYTHROCYTE [SEDIMENTATION RATE] IN BLOOD: 104 MM/HR — HIGH (ref 0–20)
FLUAV AG NPH QL: SIGNIFICANT CHANGE UP
FLUBV AG NPH QL: SIGNIFICANT CHANGE UP
GLUCOSE BLDC GLUCOMTR-MCNC: 115 MG/DL — HIGH (ref 70–99)
GLUCOSE BLDC GLUCOMTR-MCNC: 153 MG/DL — HIGH (ref 70–99)
GLUCOSE BLDC GLUCOMTR-MCNC: 155 MG/DL — HIGH (ref 70–99)
GLUCOSE BLDC GLUCOMTR-MCNC: 177 MG/DL — HIGH (ref 70–99)
GLUCOSE SERPL-MCNC: 108 MG/DL — HIGH (ref 70–99)
HCT VFR BLD CALC: 24.5 % — LOW (ref 39–50)
HGB BLD-MCNC: 7.9 G/DL — LOW (ref 13–17)
IMM GRANULOCYTES NFR BLD AUTO: 0.9 % — SIGNIFICANT CHANGE UP (ref 0–1.5)
LYMPHOCYTES # BLD AUTO: 1.24 K/UL — SIGNIFICANT CHANGE UP (ref 1–3.3)
LYMPHOCYTES # BLD AUTO: 12.8 % — LOW (ref 13–44)
MCHC RBC-ENTMCNC: 30 PG — SIGNIFICANT CHANGE UP (ref 27–34)
MCHC RBC-ENTMCNC: 32.2 G/DL — SIGNIFICANT CHANGE UP (ref 32–36)
MCV RBC AUTO: 93.2 FL — SIGNIFICANT CHANGE UP (ref 80–100)
MONOCYTES # BLD AUTO: 0.95 K/UL — HIGH (ref 0–0.9)
MONOCYTES NFR BLD AUTO: 9.8 % — SIGNIFICANT CHANGE UP (ref 2–14)
NEUTROPHILS # BLD AUTO: 7.11 K/UL — SIGNIFICANT CHANGE UP (ref 1.8–7.4)
NEUTROPHILS NFR BLD AUTO: 73.7 % — SIGNIFICANT CHANGE UP (ref 43–77)
NRBC # BLD: 0 /100 WBCS — SIGNIFICANT CHANGE UP (ref 0–0)
PLATELET # BLD AUTO: 231 K/UL — SIGNIFICANT CHANGE UP (ref 150–400)
POTASSIUM SERPL-MCNC: 3.7 MMOL/L — SIGNIFICANT CHANGE UP (ref 3.5–5.3)
POTASSIUM SERPL-SCNC: 3.7 MMOL/L — SIGNIFICANT CHANGE UP (ref 3.5–5.3)
PROCALCITONIN SERPL-MCNC: 0.18 NG/ML — HIGH (ref 0.02–0.1)
PROT SERPL-MCNC: 6 GM/DL — SIGNIFICANT CHANGE UP (ref 6–8.3)
RBC # BLD: 2.63 M/UL — LOW (ref 4.2–5.8)
RBC # FLD: 14.6 % — HIGH (ref 10.3–14.5)
SARS-COV-2 RNA SPEC QL NAA+PROBE: SIGNIFICANT CHANGE UP
SODIUM SERPL-SCNC: 140 MMOL/L — SIGNIFICANT CHANGE UP (ref 135–145)
SURGICAL PATHOLOGY STUDY: SIGNIFICANT CHANGE UP
WBC # BLD: 9.66 K/UL — SIGNIFICANT CHANGE UP (ref 3.8–10.5)
WBC # FLD AUTO: 9.66 K/UL — SIGNIFICANT CHANGE UP (ref 3.8–10.5)

## 2022-02-28 PROCEDURE — 99232 SBSQ HOSP IP/OBS MODERATE 35: CPT

## 2022-02-28 RX ORDER — TAMSULOSIN HYDROCHLORIDE 0.4 MG/1
0.4 CAPSULE ORAL AT BEDTIME
Refills: 0 | Status: DISCONTINUED | OUTPATIENT
Start: 2022-02-28 | End: 2022-03-01

## 2022-02-28 RX ADMIN — Medication 1 GRAM(S): at 11:51

## 2022-02-28 RX ADMIN — CEFEPIME 100 MILLIGRAM(S): 1 INJECTION, POWDER, FOR SOLUTION INTRAMUSCULAR; INTRAVENOUS at 17:05

## 2022-02-28 RX ADMIN — PANTOPRAZOLE SODIUM 40 MILLIGRAM(S): 20 TABLET, DELAYED RELEASE ORAL at 17:06

## 2022-02-28 RX ADMIN — ENOXAPARIN SODIUM 110 MILLIGRAM(S): 100 INJECTION SUBCUTANEOUS at 06:32

## 2022-02-28 RX ADMIN — Medication 2: at 16:47

## 2022-02-28 RX ADMIN — PANTOPRAZOLE SODIUM 40 MILLIGRAM(S): 20 TABLET, DELAYED RELEASE ORAL at 06:31

## 2022-02-28 RX ADMIN — Medication 1 MILLIGRAM(S): at 21:19

## 2022-02-28 RX ADMIN — CEFEPIME 100 MILLIGRAM(S): 1 INJECTION, POWDER, FOR SOLUTION INTRAMUSCULAR; INTRAVENOUS at 06:32

## 2022-02-28 RX ADMIN — Medication 2: at 21:50

## 2022-02-28 RX ADMIN — Medication 1 GRAM(S): at 06:31

## 2022-02-28 RX ADMIN — ENOXAPARIN SODIUM 110 MILLIGRAM(S): 100 INJECTION SUBCUTANEOUS at 17:05

## 2022-02-28 RX ADMIN — Medication 1 GRAM(S): at 00:09

## 2022-02-28 RX ADMIN — TAMSULOSIN HYDROCHLORIDE 0.4 MILLIGRAM(S): 0.4 CAPSULE ORAL at 21:18

## 2022-02-28 RX ADMIN — Medication 2: at 11:51

## 2022-02-28 RX ADMIN — ATORVASTATIN CALCIUM 40 MILLIGRAM(S): 80 TABLET, FILM COATED ORAL at 21:17

## 2022-02-28 RX ADMIN — LATANOPROST 1 DROP(S): 0.05 SOLUTION/ DROPS OPHTHALMIC; TOPICAL at 21:17

## 2022-02-28 RX ADMIN — CHLORHEXIDINE GLUCONATE 1 APPLICATION(S): 213 SOLUTION TOPICAL at 06:31

## 2022-02-28 RX ADMIN — Medication 1 GRAM(S): at 17:06

## 2022-02-28 NOTE — PROGRESS NOTE ADULT - ASSESSMENT
81M w/ HTN, bladder tumor removal, melanoma. Presents w/ diarrhea and weakness. Admitted to ICU w/ rapif afib, septic shock likely from UTI vs pneumonia, RADHA w/ hyperkalemia and significant uremia and thrombocytopenia. S/p 1 HD treatment 2/16/22      Gram Negative bacterial Pneumonia  CXR: b/l lower lobe infiltrate   Septic Shock POA; resolved   acute respiratory failure with hypoxia; resolved   cont w/ IV abx, f/u ID recommendations.      Klebsiella Bacteremia   Septic Shock POA; resolved   ID on board   cont w/ IV abx.     Klebsiella UTI  as above     Acute Respiratory Failure with hypoxia   presumed secondary to PNA   repeat CXR that not show no obvious pleural effusion   pt is tolerating room air now     Afib w/ RVR   in the setting of sepsis   s/p Amiodarone, HR now controlled     Acute blood loss anemia   episode of melena while on Heparin drip  s/p 2U pRBC transfusion, h/h stable   GI consult appreciated   Pt underwent EGD 2/24 found to have 2x duodenal ulcers   cont w/ Protonix and Carafate   monitor for bleeding.     RADHA   likely combination of prerenal, ATN and obstructive uropathy;   s/p HD x1; now making urine;   resolving s/p IV hydration     thrombocytopenia   Secondary to Bacteremia  resolved       Acute Left LE DVT  Doppler: Acute nonocclusive above-the-knee deep venous thrombosis. Occlusive DVT in the greater saphenous vein.  cont w/ Lovenox     Diabetes Mellitus   HgA1c: 7.8%  cont w/ FS monitoring w/ insulins s/s coverage     Urinary Retention/BPH   700cc on bladder scan  CT abd: mildly enlarged prostate    cont w/ indwelling yao  will start flomax      #PPx   Lovenox

## 2022-02-28 NOTE — PROGRESS NOTE ADULT - SUBJECTIVE AND OBJECTIVE BOX
TMAX _ 98.8    On day # 12 Cefepime    Vital Signs Last 24 Hrs  T(C): 36.7 (2022 17:14), Max: 36.8 (2022 05:33)  T(F): 98 (2022 17:14), Max: 98.2 (2022 05:33)  HR: 79 (2022 17:14) (75 - 79)  BP: 105/70 (2022 17:14) (105/70 - 123/81)  RR: 18 (2022 17:14) (18 - 18)  SpO2: 95% (2022 17:14) (94% - 97%)  Supplemental O2:  on RA now    Awake, alert, feeling a little stronger today.  No c/o cp or abdominal pain and no c/o SOB now.  O2 Sat's are [id number] -97% on RA now.  Appetite slowly improving.     PHYSICAL EXAM  General: 80 y/o white male awake, alert, on RA now,   pleasant and cooperative, lying in semi-reclining position in bed now, in NAD  HEENT: conj pale, sclerae anicteric, PERRLA, no oral lesions noted but mucosa and tongue still appear somewhat dry  Neck: supple, no nodes noted  Heart: RR  Lungs: decreased BS at the bases  Abdomen: BS+, soft, nontender to palpation, no masses or HS-megaly detected  Back: no CVA or Spinal tenderness noted  Extremities: well-healed bilateral TKR scars                    1+ edema LE's                     arms and hands with some decreasing swelling now  Skin: warm, dry, no rash noted  Khalil in place and draining clear yellow urine now - 2150cc  output recorded over the prior 24hrs.      I&O's Summary :  2022 07:01  -  2022 07:00  IN: 100 mL / OUT: 2150 mL / NET: -2050 mL  2022 07:01  -  2022 18:19  IN: 0 mL / OUT: 975 mL / NET: -975 mL    LABS:  CBC Full  -  ( 2022 08:35 )  WBC Count : 9.66 K/uL  RBC Count : 2.63 M/uL  Hemoglobin : 7.9 g/dL  Hematocrit : 24.5 %  Platelet Count - Automated : 231 K/uL  Mean Cell Volume : 93.2 fl  Mean Cell Hemoglobin : 30.0 pg  Mean Cell Hemoglobin Concentration : 32.2 g/dL  Auto Neutrophil # : 7.11 K/uL  Auto Lymphocyte # : 1.24 K/uL  Auto Monocyte # : 0.95 K/uL  Auto Eosinophil # : 0.22 K/uL  Auto Basophil # : 0.05 K/uL  Auto Neutrophil % : 73.7 %  Auto Lymphocyte % : 12.8 %  Auto Monocyte % : 9.8 %  Auto Eosinophil % : 2.3 %  Auto Basophil % : 0.5 %      140  |  110<H>  |  23  ----------------------------<  108<H>  3.7   |  26  |  1.20  Ca    8.0<L>      2022 08:35  TPro  6.0  /  Alb  1.5<L>  /  TBili  0.5  /  DBili  0.1  /  AST  27  /  ALT  51  /  AlkPhos  104    LIVER FUNCTIONS - ( 2022 08:35 )  Alb: 1.5 g/dL / Pro: 6.0 gm/dL / ALK PHOS: 104 U/L / ALT: 51 U/L / AST: 27 U/L / GGT: x           Sedimentation Rate, Erythrocyte: 104 mm/hr ( @ 08:35)  Sedimentation Rate, Erythrocyte: 106 mm/hr ( @ 07:44)  Sedimentation Rate, Erythrocyte: 90 mm/hr ( @ 07:03)  Sedimentation Rate, Erythrocyte: 89 mm/hr ( @ 08:09)  Sedimentation Rate, Erythrocyte: 75 mm/hr ( @ 07:24)  Sedimentation Rate, Erythrocyte: 62 mm/hr ( @ 02:22)    C-Reactive Protein, Serum (22 @ 11:04)    C-Reactive Protein, Serum: 24 mg/L  C-Reactive Protein, Serum (22 @ 11:13)    C-Reactive Protein, Serum: 37 mg/L  C-Reactive Protein, Serum (22 @ 10:48)    C-Reactive Protein, Serum: 35 mg/L  C-Reactive Protein, Serum (22 @ 10:55)    C-Reactive Protein, Serum: 44 mg/L  C-Reactive Protein, Serum (22 @ 10:51)    C-Reactive Protein, Serum: 47 mg/L  C-Reactive Protein, Serum (22 @ 09:23)    C-Reactive Protein, Serum: 83 mg/L    Procalcitonin, Serum (22 @ 11:04)    Procalcitonin, Serum: 0.18:   Procalcitonin, Serum (22 @ 11:13)    Procalcitonin, Serum: 0.26:   Procalcitonin, Serum (22 @ 10:48)    Procalcitonin, Serum: 0.57:   Procalcitonin, Serum (22 @ 10:55)    Procalcitonin, Serum: 1.18:   Procalcitonin, Serum (22 @ 10:51)    Procalcitonin, Serum: 2.09:   Procalcitonin, Serum (22 @ 09:23)    Procalcitonin, Serum: 17.20:   Procalcitonin, Serum (22 @ 09:20)    Procalcitonin, Serum: 81.70:    Lactate, Blood (02.15.22 @ 21:14)    Lactate, Blood: 1.5 mmol/L  Lactate, Blood (02.15.22 @ 18:24)    Lactate, Blood: 2.5: Elevated lactate. Consider ordering follow-up lactate to trend. mmol/L  Lactate, Blood (02.15.22 @ 14:15)    Lactate, Blood: 3.9: Elevated lactate. Consider ordering follow-up lactate to trend. mmol/L    A1C with Estimated Average Glucose (22 @ 09:30)    A1C with Estimated Average Glucose Result: 7.6: Method: Immunoassay       Reference Range                4.0-5.6%       High risk (prediabetic)        5.7-6.4%       Diabetic, diagnostic             >=6.5%       ADA diabetic treatment goal       <7.0%    Estimated Average Glucose: 171    Acute Hepatitis Panel (22 @ 09:37)    Hepatitis C Virus Interpretation: Nonreact: Hepatitis C AB    Hepatitis C Virus S/CO Ratio: 0.12 S/CO    Hepatitis B Core IgM Antibody: Nonreact    Hepatitis B Surface Antigen: Nonreact    Hepatitis A IgM Antibody: Nonreact    Hepatitis B Surface Antibody (22 @ 09:37)    Hepatitis B Surface Antibody: Nonreact    C3 Complement, Serum (22 @ 09:37)    C3 Complement, Serum: 115 mg/dL  C4 Complement, Serum (22 @ 09:37)    C4 Complement, Serum: 26 mg/dL    Glomerular Basement Membrane Ab IgG (22 @ 21:11)    Glomerular Basement Membrane Ab Ig: Negative        MICROBIOLOGY:  Flu With COVID-19 By [facility] (22 @ 06:40)    SARS-CoV-2 Result: NotDetec: EUA/IVD    Influenza A Result: NotDetec: EUA/IVD    Influenza B Result: NotDetec: EUA/IVD    Flu With COVID-19 By [facility] (22 @ 07:08)    SARS-CoV-2 Result: NotDetec: EUA/IVD    Influenza A Result: NotDetec: EUA/IVD    Influenza B Result: NotDetec: EUA/IVD      Flu With COVID-19 By [facility] (22 @ 06:11)    SARS-CoV-2 Result: NotDetec: EUA/IVD    Influenza A Result: NotDetec: EUA/IVD    Influenza B Result: NotDetec: EUA/IVD    Specimen Source: .Sputum Sputum ( @ 01:01)  Culture Results:   Normal Respiratory Alonzo present ( @ 01:01)    Gram Stain:   Rare polymorphonuclear leukocytes per low power field  Moderate Squamous epithelial cells per low power field  Numerous Yeast per oil power field  Moderate Gram Positive Rods per oil power field  Few Gram Negative Rods per oil power field  Rare Gram positive cocci in pairs per oil power field    Specimen Source: Clean Catch Clean Catch (Midstream) ( @ 08:49)  Culture Results:   >100,000 CFU/ml Klebsiella oxytoca/Raoutella ornithinolytica  Multiple Morphological Strains ( @ 08:49)    -  Tigecycline: S <=2    -  Tobramycin: S <=2    -  Trimethoprim/Sulfamethoxazole: S <=0.5/9.5    -  Amikacin: S <=16    -  Amoxicillin/Clavulanic Acid: S <=8/4    -  Ampicillin: R >16 These ampicillin results predict results for amoxicillin    -  Ampicillin/Sulbactam: S 8/4 Enterobacter, Klebsiella aerogenes, Citrobacter, and Serratia may develop resistance during prolonged therapy (3-4 days)    -  Aztreonam: S <=4    -  Cefazolin: R 16    -  Cefepime: S <=2    -  Cefoxitin: S <=8    -  Ceftriaxone: S <=1 Enterobacter, Klebsiella aerogenes, Citrobacter, and Serratia may develop resistance during prolonged therapy    -  Ciprofloxacin: S <=0.25    -  Gentamicin: S <=2    -  Imipenem: S <=1    -  Ertapenem: S <=0.5    -  Levofloxacin: S <=0.5    -  Meropenem: S <=1    -  Nitrofurantoin: S <=32 Should not be used to treat pyelonephritis    -  Piperacillin/Tazobactam: S <=8    Method Type: NATACHA    Specimen Source: .Blood Blood-Peripheral (02-15 @ 19:01)  Culture Results:   Growth in aerobic and anaerobic bottles: Klebsiella oxytoca/Raoutella  ornithinolytica    -  Klebsiella oxytoca: Detec    Gram Stain:   Growth in aerobic and anaerobic bottles: Gram Negative Rods    -  Meropenem: S <=1    -  Ertapenem: S <=0.5    -  Gentamicin: S <=2    -  Imipenem: S <=1    -  Levofloxacin: S <=0.5    -  Piperacillin/Tazobactam: S <=8    -  Tobramycin: S <=2    -  Trimethoprim/Sulfamethoxazole: S <=0.5/9.5    -  Amikacin: S <=16    -  Ampicillin: R >16 These ampicillin results predict results for amoxicillin    -  Ampicillin/Sulbactam: S 8/4 Enterobacter, Klebsiella aerogenes, Citrobacter, and Serratia may develop resistance during prolonged therapy (3-4 days)    -  Aztreonam: R >16    -  Cefazolin: R 16 Enterobacter, Klebsiella aerogenes, Citrobacter, and Serratia may develop resistance during prolonged therapy (3-4 days)    -  Cefepime: S <=2    -  Cefoxitin: S <=8    -  Ceftriaxone: S <=1 Enterobacter, Klebsiella aerogenes, Citrobacter, and Serratia may develop resistance during prolonged therapy    -  Ciprofloxacin: S <=0.25    Organism Identification: Blood Culture PCR  Klebsiella oxytoca /Raoutella ornithinolytica    Method Type: PCR    Method Type: NATACHA    Specimen Source: .Blood Blood-Peripheral (02-15 @ 19:01)  Culture Results:   Growth in aerobic and anaerobic bottles: Klebsiella oxytoca/Raoutella  ornithinolytica  See previous culture [id number] (02-15 @ 19:01)        Legionella Antigen, Urine: Negative ( @ 09:14)    MRSA/MSSA PCR (22 @ 09:20)    MRSA PCR Result.: NotDete:    Staph Aureus PCR Result: [facility]    Respiratory Viral Panel with COVID-19 by [facility] (02.15.22 @ 14:34)    Rapid RVP Result: [facility]    SARS-CoV-2: NotDetec:       RADIOLOGY:  < from: CT Abdomen and Pelvis w/ Oral Cont (22 @ 13:36) >  ACC: [id number] EXAM:  CT ABDOMEN AND PELVIS OC                        PROCEDURE DATE:  2022    INTERPRETATION:  CLINICAL INFORMATION: Bacteremia  COMPARISON: 2/15  PROCEDURE:  CT of the Abdomen and Pelvis was performed without intravenous contrast.  Intravenous contrast: None.  Oral contrast: Positive contrast was administered.  Sagittal and coronal reformats were performed.  FINDINGS:  VISUALIZED CHEST: Bibasilar atelectasis and trace effusions.  ABDOMEN AND PELVIS:  LIVER: Within normal limits.  BILE DUCTS: Normal caliber.  GALLBLADDER: Cholelithiasis.  SPLEEN: Within normal limits.  PANCREAS: Within normal limits.  ADRENALS: Within normal limits.  KIDNEYS/URETERS: Within normal limits.  BLADDER: Khalil catheter.  REPRODUCTIVE ORGANS: No pelvic masses.  BOWEL: No bowel obstruction. Appendix is normal. Moderate hiatal hernia.  PERITONEUM: No ascites.  VESSELS: Atherosclerotic changes.  RETROPERITONEUM/LYMPH NODES: No lymphadenopathy.  ABDOMINAL WALL: Within normal limits.  BONES: Within normal limits.  IMPRESSION:  No acute findings. Multiple gallstones.    < from: US Duplex Venous Lower Ext Complete, Bilateral (22 @ 04:56) >  ACC: [id number] EXAM:  US DPLX LWR EXT VEINS COMPL BI                        PROCEDURE DATE:  2022    INTERPRETATION:  CLINICAL INFORMATION: Lower extremity swelling.  COMPARISON: None available.  TECHNIQUE: Duplex sonography of theBILATERAL LOWER extremity veins with   color and spectral Doppler, with and without compression.  IMPRESSION:  RIGHT: Limited calf vein visualization. Nonvisualization of the calf   veins. No evidence of deep venous thrombosis at or above the knee.  LEFT: Limited calf vein visualization. Acute nonocclusive above-the-knee   deep venous thrombosis. Occlusive DVT in the greater saphenous vein.    < from: US Abdomen Upper Quadrant Right (22 @ 12:03) >  ACC: [id number] EXAM:  US ABDOMEN RT UPR QUADRANT                        PROCEDURE DATE:  2022    INTERPRETATION:  CLINICAL INFORMATION: Elevated LFTs.  COMPARISON: CT abdomen/pelvis 2/15/2022.  TECHNIQUE: Sonography of the right upper quadrant.  IMPRESSION: Multiple gallstones. Gallbladder wall thickening. No   pericholecystic fluid. No evidence for intrahepatic or extrahepatic   biliary ductal dilatation.    < from: Xray Chest 1 View-PORTABLE IMMEDIATE (Xray Chest 1 View-PORTABLE IMMEDIATE .) (22 @ 01:26) >  ACC: [id number] EXAM:  XR CHEST PORTABLE IMMED 1V                        PROCEDURE DATE:  2022    IMPRESSION:  Right IJ line HD catheter in satisfactory position with no pneumothorax,   new    < from: CT Abdomen and Pelvis No Cont (02.15.22 @ 23:57) >  ACC: [id number] EXAM:  CT ABDOMEN AND PELVIS                        PROCEDURE DATE:  02/15/2022    INTERPRETATION:  CLINICAL INFORMATION:  Abdominal distension  COMPARISON: None.  CONTRAST/COMPLICATIONS:  IV Contrast: None  Oral Contrast:None  Complications: None  PROCEDURE:  Axial CT images were acquired through the abdomen and pelvis obtained   without intravenous contrast. Coronal and sagittal reformatted images   provided.  FINDINGS: This examination is limited by patient motion artifact and the   lack of oral and intravenous contrast.  LOWER CHEST: Bibasilar atelectasis. Coronary artery calcifications.  LIVER: Within normal limits.  BILE DUCTS: Normal caliber.  GALLBLADDER: Cholelithiasis..  SPLEEN: Within normal limits.  PANCREAS: Within normal limits.  ADRENALS: Within normal limits.  KIDNEYS/URETERS: No definite renal stone or hydronephrosis  BLADDER: Distended with Khalil catheter. No bladder wall thickening.  REPRODUCTIVE ORGANS: Prostate gland mildly enlarged.  BOWEL: Evaluation of bowel is limited without distention with oral   contrast, however there is no bowel obstruction. Few colonic diverticula   without acute diverticulitis. Small bowel loops are not dilated.   Unremarkable appendix without appendicitis. Stomach is underdistended for   adequate evaluation, however suggestion of a moderate hiatal hernia.   Appendix  PERITONEUM: No free air significant ascites.  VESSELS:  Limited without intravenous contrast. There is calcific   atherosclerosis of the abdominal aorta without gross aneurysmal   dilatation. 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  RETROPERITONEUM: No lymphadenopathy.  ABDOMINAL WALL: Small fat-containing bilateral inguinal hernias.  BONES: Degenerative changes of the spine. Bilateral L5 spondylolysis with   slight grade 1 anterolisthesis of L5 on S1.  MPRESSION:  There is no bowel obstruction, significant ascites or free   intraperitoneal air.  Moderate hiatal hernia.  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    < from: Xray Chest 1 View- PORTABLE-Urgent (02.15.22 @ 14:32) >  ACC: [id number] EXAM:  XR CHEST PORTABLE URGENT 1V                        PROCEDURE DATE:  02/15/2022    INTERPRETATION:  AP semierect chest on February 15, 2022 at 1:55 PM.   Patient has sepsis.  Heart enlargement again noted.  There aremild mid lower lung field infiltrates medially new since 2020.  IMPRESSION: Bilateral infiltrates as above.         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 ago, s/p  Bilateral Cataract Surgeries, admitted via the ER to [facility] on 2/15/22 with c/o diarrhea x 2 weeks at home along with decreased po intake and mild nausea with a few episodes of vomiting bilious material.  W/u revealed a markedly elevated WBC's of 37,940.  an elevated Lactate to 3.9, and his BUN / Creat were found to be elevated at 169 / 12.9 and Glucose was elevated as well. Further w/u with CXR and CT of the Abdomen + Pelvis was done and revealed Bilateral Infiltrates, Cholelithiasis, a moderate Hiatal Hernia, but no bowel obstruction was seen.  Patient was admitted to the CCU for further care and he was Rx'ed empirically with Rocephin + Zithromax after Blood and Urine Cx's were obtained and he underwent HD urgently x 1 on 2/15/22 PM.  Patient subsequently found to have Sepsis with Klebsiella Oxytoca ( AKA Raoutella ornithinolytica ) Bacteremia as identified from 2 out of 2 Blood Cx's from 2/15/22  - ? , GI, or Pulmonary source with Bilateral lower lobe PNA seen on initial CXR and Cholelithiasis with GB wall thickening seen on Sono.  Ab rx changed to Cefepime with Zithromax on 22 and patient remains with low - normal temps.  Sensitivities of the Kleb Oxytoca isolated from Blood Cx's noted above and sensitive to Cefepime.  Urine Legionella Ag is negative and now Urine Cx from 22 reported with >100,000 Klebsiella oxytoca ( multiple morphologic strains ) and now with sensitivities completed and noted to be the same as the Blood Cx isolates.  Sputum Cx finalized as normal alonzo.  CT of the Abdomen + Pelvis with oral contrast done 22  resulted with no acute findings, moderate Hiatal Hernia, and multiple Gallstones.  S/p transfusion of 2 Units of PRBC's on 22 for H+H decreased to 6.5 / 19.8 and noted with continued elevated ESR although CRP and Procalcitonin are trending downwards and there is continued slow improvement in renal function.  S/p EGD and BX on 22 and found to have Gastritis, a Duodenal Bulb Ulcer, and a Hiatal Hernia.  Temps remain decreased and  now WBC's are WNL.    Suggestions:    Will continue present ab rx with Cefepime and plan to complete 14 days of rx for Sepsis due to Klebsiella  oxytoca Bacteremia and UTI along with Bilateral PNA.  Follow-up temps and labs closely.  Follow-up CXR and monitor O2 Sat's.  Discussed with patient again in detail at the bedside

## 2022-02-28 NOTE — PROGRESS NOTE ADULT - SUBJECTIVE AND OBJECTIVE BOX
Patient is a 81y old  Male who presents with a chief complaint of hypotension and renal failure (28 Feb 2022 12:00)      INTERVAL HPI/ OVERNIGHT EVENTS: Pt was seen and examined at bedside today, No significant overnight events, pt is tolerating room at rest, pt denies SOB, bleeding or abdominal pain, continues to feel weak but admits to feeling stronger than before.      MEDICATIONS  (STANDING):  ALPRAZolam 1 milliGRAM(s) Oral at bedtime  atorvastatin 40 milliGRAM(s) Oral at bedtime  cefepime   IVPB 2000 milliGRAM(s) IV Intermittent every 12 hours  chlorhexidine 2% Cloths 1 Application(s) Topical <User Schedule>  enoxaparin Injectable 110 milliGRAM(s) SubCutaneous every 12 hours  insulin lispro (ADMELOG) corrective regimen sliding scale   SubCutaneous Before meals and at bedtime  latanoprost 0.005% Ophthalmic Solution 1 Drop(s) Both EYES at bedtime  pantoprazole   Suspension 40 milliGRAM(s) Oral two times a day  sucralfate 1 Gram(s) Oral four times a day    MEDICATIONS  (PRN):  midodrine. 5 milliGRAM(s) Oral three times a day PRN SBP <100  ondansetron Injectable 4 milliGRAM(s) IV Push every 4 hours PRN Nausea and/or Vomiting  sodium chloride 0.9% lock flush 10 milliLiter(s) IV Push every 1 hour PRN Pre/post blood products, medications, blood draw, and to maintain line patency      Allergies    No Known Allergies    Intolerances        REVIEW OF SYSTEMS:    Unable to examine due to [ ] Encephalopathy [ ] Advanced Dementia [ ] Expressive Aphasia [ ] Non-verbal patient    CONSTITUTIONAL: No fever, positive generalized weakness/Fatigue, No weight loss  EYES: No eye pain, visual disturbances, or discharge  ENMT:  No difficulty hearing, tinnitus, vertigo; No sinus or throat pain  NECK: No pain or stiffness  RESPIRATORY: No shortness of breath,  cough, wheezing, sputum or hemoptysis   CARDIOVASCULAR: No chest pain, palpitations, or leg swelling  GASTROINTESTINAL: No abdominal pain. No nausea, vomiting, diarrhea or constipation. No melena or hematochezia.  GENITOURINARY: No dysuria, frequency, hematuria, or incontinence  NEUROLOGICAL: No headaches, Dizziness, memory loss, loss of strength, numbness, or tremors  SKIN: No itching, burning, rashes, or lesions   MUSCULOSKELETAL: No joint pain or swelling; No muscle, back, or extremity pain  PSYCHIATRIC: No depression, anxiety, mood swings, or difficulty sleeping  HEME/LYMPH: No easy bruising, or bleeding gums        Vital Signs Last 24 Hrs  T(C): 36.6 (28 Feb 2022 11:35), Max: 37.1 (27 Feb 2022 17:00)  T(F): 97.9 (28 Feb 2022 11:35), Max: 98.8 (27 Feb 2022 17:00)  HR: 77 (28 Feb 2022 11:35) (75 - 78)  BP: 108/70 (28 Feb 2022 11:35) (104/65 - 123/81)  BP(mean): --  RR: 18 (28 Feb 2022 11:35) (18 - 18)  SpO2: 97% (28 Feb 2022 11:35) (94% - 97%)    PHYSICAL EXAM:  GENERAL: NAD on RA, obese   HEAD:  Atraumatic, Normocephalic  EYES: conjunctiva and sclera clear  ENMT: Moist mucous membranes  NECK: Supple, No JVD, Normal thyroid  CHEST/LUNG: Clear to Auscultation bilaterally; No rales, rhonchi, wheezing, or rubs  HEART: Regular rate and rhythm; No murmurs, rubs, or gallops  ABDOMEN: Soft, Nontender, Nondistended; Bowel sounds present  EXTREMITIES:  2+ Peripheral Pulses, No clubbing, cyanosis, or edema  SKIN: No rashes or lesions  NERVOUS SYSTEM:  Alert & Oriented X3, Good concentration; Motor Strength 5/5 B/L upper and lower extremities    LABS:                        7.9    9.66  )-----------( 231      ( 28 Feb 2022 08:35 )             24.5     02-28    140  |  110<H>  |  23  ----------------------------<  108<H>  3.7   |  26  |  1.20    Ca    8.0<L>      28 Feb 2022 08:35    TPro  6.0  /  Alb  1.5<L>  /  TBili  0.5  /  DBili  0.1  /  AST  27  /  ALT  51  /  AlkPhos  104  02-28        CAPILLARY BLOOD GLUCOSE      POCT Blood Glucose.: 177 mg/dL (28 Feb 2022 11:19)  POCT Blood Glucose.: 115 mg/dL (28 Feb 2022 07:41)  POCT Blood Glucose.: 135 mg/dL (27 Feb 2022 21:35)  POCT Blood Glucose.: 209 mg/dL (27 Feb 2022 16:07)          RADIOLOGY & ADDITIONAL TESTS:          Imaging Personally Reviewed:  [ ] YES  [ ] NO    Consultant(s) Notes Reviewed:  [ ] YES  [ ] NO    Care Discussed with Consultants/Other Providers [x ] YES  [ ] NO

## 2022-02-28 NOTE — PROGRESS NOTE ADULT - ASSESSMENT
HPI:  81 year old male pmh htn dm presents with 2 weeks of diarrhea and weakness. presented to ED and found to be in rapid afib with elevated cr. last cr feb 2021 was 1.  rec'd 5 liters ns by ed/ems.  cr improved slighlty. no abd pain, no fever no chest pain.   (15 Feb 2022 20:29)  ----- As Above -------- Patient was seen earlier today. History obtained from patient, chart , daughter and MD  The patient presented with two weeks of diarrhea. He had seen his PMD during the two weeks and was prescribed Erythromycin which did not help. He eventually had to com to the ER.  Patient was noted to be in afib and developed a DVT. The patient was started on IV heparin. Unfortunately. he developed documented melena. It stopped once the heparin was discontinued.  Patient has no history of GI bleeds, does not take NSAIDs routinely and denies abdominal pain  Patient is due for a repeat colonoscopy.     A) Melena - Etiology is duodenal ulcers  HGB 7.5--> 7.9 Now on Lovenox BID  Tolerating Regular diet. 1) Carafate  2) PPI 3)  Hold NSAIDs if possible 4) Check histology   B) Elevated LFTs - Now normal 0.5/27/51/104 .  Normal CT scan earlier this admission . Probably secondary to medications -  ==== Stable from GI point of view for discharge

## 2022-02-28 NOTE — PROGRESS NOTE ADULT - SUBJECTIVE AND OBJECTIVE BOX
Patient is a 81y old  Male who presents with a chief complaint of hypotension and renal failure (27 Feb 2022 12:05)      HPI:  81 year old male pmh htn dm presents with 2 weeks of diarrhea and weakness. presented to ED and found to be in rapid afib with elevated cr. last cr feb 2021 was 1.  rec'd 5 liters ns by ed/ems.  cr improved slighlty. no abd pain, no fever no chest pain.   (15 Feb 2022 20:29)      INTERVAL HPI/OVERNIGHT EVENTS: Patient seen earlier today  The patient denies melena, hematochezia, hematemesis, nausea, vomiting, abdominal pain, constipation, diarrhea, or change in bowel movements Tolerating solid diet    MEDICATIONS  (STANDING):  ALPRAZolam 1 milliGRAM(s) Oral at bedtime  atorvastatin 40 milliGRAM(s) Oral at bedtime  cefepime   IVPB 2000 milliGRAM(s) IV Intermittent every 12 hours  chlorhexidine 2% Cloths 1 Application(s) Topical <User Schedule>  enoxaparin Injectable 110 milliGRAM(s) SubCutaneous every 12 hours  insulin lispro (ADMELOG) corrective regimen sliding scale   SubCutaneous Before meals and at bedtime  latanoprost 0.005% Ophthalmic Solution 1 Drop(s) Both EYES at bedtime  pantoprazole   Suspension 40 milliGRAM(s) Oral two times a day  sucralfate 1 Gram(s) Oral four times a day    MEDICATIONS  (PRN):  midodrine. 5 milliGRAM(s) Oral three times a day PRN SBP <100  ondansetron Injectable 4 milliGRAM(s) IV Push every 4 hours PRN Nausea and/or Vomiting  sodium chloride 0.9% lock flush 10 milliLiter(s) IV Push every 1 hour PRN Pre/post blood products, medications, blood draw, and to maintain line patency      FAMILY HISTORY:      Allergies    No Known Allergies    Intolerances        PMH/PSH:  Melanoma    HTN (hypertension)    HLD (hyperlipidemia)    Diabetes mellitus    Bladder tumor    History of cataract surgery    S/P TKR (total knee replacement), left    H/O total knee replacement, right    History of melanoma excision          REVIEW OF SYSTEMS:  CONSTITUTIONAL: No fever, weight loss, or fatigue  EYES: No eye pain, visual disturbances, or discharge  ENMT:  No difficulty hearing, tinnitus, vertigo; No sinus or throat pain  NECK: No pain or stiffness  BREASTS: No pain, masses, or nipple discharge  RESPIRATORY: No cough, wheezing, chills or hemoptysis; No shortness of breath  CARDIOVASCULAR: No chest pain, palpitations, dizziness, or leg swelling  GASTROINTESTINAL: See above   GENITOURINARY: No dysuria, frequency, hematuria, or incontinence  NEUROLOGICAL: No headaches, memory loss, loss of strength, numbness, or tremors  SKIN: No itching, burning, rashes, or lesions   LYMPH NODES: No enlarged glands  ENDOCRINE: No heat or cold intolerance; No hair loss  MUSCULOSKELETAL: No joint pain or swelling; No muscle, back, or extremity pain  PSYCHIATRIC: No depression, anxiety, mood swings, or difficulty sleeping  HEME/LYMPH: No easy bruising, or bleeding gums  ALLERGY AND IMMUNOLOGIC: No hives or eczema    Vital Signs Last 24 Hrs  T(C): 36.6 (28 Feb 2022 11:35), Max: 37.1 (27 Feb 2022 17:00)  T(F): 97.9 (28 Feb 2022 11:35), Max: 98.8 (27 Feb 2022 17:00)  HR: 77 (28 Feb 2022 11:35) (75 - 78)  BP: 108/70 (28 Feb 2022 11:35) (104/65 - 123/81)  BP(mean): --  RR: 18 (28 Feb 2022 11:35) (18 - 18)  SpO2: 97% (28 Feb 2022 11:35) (94% - 97%)    PHYSICAL EXAM:  GENERAL: NAD, well-groomed, well-developed  HEAD:  Atraumatic, Normocephalic  EYES: EOMI, PERRLA, conjunctiva and sclera clear  NECK: Supple, No JVD, Normal thyroid  NERVOUS SYSTEM:  Alert & Oriented X3, Good concentration; Motor Strength 5/5 B/L upper and lower extremities;   CHEST/LUNG: Clear to percussion bilaterally; No rales, rhonchi, wheezing, or rubs  HEART: Regular rate and rhythm; No murmurs, rubs, or gallops  ABDOMEN: Soft, Nontender, Nondistended; Bowel sounds present  EXTREMITIES:  2+ Peripheral Pulses, No clubbing, cyanosis, or edema  LYMPH: No lymphadenopathy noted  SKIN: No rashes or lesions    LAB                          7.9    9.66  )-----------( 231      ( 28 Feb 2022 08:35 )             24.5       CBC:  02-28 @ 08:35  WBC 9.66   Hgb 7.9   Hct 24.5   Plts 231  MCV 93.2  02-27 @ 07:17  WBC 10.42   Hgb 8.0   Hct 25.1   Plts 225  MCV 93.7  02-26 @ 07:44  WBC 12.07   Hgb 7.9   Hct 25.4   Plts 228  MCV 95.8  02-25 @ 07:41  WBC 14.35   Hgb 7.5   Hct 23.2   Plts 211  MCV 93.5  02-24 @ 07:03  WBC 15.93   Hgb 7.9   Hct 23.8   Plts 224  MCV 92.6  02-23 @ 07:48  WBC 18.97   Hgb 8.1   Hct 24.9   Plts 213  MCV 92.6  02-22 @ 08:09  WBC 20.59   Hgb 8.0   Hct 24.1   Plts 208  MCV 90.6      Chemistry:  02-28 @ 08:35  Na+ 140  K+ 3.7  Cl- 110  CO2 26  BUN 23  Cr 1.20     02-26 @ 07:44  Na+ 139  K+ 3.9  Cl- 108  CO2 26  BUN 26  Cr 1.33     02-25 @ 07:41  Na+ 140  K+ 3.7  Cl- 109  CO2 26  BUN 30  Cr 1.35     02-24 @ 07:03  Na+ 140  K+ 3.7  Cl- 109  CO2 26  BUN 44  Cr 1.51     02-23 @ 07:48  Na+ 142  K+ 3.6  Cl- 109  CO2 28  BUN 57  Cr 1.65     02-22 @ 08:09  Na+ 143  K+ 3.9  Cl- 109  CO2 28  BUN 75  Cr 1.83         Glucose, Serum: 108 mg/dL (02-28 @ 08:35)  Glucose, Serum: 155 mg/dL (02-26 @ 07:44)  Glucose, Serum: 146 mg/dL (02-25 @ 07:41)  Glucose, Serum: 111 mg/dL (02-24 @ 07:03)  Glucose, Serum: 112 mg/dL (02-23 @ 07:48)  Glucose, Serum: 108 mg/dL (02-22 @ 08:09)      28 Feb 2022 08:35    140    |  110    |  23     ----------------------------<  108    3.7     |  26     |  1.20   26 Feb 2022 07:44    139    |  108    |  26     ----------------------------<  155    3.9     |  26     |  1.33   25 Feb 2022 07:41    140    |  109    |  30     ----------------------------<  146    3.7     |  26     |  1.35   24 Feb 2022 07:03    140    |  109    |  44     ----------------------------<  111    3.7     |  26     |  1.51   23 Feb 2022 07:48    142    |  109    |  57     ----------------------------<  112    3.6     |  28     |  1.65   22 Feb 2022 08:09    143    |  109    |  75     ----------------------------<  108    3.9     |  28     |  1.83     Ca    8.0        28 Feb 2022 08:35  Ca    7.5        26 Feb 2022 07:44  Ca    7.7        25 Feb 2022 07:41  Ca    7.9        24 Feb 2022 07:03  Ca    7.6        23 Feb 2022 07:48  Ca    7.4        22 Feb 2022 08:09  Phos  4.8       22 Feb 2022 08:09  Mg     2.2       22 Feb 2022 08:09    TPro  6.0    /  Alb  1.5    /  TBili  0.5    /  DBili  0.1    /  AST  27     /  ALT  51     /  AlkPhos  104    28 Feb 2022 08:35  TPro  5.9    /  Alb  1.6    /  TBili  0.6    /  DBili  x      /  AST  91     /  ALT  109    /  AlkPhos  175    22 Feb 2022 08:09              CAPILLARY BLOOD GLUCOSE      POCT Blood Glucose.: 177 mg/dL (28 Feb 2022 11:19)  POCT Blood Glucose.: 115 mg/dL (28 Feb 2022 07:41)  POCT Blood Glucose.: 135 mg/dL (27 Feb 2022 21:35)  POCT Blood Glucose.: 209 mg/dL (27 Feb 2022 16:07)      C-Reactive Protein, Serum: 24 mg/L (02-28 @ 11:04)  C-Reactive Protein, Serum: 37 mg/L (02-26 @ 11:13)  C-Reactive Protein, Serum: 35 mg/L (02-24 @ 10:48)  C-Reactive Protein, Serum: 44 mg/L (02-22 @ 10:55)      RADIOLOGY & ADDITIONAL TESTS:    Imaging Personally Reviewed:  [ ] YES  [ ] NO    Consultant(s) Notes Reviewed:  [ ] YES  [ ] NO    Care Discussed with Consultants/Other Providers [ ] YES  [ ] NO

## 2022-03-01 ENCOUNTER — TRANSCRIPTION ENCOUNTER (OUTPATIENT)
Age: 82
End: 2022-03-01

## 2022-03-01 VITALS
TEMPERATURE: 98 F | DIASTOLIC BLOOD PRESSURE: 76 MMHG | SYSTOLIC BLOOD PRESSURE: 114 MMHG | OXYGEN SATURATION: 94 % | RESPIRATION RATE: 18 BRPM | HEART RATE: 81 BPM

## 2022-03-01 LAB
ANION GAP SERPL CALC-SCNC: 6 MMOL/L — SIGNIFICANT CHANGE UP (ref 5–17)
BUN SERPL-MCNC: 23 MG/DL — SIGNIFICANT CHANGE UP (ref 7–23)
CALCIUM SERPL-MCNC: 8.3 MG/DL — LOW (ref 8.5–10.1)
CHLORIDE SERPL-SCNC: 108 MMOL/L — SIGNIFICANT CHANGE UP (ref 96–108)
CO2 SERPL-SCNC: 24 MMOL/L — SIGNIFICANT CHANGE UP (ref 22–31)
CREAT SERPL-MCNC: 1.13 MG/DL — SIGNIFICANT CHANGE UP (ref 0.5–1.3)
EGFR: 65 ML/MIN/1.73M2 — SIGNIFICANT CHANGE UP
GLUCOSE BLDC GLUCOMTR-MCNC: 117 MG/DL — HIGH (ref 70–99)
GLUCOSE BLDC GLUCOMTR-MCNC: 138 MG/DL — HIGH (ref 70–99)
GLUCOSE BLDC GLUCOMTR-MCNC: 171 MG/DL — HIGH (ref 70–99)
GLUCOSE SERPL-MCNC: 129 MG/DL — HIGH (ref 70–99)
HCT VFR BLD CALC: 25.3 % — LOW (ref 39–50)
HGB BLD-MCNC: 8.1 G/DL — LOW (ref 13–17)
MCHC RBC-ENTMCNC: 29.9 PG — SIGNIFICANT CHANGE UP (ref 27–34)
MCHC RBC-ENTMCNC: 32 G/DL — SIGNIFICANT CHANGE UP (ref 32–36)
MCV RBC AUTO: 93.4 FL — SIGNIFICANT CHANGE UP (ref 80–100)
NRBC # BLD: 0 /100 WBCS — SIGNIFICANT CHANGE UP (ref 0–0)
PLATELET # BLD AUTO: 239 K/UL — SIGNIFICANT CHANGE UP (ref 150–400)
POTASSIUM SERPL-MCNC: 3.9 MMOL/L — SIGNIFICANT CHANGE UP (ref 3.5–5.3)
POTASSIUM SERPL-SCNC: 3.9 MMOL/L — SIGNIFICANT CHANGE UP (ref 3.5–5.3)
RBC # BLD: 2.71 M/UL — LOW (ref 4.2–5.8)
RBC # FLD: 14.6 % — HIGH (ref 10.3–14.5)
SODIUM SERPL-SCNC: 138 MMOL/L — SIGNIFICANT CHANGE UP (ref 135–145)
WBC # BLD: 7.79 K/UL — SIGNIFICANT CHANGE UP (ref 3.8–10.5)
WBC # FLD AUTO: 7.79 K/UL — SIGNIFICANT CHANGE UP (ref 3.8–10.5)

## 2022-03-01 PROCEDURE — 99239 HOSP IP/OBS DSCHRG MGMT >30: CPT

## 2022-03-01 PROCEDURE — 99232 SBSQ HOSP IP/OBS MODERATE 35: CPT

## 2022-03-01 RX ORDER — IRBESARTAN 75 MG/1
1 TABLET ORAL
Qty: 0 | Refills: 0 | DISCHARGE

## 2022-03-01 RX ORDER — ALPRAZOLAM 0.25 MG
1 TABLET ORAL
Qty: 0 | Refills: 0 | DISCHARGE
Start: 2022-03-01

## 2022-03-01 RX ORDER — PANTOPRAZOLE SODIUM 20 MG/1
40 TABLET, DELAYED RELEASE ORAL
Qty: 0 | Refills: 0 | DISCHARGE
Start: 2022-03-01

## 2022-03-01 RX ORDER — ENOXAPARIN SODIUM 100 MG/ML
110 INJECTION SUBCUTANEOUS
Qty: 0 | Refills: 0 | DISCHARGE
Start: 2022-03-01

## 2022-03-01 RX ORDER — APIXABAN 2.5 MG/1
1 TABLET, FILM COATED ORAL
Qty: 60 | Refills: 0
Start: 2022-03-01 | End: 2022-03-30

## 2022-03-01 RX ORDER — SUCRALFATE 1 G
1 TABLET ORAL
Qty: 0 | Refills: 0 | DISCHARGE
Start: 2022-03-01

## 2022-03-01 RX ORDER — ALPRAZOLAM 0.25 MG
1 TABLET ORAL AT BEDTIME
Refills: 0 | Status: DISCONTINUED | OUTPATIENT
Start: 2022-03-01 | End: 2022-03-01

## 2022-03-01 RX ORDER — AMLODIPINE BESYLATE 2.5 MG/1
1 TABLET ORAL
Qty: 0 | Refills: 0 | DISCHARGE

## 2022-03-01 RX ORDER — METOPROLOL TARTRATE 50 MG
1 TABLET ORAL
Qty: 0 | Refills: 0 | DISCHARGE

## 2022-03-01 RX ORDER — TAMSULOSIN HYDROCHLORIDE 0.4 MG/1
1 CAPSULE ORAL
Qty: 0 | Refills: 0 | DISCHARGE
Start: 2022-03-01

## 2022-03-01 RX ORDER — MIDODRINE HYDROCHLORIDE 2.5 MG/1
1 TABLET ORAL
Qty: 0 | Refills: 0 | DISCHARGE
Start: 2022-03-01

## 2022-03-01 RX ADMIN — Medication 1 GRAM(S): at 05:40

## 2022-03-01 RX ADMIN — PANTOPRAZOLE SODIUM 40 MILLIGRAM(S): 20 TABLET, DELAYED RELEASE ORAL at 05:40

## 2022-03-01 RX ADMIN — Medication 1 GRAM(S): at 11:29

## 2022-03-01 RX ADMIN — CEFEPIME 100 MILLIGRAM(S): 1 INJECTION, POWDER, FOR SOLUTION INTRAMUSCULAR; INTRAVENOUS at 17:59

## 2022-03-01 RX ADMIN — ENOXAPARIN SODIUM 110 MILLIGRAM(S): 100 INJECTION SUBCUTANEOUS at 05:40

## 2022-03-01 RX ADMIN — ENOXAPARIN SODIUM 110 MILLIGRAM(S): 100 INJECTION SUBCUTANEOUS at 17:59

## 2022-03-01 RX ADMIN — PANTOPRAZOLE SODIUM 40 MILLIGRAM(S): 20 TABLET, DELAYED RELEASE ORAL at 17:59

## 2022-03-01 RX ADMIN — CHLORHEXIDINE GLUCONATE 1 APPLICATION(S): 213 SOLUTION TOPICAL at 05:43

## 2022-03-01 RX ADMIN — Medication 2: at 11:36

## 2022-03-01 RX ADMIN — Medication 1 GRAM(S): at 00:42

## 2022-03-01 RX ADMIN — CEFEPIME 100 MILLIGRAM(S): 1 INJECTION, POWDER, FOR SOLUTION INTRAMUSCULAR; INTRAVENOUS at 05:40

## 2022-03-01 RX ADMIN — Medication 1 GRAM(S): at 17:59

## 2022-03-01 NOTE — DISCHARGE NOTE PROVIDER - NSDCMRMEDTOKEN_GEN_ALL_CORE_FT
atorvastatin 40 mg oral tablet: 1 tab(s) orally once a day (at bedtime)  enoxaparin: 110 milligram(s) subcutaneous 2 times a day  glimepiride 2 mg oral tablet: 1 tab(s) orally 2 times a day  latanoprost 0.005% ophthalmic solution: 1 drop(s) to each affected eye once a day (in the evening)  midodrine 5 mg oral tablet: 1 tab(s) orally 3 times a day, As needed, SBP &lt;100  pantoprazole 40 mg oral granule, delayed release: 40 milligram(s) orally once a day  sucralfate 1 g oral tablet: 1 tab(s) orally 4 times a day  tamsulosin 0.4 mg oral capsule: 1 cap(s) orally once a day (at bedtime)   ALPRAZolam 1 mg oral tablet: 1 tab(s) orally once a day (at bedtime)  atorvastatin 40 mg oral tablet: 1 tab(s) orally once a day (at bedtime)  enoxaparin: 110 milligram(s) subcutaneous 2 times a day  glimepiride 2 mg oral tablet: 1 tab(s) orally 2 times a day  latanoprost 0.005% ophthalmic solution: 1 drop(s) to each affected eye once a day (in the evening)  midodrine 5 mg oral tablet: 1 tab(s) orally 3 times a day, As needed, SBP &lt;100  pantoprazole 40 mg oral granule, delayed release: 40 milligram(s) orally once a day  sucralfate 1 g oral tablet: 1 tab(s) orally 4 times a day  tamsulosin 0.4 mg oral capsule: 1 cap(s) orally once a day (at bedtime)

## 2022-03-01 NOTE — DISCHARGE NOTE PROVIDER - HOSPITAL COURSE
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 presented to ED and found to be in rapid Afib with elevated creatine 12.00;  last creatine  February 2021 was 1.  In ER patient rec'd 5 liters NS by ed/ems.  With creatine improved slightly. Patient admitted to ICU Septic shock,  RADHA in setting of diarrhea with elevated wbc. Patient was slightly tachypneic, likely from metabolic acidosis. Cultures sent. Seen by nephology and received urgent HD; with continued IVF resuscitation 2/2 severe pre-renal azotemia do to volume depletion following 3 weeks of diarrhea. Initially on sodium bicarb drip. Septic shock requiring Vasopressors, crystalloid now transitioned to midodrine and LR IVF. Blood cultures revealed  Klebsiella oxytoca/Raoutella ornithinolytica. ID consulted and ABX changed rx to Cefepime and continue Zithromax pending Urine for legionella and for atypical for CAP.  As per ID consider HIDA vs CT ABD / PELVIS with PO contrast to further evaluate / identify source of Gm Neg bacteria.  Patient lower extremities; Doppler examination shows normal spontaneous and phasic flow. IMPRESSION: RIGHT: Limited calf vein visualization. Nonvisualization of the calf veins. No evidence of deep venous thrombosis at or above the knee. LEFT: Limited calf vein visualization. Acute nonocclusive above-the-knee  deep venous thrombosis. Occlusive DVT in the greater saphenous vein. Patient was started on full dose anticoagulation with heparin drip. Thrombocytopenia probably 2/2 sepsis, platelet count improving. On Heparin for LLE DVT, monitor platelet count, aPTT.  New onset rapid Afib: likely due to sepsis now in NSR with rate controlled: off amiodarone and sustaining NSR. Patient seen by nephrology today no further HD at this time. The patient was transferred to medical bed. The patient began to have UGIB with acute blood loss anemia; heparin was held. GI was consulted and performed EGD that revealed 2X duodenal ulcers. The patient was started on Protonix and Carafate. AC was resumed with Lovenox. The patient tolerated Lovenox and has completed his antibiotic course. The patient will be discharged to Rehab.

## 2022-03-01 NOTE — DISCHARGE NOTE PROVIDER - NSDCCPCAREPLAN_GEN_ALL_CORE_FT
PRINCIPAL DISCHARGE DIAGNOSIS  Diagnosis: Sepsis  Assessment and Plan of Treatment: Gram Negative bacterial Pneumonia  CXR: b/l lower lobe infiltrate   Septic Shock POA; resolved   Acute respiratory failure with hypoxia; resolved   s/p Antibiotic course   Klebsiella Bacteremia   as above   Klebsiella UTI  as above      SECONDARY DISCHARGE DIAGNOSES  Diagnosis: Atrial fibrillation  Assessment and Plan of Treatment: RVR POA; resolved   s/p Amiodarone   continue with Lovenox    Diagnosis: Multiple duodenal ulcers  Assessment and Plan of Treatment: Acute blood loss anemia   episode of melena while on Heparin drip  s/p 2U pRBC transfusion, h/h stable   GI consult appreciated   Pt underwent EGD 2/24 found to have 2x duodenal ulcers   cont w/ Protonix and Carafate   monitor for bleeding.    Diagnosis: Acute renal failure  Assessment and Plan of Treatment: likely combination of prerenal, ATN and obstructive uropathy;   s/p HD x1; IV fluid course; resolved now    Diagnosis: DVT of lower limb, acute  Assessment and Plan of Treatment: Acute Left LE DVT  Doppler: Acute nonocclusive above-the-knee deep venous thrombosis. Occlusive DVT in the greater saphenous vein.  cont w/ Lovenox, consider DOAC on discharge    Diagnosis: Diabetes mellitus  Assessment and Plan of Treatment: HgA1c: 7.8%  cont diabetic diet   cont glimepiride and glucose monitoring    Diagnosis: Urinary retention  Assessment and Plan of Treatment: Presumed secondary to BPH   700cc on bladder scan  CT abd: mildly enlarged prostate    cont w/ indwelling yao  cont w/ flomax    will need Urology follow up with either PCP Urologist or Dr. King    Diagnosis: Thrombocytopenia  Assessment and Plan of Treatment: Secondary to Sepsis  resolved

## 2022-03-01 NOTE — PROGRESS NOTE ADULT - REASON FOR ADMISSION
hypotension and renal failure

## 2022-03-01 NOTE — PROGRESS NOTE ADULT - SUBJECTIVE AND OBJECTIVE BOX
Patient is a 81y old  Male who presents with a chief complaint of hypotension and renal failure (01 Mar 2022 13:09)      HPI:  81 year old male pmh htn dm presents with 2 weeks of diarrhea and weakness. presented to ED and found to be in rapid afib with elevated cr. last cr feb 2021 was 1.  rec'd 5 liters ns by ed/ems.  cr improved slighlty. no abd pain, no fever no chest pain.   (15 Feb 2022 20:29)      INTERVAL HPI/OVERNIGHT EVENTS:  The patient denies melena, hematochezia, hematemesis, nausea, vomiting, abdominal pain, constipation, diarrhea, or change in bowel movements Tolerating regular diet    MEDICATIONS  (STANDING):  ALPRAZolam 1 milliGRAM(s) Oral at bedtime  atorvastatin 40 milliGRAM(s) Oral at bedtime  cefepime   IVPB 2000 milliGRAM(s) IV Intermittent every 12 hours  chlorhexidine 2% Cloths 1 Application(s) Topical <User Schedule>  enoxaparin Injectable 110 milliGRAM(s) SubCutaneous every 12 hours  insulin lispro (ADMELOG) corrective regimen sliding scale   SubCutaneous Before meals and at bedtime  latanoprost 0.005% Ophthalmic Solution 1 Drop(s) Both EYES at bedtime  pantoprazole   Suspension 40 milliGRAM(s) Oral two times a day  sucralfate 1 Gram(s) Oral four times a day  tamsulosin 0.4 milliGRAM(s) Oral at bedtime    MEDICATIONS  (PRN):  midodrine. 5 milliGRAM(s) Oral three times a day PRN SBP <100  ondansetron Injectable 4 milliGRAM(s) IV Push every 4 hours PRN Nausea and/or Vomiting  sodium chloride 0.9% lock flush 10 milliLiter(s) IV Push every 1 hour PRN Pre/post blood products, medications, blood draw, and to maintain line patency      FAMILY HISTORY:      Allergies    No Known Allergies    Intolerances        PMH/PSH:  Melanoma    HTN (hypertension)    HLD (hyperlipidemia)    Diabetes mellitus    Bladder tumor    History of cataract surgery    S/P TKR (total knee replacement), left    H/O total knee replacement, right    History of melanoma excision          REVIEW OF SYSTEMS:  CONSTITUTIONAL: No fever, weight loss,   EYES: No eye pain, visual disturbances, or discharge  ENMT:  No difficulty hearing, tinnitus, vertigo; No sinus or throat pain  NECK: No pain or stiffness  BREASTS: No pain, masses, or nipple discharge  RESPIRATORY: No cough, wheezing, chills or hemoptysis; No shortness of breath  CARDIOVASCULAR: No chest pain, palpitations, dizziness, or leg swelling  GASTROINTESTINAL: see above   GENITOURINARY: No dysuria, frequency, hematuria, or incontinence  NEUROLOGICAL: No headaches,  numbness, or tremors  SKIN: No itching, burning, rashes, or lesions   LYMPH NODES: No enlarged glands  ENDOCRINE: No heat or cold intolerance; No hair loss  MUSCULOSKELETAL: No joint pain or swelling; No muscle, back, or extremity pain  PSYCHIATRIC: No depression, anxiety, mood swings, or difficulty sleeping  HEME/LYMPH: No easy bruising, or bleeding gums  ALLERGY AND IMMUNOLOGIC: No hives or eczema    Vital Signs Last 24 Hrs  T(C): 36.6 (01 Mar 2022 11:09), Max: 36.7 (28 Feb 2022 17:14)  T(F): 97.8 (01 Mar 2022 11:09), Max: 98 (28 Feb 2022 17:14)  HR: 80 (01 Mar 2022 11:09) (72 - 84)  BP: 103/67 (01 Mar 2022 11:09) (103/67 - 124/74)  BP(mean): --  RR: 18 (01 Mar 2022 11:09) (16 - 18)  SpO2: 94% (01 Mar 2022 11:09) (92% - 95%)    PHYSICAL EXAM:  GENERAL: NAD, well-groomed, well-developed  HEAD:  Atraumatic, Normocephalic  EYES: EOMI, PERRLA, conjunctiva and sclera clear  NECK: Supple, No JVD, Normal thyroid  NERVOUS SYSTEM:  Alert & Oriented X3, Good concentration; Motor Strength 5/5 B/L upper and lower extremities;   CHEST/LUNG: Clear to percussion bilaterally; No rales, rhonchi, wheezing, or rubs  HEART: Regular rate and rhythm; No murmurs, rubs, or gallops  ABDOMEN: Soft, Nontender, Nondistended; Bowel sounds present  EXTREMITIES:  2+ Peripheral Pulses, No clubbing, cyanosis, or edema  LYMPH: No lymphadenopathy noted  SKIN: No rashes or lesions    LAB                          8.1    7.79  )-----------( 239      ( 01 Mar 2022 07:19 )             25.3       CBC:  03-01 @ 07:19  WBC 7.79   Hgb 8.1   Hct 25.3   Plts 239  MCV 93.4  02-28 @ 08:35  WBC 9.66   Hgb 7.9   Hct 24.5   Plts 231  MCV 93.2  02-27 @ 07:17  WBC 10.42   Hgb 8.0   Hct 25.1   Plts 225  MCV 93.7  02-26 @ 07:44  WBC 12.07   Hgb 7.9   Hct 25.4   Plts 228  MCV 95.8  02-25 @ 07:41  WBC 14.35   Hgb 7.5   Hct 23.2   Plts 211  MCV 93.5  02-24 @ 07:03  WBC 15.93   Hgb 7.9   Hct 23.8   Plts 224  MCV 92.6  02-23 @ 07:48  WBC 18.97   Hgb 8.1   Hct 24.9   Plts 213  MCV 92.6      Chemistry:  03-01 @ 07:19  Na+ 138  K+ 3.9  Cl- 108  CO2 24  BUN 23  Cr 1.13     02-28 @ 08:35  Na+ 140  K+ 3.7  Cl- 110  CO2 26  BUN 23  Cr 1.20     02-26 @ 07:44  Na+ 139  K+ 3.9  Cl- 108  CO2 26  BUN 26  Cr 1.33     02-25 @ 07:41  Na+ 140  K+ 3.7  Cl- 109  CO2 26  BUN 30  Cr 1.35     02-24 @ 07:03  Na+ 140  K+ 3.7  Cl- 109  CO2 26  BUN 44  Cr 1.51     02-23 @ 07:48  Na+ 142  K+ 3.6  Cl- 109  CO2 28  BUN 57  Cr 1.65         Glucose, Serum: 129 mg/dL (03-01 @ 07:19)  Glucose, Serum: 108 mg/dL (02-28 @ 08:35)  Glucose, Serum: 155 mg/dL (02-26 @ 07:44)  Glucose, Serum: 146 mg/dL (02-25 @ 07:41)  Glucose, Serum: 111 mg/dL (02-24 @ 07:03)  Glucose, Serum: 112 mg/dL (02-23 @ 07:48)      01 Mar 2022 07:19    138    |  108    |  23     ----------------------------<  129    3.9     |  24     |  1.13   28 Feb 2022 08:35    140    |  110    |  23     ----------------------------<  108    3.7     |  26     |  1.20   26 Feb 2022 07:44    139    |  108    |  26     ----------------------------<  155    3.9     |  26     |  1.33   25 Feb 2022 07:41    140    |  109    |  30     ----------------------------<  146    3.7     |  26     |  1.35   24 Feb 2022 07:03    140    |  109    |  44     ----------------------------<  111    3.7     |  26     |  1.51   23 Feb 2022 07:48    142    |  109    |  57     ----------------------------<  112    3.6     |  28     |  1.65     Ca    8.3        01 Mar 2022 07:19  Ca    8.0        28 Feb 2022 08:35  Ca    7.5        26 Feb 2022 07:44  Ca    7.7        25 Feb 2022 07:41  Ca    7.9        24 Feb 2022 07:03  Ca    7.6        23 Feb 2022 07:48    TPro  6.0    /  Alb  1.5    /  TBili  0.5    /  DBili  0.1    /  AST  27     /  ALT  51     /  AlkPhos  104    28 Feb 2022 08:35              CAPILLARY BLOOD GLUCOSE      POCT Blood Glucose.: 171 mg/dL (01 Mar 2022 11:33)  POCT Blood Glucose.: 117 mg/dL (01 Mar 2022 08:10)  POCT Blood Glucose.: 153 mg/dL (28 Feb 2022 21:43)  POCT Blood Glucose.: 155 mg/dL (28 Feb 2022 16:21)      C-Reactive Protein, Serum: 24 mg/L (02-28 @ 11:04)  C-Reactive Protein, Serum: 37 mg/L (02-26 @ 11:13)  C-Reactive Protein, Serum: 35 mg/L (02-24 @ 10:48)      RADIOLOGY & ADDITIONAL TESTS:    Imaging Personally Reviewed:  [ ] YES  [ ] NO    Consultant(s) Notes Reviewed:  [ ] YES  [ ] NO    Care Discussed with Consultants/Other Providers [ ] YES  [ ] NO

## 2022-03-01 NOTE — PROGRESS NOTE ADULT - PROVIDER SPECIALTY LIST ADULT
Critical Care
Critical Care
Gastroenterology
Hospitalist
Hospitalist
Infectious Disease
Infectious Disease
Nephrology
Critical Care
Gastroenterology
Hospitalist
Infectious Disease
Nephrology
Hospitalist
Infectious Disease
Infectious Disease
Nephrology
Nephrology
Hospitalist
Hospitalist
Gastroenterology
Gastroenterology

## 2022-03-01 NOTE — PROGRESS NOTE ADULT - ASSESSMENT
HPI:  81 year old male pmh htn dm presents with 2 weeks of diarrhea and weakness. presented to ED and found to be in rapid afib with elevated cr. last cr feb 2021 was 1.  rec'd 5 liters ns by ed/ems.  cr improved slighlty. no abd pain, no fever no chest pain.   (15 Feb 2022 20:29)  ----- As Above -------- Patient was seen earlier today. History obtained from patient, chart , daughter and MD  The patient presented with two weeks of diarrhea. He had seen his PMD during the two weeks and was prescribed Erythromycin which did not help. He eventually had to com to the ER.  Patient was noted to be in afib and developed a DVT. The patient was started on IV heparin. Unfortunately. he developed documented melena. It stopped once the heparin was discontinued.  Patient has no history of GI bleeds, does not take NSAIDs routinely and denies abdominal pain  Patient is due for a repeat colonoscopy.     A) Melena - Etiology is duodenal ulcers  HGB 7.5--> 7.9 Now on Lovenox BID  Tolerating Regular diet. On Carafate and  PPI Biopsies are benign. 1)  Hold NSAIDs if possible    B) Elevated LFTs - Now normal 0.5/27/51/104 .  Normal CT scan earlier this admission . Probably secondary to medications -  ==== Stable from GI point of view for discharge Will follow on a PRN basis.

## 2022-03-01 NOTE — DISCHARGE NOTE NURSING/CASE MANAGEMENT/SOCIAL WORK - NSDCPEFALRISK_GEN_ALL_CORE
For information on Fall & Injury Prevention, visit: [email or web address] OR  [email or web address] OR  https://www.cdc.gov/steadi/patient.html

## 2022-03-01 NOTE — DISCHARGE NOTE NURSING/CASE MANAGEMENT/SOCIAL WORK - NURSING SECTION COMPLETE
You just soon send in the iron tablets because they not going to buy it OTC.   Patient/Caregiver provided printed discharge information.

## 2022-03-01 NOTE — DISCHARGE NOTE NURSING/CASE MANAGEMENT/SOCIAL WORK - PATIENT PORTAL LINK FT
You can access the FollowMyHealth Patient Portal offered by [facility] by registering at the following website: [email or web address]. By joining [email or web address] [email or web address] portal, you will also be able to view your health information using other applications (apps) compatible with our system.

## 2022-03-01 NOTE — DISCHARGE NOTE PROVIDER - CARE PROVIDER_API CALL
Link King)  Urology  [address] [facility], [address] [place] [address]  Phone: [phone]  Fax: [phone]  Follow Up Time:

## 2022-03-01 NOTE — DISCHARGE NOTE PROVIDER - ATTENDING DISCHARGE PHYSICAL EXAMINATION:
GENERAL: NAD on RA, obese   HEAD:  Atraumatic, Normocephalic  EYES: conjunctiva and sclera clear  ENMT: Moist mucous membranes  NECK: Supple, No JVD, Normal thyroid  CHEST/LUNG: Clear to Auscultation bilaterally; No rales, rhonchi, wheezing, or rubs  HEART: Regular rate and rhythm; No murmurs, rubs, or gallops  ABDOMEN: Soft, Nontender, Nondistended; Bowel sounds present  EXTREMITIES:  2+ Peripheral Pulses, No clubbing, cyanosis, or edema  SKIN: No rashes or lesions  NERVOUS SYSTEM:  Alert & Oriented X3, Good concentration; Motor Strength 5/5 B/L upper and lower extremities

## 2022-03-01 NOTE — PROGRESS NOTE ADULT - TIME BILLING
Lab test review, Radiology Review, Vitals review, Consultant review and discussion, Physical examination, IDR, Assessment and plan; Plan discussion with patient and family
Lab test review, Radiology Review, Vitals review, Consultant review and discussion, Physical examination, IDR, Assessment and plan; Plan discussion with patient and family
GI
Lab test review, Vitals review, Consultant review and discussion, Physical examination, IDR, Assessment and plan; Plan discussion with patient and family
GI
gi
Lab test review, Radiology Review, Vitals review, Consultant review and discussion, Physical examination, IDR, Assessment and plan; Plan discussion with patient and family
Lab test review, Radiology Review, Vitals review, Consultant review and discussion, Physical examination, IDR, Assessment and plan; Plan discussion with patient and family

## 2022-03-01 NOTE — PROGRESS NOTE ADULT - ASSESSMENT
81M w/ HTN, bladder tumor removal, melanoma. Presents w/ diarrhea and weakness. Admitted to ICU w/ rapif afib, septic shock likely from UTI vs pneumonia, RADHA w/ hyperkalemia and significant uremia and thrombocytopenia. S/p 1 HD treatment 2/16/22      Gram Negative bacterial Pneumonia  CXR: b/l lower lobe infiltrate   Septic Shock POA; resolved   acute respiratory failure with hypoxia; resolved   pt to complete 14 day course after tonight dose     Klebsiella Bacteremia   Septic Shock POA; resolved   ID on board   as above     Klebsiella UTI  as above     Acute Respiratory Failure with hypoxia   presumed secondary to PNA   repeat CXR that not show no obvious pleural effusion   pt is tolerating room air now     Afib w/ RVR   in the setting of sepsis   s/p Amiodarone, HR now controlled     Acute blood loss anemia   episode of melena while on Heparin drip  s/p 2U pRBC transfusion, h/h stable   GI consult appreciated   Pt underwent EGD 2/24 found to have 2x duodenal ulcers   cont w/ Protonix and Carafate   monitor for bleeding.     RADHA   likely combination of prerenal, ATN and obstructive uropathy;   s/p HD x1; now making urine;   resolving s/p IV hydration     thrombocytopenia   Secondary to Bacteremia  resolved       Acute Left LE DVT  Doppler: Acute nonocclusive above-the-knee deep venous thrombosis. Occlusive DVT in the greater saphenous vein.  cont w/ Lovenox     Diabetes Mellitus   HgA1c: 7.8%  cont w/ FS monitoring w/ insulins s/s coverage     Urinary Retention/BPH   700cc on bladder scan  CT abd: mildly enlarged prostate    cont w/ indwelling yao  cont w/ flomax      #PPx   Lovenox     Disposition: LANE pending acceptance    81M w/ HTN, bladder tumor removal, melanoma. Presents w/ diarrhea and weakness. Admitted to ICU w/ rapif afib, septic shock likely from UTI vs pneumonia, RADHA w/ hyperkalemia and significant uremia and thrombocytopenia. S/p 1 HD treatment 2/16/22      Gram Negative bacterial Pneumonia  CXR: b/l lower lobe infiltrate   Septic Shock POA; resolved   acute respiratory failure with hypoxia; resolved   pt to complete 14 day course after tonight dose     Klebsiella Bacteremia   Septic Shock POA; resolved   ID on board   as above     Klebsiella UTI  as above     Acute Respiratory Failure with hypoxia   presumed secondary to PNA   repeat CXR that not show no obvious pleural effusion   pt is tolerating room air now     Afib w/ RVR   in the setting of sepsis   s/p Amiodarone, HR now controlled     Acute blood loss anemia   episode of melena while on Heparin drip  s/p 2U pRBC transfusion, h/h stable   GI consult appreciated   Pt underwent EGD 2/24 found to have 2x duodenal ulcers   cont w/ Protonix and Carafate   monitor for bleeding.     RADHA   likely combination of prerenal, ATN and obstructive uropathy;   s/p HD x1; now making urine;   resolving s/p IV hydration     thrombocytopenia   Secondary to Bacteremia  resolved       Acute Left LE DVT  Doppler: Acute nonocclusive above-the-knee deep venous thrombosis. Occlusive DVT in the greater saphenous vein.  cont w/ Lovenox, consider DOAC on discharge     Diabetes Mellitus   HgA1c: 7.8%  cont w/ FS monitoring w/ insulins s/s coverage     Urinary Retention/BPH   700cc on bladder scan  CT abd: mildly enlarged prostate    cont w/ indwelling yao  cont w/ flomax      #PPx   Lovenox     Disposition: LANE pending acceptance

## 2022-03-01 NOTE — PROGRESS NOTE ADULT - SUBJECTIVE AND OBJECTIVE BOX
TMAX - 98.2    On day #  13 Cefepime    Vital Signs Last 24 Hrs  T(C): 36.6 (01 Mar 2022 11:09), Max: 36.7 (2022 17:14)  T(F): 97.8 (01 Mar 2022 11:09), Max: 98 (2022 17:14)  HR: 85 (01 Mar 2022 14:35) (72 - 85)  BP: 113/71 (01 Mar 2022 14:35) (103/67 - 124/74)  BP(mean): --  RR: 19 (01 Mar 2022 14:35) (16 - 19)  SpO2: 93% (01 Mar 2022 14:35) (92% - 95%)  Supplemental O2:  on RA  now    Awake, alert, feeling better today.  Ambulated with PT and a walker earlier today to the door of the room and back.  No GI upset and tolerating his po diet.      PHYSICAL EXAM  General: 82 y/o white male awake, alert, on RA now,   pleasant and cooperative, lying in semi-reclining position in bed now, in NAD  HEENT: conj pale, sclerae anicteric, PERRLA, no oral lesions noted but mucosa and tongue still appear somewhat dry  Neck: supple, no nodes noted  Heart: RR  Lungs: decreased BS at the bases  Abdomen: BS+, soft, nontender to palpation, no masses or HS-megaly detected  Back: no CVA or Spinal tenderness noted  Extremities: well-healed bilateral TKR scars                    1+ edema LE's                     arms and hands with some decreasing swelling now  Skin: warm, dry, no rash noted  Khalil in place and draining clear yellow urine now - 1550cc  output recorded over the prior 24hrs.    I&O's Summary :  2022 07:  -  01 Mar 2022 07:00  --------------------------------------------------------  IN: 1000 mL / OUT: 1550 mL / NET: -550 mL    01 Mar 2022 07:01  -  01 Mar 2022 16:51  --------------------------------------------------------  IN: 0 mL / OUT: 400 mL / NET: -400 mL      LABS:  CBC Full  -  ( 01 Mar 2022 07:19 )  WBC Count : 7.79 K/uL  RBC Count : 2.71 M/uL  Hemoglobin : 8.1 g/dL  Hematocrit : 25.3 %  Platelet Count - Automated : 239 K/uL  Mean Cell Volume : 93.4 fl  Mean Cell Hemoglobin : 29.9 pg  Mean Cell Hemoglobin Concentration : 32.0 g/dL  Auto Neutrophil # : x  Auto Lymphocyte # : x  Auto Monocyte # : x  Auto Eosinophil # : x  Auto Basophil # : x  Auto Neutrophil % : x  Auto Lymphocyte % : x  Auto Monocyte % : x  Auto Eosinophil % : x  Auto Basophil % : x    03    138  |  108  |  23  ----------------------------<  129<H>  3.9   |  24  |  1.13    Ca    8.3<L>      01 Mar 2022 07:19    TPro  6.0  /  Alb  1.5<L>  /  TBili  0.5  /  DBili  0.1  /  AST  27  /  ALT  51  /  AlkPhos  104      LIVER FUNCTIONS - ( 2022 08:35 )  Alb: 1.5 g/dL / Pro: 6.0 gm/dL / ALK PHOS: 104 U/L / ALT: 51 U/L / AST: 27 U/L / GGT: x           Sedimentation Rate, Erythrocyte: 104 mm/hr ( @ 08:35)  Sedimentation Rate, Erythrocyte: 106 mm/hr ( @ 07:44)    Sedimentation Rate, Erythrocyte: 90 mm/hr ( @ 07:03)  Sedimentation Rate, Erythrocyte: 89 mm/hr ( @ 08:09)  Sedimentation Rate, Erythrocyte: 75 mm/hr ( @ 07:24)  Sedimentation Rate, Erythrocyte: 62 mm/hr ( @ 02:22)    C-Reactive Protein, Serum (22 @ 11:04)    C-Reactive Protein, Serum: 24 mg/L  C-Reactive Protein, Serum (22 @ 11:13)    C-Reactive Protein, Serum: 37 mg/L  C-Reactive Protein, Serum (22 @ 10:48)    C-Reactive Protein, Serum: 35 mg/L  C-Reactive Protein, Serum (22 @ 10:55)    C-Reactive Protein, Serum: 44 mg/L  C-Reactive Protein, Serum (22 @ 10:51)    C-Reactive Protein, Serum: 47 mg/L  C-Reactive Protein, Serum (22 @ 09:23)    C-Reactive Protein, Serum: 83 mg/L    Procalcitonin, Serum (22 @ 11:04)    Procalcitonin, Serum: 0.18:   Procalcitonin, Serum (22 @ 11:13)    Procalcitonin, Serum: 0.26:   Procalcitonin, Serum (22 @ 10:48)    Procalcitonin, Serum: 0.57:   Procalcitonin, Serum (22 @ 10:55)    Procalcitonin, Serum: 1.18:   Procalcitonin, Serum (22 @ 10:51)    Procalcitonin, Serum: 2.09:   Procalcitonin, Serum (22 @ 09:23)    Procalcitonin, Serum: 17.20:   Procalcitonin, Serum (22 @ 09:20)    Procalcitonin, Serum: 81.70:    Lactate, Blood (02.15.22 @ 21:14)    Lactate, Blood: 1.5 mmol/L  Lactate, Blood (02.15.22 @ 18:24)    Lactate, Blood: 2.5: Elevated lactate. Consider ordering follow-up lactate to trend. mmol/L  Lactate, Blood (02.15.22 @ 14:15)    Lactate, Blood: 3.9: Elevated lactate. Consider ordering follow-up lactate to trend. mmol/L    A1C with Estimated Average Glucose (22 @ 09:30)    A1C with Estimated Average Glucose Result: 7.6: Method: Immunoassay       Reference Range                4.0-5.6%       High risk (prediabetic)        5.7-6.4%       Diabetic, diagnostic             >=6.5%       ADA diabetic treatment goal       <7.0%    Estimated Average Glucose: 171    Acute Hepatitis Panel (22 @ 09:37)    Hepatitis C Virus Interpretation: Nonreact: Hepatitis C AB    Hepatitis C Virus S/CO Ratio: 0.12 S/CO    Hepatitis B Core IgM Antibody: Nonreact    Hepatitis B Surface Antigen: Nonreact    Hepatitis A IgM Antibody: Nonreact    Hepatitis B Surface Antibody (22 @ 09:37)    Hepatitis B Surface Antibody: Nonreact    C3 Complement, Serum (22 @ 09:37)    C3 Complement, Serum: 115 mg/dL  C4 Complement, Serum (22 @ 09:37)    C4 Complement, Serum: 26 mg/dL    Glomerular Basement Membrane Ab IgG (22 @ 21:11)    Glomerular Basement Membrane Ab Ig: Negative        MICROBIOLOGY:  Flu With COVID-19 By [facility] (22 @ 06:40)    SARS-CoV-2 Result: NotDetec: EUA/IVD    Influenza A Result: NotDetec: EUA/IVD    Influenza B Result: NotDetec: EUA/IVD    Flu With COVID-19 By [facility] (22 @ 07:08)    SARS-CoV-2 Result: NotDetec: EUA/IVD    Influenza A Result: NotDetec: EUA/IVD    Influenza B Result: NotDetec: EUA/IVD      Flu With COVID-19 By [facility] (22 @ 06:11)    SARS-CoV-2 Result: NotDetec: EUA/IVD    Influenza A Result: NotDetec: EUA/IVD    Influenza B Result: NotDetec: EUA/IVD    Specimen Source: .Sputum Sputum ( @ 01:01)  Culture Results:   Normal Respiratory Alonzo present ( @ 01:01)    Gram Stain:   Rare polymorphonuclear leukocytes per low power field  Moderate Squamous epithelial cells per low power field  Numerous Yeast per oil power field  Moderate Gram Positive Rods per oil power field  Few Gram Negative Rods per oil power field  Rare Gram positive cocci in pairs per oil power field    Specimen Source: Clean Catch Clean Catch (Midstream) ( @ 08:49)  Culture Results:   >100,000 CFU/ml Klebsiella oxytoca/Raoutella ornithinolytica  Multiple Morphological Strains ( @ 08:49)    -  Tigecycline: S <=2    -  Tobramycin: S <=2    -  Trimethoprim/Sulfamethoxazole: S <=0.5/9.5    -  Amikacin: S <=16    -  Amoxicillin/Clavulanic Acid: S <=8/4    -  Ampicillin: R >16 These ampicillin results predict results for amoxicillin    -  Ampicillin/Sulbactam: S 8/4 Enterobacter, Klebsiella aerogenes, Citrobacter, and Serratia may develop resistance during prolonged therapy (3-4 days)    -  Aztreonam: S <=4    -  Cefazolin: R 16    -  Cefepime: S <=2    -  Cefoxitin: S <=8    -  Ceftriaxone: S <=1 Enterobacter, Klebsiella aerogenes, Citrobacter, and Serratia may develop resistance during prolonged therapy    -  Ciprofloxacin: S <=0.25    -  Gentamicin: S <=2    -  Imipenem: S <=1    -  Ertapenem: S <=0.5    -  Levofloxacin: S <=0.5    -  Meropenem: S <=1    -  Nitrofurantoin: S <=32 Should not be used to treat pyelonephritis    -  Piperacillin/Tazobactam: S <=8    Method Type: NATACHA    Specimen Source: .Blood Blood-Peripheral (02-15 @ 19:01)  Culture Results:   Growth in aerobic and anaerobic bottles: Klebsiella oxytoca/Raoutella  ornithinolytica    -  Klebsiella oxytoca: Detec    Gram Stain:   Growth in aerobic and anaerobic bottles: Gram Negative Rods    -  Meropenem: S <=1    -  Ertapenem: S <=0.5    -  Gentamicin: S <=2    -  Imipenem: S <=1    -  Levofloxacin: S <=0.5    -  Piperacillin/Tazobactam: S <=8    -  Tobramycin: S <=2    -  Trimethoprim/Sulfamethoxazole: S <=0.5/9.5    -  Amikacin: S <=16    -  Ampicillin: R >16 These ampicillin results predict results for amoxicillin    -  Ampicillin/Sulbactam: S 8/4 Enterobacter, Klebsiella aerogenes, Citrobacter, and Serratia may develop resistance during prolonged therapy (3-4 days)    -  Aztreonam: R >16    -  Cefazolin: R 16 Enterobacter, Klebsiella aerogenes, Citrobacter, and Serratia may develop resistance during prolonged therapy (3-4 days)    -  Cefepime: S <=2    -  Cefoxitin: S <=8    -  Ceftriaxone: S <=1 Enterobacter, Klebsiella aerogenes, Citrobacter, and Serratia may develop resistance during prolonged therapy    -  Ciprofloxacin: S <=0.25    Organism Identification: Blood Culture PCR  Klebsiella oxytoca /Raoutella ornithinolytica    Method Type: PCR    Method Type: NATACHA    Specimen Source: .Blood Blood-Peripheral (02-15 @ 19:01)  Culture Results:   Growth in aerobic and anaerobic bottles: Klebsiella oxytoca/Raoutella  ornithinolytica  See previous culture [id number] (02-15 @ 19:01)        Legionella Antigen, Urine: Negative ( @ 09:14)    MRSA/MSSA PCR (22 @ 09:20)    MRSA PCR Result.: NotDetec:    [facility]    Respiratory Viral Panel with COVID-19 by [facility] (02.15.22 @ 14:34)    Rapid RVP Result: NotDetec    SARS-CoV-2: NotDetec:       RADIOLOGY:  < from: CT Abdomen and Pelvis w/ Oral Cont (22 @ 13:36) >  ACC: [id number] EXAM:  CT ABDOMEN AND PELVIS OC                        PROCEDURE DATE:  2022    INTERPRETATION:  CLINICAL INFORMATION: Bacteremia  COMPARISON: 2/15  PROCEDURE:  CT of the Abdomen and Pelvis was performed without intravenous contrast.  Intravenous contrast: None.  Oral contrast: Positive contrast was administered.  Sagittal and coronal reformats were performed.  FINDINGS:  VISUALIZED CHEST: Bibasilar atelectasis and trace effusions.  ABDOMEN AND PELVIS:  LIVER: Within normal limits.  BILE DUCTS: Normal caliber.  GALLBLADDER: Cholelithiasis.  SPLEEN: Within normal limits.  PANCREAS: Within normal limits.  ADRENALS: Within normal limits.  KIDNEYS/URETERS: Within normal limits.  BLADDER: Khalil catheter.  REPRODUCTIVE ORGANS: No pelvic masses.  BOWEL: No bowel obstruction. Appendix is normal. Moderate hiatal hernia.  PERITONEUM: No ascites.  VESSELS: Atherosclerotic changes.  RETROPERITONEUM/LYMPH NODES: No lymphadenopathy.  ABDOMINAL WALL: Within normal limits.  BONES: Within normal limits.  IMPRESSION:  No acute findings. Multiple gallstones.    < from: US Duplex Venous Lower Ext Complete, Bilateral (22 @ 04:56) >  ACC: [id number] EXAM:  US DPLX LWR EXT VEINS COMPL BI                        PROCEDURE DATE:  2022    INTERPRETATION:  CLINICAL INFORMATION: Lower extremity swelling.  COMPARISON: None available.  TECHNIQUE: Duplex sonography of theBILATERAL LOWER extremity veins with   color and spectral Doppler, with and without compression.  IMPRESSION:  RIGHT: Limited calf vein visualization. Nonvisualization of the calf   veins. No evidence of deep venous thrombosis at or above the knee.  LEFT: Limited calf vein visualization. Acute nonocclusive above-the-knee   deep venous thrombosis. Occlusive DVT in the greater saphenous vein.    < from: US Abdomen Upper Quadrant Right (22 @ 12:03) >  ACC: [id number] EXAM:  US ABDOMEN RT UPR QUADRANT                        PROCEDURE DATE:  2022    INTERPRETATION:  CLINICAL INFORMATION: Elevated LFTs.  COMPARISON: CT abdomen/pelvis 2/15/2022.  TECHNIQUE: Sonography of the right upper quadrant.  IMPRESSION: Multiple gallstones. Gallbladder wall thickening. No   pericholecystic fluid. No evidence for intrahepatic or extrahepatic   biliary ductal dilatation.    < from: Xray Chest 1 View-PORTABLE IMMEDIATE (Xray Chest 1 View-PORTABLE IMMEDIATE .) (22 @ 01:26) >  ACC: [id number] EXAM:  XR CHEST PORTABLE IMMED 1V                        PROCEDURE DATE:  2022    IMPRESSION:  Right IJ line HD catheter in satisfactory position with no pneumothorax,   new    < from: CT Abdomen and Pelvis No Cont (02.15.22 @ 23:57) >  ACC: [id number] EXAM:  CT ABDOMEN AND PELVIS                        PROCEDURE DATE:  02/15/2022    INTERPRETATION:  CLINICAL INFORMATION:  Abdominal distension  COMPARISON: None.  CONTRAST/COMPLICATIONS:  IV Contrast: None  Oral Contrast:None  Complications: None  PROCEDURE:  Axial CT images were acquired through the abdomen and pelvis obtained   without intravenous contrast. Coronal and sagittal reformatted images   provided.  FINDINGS: This examination is limited by patient motion artifact and the   lack of oral and intravenous contrast.  LOWER CHEST: Bibasilar atelectasis. Coronary artery calcifications.  LIVER: Within normal limits.  BILE DUCTS: Normal caliber.  GALLBLADDER: Cholelithiasis..  SPLEEN: Within normal limits.  PANCREAS: Within normal limits.  ADRENALS: Within normal limits.  KIDNEYS/URETERS: No definite renal stone or hydronephrosis  BLADDER: Distended with Khalil catheter. No bladder wall thickening.  REPRODUCTIVE ORGANS: Prostate gland mildly enlarged.  BOWEL: Evaluation of bowel is limited without distention with oral   contrast, however there is no bowel obstruction. Few colonic diverticula   without acute diverticulitis. Small bowel loops are not dilated.   Unremarkable appendix without appendicitis. Stomach is underdistended for   adequate evaluation, however suggestion of a moderate hiatal hernia.   Appendix  PERITONEUM: No free air significant ascites.  VESSELS:  Limited without intravenous contrast. There is calcific   atherosclerosis of the abdominal aorta without gross aneurysmal   dilatation. 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  RETROPERITONEUM: No lymphadenopathy.  ABDOMINAL WALL: Small fat-containing bilateral inguinal hernias.  BONES: Degenerative changes of the spine. Bilateral L5 spondylolysis with   slight grade 1 anterolisthesis of L5 on S1.  MPRESSION:  There is no bowel obstruction, significant ascites or free   intraperitoneal air.  Moderate hiatal hernia.  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    < from: Xray Chest 1 View- PORTABLE-Urgent (02.15.22 @ 14:32) >  ACC: [id number] EXAM:  XR CHEST PORTABLE URGENT 1V                        PROCEDURE DATE:  02/15/2022    INTERPRETATION:  AP semierect chest on February 15, 2022 at 1:55 PM.   Patient has sepsis.  Heart enlargement again noted.  There aremild mid lower lung field infiltrates medially new since 2020.  IMPRESSION: Bilateral infiltrates as above.    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 ago, s/p  Bilateral Cataract Surgeries, admitted via the ER to [facility] on 2/15/22 with c/o diarrhea x 2 weeks at home along with decreased po intake and mild nausea with a few episodes of vomiting bilious material.  W/u revealed a markedly elevated WBC's of 37,940.  an elevated Lactate to 3.9, and his BUN / Creat were found to be elevated at 169 / 12.9 and Glucose was elevated as well. Further w/u with CXR and CT of the Abdomen + Pelvis was done and revealed Bilateral Infiltrates, Cholelithiasis, a moderate Hiatal Hernia, but no bowel obstruction was seen.  Patient was admitted to the CCU for further care and he was Rx'ed empirically with Rocephin + Zithromax after Blood and Urine Cx's were obtained and he underwent HD urgently x 1 on 2/15/22 PM.  Patient subsequently found to have Sepsis with Klebsiella Oxytoca ( AKA Raoutella ornithinolytica ) Bacteremia as identified from 2 out of 2 Blood Cx's from 2/15/22  - ? , GI, or Pulmonary source with Bilateral lower lobe PNA seen on initial CXR and Cholelithiasis with GB wall thickening seen on Sono.  Ab rx changed to Cefepime with Zithromax on 22 and patient remains with low - normal temps.  Sensitivities of the Kleb Oxytoca isolated from Blood Cx's noted above and sensitive to Cefepime.  Urine Legionella Ag is negative and now Urine Cx from 22 reported with >100,000 Klebsiella oxytoca ( multiple morphologic strains ) and now with sensitivities completed and noted to be the same as the Blood Cx isolates.  Sputum Cx finalized as normal alonzo.  CT of the Abdomen + Pelvis with oral contrast done 22  resulted with no acute findings, moderate Hiatal Hernia, and multiple Gallstones.  S/p transfusion of 2 Units of PRBC's on 22 for H+H decreased to 6.5 / 19.8 and noted with continued elevated ESR although CRP and Procalcitonin are trending downwards and there is continued slow improvement in renal function.  S/p EGD and BX on 22 and found to have Gastritis, a Duodenal Bulb Ulcer, and a Hiatal Hernia.  Temps remain decreased and  now WBC's are WNL.  Clinically continues to improve.    Suggestions:   Will continue present ab rx with Cefepime and plan to complete 14 days of rx for Sepsis due to Klebsiella  oxytoca Bacteremia and UTI along with Bilateral PNA.  Follow-up temps and labs closely. Patient to be d/c'ed to [facility] for further care.  Discussed with patient again in detail at the bedside

## 2022-03-01 NOTE — PROGRESS NOTE ADULT - SUBJECTIVE AND OBJECTIVE BOX
Patient is a 81y old  Male who presents with a chief complaint of hypotension and renal failure (28 Feb 2022 18:18)      INTERVAL HPI/ OVERNIGHT EVENTS: Pt was seen and examined at bedside today, No significant overnight events, pt continues to feel weak, denies any other complaints at this time, tolerating room air.      MEDICATIONS  (STANDING):  atorvastatin 40 milliGRAM(s) Oral at bedtime  cefepime   IVPB 2000 milliGRAM(s) IV Intermittent every 12 hours  chlorhexidine 2% Cloths 1 Application(s) Topical <User Schedule>  enoxaparin Injectable 110 milliGRAM(s) SubCutaneous every 12 hours  insulin lispro (ADMELOG) corrective regimen sliding scale   SubCutaneous Before meals and at bedtime  latanoprost 0.005% Ophthalmic Solution 1 Drop(s) Both EYES at bedtime  pantoprazole   Suspension 40 milliGRAM(s) Oral two times a day  sucralfate 1 Gram(s) Oral four times a day  tamsulosin 0.4 milliGRAM(s) Oral at bedtime    MEDICATIONS  (PRN):  midodrine. 5 milliGRAM(s) Oral three times a day PRN SBP <100  ondansetron Injectable 4 milliGRAM(s) IV Push every 4 hours PRN Nausea and/or Vomiting  sodium chloride 0.9% lock flush 10 milliLiter(s) IV Push every 1 hour PRN Pre/post blood products, medications, blood draw, and to maintain line patency      Allergies    No Known Allergies    Intolerances        REVIEW OF SYSTEMS:    Unable to examine due to [ ] Encephalopathy [ ] Advanced Dementia [ ] Expressive Aphasia [ ] Non-verbal patient    CONSTITUTIONAL: No fever, positive generalized weakness/Fatigue, No weight loss  EYES: No eye pain, visual disturbances, or discharge  ENMT:  No difficulty hearing, tinnitus, vertigo; No sinus or throat pain  NECK: No pain or stiffness  RESPIRATORY: No shortness of breath,  cough, wheezing, sputum or hemoptysis   CARDIOVASCULAR: No chest pain, palpitations, or leg swelling  GASTROINTESTINAL: No abdominal pain. No nausea, vomiting, diarrhea or constipation. No melena or hematochezia.  GENITOURINARY: No dysuria, frequency, hematuria, or incontinence  NEUROLOGICAL: No headaches, Dizziness, memory loss, loss of strength, numbness, or tremors  SKIN: No itching, burning, rashes, or lesions   MUSCULOSKELETAL: No joint pain or swelling; No muscle, back, or extremity pain  PSYCHIATRIC: No depression, anxiety, mood swings, or difficulty sleeping  HEME/LYMPH: No easy bruising, or bleeding gums      Vital Signs Last 24 Hrs  T(C): 36.5 (01 Mar 2022 04:55), Max: 36.7 (28 Feb 2022 17:14)  T(F): 97.7 (01 Mar 2022 04:55), Max: 98 (28 Feb 2022 17:14)  HR: 84 (01 Mar 2022 04:55) (72 - 84)  BP: 119/71 (01 Mar 2022 04:55) (105/70 - 124/74)  BP(mean): --  RR: 16 (01 Mar 2022 04:55) (16 - 18)  SpO2: 92% (01 Mar 2022 04:55) (92% - 95%)    PHYSICAL EXAM:  GENERAL: NAD on RA, obese   HEAD:  Atraumatic, Normocephalic  EYES: conjunctiva and sclera clear  ENMT: Moist mucous membranes  NECK: Supple, No JVD, Normal thyroid  CHEST/LUNG: Clear to Auscultation bilaterally; No rales, rhonchi, wheezing, or rubs  HEART: Regular rate and rhythm; No murmurs, rubs, or gallops  ABDOMEN: Soft, Nontender, Nondistended; Bowel sounds present  EXTREMITIES:  2+ Peripheral Pulses, No clubbing, cyanosis, or edema  SKIN: No rashes or lesions  NERVOUS SYSTEM:  Alert & Oriented X3, Good concentration; Motor Strength 5/5 B/L upper and lower extremities    LABS:                        8.1    7.79  )-----------( 239      ( 01 Mar 2022 07:19 )             25.3     03-01    138  |  108  |  23  ----------------------------<  129<H>  3.9   |  24  |  1.13    Ca    8.3<L>      01 Mar 2022 07:19    TPro  6.0  /  Alb  1.5<L>  /  TBili  0.5  /  DBili  0.1  /  AST  27  /  ALT  51  /  AlkPhos  104  02-28        CAPILLARY BLOOD GLUCOSE      POCT Blood Glucose.: 171 mg/dL (01 Mar 2022 11:33)  POCT Blood Glucose.: 117 mg/dL (01 Mar 2022 08:10)  POCT Blood Glucose.: 153 mg/dL (28 Feb 2022 21:43)  POCT Blood Glucose.: 155 mg/dL (28 Feb 2022 16:21)          RADIOLOGY & ADDITIONAL TESTS:          Imaging Personally Reviewed:  [ ] YES  [ ] NO    Consultant(s) Notes Reviewed:  [ ] YES  [ ] NO    Care Discussed with Consultants/Other Providers [x ] YES  [ ] NO

## 2022-03-04 DIAGNOSIS — J15.6 PNEUMONIA DUE TO OTHER GRAM-NEGATIVE BACTERIA: ICD-10-CM

## 2022-03-04 DIAGNOSIS — I48.91 UNSPECIFIED ATRIAL FIBRILLATION: ICD-10-CM

## 2022-03-04 DIAGNOSIS — K26.4 CHRONIC OR UNSPECIFIED DUODENAL ULCER WITH HEMORRHAGE: ICD-10-CM

## 2022-03-04 DIAGNOSIS — R65.21 SEVERE SEPSIS WITH SEPTIC SHOCK: ICD-10-CM

## 2022-03-04 DIAGNOSIS — K29.70 GASTRITIS, UNSPECIFIED, WITHOUT BLEEDING: ICD-10-CM

## 2022-03-04 DIAGNOSIS — I10 ESSENTIAL (PRIMARY) HYPERTENSION: ICD-10-CM

## 2022-03-04 DIAGNOSIS — A41.9 SEPSIS, UNSPECIFIED ORGANISM: ICD-10-CM

## 2022-03-04 DIAGNOSIS — K80.20 CALCULUS OF GALLBLADDER WITHOUT CHOLECYSTITIS WITHOUT OBSTRUCTION: ICD-10-CM

## 2022-03-04 DIAGNOSIS — N40.1 BENIGN PROSTATIC HYPERPLASIA WITH LOWER URINARY TRACT SYMPTOMS: ICD-10-CM

## 2022-03-04 DIAGNOSIS — D69.6 THROMBOCYTOPENIA, UNSPECIFIED: ICD-10-CM

## 2022-03-04 DIAGNOSIS — I82.402 ACUTE EMBOLISM AND THROMBOSIS OF UNSPECIFIED DEEP VEINS OF LEFT LOWER EXTREMITY: ICD-10-CM

## 2022-03-04 DIAGNOSIS — E87.5 HYPERKALEMIA: ICD-10-CM

## 2022-03-04 DIAGNOSIS — K44.9 DIAPHRAGMATIC HERNIA WITHOUT OBSTRUCTION OR GANGRENE: ICD-10-CM

## 2022-03-04 DIAGNOSIS — Z85.820 PERSONAL HISTORY OF MALIGNANT MELANOMA OF SKIN: ICD-10-CM

## 2022-03-04 DIAGNOSIS — D62 ACUTE POSTHEMORRHAGIC ANEMIA: ICD-10-CM

## 2022-03-04 DIAGNOSIS — N17.0 ACUTE KIDNEY FAILURE WITH TUBULAR NECROSIS: ICD-10-CM

## 2022-03-04 DIAGNOSIS — J96.01 ACUTE RESPIRATORY FAILURE WITH HYPOXIA: ICD-10-CM

## 2022-03-04 DIAGNOSIS — E78.5 HYPERLIPIDEMIA, UNSPECIFIED: ICD-10-CM

## 2022-03-04 DIAGNOSIS — E87.2 ACIDOSIS: ICD-10-CM

## 2022-03-04 DIAGNOSIS — E11.9 TYPE 2 DIABETES MELLITUS WITHOUT COMPLICATIONS: ICD-10-CM

## 2022-03-04 DIAGNOSIS — N39.0 URINARY TRACT INFECTION, SITE NOT SPECIFIED: ICD-10-CM

## 2022-03-04 DIAGNOSIS — B96.1 KLEBSIELLA PNEUMONIAE [K. PNEUMONIAE] AS THE CAUSE OF DISEASES CLASSIFIED ELSEWHERE: ICD-10-CM

## 2022-03-04 DIAGNOSIS — R33.8 OTHER RETENTION OF URINE: ICD-10-CM

## 2022-03-18 ENCOUNTER — INPATIENT (INPATIENT)
Facility: HOSPITAL | Age: 82
LOS: 4 days | Discharge: NURSING, REHAB OR LONG-TERM CARE | End: 2022-03-23
Attending: STUDENT IN AN ORGANIZED HEALTH CARE EDUCATION/TRAINING PROGRAM | Admitting: STUDENT IN AN ORGANIZED HEALTH CARE EDUCATION/TRAINING PROGRAM
Payer: MEDICARE

## 2022-03-18 VITALS
TEMPERATURE: 98 F | SYSTOLIC BLOOD PRESSURE: 96 MMHG | WEIGHT: 203.93 LBS | HEART RATE: 76 BPM | OXYGEN SATURATION: 97 % | HEIGHT: 68 IN | RESPIRATION RATE: 18 BRPM | DIASTOLIC BLOOD PRESSURE: 67 MMHG

## 2022-03-18 DIAGNOSIS — Z98.890 OTHER SPECIFIED POSTPROCEDURAL STATES: Chronic | ICD-10-CM

## 2022-03-18 DIAGNOSIS — Z98.49 CATARACT EXTRACTION STATUS, UNSPECIFIED EYE: Chronic | ICD-10-CM

## 2022-03-18 DIAGNOSIS — Z96.651 PRESENCE OF RIGHT ARTIFICIAL KNEE JOINT: Chronic | ICD-10-CM

## 2022-03-18 DIAGNOSIS — Z96.652 PRESENCE OF LEFT ARTIFICIAL KNEE JOINT: Chronic | ICD-10-CM

## 2022-03-18 LAB
ALBUMIN SERPL ELPH-MCNC: 2.2 G/DL — LOW (ref 3.3–5)
ALP SERPL-CCNC: 85 U/L — SIGNIFICANT CHANGE UP (ref 40–120)
ALT FLD-CCNC: 28 U/L — SIGNIFICANT CHANGE UP (ref 12–78)
ANION GAP SERPL CALC-SCNC: 8 MMOL/L — SIGNIFICANT CHANGE UP (ref 5–17)
APTT BLD: 36.8 SEC — HIGH (ref 27.5–35.5)
AST SERPL-CCNC: 20 U/L — SIGNIFICANT CHANGE UP (ref 15–37)
BASOPHILS # BLD AUTO: 0.04 K/UL — SIGNIFICANT CHANGE UP (ref 0–0.2)
BASOPHILS NFR BLD AUTO: 0.3 % — SIGNIFICANT CHANGE UP (ref 0–2)
BILIRUB SERPL-MCNC: 0.6 MG/DL — SIGNIFICANT CHANGE UP (ref 0.2–1.2)
BLD GP AB SCN SERPL QL: SIGNIFICANT CHANGE UP
BUN SERPL-MCNC: 37 MG/DL — HIGH (ref 7–23)
CALCIUM SERPL-MCNC: 8.1 MG/DL — LOW (ref 8.5–10.1)
CHLORIDE SERPL-SCNC: 107 MMOL/L — SIGNIFICANT CHANGE UP (ref 96–108)
CO2 SERPL-SCNC: 21 MMOL/L — LOW (ref 22–31)
CREAT SERPL-MCNC: 2.02 MG/DL — HIGH (ref 0.5–1.3)
EGFR: 32 ML/MIN/1.73M2 — LOW
EOSINOPHIL # BLD AUTO: 0.09 K/UL — SIGNIFICANT CHANGE UP (ref 0–0.5)
EOSINOPHIL NFR BLD AUTO: 0.6 % — SIGNIFICANT CHANGE UP (ref 0–6)
FLUAV AG NPH QL: SIGNIFICANT CHANGE UP
FLUBV AG NPH QL: SIGNIFICANT CHANGE UP
GLUCOSE SERPL-MCNC: 155 MG/DL — HIGH (ref 70–99)
HCT VFR BLD CALC: 23.8 % — LOW (ref 39–50)
HGB BLD-MCNC: 7.7 G/DL — LOW (ref 13–17)
IMM GRANULOCYTES NFR BLD AUTO: 1.4 % — SIGNIFICANT CHANGE UP (ref 0–1.5)
INR BLD: 1.61 RATIO — HIGH (ref 0.88–1.16)
LYMPHOCYTES # BLD AUTO: 1.23 K/UL — SIGNIFICANT CHANGE UP (ref 1–3.3)
LYMPHOCYTES # BLD AUTO: 8.1 % — LOW (ref 13–44)
MCHC RBC-ENTMCNC: 29.1 PG — SIGNIFICANT CHANGE UP (ref 27–34)
MCHC RBC-ENTMCNC: 32.4 G/DL — SIGNIFICANT CHANGE UP (ref 32–36)
MCV RBC AUTO: 89.8 FL — SIGNIFICANT CHANGE UP (ref 80–100)
MONOCYTES # BLD AUTO: 1.16 K/UL — HIGH (ref 0–0.9)
MONOCYTES NFR BLD AUTO: 7.6 % — SIGNIFICANT CHANGE UP (ref 2–14)
NEUTROPHILS # BLD AUTO: 12.45 K/UL — HIGH (ref 1.8–7.4)
NEUTROPHILS NFR BLD AUTO: 82 % — HIGH (ref 43–77)
NRBC # BLD: 0 /100 WBCS — SIGNIFICANT CHANGE UP (ref 0–0)
OB PNL STL: POSITIVE
PLATELET # BLD AUTO: 350 K/UL — SIGNIFICANT CHANGE UP (ref 150–400)
POTASSIUM SERPL-MCNC: 4.4 MMOL/L — SIGNIFICANT CHANGE UP (ref 3.5–5.3)
POTASSIUM SERPL-SCNC: 4.4 MMOL/L — SIGNIFICANT CHANGE UP (ref 3.5–5.3)
PROT SERPL-MCNC: 6.9 GM/DL — SIGNIFICANT CHANGE UP (ref 6–8.3)
PROTHROM AB SERPL-ACNC: 19.2 SEC — HIGH (ref 10.5–13.4)
RBC # BLD: 2.65 M/UL — LOW (ref 4.2–5.8)
RBC # FLD: 16.9 % — HIGH (ref 10.3–14.5)
SARS-COV-2 RNA SPEC QL NAA+PROBE: SIGNIFICANT CHANGE UP
SODIUM SERPL-SCNC: 136 MMOL/L — SIGNIFICANT CHANGE UP (ref 135–145)
WBC # BLD: 15.18 K/UL — HIGH (ref 3.8–10.5)
WBC # FLD AUTO: 15.18 K/UL — HIGH (ref 3.8–10.5)

## 2022-03-18 PROCEDURE — 99222 1ST HOSP IP/OBS MODERATE 55: CPT

## 2022-03-18 PROCEDURE — 99285 EMERGENCY DEPT VISIT HI MDM: CPT

## 2022-03-18 PROCEDURE — 93010 ELECTROCARDIOGRAM REPORT: CPT

## 2022-03-18 PROCEDURE — 71045 X-RAY EXAM CHEST 1 VIEW: CPT | Mod: 26

## 2022-03-18 PROCEDURE — 74176 CT ABD & PELVIS W/O CONTRAST: CPT | Mod: 26,MA

## 2022-03-18 RX ORDER — ACETAMINOPHEN 500 MG
650 TABLET ORAL ONCE
Refills: 0 | Status: COMPLETED | OUTPATIENT
Start: 2022-03-18 | End: 2022-03-18

## 2022-03-18 RX ORDER — PANTOPRAZOLE SODIUM 20 MG/1
40 TABLET, DELAYED RELEASE ORAL EVERY 12 HOURS
Refills: 0 | Status: DISCONTINUED | OUTPATIENT
Start: 2022-03-18 | End: 2022-03-22

## 2022-03-18 RX ORDER — MIDODRINE HYDROCHLORIDE 2.5 MG/1
5 TABLET ORAL THREE TIMES A DAY
Refills: 0 | Status: DISCONTINUED | OUTPATIENT
Start: 2022-03-18 | End: 2022-03-23

## 2022-03-18 RX ORDER — SODIUM CHLORIDE 9 MG/ML
1000 INJECTION INTRAMUSCULAR; INTRAVENOUS; SUBCUTANEOUS ONCE
Refills: 0 | Status: COMPLETED | OUTPATIENT
Start: 2022-03-18 | End: 2022-03-18

## 2022-03-18 RX ORDER — PANTOPRAZOLE SODIUM 20 MG/1
8 TABLET, DELAYED RELEASE ORAL
Qty: 80 | Refills: 0 | Status: DISCONTINUED | OUTPATIENT
Start: 2022-03-18 | End: 2022-03-18

## 2022-03-18 RX ORDER — PANTOPRAZOLE SODIUM 20 MG/1
40 TABLET, DELAYED RELEASE ORAL ONCE
Refills: 0 | Status: COMPLETED | OUTPATIENT
Start: 2022-03-18 | End: 2022-03-18

## 2022-03-18 RX ORDER — TAMSULOSIN HYDROCHLORIDE 0.4 MG/1
0.4 CAPSULE ORAL AT BEDTIME
Refills: 0 | Status: DISCONTINUED | OUTPATIENT
Start: 2022-03-18 | End: 2022-03-23

## 2022-03-18 RX ORDER — SODIUM CHLORIDE 9 MG/ML
1500 INJECTION INTRAMUSCULAR; INTRAVENOUS; SUBCUTANEOUS ONCE
Refills: 0 | Status: COMPLETED | OUTPATIENT
Start: 2022-03-18 | End: 2022-03-18

## 2022-03-18 RX ORDER — LATANOPROST 0.05 MG/ML
1 SOLUTION/ DROPS OPHTHALMIC; TOPICAL AT BEDTIME
Refills: 0 | Status: DISCONTINUED | OUTPATIENT
Start: 2022-03-18 | End: 2022-03-23

## 2022-03-18 RX ORDER — ATORVASTATIN CALCIUM 80 MG/1
40 TABLET, FILM COATED ORAL AT BEDTIME
Refills: 0 | Status: DISCONTINUED | OUTPATIENT
Start: 2022-03-18 | End: 2022-03-23

## 2022-03-18 RX ORDER — SUCRALFATE 1 G
1 TABLET ORAL
Refills: 0 | Status: DISCONTINUED | OUTPATIENT
Start: 2022-03-18 | End: 2022-03-23

## 2022-03-18 RX ORDER — SODIUM CHLORIDE 9 MG/ML
1000 INJECTION, SOLUTION INTRAVENOUS
Refills: 0 | Status: DISCONTINUED | OUTPATIENT
Start: 2022-03-18 | End: 2022-03-19

## 2022-03-18 RX ORDER — PANTOPRAZOLE SODIUM 20 MG/1
40 TABLET, DELAYED RELEASE ORAL DAILY
Refills: 0 | Status: DISCONTINUED | OUTPATIENT
Start: 2022-03-18 | End: 2022-03-18

## 2022-03-18 RX ORDER — ALPRAZOLAM 0.25 MG
1 TABLET ORAL AT BEDTIME
Refills: 0 | Status: DISCONTINUED | OUTPATIENT
Start: 2022-03-18 | End: 2022-03-23

## 2022-03-18 RX ADMIN — PANTOPRAZOLE SODIUM 40 MILLIGRAM(S): 20 TABLET, DELAYED RELEASE ORAL at 15:28

## 2022-03-18 RX ADMIN — PANTOPRAZOLE SODIUM 10 MG/HR: 20 TABLET, DELAYED RELEASE ORAL at 18:24

## 2022-03-18 RX ADMIN — SODIUM CHLORIDE 1000 MILLILITER(S): 9 INJECTION INTRAMUSCULAR; INTRAVENOUS; SUBCUTANEOUS at 17:11

## 2022-03-18 RX ADMIN — ATORVASTATIN CALCIUM 40 MILLIGRAM(S): 80 TABLET, FILM COATED ORAL at 22:17

## 2022-03-18 RX ADMIN — SODIUM CHLORIDE 1000 MILLILITER(S): 9 INJECTION INTRAMUSCULAR; INTRAVENOUS; SUBCUTANEOUS at 18:11

## 2022-03-18 RX ADMIN — SODIUM CHLORIDE 1500 MILLILITER(S): 9 INJECTION INTRAMUSCULAR; INTRAVENOUS; SUBCUTANEOUS at 15:28

## 2022-03-18 RX ADMIN — SODIUM CHLORIDE 1500 MILLILITER(S): 9 INJECTION INTRAMUSCULAR; INTRAVENOUS; SUBCUTANEOUS at 16:49

## 2022-03-18 RX ADMIN — Medication 1 MILLIGRAM(S): at 22:17

## 2022-03-18 RX ADMIN — Medication 650 MILLIGRAM(S): at 19:10

## 2022-03-18 RX ADMIN — Medication 1 GRAM(S): at 23:03

## 2022-03-18 RX ADMIN — LATANOPROST 1 DROP(S): 0.05 SOLUTION/ DROPS OPHTHALMIC; TOPICAL at 22:18

## 2022-03-18 RX ADMIN — TAMSULOSIN HYDROCHLORIDE 0.4 MILLIGRAM(S): 0.4 CAPSULE ORAL at 22:17

## 2022-03-18 NOTE — ED ADULT NURSE NOTE - OBJECTIVE STATEMENT
AAOx3, resps even and unlabored, skin warm and dry. Pt sent over from [facility] to be monitored, per pt he received blood transfusions last night r/t retroperitoneal bleed. Eliquis being held at this time. Reports intermittent buttocks pain r/t pulled hamstring, denies pain at this time. Pt denies SOB, CP, N/V, fatigue, denies blood in stool.

## 2022-03-18 NOTE — ED PROVIDER NOTE - OBJECTIVE STATEMENT
81 years old male from [facility] rehab for low H/H and to rule out retroperitoneal hematoma. Pt was one Eliquis for dvt. and now it is no hold and pt had prbc x 2 u yesterday. Pt is alert and oriented x 3 denies headache, dizziness, blurred visions, light sensitivities, focal/distal weakness or numbness, neck/back/hips/calfs pain, cough, sob, chest pain, nausea, vomiting, fever, chills, abd pain, dysuria or irregular bowel movements. 81 years old male from [facility] rehab for low H/H and to rule out retroperitoneal hematoma. Pt was one Eliquis for dvt. and now it is no hold and pt had prbc x 2 u yesterday. Pt is alert and oriented x 3 denies headache, dizziness, blurred visions, light sensitivities, focal/distal weakness or numbness, neck/back/hips/calfs pain, cough, sob, chest pain, nausea, vomiting, fever, chills, abd pain, dysuria or irregular bowel movements. Pt sts he did not fall.

## 2022-03-18 NOTE — H&P ADULT - ASSESSMENT
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 ago,   LIJVS on 2/15/22 with c/o diarrhea x 2 weeks ,  WBC's of 37,940.   Lactate to 3.9, and his BUN / Creat were found to be elevated at 169 / 12.9    ct with   pna/  Cholelithiasis, a moderate Hiatal Hernia, but no bowel obstruction was seen.    Patient was admitted to the CCU m  he underwent HD urgently x 1 on 2/15/22 ,  rec'd 5 Liters of NS.    s/p  Sepsis with Klebsiella Oxytoca ( AKA Raoutella ornithinolytica ) Bacteremia as identified from 2 out of 2 Blood Cx's from 2/15/22   dvt  left femoral V   on  2/17/22     now  admitted with anemia,  was   sent from [facility]/ was  on eliis  for  recent dvt   denies  any  falls/  trauma   denies any rectal  bleed/ cp/sob/abd  pain       *  anemia, clark is 7   anemia,  is  from bleed  into  left  gluteal area    prbc  given.  follow  serial hb  wbc  of  15,000 appears  reactive,  to acute bleed/  pt is afebrile and  non toxic appearing   [facility]  [facility]  a/p,  12 by   6 by  14   cm,  acute left  gluteal hematoma,  extends  to left thidh/ gallstones. inflamed  GB  neck, . similar to prior  HIDA  ordered  *  c/c  low  sbp, on midodrine   on iv fluids   * recent dvt  leg on 2/17/22    a/c  on hold   will  rpt  doppler  legs/  vascular  eval  in am   for ?  ivc filter  *   recent  anthony,  known to maryann colvin, , crt is  2  now    dvt ppx/  PAS  low  threshold to call  icu, it pt deteriorates     rad< from: CT Abdomen and Pelvis No Cont (03.18.22 @ 16:41) >  MPRESSION:  Limited noncontrast study.  Acute left gluteal hematoma extending caudally to the proximal left femur   level measures 12.2 x 6.4 x 14.9 cm. Soft tissue edema extends along the   left flank and proximal left thigh. Findings were discussed with Dr. Ruiz Bui on 3/18/2022 at 5:00PM.    Cholelithiasis with distended and slightly inflamed gallbladder neck,   similar to prior scans. Correlate with LFTs and right upper quadrant   ultrasound as warranted.  --- End of Report --  < end of copied text >       < from: US Duplex Venous Lower Ext Complete, Bilateral (02.17.22 @ 04:56) >  MPRESSION:  RIGHT: Limited calf vein visualization. Nonvisualization of the calf   veins. No evidence of deep venous thrombosis at or above the knee.  LEFT: Limited calf vein visualization. Acute nonocclusive above-the-knee   deep venous thrombosis. Occlusive DVT in the greater saphenous vein.  Findings were discussed with NP Au at 4:25 AM on 2/17/2022 with read back   verification  --- End of Report ---  < end of copied text >                 Pt was one Eliquis for dvt. and now it is no hold and pt had prbc x 2 u yesterday.    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 ago,   LIJVS on 2/15/22 with c/o diarrhea x 2 weeks ,  WBC's of 37,940.   Lactate to 3.9, and his BUN / Creat were found to be elevated at 169 / 12.9    ct with   pna/  Cholelithiasis, a moderate Hiatal Hernia, but no bowel obstruction was seen.    Patient was admitted to the CCU m  he underwent HD urgently x 1 on 2/15/22 ,  rec'd 5 Liters of NS.    s/p  Sepsis with Klebsiella Oxytoca ( AKA Raoutella ornithinolytica ) Bacteremia as identified from 2 out of 2 Blood Cx's from 2/15/22   dvt  left femoral V   on  2/17/22,  was    d/c  on  lovenox  bid       now  [facility] with anemia,  [facility]  denies  any  falls/  trauma / denies any rectal  bleed/ cp/sob/abd  pain       *  anemia, clark is 7   anemia,  is  from bleed  into  left  gluteal area/  also pt is guaic  +/  denies  melena/ brbpr/  s/p egd last month by maryann pennington. with DU    prbc  given.  follow  serial hb  wbc  of  15,000 appears  reactive,  to acute bleed/  pt is afebrile and  non toxic appearing  surg called  by er/  maryann r [facility]  CT  a/p,  12 by   6 by  14   cm,  acute left  gluteal hematoma,  extends  to left thidh/ gallstones. inflamed  GB  neck, . similar to prior  HIDA  ordered  *  c/c  low  sbp, on midodrine   on iv fluids   * recent dvt  leg on 2/17/22    a/c  on hold   will  rpt  doppler  legs/  vascular  eval  in am   for ?  ivc filter  *   recent  anthony,  known to dr colvin, , crt is  2  now    Ct angio a/p ordered  dvt ppx/  PAS  low  threshold to call  icu, it pt deteriorates     rad< from: CT Abdomen and Pelvis No Cont (03.18.22 @ 16:41) >  MPRESSION:  Limited noncontrast study.  Acute left gluteal hematoma extending caudally to the proximal left femur   level measures 12.2 x 6.4 x 14.9 cm. Soft tissue edema extends along the   left flank and proximal left thigh. Findings were discussed with Dr. Ruiz Bui on 3/18/2022 at 5:00PM.    Cholelithiasis with distended and slightly inflamed gallbladder neck,   similar to prior scans. Correlate with LFTs and right upper quadrant   ultrasound as warranted.  --- End of Report --  < end of copied text >       < from: US Duplex Venous Lower Ext Complete, Bilateral (02.17.22 @ 04:56) >  MPRESSION:  RIGHT: Limited calf vein visualization. Nonvisualization of the calf   veins. No evidence of deep venous thrombosis at or above the knee.  LEFT: Limited calf vein visualization. Acute nonocclusive above-the-knee   deep venous thrombosis. Occlusive DVT in the greater saphenous vein.  Findings were discussed with NP Au at 4:25 AM on 2/17/2022 with read back   verification  --- End of Report ---  < end of copied text >                 Pt was one Eliquis for dvt. and now it is no hold and pt had prbc x 2 u yesterday.    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 ago,   LIJVS on 2/15/22 with c/o diarrhea x 2 weeks ,  WBC's of 37,940.   Lactate to 3.9, and his BUN / Creat were found to be elevated at 169 / 12.9    ct with   pna/  Cholelithiasis, a moderate Hiatal Hernia, but no bowel obstruction was seen.    Patient was admitted to the CCU m  he underwent HD urgently x 1 on 2/15/22 ,  rec'd 5 Liters of NS.    s/p  Sepsis with Klebsiella Oxytoca ( AKA Raoutella ornithinolytica ) Bacteremia as identified from 2 out of 2 Blood Cx's from 2/15/22   dvt  left femoral V   on  2/17/22,  was    d/c  on  lovenox  bid       now  [facility] with anemia,  [facility]  denies  any  falls/  trauma / denies any rectal  bleed/ cp/sob/abd  pain       *  anemia, clark is 7   anemia,  is  from bleed  into  left  gluteal area/  also pt is guaic  +/  denies  melena/ brbpr/  s/p egd last month by maryann pennington. with DU    prbc  given.  follow  serial hb  wbc  of  15,000 appears  reactive,  to acute bleed/  pt is afebrile and  non toxic appearing  surg called  by er/  maryann larsen  CT  a/p,  12 by   6 by  14   cm,  acute left  gluteal hematoma,  extends  to left thidh/ gallstones. inflamed  GB  neck, . similar to prior  HIDA  ordered  *  c/c  low  sbp, on midodrine   on iv fluids   * recent dvt  leg on 2/17/22    a/c  on hold   will  rpt  doppler  legs/  vascular  eval  in am   for ?  ivc filter  *   recent  anthony,  known to dr colvin, , crt is  2  now  ezra barraza  called  by er  surg  dr larsen called  bye r/  surg PA  paged/  awiating call back    Ct angio a/p ordered  dvt ppx/  PAS  low  threshold to call  icu, it pt deteriorates     rad< from: CT Abdomen and Pelvis No Cont (03.18.22 @ 16:41) >  MPRESSION:  Limited noncontrast study.  Acute left gluteal hematoma extending caudally to the proximal left femur   level measures 12.2 x 6.4 x 14.9 cm. Soft tissue edema extends along the   left flank and proximal left thigh. Findings were discussed with Dr. Ruiz Bui on 3/18/2022 at 5:00PM.    Cholelithiasis with distended and slightly inflamed gallbladder neck,   similar to prior scans. Correlate with LFTs and right upper quadrant   ultrasound as warranted.  --- End of Report --  < end of copied text >       < from: US Duplex Venous Lower Ext Complete, Bilateral (02.17.22 @ 04:56) >  MPRESSION:  RIGHT: Limited calf vein visualization. Nonvisualization of the calf   veins. No evidence of deep venous thrombosis at or above the knee.  LEFT: Limited calf vein visualization. Acute nonocclusive above-the-knee   deep venous thrombosis. Occlusive DVT in the greater saphenous vein.  Findings were discussed with NP Au at 4:25 AM on 2/17/2022 with read back   verification  --- End of Report ---  < end of copied text >                 Pt was one Eliquis for dvt. and now it is no hold and pt had prbc x 2 u yesterday.    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 ago,   LIJVS on 2/15/22 with c/o diarrhea x 2 weeks ,  WBC's of 37,940.   Lactate to 3.9, and his BUN / Creat were found to be elevated at 169 / 12.9    ct with   pna/  Cholelithiasis, a moderate Hiatal Hernia, but no bowel obstruction was seen.    Patient was admitted to the CCU m  he underwent HD urgently x 1 on 2/15/22 ,  rec'd 5 Liters of NS.    s/p  Sepsis with Klebsiella Oxytoca ( AKA Raoutella ornithinolytica ) Bacteremia as identified from 2 out of 2 Blood Cx's from 2/15/22   dvt  left femoral V   on  2/17/22,  was    d/c  on  lovenox  bid       now  [facility] with anemia,  [facility]  denies  any  falls/  trauma / denies any rectal  bleed/ cp/sob/abd  pain       *  anemia, clark is 7   anemia,  is  from bleed  into  left  gluteal area/  also pt is guaic  +/  denies  melena/ brbpr/  s/p egd last month by maryann pennington. with DU    prbc  given.  follow  serial hb  wbc  of  15,000 appears  reactive,  to acute bleed/  pt is afebrile and  non toxic appearing  surg called  by er/  maryann larsen  CT  a/p,  12 by   6 by  14   cm,  acute left  gluteal hematoma,  extends  to left thidh/ gallstones. inflamed  GB  neck, . similar to prior  HIDA  ordered  *  c/c  low  sbp, on midodrine   on iv fluids   * recent dvt  leg on 2/17/22    a/c  on hold   will  rpt  doppler  legs/  vascular  eval  in am   for ?  ivc filter  *   recent  anthony,  known to dr colvin, , crt is  2  now  ezra barraza  called  by er  surg  dr larsen called  bye r/  surg PA  paged/,  stated dr kay will  see  pt    Ct angio a/p ordered  dvt ppx/  PAS  low  threshold to call  icu, it pt deteriorates     rad< from: CT Abdomen and Pelvis No Cont (03.18.22 @ 16:41) >  MPRESSION:  Limited noncontrast study.  Acute left gluteal hematoma extending caudally to the proximal left femur   level measures 12.2 x 6.4 x 14.9 cm. Soft tissue edema extends along the   left flank and proximal left thigh. Findings were discussed with Dr. Ruiz Bui on 3/18/2022 at 5:00PM.    Cholelithiasis with distended and slightly inflamed gallbladder neck,   similar to prior scans. Correlate with LFTs and right upper quadrant   ultrasound as warranted.  --- End of Report --  < end of copied text >       < from: US Duplex Venous Lower Ext Complete, Bilateral (02.17.22 @ 04:56) >  MPRESSION:  RIGHT: Limited calf vein visualization. Nonvisualization of the calf   veins. No evidence of deep venous thrombosis at or above the knee.  LEFT: Limited calf vein visualization. Acute nonocclusive above-the-knee   deep venous thrombosis. Occlusive DVT in the greater saphenous vein.  Findings were discussed with NP Au at 4:25 AM on 2/17/2022 with read back   verification  --- End of Report ---  < end of copied text >                 Pt was one Eliquis for dvt. and now it is no hold and pt had prbc x 2 u yesterday.

## 2022-03-18 NOTE — ED ADULT TRIAGE NOTE - CHIEF COMPLAINT QUOTE
pt sent from [facility] r/o retroperitoneal bleed eliquis on hold pending eval s/p prbc tx x 2 units yesterday denies any pain at present states had l buttocks pain earlier which improved after therapy

## 2022-03-18 NOTE — CONSULT NOTE ADULT - SUBJECTIVE AND OBJECTIVE BOX
Patient is a 81y old  Male who presents with a chief complaint of anemia (18 Mar 2022 18:35) recently discharged to rehab after episode of sepsis and renal failure. Presents from rehab due to anemia and pain in LLE. CT shows large left gluteal hematoma. Patient denies trauma. Endorses pain to left leg. CT also shows stable cholelithiasis- patient denies abdominal pain at this time. Patient also guiac positive. Had been on eliquis for DVT. Patient states he had diarrhea but denies bloody stools, denies cp or sob. Had received 2units of PRBC in [place] yesterday and anemia persisted- sent to hospital for further monitoring.       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 ago,   LIJVS on 2/15/22 with c/o diarrhea x 2 weeks ,  WBC's of 37,940.   Lactate to 3.9, and his BUN / Creat were found to be elevated at 169 / 12.9 ct with pna/  Cholelithiasis, a moderate Hiatal Hernia, but no bowel obstruction was seen. Patient was admitted to the CCU m  he underwent HD urgently x 1 on 2/15/22s/p  Sepsis with Klebsiella Oxytoca ( AKA Raoutella ornithinolytica ) Bacteremia as identified from 2 out of 2 Blood Cx's from 2/15/22 dvt left femoral Vein on  2/17/22        PAST MEDICAL & SURGICAL HISTORY:  Melanoma  head    HTN (hypertension)    HLD (hyperlipidemia)    Diabetes mellitus    Bladder tumor    History of cataract surgery  left eye    S/P TKR (total knee replacement), left  2015    H/O total knee replacement, right  2015    History of melanoma excision    FAMILY HISTORY: non contributory         Social Hx:  Nonsmoker, no drug or alcohol use    MEDICATIONS  (STANDING):  ALPRAZolam 1 milliGRAM(s) Oral at bedtime  atorvastatin 40 milliGRAM(s) Oral at bedtime  lactated ringers. 1000 milliLiter(s) (80 mL/Hr) IV Continuous <Continuous>  latanoprost 0.005% Ophthalmic Solution 1 Drop(s) Both EYES at bedtime  pantoprazole  Injectable 40 milliGRAM(s) IV Push daily  tamsulosin 0.4 milliGRAM(s) Oral at bedtime       Allergies    No Known Allergies    ROS: Pertinent positives in HPI, all other ROS were reviewed and are negative.      Vital Signs Last 24 Hrs  T(C): 37 (18 Mar 2022 20:35), Max: 37 (18 Mar 2022 20:35)  T(F): 98.6 (18 Mar 2022 20:35), Max: 98.6 (18 Mar 2022 20:35)  HR: 76 (18 Mar 2022 20:35) (76 - 98)  BP: 121/68 (18 Mar 2022 20:35) (96/67 - 121/68)  BP(mean): --  RR: 16 (18 Mar 2022 20:35) (16 - 18)  SpO2: 97% (18 Mar 2022 20:35) (96% - 98%)        Constitutional: awake and alert.  HEENT: PERRLA, EOMI,   Neck: Supple.  Respiratory: Breath sounds are clear bilaterally  Cardiovascular: S1 and S2, regular   Gastrointestinal: soft, nontender  Extremities:  + trace edema  Vascular: Caritid Bruit - no  Musculoskeletal: + Left knee joint swelling/ +tenderness left hip, no abnormal movements  Skin: No rashes, no external evidence of hematoma or bruising to buttocks, no active bleeding from orifices.       Labs:                        7.7    15.18 )-----------( 350      ( 18 Mar 2022 14:46 )             23.8     03-18    136  |  107  |  37<H>  ----------------------------<  155<H>  4.4   |  21<L>  |  2.02<H>    Ca    8.1<L>      18 Mar 2022 14:46    TPro  6.9  /  Alb  2.2<L>  /  TBili  0.6  /  DBili  x   /  AST  20  /  ALT  28  /  AlkPhos  85  03-18        PT/INR - ( 18 Mar 2022 14:46 )   PT: 19.2 sec;   INR: 1.61 ratio         PTT - ( 18 Mar 2022 14:46 )  PTT:36.8 sec    RADIOLOGY:  < from: CT Abdomen and Pelvis No Cont (03.18.22 @ 16:41) >  IMPRESSION:    Limited noncontrast study.    Acute left gluteal hematoma extending caudally to the proximal left femur   level measures 12.2 x 6.4 x 14.9 cm. Soft tissue edema extends along the   left flank and proximal left thigh. Findings were discussed with Dr. Ruiz Bui on 3/18/2022 at 5:00PM.    Cholelithiasis with distended and slightly inflamed gallbladder neck,   similar to prior scans. Correlate with LFTs and right upper quadrant   ultrasound as warranted.      < end of copied text >

## 2022-03-18 NOTE — ED PROVIDER NOTE - MUSCULOSKELETAL, MLM
Spine appears normal, range of motion is not limited, no muscle or joint tenderness Nontender to palp bilateral calfs

## 2022-03-18 NOTE — H&P ADULT - NSHPLABSRESULTS_GEN_ALL_CORE
LABS:                        7.7    15.18 )-----------( 350      ( 18 Mar 2022 14:46 )             23.8     03-18    136  |  107  |  37<H>  ----------------------------<  155<H>  4.4   |  21<L>  |  2.02<H>    Ca    8.1<L>      18 Mar 2022 14:46    TPro  6.9  /  Alb  2.2<L>  /  TBili  0.6  /  DBili  x   /  AST  20  /  ALT  28  /  AlkPhos  85  03-18    PT/INR - ( 18 Mar 2022 14:46 )   PT: 19.2 sec;   INR: 1.61 ratio         PTT - ( 18 Mar 2022 14:46 )  PTT:36.8 sec

## 2022-03-18 NOTE — H&P ADULT - NSHPPHYSICALEXAM_GEN_ALL_CORE
PHYSICAL EXAMINATION:  Vital Signs Last 24 Hrs  T(C): 36.9 (18 Mar 2022 16:24), Max: 36.9 (18 Mar 2022 13:29)  T(F): 98.5 (18 Mar 2022 16:24), Max: 98.5 (18 Mar 2022 13:29)  HR: 98 (18 Mar 2022 17:04) (76 - 98)  BP: 97/61 (18 Mar 2022 17:04) (96/67 - 108/68)  BP(mean): --  RR: 17 (18 Mar 2022 17:04) (16 - 18)  SpO2: 98% (18 Mar 2022 17:04) (96% - 98%)  CAPILLARY BLOOD GLUCOSE            GENERAL: NAD, well-groomed,  HEAD:  atraumatic, normocephalic  EYES: sclera anicteric  ENMT: mucous membranes moist  NECK: supple, No JVD  CHEST/LUNG: clear to auscultation bilaterally;    no      rales   ,   no rhonchi,   HEART: normal S1, S2  ABDOMEN: BS+, soft, ND, NT   EXTREMITIES:    no    edema    b/l LEs  NEURO: awake, ,     moves all extremities  SKIN: no     rash  has  pian,  left  gluteal area./  no redness    ha s najma

## 2022-03-18 NOTE — ED ADULT NURSE NOTE - ED STAT RN HANDOFF DETAILS 2
Report received from LENKA Hubbard at 7pm. Assessment available on KB. Pt resting comfortably in bed, IV medication infusion ongoing, report already given by previous shift RN for admission, pending transport pickup. will continue to monitor.

## 2022-03-18 NOTE — CONSULT NOTE ADULT - ASSESSMENT
81 year old male with complex medical hx as above, recent sepsis, anthony and HD, DM, HTN, HLD, cholelithiasis, hx of benign bladder tumor    admitted to medical service.   hemodynamically stable  no emergent surgical intervention required  do not require HIDA scan as patient does not have active abdominal pain and has stable Bili T, cholelithiasis stable on CT.   discussed with Dr. Ferguson  transfuse platelets, blood products, FFP as needed, hold eliquis.

## 2022-03-18 NOTE — ED ADULT NURSE NOTE - ED CARDIAC HEART SOUNDS
[address] [place] [address]  PROGRESS NOTE  Chief Complaint:   Patient presents with: Annual: Medicare Annual      HPI:   This is a 67year old male coming in for his annual Medicare wellness visit. He said that he has been feeling great overall.   He d fL    MCH 31.6 26.0 - 34.0 pg    MCHC 33.5 31.0 - 37.0 g/dL    RDW 12.4 11.0 - 15.0 %    RDW-SD 42.7 35.1 - 46.3 fL    Neutrophil Absolute Prelim 3.24 1.50 - 7.70 x10 (3) uL    Neutrophil Absolute 3.24 1.50 - 7.70 x10(3) uL    Lymphocyte Absolute 1.29 1.00 edema, dyspnea on exertion or at rest.  RESPIRATORY:  Denies shortness of breath, wheezing, cough or sputum. GASTROINTESTINAL:  Denies abdominal pain, nausea, vomiting, constipation, diarrhea, or blood in stool.   MUSCULOSKELETAL:  Denies weakness, muscle Soft, nondistended, nontender, bowel sounds normal in all 4 quadrants, no masses, no hepatosplenomegaly. Genitalia: Testes are descended bilaterally. No masses noted. Penis is normal.    BACK: No tenderness, no spasm, SLR test negative, FROM.   EXTREMITI hyperlipidemia     Psoriasis of nail     History of Bell's palsy      Christy Sewell MD  3/6/2020  8:24 AM normal S1, S2 heard

## 2022-03-18 NOTE — H&P ADULT - HISTORY OF PRESENT ILLNESS
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 ago,   LIJVS on 2/15/22 with c/o diarrhea x 2 weeks ,  WBC's of 37,940.   Lactate to 3.9, and his BUN / Creat were found to be elevated at 169 / 12.9    ct with   pna/  Cholelithiasis, a moderate Hiatal Hernia, but no bowel obstruction was seen.    Patient was admitted to the CCU m  he underwent HD urgently x 1 on 2/15/22 ,  rec'd 5 Liters of NS.    s/p  Sepsis with Klebsiella Oxytoca ( AKA Raoutella ornithinolytica ) Bacteremia as identified from 2 out of 2 Blood Cx's from 2/15/22   dvt  left femoral V   on  2/17/22     now  admitted with anemia,  was   sent from [facility]  denies any rectal  bleed    ct a/p done in  er  denies  cp/sob/abd  pain     Pt was one Eliquis for dvt. and now it is no hold and pt had prbc x 2 u yesterday, per  er  chart    . Pt is alert and oriented x 3 denies headache,   neck/back/hips/calfs pain, cough, sob, chest pain, nausea, vomiting, fever, chills, abd pain, dysuria    Pt sts he did not fall.           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 ago,   LIJVS on 2/15/22 with c/o diarrhea x 2 weeks ,  WBC's of 37,940.   Lactate to 3.9, and his BUN / Creat were found to be elevated at 169 / 12.9    ct with   pna/  Cholelithiasis, a moderate Hiatal Hernia, but no bowel obstruction was seen.    Patient was admitted to the CCU m  he underwent HD urgently x 1 on 2/15/22 ,  rec'd 5 Liters of NS.    s/p  Sepsis with Klebsiella Oxytoca ( AKA Raoutella ornithinolytica ) Bacteremia as identified from 2 out of 2 Blood Cx's from 2/15/22   dvt  left femoral V   on  2/17/22     now  admitted with anemia,  was   sent from [facility]  denies any rectal  bleed   ct a/p done in  er  denies  cp/sob/abd  pain     Pt was   d/c  on 3/1/  to [facility] on lovenox  bid  for dvt/   and now it is no hold and pt had prbc x 2 u yesterday, per  er  chart    .Pt is alert and oriented x 3 denies headache,   neck/back/hips/calfs pain, cough, sob, chest pain, nausea, vomiting, fever, chills, abd pain, dysuria    Pt sts he did not fall.

## 2022-03-18 NOTE — ED ADULT NURSE NOTE - CHIEF COMPLAINT QUOTE
pt sent from [facility] r/o retroperitoneal bleed eliquis on hold pending eval s/p prbc tx x 2 units yesterday denies any pain at present states had l buttocks pain earlier which improved after therapy

## 2022-03-18 NOTE — CONSULT NOTE ADULT - SUBJECTIVE AND OBJECTIVE BOX
Full consult dictated    Plan: Pt admitted with anemia from [facility]. Mjultiple medical problems. On anticoagulation for DVT. Pt had EGD 2/24 which revealed acute Duodenal Ulcer.  Will hold anticoagulation; start on protonix and carafate; follow CBC. Transfuse for Hgb < 7

## 2022-03-18 NOTE — PATIENT PROFILE ADULT - FALL HARM RISK - HARM RISK INTERVENTIONS

## 2022-03-19 LAB
ANION GAP SERPL CALC-SCNC: 7 MMOL/L — SIGNIFICANT CHANGE UP (ref 5–17)
BUN SERPL-MCNC: 29 MG/DL — HIGH (ref 7–23)
CALCIUM SERPL-MCNC: 7.9 MG/DL — LOW (ref 8.5–10.1)
CHLORIDE SERPL-SCNC: 112 MMOL/L — HIGH (ref 96–108)
CO2 SERPL-SCNC: 21 MMOL/L — LOW (ref 22–31)
CREAT SERPL-MCNC: 1.24 MG/DL — SIGNIFICANT CHANGE UP (ref 0.5–1.3)
EGFR: 58 ML/MIN/1.73M2 — LOW
FOLATE SERPL-MCNC: 11.6 NG/ML — SIGNIFICANT CHANGE UP
GLUCOSE SERPL-MCNC: 105 MG/DL — HIGH (ref 70–99)
HCT VFR BLD CALC: 25.5 % — LOW (ref 39–50)
HCT VFR BLD CALC: 25.6 % — LOW (ref 39–50)
HCT VFR BLD CALC: 29.3 % — LOW (ref 39–50)
HGB BLD-MCNC: 8.5 G/DL — LOW (ref 13–17)
HGB BLD-MCNC: 8.5 G/DL — LOW (ref 13–17)
HGB BLD-MCNC: 9.3 G/DL — LOW (ref 13–17)
IRON SATN MFR SERPL: 16 % — SIGNIFICANT CHANGE UP (ref 16–55)
IRON SATN MFR SERPL: 27 UG/DL — LOW (ref 45–165)
MCHC RBC-ENTMCNC: 28.3 PG — SIGNIFICANT CHANGE UP (ref 27–34)
MCHC RBC-ENTMCNC: 29.3 PG — SIGNIFICANT CHANGE UP (ref 27–34)
MCHC RBC-ENTMCNC: 29.3 PG — SIGNIFICANT CHANGE UP (ref 27–34)
MCHC RBC-ENTMCNC: 31.7 G/DL — LOW (ref 32–36)
MCHC RBC-ENTMCNC: 33.2 G/DL — SIGNIFICANT CHANGE UP (ref 32–36)
MCHC RBC-ENTMCNC: 33.3 G/DL — SIGNIFICANT CHANGE UP (ref 32–36)
MCV RBC AUTO: 87.9 FL — SIGNIFICANT CHANGE UP (ref 80–100)
MCV RBC AUTO: 88.3 FL — SIGNIFICANT CHANGE UP (ref 80–100)
MCV RBC AUTO: 89.1 FL — SIGNIFICANT CHANGE UP (ref 80–100)
NRBC # BLD: 0 /100 WBCS — SIGNIFICANT CHANGE UP (ref 0–0)
PLATELET # BLD AUTO: 267 K/UL — SIGNIFICANT CHANGE UP (ref 150–400)
PLATELET # BLD AUTO: 272 K/UL — SIGNIFICANT CHANGE UP (ref 150–400)
PLATELET # BLD AUTO: 286 K/UL — SIGNIFICANT CHANGE UP (ref 150–400)
POTASSIUM SERPL-MCNC: 4.1 MMOL/L — SIGNIFICANT CHANGE UP (ref 3.5–5.3)
POTASSIUM SERPL-SCNC: 4.1 MMOL/L — SIGNIFICANT CHANGE UP (ref 3.5–5.3)
RBC # BLD: 2.9 M/UL — LOW (ref 4.2–5.8)
RBC # BLD: 2.9 M/UL — LOW (ref 4.2–5.8)
RBC # BLD: 3.29 M/UL — LOW (ref 4.2–5.8)
RBC # FLD: 16 % — HIGH (ref 10.3–14.5)
RBC # FLD: 16.3 % — HIGH (ref 10.3–14.5)
RBC # FLD: 16.5 % — HIGH (ref 10.3–14.5)
SODIUM SERPL-SCNC: 140 MMOL/L — SIGNIFICANT CHANGE UP (ref 135–145)
TIBC SERPL-MCNC: 168 UG/DL — LOW (ref 220–430)
UIBC SERPL-MCNC: 141 UG/DL — SIGNIFICANT CHANGE UP (ref 110–370)
VIT B12 SERPL-MCNC: 379 PG/ML — SIGNIFICANT CHANGE UP (ref 232–1245)
WBC # BLD: 10.55 K/UL — HIGH (ref 3.8–10.5)
WBC # BLD: 11.26 K/UL — HIGH (ref 3.8–10.5)
WBC # BLD: 12.11 K/UL — HIGH (ref 3.8–10.5)
WBC # FLD AUTO: 10.55 K/UL — HIGH (ref 3.8–10.5)
WBC # FLD AUTO: 11.26 K/UL — HIGH (ref 3.8–10.5)
WBC # FLD AUTO: 12.11 K/UL — HIGH (ref 3.8–10.5)

## 2022-03-19 PROCEDURE — 93970 EXTREMITY STUDY: CPT | Mod: 26

## 2022-03-19 PROCEDURE — 99232 SBSQ HOSP IP/OBS MODERATE 35: CPT

## 2022-03-19 RX ORDER — SODIUM CHLORIDE 9 MG/ML
1000 INJECTION, SOLUTION INTRAVENOUS
Refills: 0 | Status: DISCONTINUED | OUTPATIENT
Start: 2022-03-19 | End: 2022-03-20

## 2022-03-19 RX ORDER — ACETAMINOPHEN 500 MG
650 TABLET ORAL EVERY 6 HOURS
Refills: 0 | Status: DISCONTINUED | OUTPATIENT
Start: 2022-03-19 | End: 2022-03-23

## 2022-03-19 RX ADMIN — Medication 1 GRAM(S): at 23:24

## 2022-03-19 RX ADMIN — Medication 650 MILLIGRAM(S): at 15:37

## 2022-03-19 RX ADMIN — Medication 1 GRAM(S): at 17:06

## 2022-03-19 RX ADMIN — Medication 1 GRAM(S): at 05:34

## 2022-03-19 RX ADMIN — Medication 650 MILLIGRAM(S): at 14:37

## 2022-03-19 RX ADMIN — SODIUM CHLORIDE 80 MILLILITER(S): 9 INJECTION, SOLUTION INTRAVENOUS at 05:34

## 2022-03-19 RX ADMIN — Medication 1 GRAM(S): at 11:09

## 2022-03-19 RX ADMIN — ATORVASTATIN CALCIUM 40 MILLIGRAM(S): 80 TABLET, FILM COATED ORAL at 21:36

## 2022-03-19 RX ADMIN — Medication 1 MILLIGRAM(S): at 21:36

## 2022-03-19 RX ADMIN — Medication 650 MILLIGRAM(S): at 23:22

## 2022-03-19 RX ADMIN — LATANOPROST 1 DROP(S): 0.05 SOLUTION/ DROPS OPHTHALMIC; TOPICAL at 21:36

## 2022-03-19 RX ADMIN — PANTOPRAZOLE SODIUM 40 MILLIGRAM(S): 20 TABLET, DELAYED RELEASE ORAL at 05:44

## 2022-03-19 RX ADMIN — SODIUM CHLORIDE 75 MILLILITER(S): 9 INJECTION, SOLUTION INTRAVENOUS at 21:40

## 2022-03-19 RX ADMIN — SODIUM CHLORIDE 75 MILLILITER(S): 9 INJECTION, SOLUTION INTRAVENOUS at 19:05

## 2022-03-19 RX ADMIN — TAMSULOSIN HYDROCHLORIDE 0.4 MILLIGRAM(S): 0.4 CAPSULE ORAL at 21:36

## 2022-03-19 RX ADMIN — PANTOPRAZOLE SODIUM 40 MILLIGRAM(S): 20 TABLET, DELAYED RELEASE ORAL at 17:06

## 2022-03-19 NOTE — CONSULT NOTE ADULT - SUBJECTIVE AND OBJECTIVE BOX
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 Patient was admitted to the CCU, underwent HD urgently x 1 on 2/15/22, s/p  Sepsis with Klebsiella Oxytoca. D/c to [facility] 3/1/22. On 3/18/22 admitted with anemia, sent from [facility], denies rectal  bleed. No cp/sob/abd  pain, nausea, vomiting, fever, chills. Noted to have abn renal fx on adm.    ROS:  as above    Allergies:  No Known Allergies    PAST MEDICAL & SURGICAL HISTORY:  HTN (hypertension)  HLD (hyperlipidemia)  Diabetes mellitus  Bladder tumor  History of cataract surgery  left eye  S/P TKR (total knee replacement), left  2015  H/O total knee replacement, right  2015  History of melanoma excision    SOCIAL HISTORY:  negative    FAMILY HISTORY:  NC    MEDICATIONS  (STANDING):  ALPRAZolam 1 milliGRAM(s) Oral at bedtime  atorvastatin 40 milliGRAM(s) Oral at bedtime  lactated ringers. 1000 milliLiter(s) (80 mL/Hr) IV Continuous <Continuous>  latanoprost 0.005% Ophthalmic Solution 1 Drop(s) Both EYES at bedtime  pantoprazole  Injectable 40 milliGRAM(s) IV Push every 12 hours  sucralfate 1 Gram(s) Oral four times a day  tamsulosin 0.4 milliGRAM(s) Oral at bedtime    Home Medications:  ALPRAZolam 1 mg oral tablet: 1 tab(s) orally once a day (at bedtime) (01 Mar 2022 14:50)  atorvastatin 40 mg oral tablet: 1 tab(s) orally once a day (at bedtime) (04 Aug 2020 10:33)  enoxaparin: 110 milligram(s) subcutaneous 2 times a day (01 Mar 2022 13:21)  glimepiride 2 mg oral tablet: 1 tab(s) orally 2 times a day (16 Feb 2022 12:11)  latanoprost 0.005% ophthalmic solution: 1 drop(s) to each affected eye once a day (in the evening) (16 Feb 2022 12:13)  midodrine 5 mg oral tablet: 1 tab(s) orally 3 times a day, As needed, SBP &lt;100 (01 Mar 2022 13:21)  pantoprazole 40 mg oral granule, delayed release: 40 milligram(s) orally once a day (01 Mar 2022 13:21)  sucralfate 1 g oral tablet: 1 tab(s) orally 4 times a day (01 Mar 2022 13:21)  tamsulosin 0.4 mg oral capsule: 1 cap(s) orally once a day (at bedtime) (01 Mar 2022 13:21)    Vital Signs Last 24 Hrs  T(C): 36.4 (03-19-22 @ 11:14), Max: 37 (03-18-22 @ 20:35)  T(F): 97.6 (03-19-22 @ 11:14), Max: 98.6 (03-18-22 @ 20:35)  HR: 91 (03-19-22 @ 11:14) (76 - 101)  BP: 95/62 (03-19-22 @ 11:14) (95/62 - 121/68)  RR: 18 (03-19-22 @ 11:14) (16 - 18)  SpO2: 96% (03-19-22 @ 11:14) (95% - 98%)    I&O's Detail    18 Mar 2022 07:01  -  19 Mar 2022 07:00  --------------------------------------------------------  IN:    Lactated Ringers: 720 mL    Oral Fluid: 480 mL    PRBCs (Packed Red Blood Cells): 620 mL  Total IN: 1820 mL    OUT:    Indwelling Catheter - Urethral (mL): 800 mL  Total OUT: 800 mL    Total NET: 1020 mL    LABS:                        8.5    11.26 )-----------( 272      ( 19 Mar 2022 12:43 )             25.5     03-19    140  |  112<H>  |  29<H>  ----------------------------<  105<H>  4.1   |  21<L>  |  1.24    Ca    7.9<L>      19 Mar 2022 08:19    TPro  6.9  /  Alb  2.2<L>  /  TBili  0.6  /  DBili  x   /  AST  20  /  ALT  28  /  AlkPhos  85  03-18    LIVER FUNCTIONS - ( 18 Mar 2022 14:46 )  Alb: 2.2 g/dL / Pro: 6.9 gm/dL / ALK PHOS: 85 U/L / ALT: 28 U/L / AST: 20 U/L / GGT: x           PT/INR - ( 18 Mar 2022 14:46 )   PT: 19.2 sec;   INR: 1.61 ratio       PTT - ( 18 Mar 2022 14:46 )  PTT:36.8 sec    A/P:    full consult to follow    [phone]   NEPHROLOGY CONSULTATION    CHIEF COMPLAINT: LLE pain    HPI:  Pt is 80 yo M with hx of HTN, HLD, DM, 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 Patient was admitted to the CCU, underwent HD urgently x 1 on 2/15/22, s/p  Sepsis with Klebsiella Oxytoca. D/c to [facility] 3/1/22. On 3/18/22 admitted to [facility] from [facility] with anemia, dx w/L gluteal hematoma. No cp/sob/abd pain, nausea, vomiting, fever, chills, no hx of falls. Awake, alert. Noted to have abn renal fx on adm.    ROS:  as above    Allergies:  No Known Allergies    PAST MEDICAL & SURGICAL HISTORY:  HTN (hypertension)  HLD (hyperlipidemia)  Diabetes mellitus  Bladder tumor  History of cataract surgery  left eye  S/P TKR (total knee replacement), left  2015  H/O total knee replacement, right  2015  History of melanoma excision    SOCIAL HISTORY:  negative    FAMILY HISTORY:  NC    MEDICATIONS  (STANDING):  ALPRAZolam 1 milliGRAM(s) Oral at bedtime  atorvastatin 40 milliGRAM(s) Oral at bedtime  lactated ringers. 1000 milliLiter(s) (80 mL/Hr) IV Continuous <Continuous>  latanoprost 0.005% Ophthalmic Solution 1 Drop(s) Both EYES at bedtime  pantoprazole  Injectable 40 milliGRAM(s) IV Push every 12 hours  sucralfate 1 Gram(s) Oral four times a day  tamsulosin 0.4 milliGRAM(s) Oral at bedtime    Home Medications:  ALPRAZolam 1 mg oral tablet: 1 tab(s) orally once a day (at bedtime) (01 Mar 2022 14:50)  atorvastatin 40 mg oral tablet: 1 tab(s) orally once a day (at bedtime) (04 Aug 2020 10:33)  enoxaparin: 110 milligram(s) subcutaneous 2 times a day (01 Mar 2022 13:21)  glimepiride 2 mg oral tablet: 1 tab(s) orally 2 times a day (16 Feb 2022 12:11)  latanoprost 0.005% ophthalmic solution: 1 drop(s) to each affected eye once a day (in the evening) (16 Feb 2022 12:13)  midodrine 5 mg oral tablet: 1 tab(s) orally 3 times a day, As needed, SBP &lt;100 (01 Mar 2022 13:21)  pantoprazole 40 mg oral granule, delayed release: 40 milligram(s) orally once a day (01 Mar 2022 13:21)  sucralfate 1 g oral tablet: 1 tab(s) orally 4 times a day (01 Mar 2022 13:21)  tamsulosin 0.4 mg oral capsule: 1 cap(s) orally once a day (at bedtime) (01 Mar 2022 13:21)    Vital Signs Last 24 Hrs  T(C): 36.4 (03-19-22 @ 11:14), Max: 37 (03-18-22 @ 20:35)  T(F): 97.6 (03-19-22 @ 11:14), Max: 98.6 (03-18-22 @ 20:35)  HR: 91 (03-19-22 @ 11:14) (76 - 101)  BP: 95/62 (03-19-22 @ 11:14) (95/62 - 121/68)  RR: 18 (03-19-22 @ 11:14) (16 - 18)  SpO2: 96% (03-19-22 @ 11:14) (95% - 98%)    I&O's Detail    18 Mar 2022 07:01  -  19 Mar 2022 07:00  --------------------------------------------------------  IN:    Lactated Ringers: 720 mL    Oral Fluid: 480 mL    PRBCs (Packed Red Blood Cells): 620 mL  Total IN: 1820 mL    OUT:    Indwelling Catheter - Urethral (mL): 800 mL  Total OUT: 800 mL    Total NET: 1020 mL    s1s2  b/l air entry  soft, ND  tr edema LE    LABS:                        8.5    11.26 )-----------( 272      ( 19 Mar 2022 12:43 )             25.5     03-19    140  |  112<H>  |  29<H>  ----------------------------<  105<H>  4.1   |  21<L>  |  1.24    Ca    7.9<L>      19 Mar 2022 08:19    TPro  6.9  /  Alb  2.2<L>  /  TBili  0.6  /  DBili  x   /  AST  20  /  ALT  28  /  AlkPhos  85  03-18    LIVER FUNCTIONS - ( 18 Mar 2022 14:46 )  Alb: 2.2 g/dL / Pro: 6.9 gm/dL / ALK PHOS: 85 U/L / ALT: 28 U/L / AST: 20 U/L / GGT: x           PT/INR - ( 18 Mar 2022 14:46 )   PT: 19.2 sec;   INR: 1.61 ratio       PTT - ( 18 Mar 2022 14:46 )  PTT:36.8 sec    A/P:    Anemia, L gluteal hematoma, guaiac positive  S/p bld tx  Pre-renal RADHA on adm  Improving  Continue yao, IVF  Avoid nephrotoxins  F/u CBC, BMP, UO  Check UA    [phone]

## 2022-03-20 LAB
ALBUMIN SERPL ELPH-MCNC: 1.7 G/DL — LOW (ref 3.3–5)
ALP SERPL-CCNC: 71 U/L — SIGNIFICANT CHANGE UP (ref 40–120)
ALT FLD-CCNC: 29 U/L — SIGNIFICANT CHANGE UP (ref 12–78)
ANION GAP SERPL CALC-SCNC: 6 MMOL/L — SIGNIFICANT CHANGE UP (ref 5–17)
APPEARANCE UR: ABNORMAL
AST SERPL-CCNC: 20 U/L — SIGNIFICANT CHANGE UP (ref 15–37)
BACTERIA # UR AUTO: ABNORMAL
BILIRUB SERPL-MCNC: 0.6 MG/DL — SIGNIFICANT CHANGE UP (ref 0.2–1.2)
BILIRUB UR-MCNC: NEGATIVE — SIGNIFICANT CHANGE UP
BUN SERPL-MCNC: 22 MG/DL — SIGNIFICANT CHANGE UP (ref 7–23)
CALCIUM SERPL-MCNC: 7.7 MG/DL — LOW (ref 8.5–10.1)
CHLORIDE SERPL-SCNC: 110 MMOL/L — HIGH (ref 96–108)
CO2 SERPL-SCNC: 23 MMOL/L — SIGNIFICANT CHANGE UP (ref 22–31)
COLOR SPEC: YELLOW — SIGNIFICANT CHANGE UP
COMMENT - URINE: SIGNIFICANT CHANGE UP
CREAT SERPL-MCNC: 1.03 MG/DL — SIGNIFICANT CHANGE UP (ref 0.5–1.3)
DIFF PNL FLD: ABNORMAL
EGFR: 73 ML/MIN/1.73M2 — SIGNIFICANT CHANGE UP
EPI CELLS # UR: SIGNIFICANT CHANGE UP
GLUCOSE SERPL-MCNC: 101 MG/DL — HIGH (ref 70–99)
GLUCOSE UR QL: NEGATIVE MG/DL — SIGNIFICANT CHANGE UP
HCT VFR BLD CALC: 25.6 % — LOW (ref 39–50)
HGB BLD-MCNC: 8.2 G/DL — LOW (ref 13–17)
KETONES UR-MCNC: NEGATIVE — SIGNIFICANT CHANGE UP
LEUKOCYTE ESTERASE UR-ACNC: ABNORMAL
MCHC RBC-ENTMCNC: 28.8 PG — SIGNIFICANT CHANGE UP (ref 27–34)
MCHC RBC-ENTMCNC: 32 G/DL — SIGNIFICANT CHANGE UP (ref 32–36)
MCV RBC AUTO: 89.8 FL — SIGNIFICANT CHANGE UP (ref 80–100)
NITRITE UR-MCNC: NEGATIVE — SIGNIFICANT CHANGE UP
NRBC # BLD: 0 /100 WBCS — SIGNIFICANT CHANGE UP (ref 0–0)
PH UR: 5 — SIGNIFICANT CHANGE UP (ref 5–8)
PLATELET # BLD AUTO: 261 K/UL — SIGNIFICANT CHANGE UP (ref 150–400)
POTASSIUM SERPL-MCNC: 3.7 MMOL/L — SIGNIFICANT CHANGE UP (ref 3.5–5.3)
POTASSIUM SERPL-SCNC: 3.7 MMOL/L — SIGNIFICANT CHANGE UP (ref 3.5–5.3)
PROT SERPL-MCNC: 5.8 GM/DL — LOW (ref 6–8.3)
PROT UR-MCNC: 15 MG/DL
RBC # BLD: 2.85 M/UL — LOW (ref 4.2–5.8)
RBC # FLD: 16.7 % — HIGH (ref 10.3–14.5)
RBC CASTS # UR COMP ASSIST: ABNORMAL /HPF (ref 0–4)
SODIUM SERPL-SCNC: 139 MMOL/L — SIGNIFICANT CHANGE UP (ref 135–145)
SP GR SPEC: 1.01 — SIGNIFICANT CHANGE UP (ref 1.01–1.02)
UROBILINOGEN FLD QL: NEGATIVE MG/DL — SIGNIFICANT CHANGE UP
WBC # BLD: 9.95 K/UL — SIGNIFICANT CHANGE UP (ref 3.8–10.5)
WBC # FLD AUTO: 9.95 K/UL — SIGNIFICANT CHANGE UP (ref 3.8–10.5)
WBC UR QL: SIGNIFICANT CHANGE UP

## 2022-03-20 PROCEDURE — 99232 SBSQ HOSP IP/OBS MODERATE 35: CPT

## 2022-03-20 RX ORDER — FERROUS SULFATE 325(65) MG
325 TABLET ORAL DAILY
Refills: 0 | Status: DISCONTINUED | OUTPATIENT
Start: 2022-03-20 | End: 2022-03-23

## 2022-03-20 RX ORDER — SODIUM CHLORIDE 9 MG/ML
1000 INJECTION, SOLUTION INTRAVENOUS
Refills: 0 | Status: DISCONTINUED | OUTPATIENT
Start: 2022-03-20 | End: 2022-03-22

## 2022-03-20 RX ADMIN — Medication 1 GRAM(S): at 12:21

## 2022-03-20 RX ADMIN — PANTOPRAZOLE SODIUM 40 MILLIGRAM(S): 20 TABLET, DELAYED RELEASE ORAL at 06:00

## 2022-03-20 RX ADMIN — Medication 1 GRAM(S): at 18:45

## 2022-03-20 RX ADMIN — Medication 650 MILLIGRAM(S): at 18:49

## 2022-03-20 RX ADMIN — TAMSULOSIN HYDROCHLORIDE 0.4 MILLIGRAM(S): 0.4 CAPSULE ORAL at 21:16

## 2022-03-20 RX ADMIN — SODIUM CHLORIDE 75 MILLILITER(S): 9 INJECTION, SOLUTION INTRAVENOUS at 09:20

## 2022-03-20 RX ADMIN — ATORVASTATIN CALCIUM 40 MILLIGRAM(S): 80 TABLET, FILM COATED ORAL at 21:16

## 2022-03-20 RX ADMIN — Medication 1 GRAM(S): at 05:59

## 2022-03-20 RX ADMIN — Medication 1 GRAM(S): at 23:11

## 2022-03-20 RX ADMIN — PANTOPRAZOLE SODIUM 40 MILLIGRAM(S): 20 TABLET, DELAYED RELEASE ORAL at 18:46

## 2022-03-20 RX ADMIN — Medication 1 MILLIGRAM(S): at 21:16

## 2022-03-20 RX ADMIN — SODIUM CHLORIDE 50 MILLILITER(S): 9 INJECTION, SOLUTION INTRAVENOUS at 21:17

## 2022-03-20 RX ADMIN — LATANOPROST 1 DROP(S): 0.05 SOLUTION/ DROPS OPHTHALMIC; TOPICAL at 21:16

## 2022-03-20 RX ADMIN — Medication 325 MILLIGRAM(S): at 12:21

## 2022-03-21 LAB
ANION GAP SERPL CALC-SCNC: 6 MMOL/L — SIGNIFICANT CHANGE UP (ref 5–17)
BUN SERPL-MCNC: 18 MG/DL — SIGNIFICANT CHANGE UP (ref 7–23)
CALCIUM SERPL-MCNC: 8.2 MG/DL — LOW (ref 8.5–10.1)
CHLORIDE SERPL-SCNC: 109 MMOL/L — HIGH (ref 96–108)
CK SERPL-CCNC: 32 U/L — SIGNIFICANT CHANGE UP (ref 26–308)
CO2 SERPL-SCNC: 22 MMOL/L — SIGNIFICANT CHANGE UP (ref 22–31)
CREAT SERPL-MCNC: 0.89 MG/DL — SIGNIFICANT CHANGE UP (ref 0.5–1.3)
EGFR: 86 ML/MIN/1.73M2 — SIGNIFICANT CHANGE UP
GLUCOSE SERPL-MCNC: 106 MG/DL — HIGH (ref 70–99)
HCT VFR BLD CALC: 28 % — LOW (ref 39–50)
HGB BLD-MCNC: 8.9 G/DL — LOW (ref 13–17)
MCHC RBC-ENTMCNC: 28.6 PG — SIGNIFICANT CHANGE UP (ref 27–34)
MCHC RBC-ENTMCNC: 31.8 G/DL — LOW (ref 32–36)
MCV RBC AUTO: 90 FL — SIGNIFICANT CHANGE UP (ref 80–100)
NRBC # BLD: 0 /100 WBCS — SIGNIFICANT CHANGE UP (ref 0–0)
PLATELET # BLD AUTO: 305 K/UL — SIGNIFICANT CHANGE UP (ref 150–400)
POTASSIUM SERPL-MCNC: 4.1 MMOL/L — SIGNIFICANT CHANGE UP (ref 3.5–5.3)
POTASSIUM SERPL-SCNC: 4.1 MMOL/L — SIGNIFICANT CHANGE UP (ref 3.5–5.3)
RAPID RVP RESULT: SIGNIFICANT CHANGE UP
RBC # BLD: 3.11 M/UL — LOW (ref 4.2–5.8)
RBC # FLD: 16 % — HIGH (ref 10.3–14.5)
SARS-COV-2 RNA SPEC QL NAA+PROBE: SIGNIFICANT CHANGE UP
SODIUM SERPL-SCNC: 137 MMOL/L — SIGNIFICANT CHANGE UP (ref 135–145)
WBC # BLD: 10.22 K/UL — SIGNIFICANT CHANGE UP (ref 3.8–10.5)
WBC # FLD AUTO: 10.22 K/UL — SIGNIFICANT CHANGE UP (ref 3.8–10.5)

## 2022-03-21 PROCEDURE — 99233 SBSQ HOSP IP/OBS HIGH 50: CPT

## 2022-03-21 RX ORDER — NYSTATIN CREAM 100000 [USP'U]/G
1 CREAM TOPICAL THREE TIMES A DAY
Refills: 0 | Status: DISCONTINUED | OUTPATIENT
Start: 2022-03-21 | End: 2022-03-23

## 2022-03-21 RX ADMIN — ATORVASTATIN CALCIUM 40 MILLIGRAM(S): 80 TABLET, FILM COATED ORAL at 21:51

## 2022-03-21 RX ADMIN — LATANOPROST 1 DROP(S): 0.05 SOLUTION/ DROPS OPHTHALMIC; TOPICAL at 21:52

## 2022-03-21 RX ADMIN — Medication 650 MILLIGRAM(S): at 13:40

## 2022-03-21 RX ADMIN — PANTOPRAZOLE SODIUM 40 MILLIGRAM(S): 20 TABLET, DELAYED RELEASE ORAL at 17:54

## 2022-03-21 RX ADMIN — TAMSULOSIN HYDROCHLORIDE 0.4 MILLIGRAM(S): 0.4 CAPSULE ORAL at 21:51

## 2022-03-21 RX ADMIN — Medication 1 GRAM(S): at 06:01

## 2022-03-21 RX ADMIN — PANTOPRAZOLE SODIUM 40 MILLIGRAM(S): 20 TABLET, DELAYED RELEASE ORAL at 06:01

## 2022-03-21 RX ADMIN — NYSTATIN CREAM 1 APPLICATION(S): 100000 CREAM TOPICAL at 06:01

## 2022-03-21 RX ADMIN — SODIUM CHLORIDE 50 MILLILITER(S): 9 INJECTION, SOLUTION INTRAVENOUS at 17:53

## 2022-03-21 RX ADMIN — Medication 650 MILLIGRAM(S): at 14:40

## 2022-03-21 RX ADMIN — Medication 1 GRAM(S): at 17:53

## 2022-03-21 RX ADMIN — Medication 1 MILLIGRAM(S): at 21:51

## 2022-03-21 RX ADMIN — Medication 1 GRAM(S): at 11:29

## 2022-03-21 RX ADMIN — NYSTATIN CREAM 1 APPLICATION(S): 100000 CREAM TOPICAL at 22:52

## 2022-03-21 RX ADMIN — Medication 325 MILLIGRAM(S): at 11:29

## 2022-03-21 RX ADMIN — NYSTATIN CREAM 1 APPLICATION(S): 100000 CREAM TOPICAL at 17:54

## 2022-03-21 NOTE — PHYSICAL THERAPY INITIAL EVALUATION ADULT - GENERAL OBSERVATIONS, REHAB EVAL
Chart (EMR) reviewed. VSS taken by PCA. Received supine c HOB elevated, NAD. +IV intact, +yao cath intact. Alert. Ox4. Able to follow multistep commands/directions.

## 2022-03-21 NOTE — PHYSICAL THERAPY INITIAL EVALUATION ADULT - ADDITIONAL COMMENTS
Pt is currently a NH resident for short term rehab since 3 weeks ago. Prior to first hospitalization, pt lives in an assisted living prior to hospitalization. Independent with all ADL's and ambulation without device.

## 2022-03-21 NOTE — PHYSICAL THERAPY INITIAL EVALUATION ADULT - IMPAIRMENTS FOUND, PT EVAL
aerobic capacity/endurance/gait, locomotion, and balance/joint integrity and mobility/muscle strength/neuromotor development and sensory integration/posture

## 2022-03-21 NOTE — PHYSICAL THERAPY INITIAL EVALUATION ADULT - PERTINENT HX OF CURRENT PROBLEM, REHAB EVAL
Patient is an 82 y/o male admitted to [facility] due to GI bleed, hematoma. Pt seen by surgical team and no surgical recommendations at this time.

## 2022-03-21 NOTE — PHYSICAL THERAPY INITIAL EVALUATION ADULT - REHAB POTENTIAL, PT EVAL
No respiratory distress. No stridor, Lungs sounds clear with good aeration bilaterally.
good, to achieve stated therapy goals

## 2022-03-22 ENCOUNTER — TRANSCRIPTION ENCOUNTER (OUTPATIENT)
Age: 82
End: 2022-03-22

## 2022-03-22 LAB
HCT VFR BLD CALC: 27.4 % — LOW (ref 39–50)
HGB BLD-MCNC: 9 G/DL — LOW (ref 13–17)
MCHC RBC-ENTMCNC: 29.3 PG — SIGNIFICANT CHANGE UP (ref 27–34)
MCHC RBC-ENTMCNC: 32.8 G/DL — SIGNIFICANT CHANGE UP (ref 32–36)
MCV RBC AUTO: 89.3 FL — SIGNIFICANT CHANGE UP (ref 80–100)
NRBC # BLD: 0 /100 WBCS — SIGNIFICANT CHANGE UP (ref 0–0)
PLATELET # BLD AUTO: 305 K/UL — SIGNIFICANT CHANGE UP (ref 150–400)
RBC # BLD: 3.07 M/UL — LOW (ref 4.2–5.8)
RBC # FLD: 15.9 % — HIGH (ref 10.3–14.5)
WBC # BLD: 10.05 K/UL — SIGNIFICANT CHANGE UP (ref 3.8–10.5)
WBC # FLD AUTO: 10.05 K/UL — SIGNIFICANT CHANGE UP (ref 3.8–10.5)

## 2022-03-22 PROCEDURE — 99233 SBSQ HOSP IP/OBS HIGH 50: CPT

## 2022-03-22 RX ORDER — PANTOPRAZOLE SODIUM 20 MG/1
40 TABLET, DELAYED RELEASE ORAL
Refills: 0 | Status: DISCONTINUED | OUTPATIENT
Start: 2022-03-22 | End: 2022-03-23

## 2022-03-22 RX ORDER — FERROUS SULFATE 325(65) MG
1 TABLET ORAL
Qty: 0 | Refills: 0 | DISCHARGE
Start: 2022-03-22

## 2022-03-22 RX ADMIN — PANTOPRAZOLE SODIUM 40 MILLIGRAM(S): 20 TABLET, DELAYED RELEASE ORAL at 05:41

## 2022-03-22 RX ADMIN — LATANOPROST 1 DROP(S): 0.05 SOLUTION/ DROPS OPHTHALMIC; TOPICAL at 21:23

## 2022-03-22 RX ADMIN — ATORVASTATIN CALCIUM 40 MILLIGRAM(S): 80 TABLET, FILM COATED ORAL at 21:22

## 2022-03-22 RX ADMIN — Medication 1 GRAM(S): at 23:50

## 2022-03-22 RX ADMIN — Medication 1 MILLIGRAM(S): at 21:22

## 2022-03-22 RX ADMIN — Medication 1 GRAM(S): at 05:41

## 2022-03-22 RX ADMIN — Medication 1 GRAM(S): at 00:32

## 2022-03-22 RX ADMIN — NYSTATIN CREAM 1 APPLICATION(S): 100000 CREAM TOPICAL at 21:22

## 2022-03-22 RX ADMIN — Medication 1 GRAM(S): at 17:11

## 2022-03-22 RX ADMIN — NYSTATIN CREAM 1 APPLICATION(S): 100000 CREAM TOPICAL at 14:49

## 2022-03-22 RX ADMIN — Medication 1 GRAM(S): at 11:38

## 2022-03-22 RX ADMIN — Medication 325 MILLIGRAM(S): at 11:37

## 2022-03-22 RX ADMIN — PANTOPRAZOLE SODIUM 40 MILLIGRAM(S): 20 TABLET, DELAYED RELEASE ORAL at 17:11

## 2022-03-22 RX ADMIN — TAMSULOSIN HYDROCHLORIDE 0.4 MILLIGRAM(S): 0.4 CAPSULE ORAL at 21:22

## 2022-03-22 RX ADMIN — NYSTATIN CREAM 1 APPLICATION(S): 100000 CREAM TOPICAL at 05:41

## 2022-03-22 NOTE — DISCHARGE NOTE PROVIDER - HOSPITAL COURSE
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 ago,   LIJVS on 2/15/22 with c/o diarrhea x 2 weeks ,  WBC's of 37,940.   Lactate to 3.9, and his BUN / Creat were found to be elevated at 169 / 12.9    ct with   pna/  Cholelithiasis, a moderate Hiatal Hernia, but no bowel obstruction was seen.    Patient was admitted to the CCU m  he underwent HD urgently x 1 on 2/15/22 ,  rec'd 5 Liters of NS.    s/p  Sepsis with Klebsiella Oxytoca ( AKA Raoutella ornithinolytica ) Bacteremia as identified from 2 out of 2 Blood Cx's from 2/15/22   dvt  left femoral V   on  2/17/22     now  admitted with anemia,  was   sent from [facility]  denies any rectal  bleed   ct a/p done in  er  denies  cp/sob/abd  pain     Pt was   d/c  on 3/1/  to [facility] on lovenox  bid  for dvt/   and now it is no hold and pt had prbc x 2 u yesterday, per  er  chart    .Pt is alert and oriented x 3 denies headache,   neck/back/hips/calfs pain, cough, sob, chest pain, nausea, vomiting, fever, chills, abd pain, dysuria    Pt sts he did not fall.         (18 Mar 2022 18:35)    SUBJECTIVE & OBJECTIVE: Pt seen and examined at bedside.   PHYSICAL EXAM:  T(C): 37.2 (03-22-22 @ 11:47), Max: 37.2 (03-22-22 @ 11:47)  HR: 78 (03-22-22 @ 11:47) (65 - 79)  BP: 103/68 (03-22-22 @ 11:47) (103/68 - 128/73)  RR: 18 (03-22-22 @ 11:47) (18 - 18)  SpO2: 93% (03-22-22 @ 11:47) (93% - 99%) Daily     Daily I&O's Detail    21 Mar 2022 07:01  -  22 Mar 2022 07:00  --------------------------------------------------------  IN:    Oral Fluid: 240 mL  Total IN: 240 mL    OUT:    Indwelling Catheter - Urethral (mL): 2750 mL  Total OUT: 2750 mL    Total NET: -2510 mL        GENERAL: NAD, well-groomed, well-developed  HEAD:  Atraumatic, Normocephalic  EYES: EOMI, PERRLA, conjunctiva and sclera clear  ENMT: Moist mucous membranes  NECK: Supple, No JVD  NERVOUS SYSTEM:  Alert & Oriented X3, Motor Strength 5/5 B/L upper and lower extremities; DTRs 2+ intact and symmetric  CHEST/LUNG: Clear to auscultation bilaterally; No rales, rhonchi, wheezing, or rubs  HEART: Regular rate and rhythm; No murmurs, rubs, or gallops  ABDOMEN: Soft, Nontender, Nondistended; Bowel sounds present  EXTREMITIES:  2+ Peripheral Pulses, No clubbing, cyanosis, or edema  LABS:                        9.0    10.05 )-----------( 305      ( 22 Mar 2022 08:16 )             27.4   CAPILLARY BLOOD GLUCOSE        RECENT CULTURES:   RADIOLOGY & ADDITIONAL TESTS:

## 2022-03-22 NOTE — DISCHARGE NOTE PROVIDER - CARE PROVIDER_API CALL
Jayjay Armendariz)  Internal Medicine  [address] [facility], [address] [place] [address]  Phone: [phone]  Fax: [phone]  Follow Up Time:

## 2022-03-22 NOTE — DISCHARGE NOTE PROVIDER - NSDCCPCAREPLAN_GEN_ALL_CORE_FT
PRINCIPAL DISCHARGE DIAGNOSIS  Diagnosis: GI bleed  Assessment and Plan of Treatment: - active ulcer on EGD  - no Lovenox or AC  - protonix and carafate  - follow up with GI as outpatient ( Dr Armendariz)      SECONDARY DISCHARGE DIAGNOSES  Diagnosis: Hematoma  Assessment and Plan of Treatment: - serial cbc q weekly   - monitor h/h    Diagnosis: Diabetes mellitus  Assessment and Plan of Treatment: - resume home medications    Diagnosis: Hyperlipidemia  Assessment and Plan of Treatment: - resume home medication

## 2022-03-22 NOTE — DISCHARGE NOTE PROVIDER - NSDCMRMEDTOKEN_GEN_ALL_CORE_FT
ALPRAZolam 1 mg oral tablet: 1 tab(s) orally once a day (at bedtime)  atorvastatin 40 mg oral tablet: 1 tab(s) orally once a day (at bedtime)  ferrous sulfate 325 mg (65 mg elemental iron) oral tablet: 1 tab(s) orally once a day  glimepiride 2 mg oral tablet: 1 tab(s) orally 2 times a day  latanoprost 0.005% ophthalmic solution: 1 drop(s) to each affected eye once a day (in the evening)  midodrine 5 mg oral tablet: 1 tab(s) orally 3 times a day, As needed, SBP &lt;100  pantoprazole 40 mg oral granule, delayed release: 40 milligram(s) orally once a day  sucralfate 1 g oral tablet: 1 tab(s) orally 4 times a day  tamsulosin 0.4 mg oral capsule: 1 cap(s) orally once a day (at bedtime)

## 2022-03-22 NOTE — DISCHARGE NOTE PROVIDER - DISCHARGE DATE
22-Mar-2022 Advancement-Rotation Flap Text: The defect edges were debeveled with a #15 scalpel blade.  Given the location of the defect, shape of the defect and the proximity to free margins an advancement-rotation flap was deemed most appropriate.  Using a sterile surgical marker, an appropriate flap was drawn incorporating the defect and placing the expected incisions within the relaxed skin tension lines where possible. The area thus outlined was incised deep to adipose tissue with a #15 scalpel blade.  The skin margins were undermined to an appropriate distance in all directions utilizing iris scissors.

## 2022-03-23 ENCOUNTER — TRANSCRIPTION ENCOUNTER (OUTPATIENT)
Age: 82
End: 2022-03-23

## 2022-03-23 VITALS
HEART RATE: 91 BPM | TEMPERATURE: 98 F | OXYGEN SATURATION: 97 % | SYSTOLIC BLOOD PRESSURE: 105 MMHG | RESPIRATION RATE: 18 BRPM | DIASTOLIC BLOOD PRESSURE: 68 MMHG

## 2022-03-23 PROCEDURE — 99239 HOSP IP/OBS DSCHRG MGMT >30: CPT

## 2022-03-23 RX ADMIN — Medication 1 GRAM(S): at 11:57

## 2022-03-23 RX ADMIN — PANTOPRAZOLE SODIUM 40 MILLIGRAM(S): 20 TABLET, DELAYED RELEASE ORAL at 05:48

## 2022-03-23 RX ADMIN — NYSTATIN CREAM 1 APPLICATION(S): 100000 CREAM TOPICAL at 05:49

## 2022-03-23 RX ADMIN — Medication 325 MILLIGRAM(S): at 11:57

## 2022-03-23 RX ADMIN — Medication 1 GRAM(S): at 17:31

## 2022-03-23 RX ADMIN — NYSTATIN CREAM 1 APPLICATION(S): 100000 CREAM TOPICAL at 14:15

## 2022-03-23 RX ADMIN — PANTOPRAZOLE SODIUM 40 MILLIGRAM(S): 20 TABLET, DELAYED RELEASE ORAL at 17:31

## 2022-03-23 RX ADMIN — Medication 1 GRAM(S): at 05:47

## 2022-03-23 NOTE — DISCHARGE NOTE NURSING/CASE MANAGEMENT/SOCIAL WORK - PATIENT PORTAL LINK FT
You can access the FollowMyHealth Patient Portal offered by [facility] by registering at the following website: [email or web address]. By joining [employer]’s FollowMyHealth portal, you will also be able to view your health information using other applications (apps) compatible with our system.

## 2022-03-23 NOTE — PROGRESS NOTE ADULT - PROVIDER SPECIALTY LIST ADULT
Gastroenterology
Hospitalist
Hospitalist
Gastroenterology
Gastroenterology
Hospitalist
Surgery
Surgery
Hospitalist

## 2022-03-23 NOTE — DISCHARGE NOTE NURSING/CASE MANAGEMENT/SOCIAL WORK - NSDCPEFALRISK_GEN_ALL_CORE
For information on Fall & Injury Prevention, visit: [email or web address] OR  [email or web address] OR  https://www.cdc.gov/steadi/patient.html

## 2022-03-23 NOTE — PROGRESS NOTE ADULT - SUBJECTIVE AND OBJECTIVE BOX
Mr. Rosas is comfortable in bed  NO nausea or vomiting  NO abdominal pain    ICU Vital Signs Last 24 Hrs  T(C): 36.7 (21 Mar 2022 05:24), Max: 36.9 (20 Mar 2022 11:50)  T(F): 98.1 (21 Mar 2022 05:24), Max: 98.5 (20 Mar 2022 11:50)  HR: 83 (21 Mar 2022 05:24) (72 - 83)  BP: 108/66 (21 Mar 2022 05:24) (98/64 - 124/77)  BP(mean): --  ABP: --  ABP(mean): --  RR: 18 (21 Mar 2022 05:24) (16 - 19)  SpO2: 97% (21 Mar 2022 05:24) (95% - 97%)    I&O's Detail    20 Mar 2022 07:01  -  21 Mar 2022 07:00  --------------------------------------------------------  IN:    Oral Fluid: 480 mL    sodium chloride 0.45%: 600 mL  Total IN: 1080 mL    OUT:    Indwelling Catheter - Urethral (mL): 1500 mL  Total OUT: 1500 mL    Total NET: -420 mL                            8.9    10.22 )-----------( 305      ( 21 Mar 2022 08:31 )             28.0     03-21    137  |  109<H>  |  18  ----------------------------<  106<H>  4.1   |  22  |  0.89    Ca    8.2<L>      21 Mar 2022 08:31    TPro  5.8<L>  /  Alb  1.7<L>  /  TBili  0.6  /  DBili  x   /  AST  20  /  ALT  29  /  AlkPhos  71  03-20    On exam: awake, alert and oriented  BReathing comfortably on room air  Abd is soft, not tender and not distended  Moving lower extremities, has some discomfort with movement    < from: CT Abdomen and Pelvis No Cont (03.18.22 @ 16:41) >    ACC: [id number] EXAM:  CT ABDOMEN AND PELVIS                          PROCEDURE DATE:  03/18/2022          INTERPRETATION:  CLINICAL INFORMATION: Anemia. Evaluate for   retroperitoneal hematoma.    COMPARISON: None.    CONTRAST/COMPLICATIONS:  IV Contrast: None  Oral Contrast: None  Complications: None reported    PROCEDURE:  CT of the Abdomen and Pelvis was performed.  Sagittal and coronal reformats were performed.    FINDINGS:    LOWER CHEST: Dependent atelectasis/scarring. No pleural effusion.Right   lower lobe 7 mm calcified nodule, image 26 series 2. Aortic valve and   coronary calcification. Ectatic thoracic aorta partially imaged. Main   pulmonary artery mildly enlarged measuring 3.2 cm. Gynecomastia.    Solid organ evaluation limiteddue to noncontrast technique.    LIVER: Liver size within normal limits.  BILE DUCTS: No biliary distention  GALLBLADDER: Cholelithiasis with distended and slightly inflamed   gallbladder neck, similar to prior scans. Correlate with LFTs and right   upper quadrant ultrasound as warranted.  SPLEEN: Normal size  PANCREAS: No main ductal dilatation  ADRENALS: Unremarkable  KIDNEYS/URETERS: No hydronephrosis    BLADDER: Underdistended secondary to Khalil catheter  REPRODUCTIVE ORGANS: Enlarged prostate    BOWEL: Moderate hiatal hernia. No small bowel distention. No secondary   sign of acute appendicitis. Mild stool burden of the colon limits   evaluation of the colonic mucosa. Colonic diverticulosis, without   diverticulitis.  PERITONEUM: No ascites.  VESSELS: Moderate aortoiliac and visceral artery atheromatous changes.   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  RETROPERITONEUM/LYMPH NODES: Small volume nodes.  ABDOMINAL WALL: Acute left gluteal hematoma extending caudally to the   proximal left femur level measures 12.2 x 6.4 x 14.9 cm. Soft tissue   edema extends along the left flank and proximal left thigh. Tiny   fat-containing umbilical and inguinal hernias. Incidentally noted fatty   degeneration and calcification of the right rectus femoris muscle.  BONES: Degenerative changes. Central canal and neural foramina are not   adequately assessed on this study.    IMPRESSION:    Limited noncontrast study.    Acute left gluteal hematoma extending caudally to the proximal left femur   level measures 12.2 x 6.4 x 14.9 cm. Soft tissue edema extends along the   left flank and proximal left thigh. Findings were discussed with Dr. Ruiz Bui on 3/18/2022 at 5:00PM.    Cholelithiasis with distended and slightly inflamed gallbladder neck,   similar to prior scans. Correlate with LFTs and right upper quadrant   ultrasound as warranted.    --- End of Report ---            MARCUS RAMBHIA M.D., ATTENDING RADIOLOGIST  This document has been electronically signed. Mar 18 2022  5:08PM    < end of copied text >  
No changes - stable  no bleeding  H/H stable      MEDICATIONS  (STANDING):  ALPRAZolam 1 milliGRAM(s) Oral at bedtime  atorvastatin 40 milliGRAM(s) Oral at bedtime  ferrous    sulfate 325 milliGRAM(s) Oral daily  latanoprost 0.005% Ophthalmic Solution 1 Drop(s) Both EYES at bedtime  nystatin Powder 1 Application(s) Topical three times a day  pantoprazole  Injectable 40 milliGRAM(s) IV Push every 12 hours  sodium chloride 0.45%. 1000 milliLiter(s) (50 mL/Hr) IV Continuous <Continuous>  sucralfate 1 Gram(s) Oral four times a day  tamsulosin 0.4 milliGRAM(s) Oral at bedtime    MEDICATIONS  (PRN):  acetaminophen     Tablet .. 650 milliGRAM(s) Oral every 6 hours PRN Temp greater or equal to 38C (100.4F), Mild Pain (1 - 3)  midodrine. 5 milliGRAM(s) Oral three times a day PRN SBP <100      Allergies    No Known Allergies    Intolerances        Vital Signs Last 24 Hrs  T(C): 37.2 (22 Mar 2022 11:47), Max: 37.2 (22 Mar 2022 11:47)  T(F): 98.9 (22 Mar 2022 11:47), Max: 98.9 (22 Mar 2022 11:47)  HR: 78 (22 Mar 2022 11:47) (65 - 79)  BP: 103/68 (22 Mar 2022 11:47) (103/68 - 128/73)  BP(mean): --  RR: 18 (22 Mar 2022 11:47) (18 - 18)  SpO2: 93% (22 Mar 2022 11:47) (93% - 99%)    PHYSICAL EXAM:  General: NAD.  CVS: S1, S2  Chest: air entry bilaterally present  Abd: BS present, soft, non-tender      LABS:                        9.0    10.05 )-----------( 305      ( 22 Mar 2022 08:16 )             27.4     03-21    137  |  109<H>  |  18  ----------------------------<  106<H>  4.1   |  22  |  0.89    Ca    8.2<L>      21 Mar 2022 08:31      follow CBC  change protonix to PO continue carafate      
Patient is a 81y old  Male who presents with a chief complaint of anemia (19 Mar 2022 09:14)    INTERVAL HPI/OVERNIGHT EVENTS: no events     MEDICATIONS  (STANDING):  ALPRAZolam 1 milliGRAM(s) Oral at bedtime  atorvastatin 40 milliGRAM(s) Oral at bedtime  lactated ringers. 1000 milliLiter(s) (80 mL/Hr) IV Continuous <Continuous>  latanoprost 0.005% Ophthalmic Solution 1 Drop(s) Both EYES at bedtime  pantoprazole  Injectable 40 milliGRAM(s) IV Push every 12 hours  sucralfate 1 Gram(s) Oral four times a day  tamsulosin 0.4 milliGRAM(s) Oral at bedtime    MEDICATIONS  (PRN):  midodrine. 5 milliGRAM(s) Oral three times a day PRN SBP <100    Allergies    No Known Allergies    Intolerances      REVIEW OF SYSTEMS:  All other systems reviewed and are negative    Vital Signs Last 24 Hrs  T(C): 36.7 (19 Mar 2022 05:20), Max: 37 (18 Mar 2022 20:35)  T(F): 98.1 (19 Mar 2022 05:20), Max: 98.6 (18 Mar 2022 20:35)  HR: 92 (19 Mar 2022 05:20) (76 - 101)  BP: 110/70 (19 Mar 2022 05:20) (96/65 - 121/68)  BP(mean): --  RR: 17 (19 Mar 2022 05:20) (16 - 18)  SpO2: 96% (19 Mar 2022 05:20) (95% - 98%)  Daily Height in cm: 170.18 (18 Mar 2022 20:35)    Daily   I&O's Summary    18 Mar 2022 07:01  -  19 Mar 2022 07:00  --------------------------------------------------------  IN: 1820 mL / OUT: 800 mL / NET: 1020 mL      CAPILLARY BLOOD GLUCOSE        PHYSICAL EXAM:  GENERAL: NAD,    HEAD:  Atraumatic, Normocephalic  EYES: EOMI, PERRLA, conjunctiva and sclera clear  ENMT: No tonsillar erythema, exudates, or enlargement; Moist mucous membranes, Good dentition, No lesions  NECK: Supple, No JVD, Normal thyroid  NERVOUS SYSTEM:  Alert & Oriented X3, Good concentration; Motor Strength 5/5 B/L upper and lower extremities; DTRs 2+ intact and symmetric  CHEST/LUNG: Clear to percussion bilaterally; No rales, rhonchi, wheezing, or rubs  HEART: Regular rate and rhythm; No murmurs, rubs, or gallops  ABDOMEN: Soft, Nontender, Nondistended; Bowel sounds present  EXTREMITIES:  2+ Peripheral Pulses, No clubbing, cyanosis, or edema  LYMPH: No lymphadenopathy noted  SKIN: No rashes or lesions    Labs                          9.3    12.11 )-----------( 267      ( 19 Mar 2022 08:19 )             29.3     03-19    140  |  112<H>  |  29<H>  ----------------------------<  105<H>  4.1   |  21<L>  |  1.24    Ca    7.9<L>      19 Mar 2022 08:19    TPro  6.9  /  Alb  2.2<L>  /  TBili  0.6  /  DBili  x   /  AST  20  /  ALT  28  /  AlkPhos  85  03-18    PT/INR - ( 18 Mar 2022 14:46 )   PT: 19.2 sec;   INR: 1.61 ratio         PTT - ( 18 Mar 2022 14:46 )  PTT:36.8 sec                    DVT prophylaxis: > Lovenox 40mg SQ daily  > Heparin   > SCD's
Patient is a 81y old  Male who presents with a chief complaint of anemia (19 Mar 2022 19:17)    INTERVAL HPI/OVERNIGHT EVENTS: no events     MEDICATIONS  (STANDING):  ALPRAZolam 1 milliGRAM(s) Oral at bedtime  atorvastatin 40 milliGRAM(s) Oral at bedtime  ferrous    sulfate 325 milliGRAM(s) Oral daily  latanoprost 0.005% Ophthalmic Solution 1 Drop(s) Both EYES at bedtime  pantoprazole  Injectable 40 milliGRAM(s) IV Push every 12 hours  sodium chloride 0.45%. 1000 milliLiter(s) (75 mL/Hr) IV Continuous <Continuous>  sucralfate 1 Gram(s) Oral four times a day  tamsulosin 0.4 milliGRAM(s) Oral at bedtime    MEDICATIONS  (PRN):  acetaminophen     Tablet .. 650 milliGRAM(s) Oral every 6 hours PRN Temp greater or equal to 38C (100.4F), Mild Pain (1 - 3)  midodrine. 5 milliGRAM(s) Oral three times a day PRN SBP <100    Allergies    No Known Allergies    Intolerances      REVIEW OF SYSTEMS:  All other systems reviewed and are negative    Vital Signs Last 24 Hrs  T(C): 36.6 (20 Mar 2022 05:19), Max: 37.2 (20 Mar 2022 00:44)  T(F): 97.9 (20 Mar 2022 05:19), Max: 98.9 (20 Mar 2022 00:44)  HR: 77 (20 Mar 2022 05:19) (77 - 91)  BP: 90/60 (20 Mar 2022 05:19) (90/60 - 100/60)  BP(mean): 73 (19 Mar 2022 17:04) (73 - 73)  RR: 16 (20 Mar 2022 05:19) (16 - 18)  SpO2: 95% (20 Mar 2022 05:19) (95% - 96%)  Daily     Daily   I&O's Summary    19 Mar 2022 07:01  -  20 Mar 2022 07:00  --------------------------------------------------------  IN: 0 mL / OUT: 700 mL / NET: -700 mL      CAPILLARY BLOOD GLUCOSE        PHYSICAL EXAM:  GENERAL: NAD,    HEAD:  Atraumatic, Normocephalic  EYES: EOMI, PERRLA, conjunctiva and sclera clear  ENMT: No tonsillar erythema, exudates, or enlargement; Moist mucous membranes, Good dentition, No lesions  NECK: Supple, No JVD, Normal thyroid  NERVOUS SYSTEM:  Alert & Oriented X3, Good concentration; Motor Strength 5/5 B/L upper and lower extremities; DTRs 2+ intact and symmetric  CHEST/LUNG: Clear to percussion bilaterally; No rales, rhonchi, wheezing, or rubs  HEART: Regular rate and rhythm; No murmurs, rubs, or gallops  ABDOMEN: Soft, Nontender, Nondistended; Bowel sounds present  EXTREMITIES:  2+ Peripheral Pulses, No clubbing, cyanosis, or edema, L thigh buttock hematoma   LYMPH: No lymphadenopathy noted  SKIN: No rashes or lesions    Labs                          8.2    9.95  )-----------( 261      ( 20 Mar 2022 08:18 )             25.6     03-20    139  |  110<H>  |  22  ----------------------------<  101<H>  3.7   |  23  |  1.03    Ca    7.7<L>      20 Mar 2022 08:18    TPro  5.8<L>  /  Alb  1.7<L>  /  TBili  0.6  /  DBili  x   /  AST  20  /  ALT  29  /  AlkPhos  71  03-20    PT/INR - ( 18 Mar 2022 14:46 )   PT: 19.2 sec;   INR: 1.61 ratio         PTT - ( 18 Mar 2022 14:46 )  PTT:36.8 sec      Urinalysis Basic - ( 20 Mar 2022 09:12 )    Color: Yellow / Appearance: Slightly Turbid / S.015 / pH: x  Gluc: x / Ketone: Negative  / Bili: Negative / Urobili: Negative mg/dL   Blood: x / Protein: 15 mg/dL / Nitrite: Negative   Leuk Esterase: Moderate / RBC: 3-5 /HPF / WBC 3-5   Sq Epi: x / Non Sq Epi: Few / Bacteria: Moderate                  DVT prophylaxis: > Lovenox 40mg SQ daily  > Heparin   > SCD's
Pt tolerating diet  feeling better  awaiting insurance approval for rehab      MEDICATIONS  (STANDING):  ALPRAZolam 1 milliGRAM(s) Oral at bedtime  atorvastatin 40 milliGRAM(s) Oral at bedtime  ferrous    sulfate 325 milliGRAM(s) Oral daily  latanoprost 0.005% Ophthalmic Solution 1 Drop(s) Both EYES at bedtime  nystatin Powder 1 Application(s) Topical three times a day  pantoprazole    Tablet 40 milliGRAM(s) Oral two times a day  sucralfate 1 Gram(s) Oral four times a day  tamsulosin 0.4 milliGRAM(s) Oral at bedtime    MEDICATIONS  (PRN):  acetaminophen     Tablet .. 650 milliGRAM(s) Oral every 6 hours PRN Temp greater or equal to 38C (100.4F), Mild Pain (1 - 3)  midodrine. 5 milliGRAM(s) Oral three times a day PRN SBP <100      Allergies    No Known Allergies    Intolerances        Vital Signs Last 24 Hrs  T(C): 36.4 (23 Mar 2022 11:33), Max: 36.9 (22 Mar 2022 17:34)  T(F): 97.6 (23 Mar 2022 11:33), Max: 98.5 (22 Mar 2022 17:34)  HR: 73 (23 Mar 2022 11:33) (72 - 78)  BP: 109/69 (23 Mar 2022 11:33) (109/69 - 121/71)  BP(mean): --  RR: 17 (23 Mar 2022 11:33) (17 - 18)  SpO2: 98% (23 Mar 2022 11:33) (96% - 100%)    PHYSICAL EXAM:  General: NAD.  CVS: S1, S2  Chest: air entry bilaterally present  Abd: BS present, soft, non-tender      LABS:                        9.0    10.05 )-----------( 305      ( 22 Mar 2022 08:16 )             27.4         continue present meds  should have CBC monitored at rehab      
Pt with anemia  Transfused 2u prbc's  Pt diagnosed with DU 3 weeks ago      MEDICATIONS  (STANDING):  ALPRAZolam 1 milliGRAM(s) Oral at bedtime  atorvastatin 40 milliGRAM(s) Oral at bedtime  latanoprost 0.005% Ophthalmic Solution 1 Drop(s) Both EYES at bedtime  pantoprazole  Injectable 40 milliGRAM(s) IV Push every 12 hours  sodium chloride 0.45%. 1000 milliLiter(s) (75 mL/Hr) IV Continuous <Continuous>  sucralfate 1 Gram(s) Oral four times a day  tamsulosin 0.4 milliGRAM(s) Oral at bedtime    MEDICATIONS  (PRN):  acetaminophen     Tablet .. 650 milliGRAM(s) Oral every 6 hours PRN Temp greater or equal to 38C (100.4F), Mild Pain (1 - 3)  midodrine. 5 milliGRAM(s) Oral three times a day PRN SBP <100      Allergies    No Known Allergies    Intolerances        Vital Signs Last 24 Hrs  T(C): 36.4 (19 Mar 2022 17:04), Max: 37 (18 Mar 2022 20:35)  T(F): 97.6 (19 Mar 2022 17:04), Max: 98.6 (18 Mar 2022 20:35)  HR: 88 (19 Mar 2022 17:04) (76 - 101)  BP: 100/60 (19 Mar 2022 17:04) (95/62 - 121/68)  BP(mean): 73 (19 Mar 2022 17:04) (73 - 73)  RR: 18 (19 Mar 2022 17:04) (16 - 18)  SpO2: 95% (19 Mar 2022 17:04) (95% - 98%)    PHYSICAL EXAM:  General: NAD.  CVS: S1, S2  Chest: air entry bilaterally present  Abd: BS present, soft, non-tender      LABS:                        8.5    10.55 )-----------( 286      ( 19 Mar 2022 18:48 )             25.6     03-19    140  |  112<H>  |  29<H>  ----------------------------<  105<H>  4.1   |  21<L>  |  1.24    Ca    7.9<L>      19 Mar 2022 08:19    TPro  6.9  /  Alb  2.2<L>  /  TBili  0.6  /  DBili  x   /  AST  20  /  ALT  28  /  AlkPhos  85  03-18    PT/INR - ( 18 Mar 2022 14:46 )   PT: 19.2 sec;   INR: 1.61 ratio         PTT - ( 18 Mar 2022 14:46 )  PTT:36.8 sec    continue protonix and carafate  cbc in Am    
SURGERY PROGRESS HPI:  Pt seen and examined at bedside.  No acute events overnight.       Vital Signs Last 24 Hrs  T(C): 36.7 (19 Mar 2022 05:20), Max: 37 (18 Mar 2022 20:35)  T(F): 98.1 (19 Mar 2022 05:20), Max: 98.6 (18 Mar 2022 20:35)  HR: 92 (19 Mar 2022 05:20) (76 - 101)  BP: 110/70 (19 Mar 2022 05:20) (96/65 - 121/68)  BP(mean): --  RR: 17 (19 Mar 2022 05:20) (16 - 18)  SpO2: 96% (19 Mar 2022 05:20) (95% - 98%)      PHYSICAL EXAM:    GENERAL: NAD  HEAD:  Atraumatic, Normocephalic  CHEST/LUNG: Clear to ausculation, bilaterally   HEART: RRR S1S2  ABDOMEN: non distended, +BS, soft, non tender, no guarding  EXTREMITIES:  calf soft, non tender b/l  Musculoskeletal: + Left knee joint swelling/ +tenderness left hip, no abnormal movements  Skin: No rashes, no external evidence of hematoma or bruising to buttocks, no active bleeding from orifices.   I&O's Detail    18 Mar 2022 07:01  -  19 Mar 2022 07:00  --------------------------------------------------------  IN:    Lactated Ringers: 720 mL    Oral Fluid: 480 mL    PRBCs (Packed Red Blood Cells): 620 mL  Total IN: 1820 mL    OUT:    Indwelling Catheter - Urethral (mL): 800 mL  Total OUT: 800 mL    Total NET: 1020 mL          LABS:                        9.3    12.11 )-----------( 267      ( 19 Mar 2022 08:19 )             29.3     03-19    140  |  112<H>  |  29<H>  ----------------------------<  105<H>  4.1   |  21<L>  |  1.24    Ca    7.9<L>      19 Mar 2022 08:19    TPro  6.9  /  Alb  2.2<L>  /  TBili  0.6  /  DBili  x   /  AST  20  /  ALT  28  /  AlkPhos  85  03-18    PT/INR - ( 18 Mar 2022 14:46 )   PT: 19.2 sec;   INR: 1.61 ratio         PTT - ( 18 Mar 2022 14:46 )  PTT:36.8 sec      
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 ago,   [facility] on 2/15/22 with c/o diarrhea x 2 weeks ,  WBC's of 37,940.   Lactate to 3.9, and his BUN / Creat were found to be elevated at 169 / 12.9    ct with   pna/  Cholelithiasis, a moderate Hiatal Hernia, but no bowel obstruction was seen.    Patient was admitted to the CCU m  he underwent HD urgently x 1 on 2/15/22 ,  rec'd 5 Liters of NS.    s/p  Sepsis with Klebsiella Oxytoca ( AKA Raoutella ornithinolytica ) Bacteremia as identified from 2 out of 2 Blood Cx's from 2/15/22   dvt  left femoral V   on  22     now  admitted with anemia,  was   sent from [facility]  denies any rectal  bleed   ct a/p done in  er  denies  cp/sob/abd  pain     Pt was   d/c  on 3/1/  to [facility] on lovenox  bid  for dvt/   and now it is no hold and pt had prbc x 2 u yesterday, per  er  chart    .Pt is alert and oriented x 3 denies headache,   neck/back/hips/calfs pain, cough, sob, chest pain, nausea, vomiting, fever, chills, abd pain, dysuria    Pt sts he did not fall.         (18 Mar 2022 18:35)    SUBJECTIVE & OBJECTIVE: Pt seen and examined at bedside.   PHYSICAL EXAM:  T(C): 36.7 (22 @ 11:47), Max: 36.8 (22 @ 00:37)  HR: 70 (22 @ 11:47) (70 - 83)  BP: 105/64 (22 @ 11:47) (105/64 - 124/77)  RR: 17 (22 @ 11:47) (16 - 19)  SpO2: 96% (22 @ 11:47) (95% - 97%) Daily     Daily I&O's Detail    20 Mar 2022 07:01  -  21 Mar 2022 07:00  --------------------------------------------------------  IN:    Oral Fluid: 480 mL    sodium chloride 0.45%: 600 mL  Total IN: 1080 mL    OUT:    Indwelling Catheter - Urethral (mL): 1500 mL  Total OUT: 1500 mL    Total NET: -420 mL      21 Mar 2022 07:01  -  21 Mar 2022 15:31  --------------------------------------------------------  IN:  Total IN: 0 mL    OUT:    Indwelling Catheter - Urethral (mL): 700 mL  Total OUT: 700 mL    Total NET: -700 mL        GENERAL: NAD, well-groomed, well-developed  HEAD:  Atraumatic, Normocephalic  EYES: EOMI, PERRLA, conjunctiva and sclera clear  ENMT: Moist mucous membranes  NECK: Supple, No JVD  NERVOUS SYSTEM:  Alert & Oriented X3, Motor Strength 5/5 B/L upper and lower extremities; DTRs 2+ intact and symmetric  CHEST/LUNG: Clear to auscultation bilaterally; No rales, rhonchi, wheezing, or rubs  HEART: Regular rate and rhythm; No murmurs, rubs, or gallops  ABDOMEN: Soft, Nontender, Nondistended; Bowel sounds present  EXTREMITIES:  2+ Peripheral Pulses, No clubbing, cyanosis, or edema  LABS:                        8.9    10.22 )-----------( 305      ( 21 Mar 2022 08:31 )             28.0   Urinalysis Basic - ( 20 Mar 2022 09:12 )    Color: Yellow / Appearance: Slightly Turbid / S.015 / pH: x  Gluc: x / Ketone: Negative  / Bili: Negative / Urobili: Negative mg/dL   Blood: x / Protein: 15 mg/dL / Nitrite: Negative   Leuk Esterase: Moderate / RBC: 3-5 /HPF / WBC 3-5   Sq Epi: x / Non Sq Epi: Few / Bacteria: Moderate    CAPILLARY BLOOD GLUCOSE        RECENT CULTURES:   RADIOLOGY & ADDITIONAL TESTS:  
Patient is a 81y old  Male who presents with a chief complaint of anemia (23 Mar 2022 13:27)    INTERVAL HPI/OVERNIGHT EVENTS: Patients seen and examined at bedside this morning. No acute events overnight. Pt reports left hip pain is improving. Slightly better today than yesterday. Feels like he needs a nap due to the pain medications. Wants to sit in chair and start therapy. Reports diet is well. Had 1 BM today.     MEDICATIONS  (STANDING):  ALPRAZolam 1 milliGRAM(s) Oral at bedtime  atorvastatin 40 milliGRAM(s) Oral at bedtime  ferrous    sulfate 325 milliGRAM(s) Oral daily  latanoprost 0.005% Ophthalmic Solution 1 Drop(s) Both EYES at bedtime  nystatin Powder 1 Application(s) Topical three times a day  pantoprazole    Tablet 40 milliGRAM(s) Oral two times a day  sucralfate 1 Gram(s) Oral four times a day  tamsulosin 0.4 milliGRAM(s) Oral at bedtime    MEDICATIONS  (PRN):  acetaminophen     Tablet .. 650 milliGRAM(s) Oral every 6 hours PRN Temp greater or equal to 38C (100.4F), Mild Pain (1 - 3)  midodrine. 5 milliGRAM(s) Oral three times a day PRN SBP <100    Allergies    No Known Allergies    Intolerances      REVIEW OF SYSTEMS:  All other systems reviewed and are negative    Vital Signs Last 24 Hrs  T(C): 36.4 (23 Mar 2022 11:33), Max: 36.9 (22 Mar 2022 17:34)  T(F): 97.6 (23 Mar 2022 11:33), Max: 98.5 (22 Mar 2022 17:34)  HR: 73 (23 Mar 2022 11:33) (72 - 78)  BP: 109/69 (23 Mar 2022 11:33) (109/69 - 121/71)  BP(mean): --  RR: 17 (23 Mar 2022 11:33) (17 - 18)  SpO2: 98% (23 Mar 2022 11:33) (96% - 100%)  Daily     Daily   I&O's Summary    22 Mar 2022 07:01  -  23 Mar 2022 07:00  --------------------------------------------------------  IN: 240 mL / OUT: 2200 mL / NET: -1960 mL    23 Mar 2022 07:01  -  23 Mar 2022 15:06  --------------------------------------------------------  IN: 0 mL / OUT: 700 mL / NET: -700 mL      CAPILLARY BLOOD GLUCOSE        PHYSICAL EXAM:  GENERAL: NAD, chronically ill appearing   HEAD:  Atraumatic, Normocephalic  EYES: EOMI, PERRLA, conjunctiva and sclera clear  ENMT: No tonsillar erythema, exudates, or enlargement; Moist mucous membranes, Good dentition, No lesions  NECK: Supple, No JVD, Normal thyroid  NERVOUS SYSTEM:  Alert & Oriented X2, Good concentration; Motor Strength 3/5 B/L upper and lower extremities; DTRs 2+ intact and symmetric  CHEST/LUNG: Clear to percussion bilaterally; No rales, rhonchi, wheezing, or rubs  HEART: Regular rate and rhythm; No murmurs, rubs, or gallops  ABDOMEN: Soft, Nontender, Nondistended; Bowel sounds present  EXTREMITIES:  2+ Peripheral Pulses, No clubbing, cyanosis, or edema, Pain to left buttock to palpation.  LYMPH: No lymphadenopathy noted  SKIN: No rashes or lesions, no ecchymosis     Labs                          9.0    10.05 )-----------( 305      ( 22 Mar 2022 08:16 )             27.4                               
Pt feeling better  H/H improving  no evidence of bleeding - pt has recent h/o DU      MEDICATIONS  (STANDING):  ALPRAZolam 1 milliGRAM(s) Oral at bedtime  atorvastatin 40 milliGRAM(s) Oral at bedtime  ferrous    sulfate 325 milliGRAM(s) Oral daily  latanoprost 0.005% Ophthalmic Solution 1 Drop(s) Both EYES at bedtime  nystatin Powder 1 Application(s) Topical three times a day  pantoprazole  Injectable 40 milliGRAM(s) IV Push every 12 hours  sodium chloride 0.45%. 1000 milliLiter(s) (50 mL/Hr) IV Continuous <Continuous>  sucralfate 1 Gram(s) Oral four times a day  tamsulosin 0.4 milliGRAM(s) Oral at bedtime    MEDICATIONS  (PRN):  acetaminophen     Tablet .. 650 milliGRAM(s) Oral every 6 hours PRN Temp greater or equal to 38C (100.4F), Mild Pain (1 - 3)  midodrine. 5 milliGRAM(s) Oral three times a day PRN SBP <100      Allergies    No Known Allergies    Intolerances        Vital Signs Last 24 Hrs  T(C): 36.7 (21 Mar 2022 05:24), Max: 36.9 (20 Mar 2022 11:50)  T(F): 98.1 (21 Mar 2022 05:24), Max: 98.5 (20 Mar 2022 11:50)  HR: 83 (21 Mar 2022 05:24) (72 - 83)  BP: 108/66 (21 Mar 2022 05:24) (98/64 - 124/77)  BP(mean): --  RR: 18 (21 Mar 2022 05:24) (16 - 19)  SpO2: 97% (21 Mar 2022 05:24) (95% - 97%)    PHYSICAL EXAM:  General: NAD.  CVS: S1, S2  Chest: air entry bilaterally present  Abd: BS present, soft, non-tender      LABS:                        8.9    10.22 )-----------( 305      ( 21 Mar 2022 08:31 )             28.0     03-21    137  |  109<H>  |  18  ----------------------------<  106<H>  4.1   |  22  |  0.89    Ca    8.2<L>      21 Mar 2022 08:31    TPro  5.8<L>  /  Alb  1.7<L>  /  TBili  0.6  /  DBili  x   /  AST  20  /  ALT  29  /  AlkPhos  71  03-20        diet as tolerated  continue protonix and carafate  CBC in AM      
Pt stable  DU seen at endoscopy 3 weeks ago      MEDICATIONS  (STANDING):  ALPRAZolam 1 milliGRAM(s) Oral at bedtime  atorvastatin 40 milliGRAM(s) Oral at bedtime  ferrous    sulfate 325 milliGRAM(s) Oral daily  latanoprost 0.005% Ophthalmic Solution 1 Drop(s) Both EYES at bedtime  pantoprazole  Injectable 40 milliGRAM(s) IV Push every 12 hours  sodium chloride 0.45%. 1000 milliLiter(s) (75 mL/Hr) IV Continuous <Continuous>  sucralfate 1 Gram(s) Oral four times a day  tamsulosin 0.4 milliGRAM(s) Oral at bedtime    MEDICATIONS  (PRN):  acetaminophen     Tablet .. 650 milliGRAM(s) Oral every 6 hours PRN Temp greater or equal to 38C (100.4F), Mild Pain (1 - 3)  midodrine. 5 milliGRAM(s) Oral three times a day PRN SBP <100      Allergies    No Known Allergies    Intolerances        Vital Signs Last 24 Hrs  T(C): 36.9 (20 Mar 2022 11:50), Max: 37.2 (20 Mar 2022 00:44)  T(F): 98.5 (20 Mar 2022 11:50), Max: 98.9 (20 Mar 2022 00:44)  HR: 72 (20 Mar 2022 11:50) (72 - 88)  BP: 98/64 (20 Mar 2022 11:50) (90/60 - 100/60)  BP(mean): 73 (19 Mar 2022 17:04) (73 - 73)  RR: 17 (20 Mar 2022 11:50) (16 - 18)  SpO2: 95% (20 Mar 2022 11:50) (95% - 95%)    PHYSICAL EXAM:  General: NAD.  CVS: S1, S2  Chest: air entry bilaterally present  Abd: BS present, soft, non-tender      LABS:                        8.2    9.95  )-----------( 261      ( 20 Mar 2022 08:18 )             25.6     03-20    139  |  110<H>  |  22  ----------------------------<  101<H>  3.7   |  23  |  1.03    Ca    7.7<L>      20 Mar 2022 08:18    TPro  5.8<L>  /  Alb  1.7<L>  /  TBili  0.6  /  DBili  x   /  AST  20  /  ALT  29  /  AlkPhos  71  03-20      follow CBC  same meds

## 2022-03-23 NOTE — PROGRESS NOTE ADULT - ASSESSMENT
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 ago,   LIJVS on 2/15/22 with c/o diarrhea x 2 weeks ,  WBC's of 37,940.   Lactate to 3.9, and his BUN / Creat were found to be elevated at 169 / 12.9    ct with   pna/  Cholelithiasis, a moderate Hiatal Hernia, but no bowel obstruction was seen.    Patient was admitted to the CCU m  he underwent HD urgently x 1 on 2/15/22 ,  rec'd 5 Liters of NS.    s/p  Sepsis with Klebsiella Oxytoca ( AKA Raoutella ornithinolytica ) Bacteremia as identified from 2 out of 2 Blood Cx's from 2/15/22   dvt  left femoral V   on  2/17/22,  was    d/c  on  lovenox  bid       now  admitted with anemia,  was   sent from [place]/  denies  any  falls/  trauma / denies any rectal  bleed/ cp/sob/abd  pain       *  anemia,hgb is 7   anemia,  is  from bleed  into  left  gluteal area/  also pt is guaic  +/  denies  melena/ brbpr/  s/p egd last month by maryann pennington. with DU    prbc  given.    hgb w good response  wbc  of  15,000 appears  reactive,  to acute bleed/  pt is afebrile and  non toxic appearing  [facility]  [facility],  12 by   6 by  14   cm,  acute left  gluteal hematoma,  extends  to left thidh/ gallstones. inflamed  GB  neck, . similar to prior    Eliquis stopped, duplex neg for dvt      *  c/c  low  sbp, on midodrine   on iv fluids    *   recent  anthony,  known to dr colvin, , crt is  2  now  gi  maryann r christi  surg  on board                        Pt was one Eliquis for dvt. and now it is no hold and pt had prbc x 2 u yesterday.   
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 ago,   LIJVS on 2/15/22 with c/o diarrhea x 2 weeks ,  WBC's of 37,940.   Lactate to 3.9, and his BUN / Creat were found to be elevated at 169 / 12.9  ct with   pna/  Cholelithiasis, a moderate Hiatal Hernia, but no bowel obstruction was seen.    Patient was admitted to the CCU m  he underwent HD urgently x 1 on 2/15/22 ,  rec'd 5 Liters of NS.  s/p  Sepsis with Klebsiella Oxytoca ( AKA Raoutella ornithinolytica ) Bacteremia as identified from 2 out of 2 Blood Cx's from 2/15/22   dvt  left femoral V   on  2/17/22,  was    d/c  on  lovenox  bid   now  admitted with anemia,  was   sent from [facility] for buttock/hip hematoma        Acute blood loss anemia  anemia,hgb is 7   anemia,  is  from bleed  into  left  gluteal area/  also pt is guaic  +/  denies  melena/ brbpr/  s/p egd last month by maryann pennington.    hgb w good response  wbc  of  15,000 appears  reactive,  to acute bleed/  pt is afebrile and  non toxic appearing  [facility]  [facility],  12 by   6 by  14   cm,  acute left  gluteal hematoma,  extends  to left thidh/ gallstones. inflamed  GB  neck, . similar to prior  AC stopped, duplex neg for dvt      *  c/c  low  sbp, on midodrine   on iv fluids    *   recent  anthony,  known to dr colvin, , crt improved        PT eval for placement , monitor cbc    
81 year old man with cholelithiasis, and gluteal hematoma. H/H stable.  - no surgical intervention required at this time  - will sign off, please reconsult as needed  
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 ago,   LIJVS on 2/15/22 with c/o diarrhea x 2 weeks ,  WBC's of 37,940.   Lactate to 3.9, and his BUN / Creat were found to be elevated at 169 / 12.9  ct with   pna/  Cholelithiasis, a moderate Hiatal Hernia, but no bowel obstruction was seen.    Patient was admitted to the CCU m  he underwent HD urgently x 1 on 2/15/22 ,  rec'd 5 Liters of NS.  s/p  Sepsis with Klebsiella Oxytoca ( AKA Raoutella ornithinolytica ) Bacteremia as identified from 2 out of 2 Blood Cx's from 2/15/22   dvt  left femoral V   on  2/17/22,  was    d/c  on  lovenox  bid   now  admitted with anemia,  was   sent from [facility] for buttock/hip hematoma        Acute blood loss anemia  anemia,hgb is 7   anemia,  is  from bleed  into  left  gluteal area/  also pt is guaic  +/  denies  melena/ brbpr/  s/p egd last month by maryann pennington.    hgb w good response  wbc  of  15,000 appears  reactive,  to acute bleed/  pt is afebrile and  non toxic appearing  [facility]  [facility]  a/p,  12 by   6 by  14   cm,  acute left  gluteal hematoma,  extends  to left thidh/ gallstones. inflamed  GB  neck, . similar to prior  AC stopped, duplex neg for dvt      *  c/c  low  sbp, on midodrine   on iv fluids    *   recent  anthony,  known to dr colvin, , crt improved        PT eval for LANE, patient to be discharged today pending bed.
81 year old male with complex medical hx as above, recent sepsis, anthony and HD, DM, HTN, HLD, cholelithiasis, hx of benign bladder tumor    admitted to medical service.   hemodynamically stable, h/h improving  no emergent surgical intervention required  reconsult as necessary
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 ago,   LIJVS on 2/15/22 with c/o diarrhea x 2 weeks ,  WBC's of 37,940.   Lactate to 3.9, and his BUN / Creat were found to be elevated at 169 / 12.9  ct with   pna/  Cholelithiasis, a moderate Hiatal Hernia, but no bowel obstruction was seen.    Patient was admitted to the CCU m  he underwent HD urgently x 1 on 2/15/22 ,  rec'd 5 Liters of NS.  s/p  Sepsis with Klebsiella Oxytoca ( AKA Raoutella ornithinolytica ) Bacteremia as identified from 2 out of 2 Blood Cx's from 2/15/22   dvt  left femoral V   on  2/17/22,  was    d/c  on  lovenox  bid   now  admitted with anemia,  was   sent from [facility] for buttock/hip hematoma        Acute blood loss anemia  anemia,hgb is 7   anemia,  is  from bleed  into  left  gluteal area/  also pt is guaic  +/  denies  melena/ brbpr/  s/p egd last month by maryann pennington.    hgb w good response  wbc  of  15,000 appears  reactive,  to acute bleed/  pt is afebrile and  non toxic appearing  [facility]  [facility]/p,  12 by   6 by  14   cm,  acute left  gluteal hematoma,  extends  to left thidh/ gallstones. inflamed  GB  neck, . similar to prior  AC stopped, duplex neg for dvt      *  c/c  low  sbp, on midodrine   on iv fluids    *   recent  anthony,  known to dr colivn, , crt improved        PT eval for placement , monitor cbc

## 2022-03-29 DIAGNOSIS — Z87.440 PERSONAL HISTORY OF URINARY (TRACT) INFECTIONS: ICD-10-CM

## 2022-03-29 DIAGNOSIS — Z85.46 PERSONAL HISTORY OF MALIGNANT NEOPLASM OF PROSTATE: ICD-10-CM

## 2022-03-29 DIAGNOSIS — E78.5 HYPERLIPIDEMIA, UNSPECIFIED: ICD-10-CM
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
## 2022-03-29 DIAGNOSIS — N17.9 ACUTE KIDNEY FAILURE, UNSPECIFIED: ICD-10-CM

## 2022-03-29 DIAGNOSIS — Z79.899 OTHER LONG TERM (CURRENT) DRUG THERAPY: ICD-10-CM

## 2022-03-29 DIAGNOSIS — E11.9 TYPE 2 DIABETES MELLITUS WITHOUT COMPLICATIONS: ICD-10-CM

## 2022-03-29 DIAGNOSIS — D64.9 ANEMIA, UNSPECIFIED: ICD-10-CM

## 2022-03-29 DIAGNOSIS — K26.4 CHRONIC OR UNSPECIFIED DUODENAL ULCER WITH HEMORRHAGE: ICD-10-CM

## 2022-03-29 DIAGNOSIS — I10 ESSENTIAL (PRIMARY) HYPERTENSION: ICD-10-CM
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
## 2022-03-29 DIAGNOSIS — K80.20 CALCULUS OF GALLBLADDER WITHOUT CHOLECYSTITIS WITHOUT OBSTRUCTION: ICD-10-CM

## 2022-03-29 DIAGNOSIS — S30.0XXA CONTUSION OF LOWER BACK AND PELVIS, INITIAL ENCOUNTER: ICD-10-CM

## 2022-03-29 DIAGNOSIS — Z20.822 CONTACT WITH AND (SUSPECTED) EXPOSURE TO COVID-19: ICD-10-CM

## 2022-03-29 DIAGNOSIS — Z79.01 LONG TERM (CURRENT) USE OF ANTICOAGULANTS: ICD-10-CM

## 2022-03-29 DIAGNOSIS — Z86.19 PERSONAL HISTORY OF OTHER INFECTIOUS AND PARASITIC DISEASES: ICD-10-CM

## 2022-03-29 DIAGNOSIS — Z85.820 PERSONAL HISTORY OF MALIGNANT MELANOMA OF SKIN: ICD-10-CM

## 2022-03-29 DIAGNOSIS — D62 ACUTE POSTHEMORRHAGIC ANEMIA: ICD-10-CM

## 2022-03-29 DIAGNOSIS — Z96.653 PRESENCE OF ARTIFICIAL KNEE JOINT, BILATERAL: ICD-10-CM

## 2022-03-29 DIAGNOSIS — Z79.84 LONG TERM (CURRENT) USE OF ORAL HYPOGLYCEMIC DRUGS: ICD-10-CM

## 2022-04-05 ENCOUNTER — APPOINTMENT (OUTPATIENT)
Dept: UROLOGY | Facility: CLINIC | Age: 82
End: 2022-04-05
Payer: MEDICARE

## 2022-04-05 VITALS
HEIGHT: 69 IN | BODY MASS INDEX: 30.36 KG/M2 | OXYGEN SATURATION: 98 % | SYSTOLIC BLOOD PRESSURE: 154 MMHG | WEIGHT: 205 LBS | TEMPERATURE: 97.7 F | DIASTOLIC BLOOD PRESSURE: 82 MMHG | HEART RATE: 100 BPM

## 2022-04-05 PROCEDURE — 99214 OFFICE O/P EST MOD 30 MIN: CPT

## 2022-04-05 RX ORDER — PHENAZOPYRIDINE HYDROCHLORIDE 100 MG/1
100 TABLET ORAL 3 TIMES DAILY
Qty: 9 | Refills: 0 | Status: DISCONTINUED | COMMUNITY
Start: 2020-07-17 | End: 2022-04-05

## 2022-04-06 NOTE — LETTER BODY
[Dear  ___] : Dear  [unfilled], [Consult Letter:] : I had the pleasure of evaluating your patient, [unfilled]. [Please see my note below.] : Please see my note below. [Consult Closing:] : Thank you very much for allowing me to participate in the care of this patient.  If you have any questions, please do not hesitate to contact me. [Sincerely,] : Sincerely, [FreeTextEntry1] : see below\par increase flomax\par \par f/u tov later this week [FreeTextEntry3] : Breezy Cote MD FACS\par \par Attending Urologist-[facility]\par Chief-Urology Division [facility]\par Associate Clinical Professor-[employer] at [facility]\par \par

## 2022-04-06 NOTE — PHYSICAL EXAM
[General Appearance - Well Nourished] : well nourished [General Appearance - Well Developed] : well developed [Normal Appearance] : normal appearance [Well Groomed] : well groomed [General Appearance - In No Acute Distress] : no acute distress [Edema] : no peripheral edema [Respiration, Rhythm And Depth] : normal respiratory rhythm and effort [Exaggerated Use Of Accessory Muscles For Inspiration] : no accessory muscle use [Abdomen Soft] : soft [Abdomen Tenderness] : non-tender [Costovertebral Angle Tenderness] : no ~M costovertebral angle tenderness [Urethral Meatus] : meatus normal [Scrotum] : the scrotum was normal [Testes Mass (___cm)] : there were no testicular masses [No Prostate Nodules] : no prostate nodules [Normal Station and Gait] : the gait and station were normal for the patient's age [] : no rash [No Focal Deficits] : no focal deficits [Oriented To Time, Place, And Person] : oriented to person, place, and time [Affect] : the affect was normal [Mood] : the mood was normal [Not Anxious] : not anxious [No Palpable Adenopathy] : no palpable adenopathy [Epididymis] : the epididymides were normal [Testes Tenderness] : no tenderness of the testes [Anus Abnormality] : the anus and perineum were normal [Rectal Exam - Rectum] : digital rectal exam was normal [Prostate Tenderness] : the prostate was not tender [Prostate Size ___ (0-4)] : prostate size [unfilled] (scale: 0-4) [FreeTextEntry1] : normal bulbocavernosus reflex, good tone

## 2022-04-06 NOTE — HISTORY OF PRESENT ILLNESS
[FreeTextEntry1] : 81 year old male with h/o bladder ca presents today for c/o acute urinary retention \par \par pt's family member notes pt visited [facility] ER one month ago for urinary sepsis, experiencing fever and other related symptoms- yao cath was placed there  \par \par pt then went to rehab for 3 weeks and left last Friday \par \par here today for f/u- family wishes for pt to see a urologist closer to home \par  \par currently on 0.4 flomax prescribed by  , Dr. Nicole \par \par \par s/p TURBT with intravesical mitomycin instillation 8/4/2020 at [facility] - papillary urothelial carcinoma, low grade \par \par last cysto 1/22-negative

## 2022-04-06 NOTE — ASSESSMENT
[FreeTextEntry1] : 81 year old male with acute urinary retention and UTI \par \par UA with reflex, cath culture, and cytology sent today \par \par increase to 0.8 flomax \par \par recommended pt \par RTO early this friday for tov after increasing flomax\par \par \par \par \par

## 2022-04-06 NOTE — END OF VISIT
[FreeTextEntry3] : Medical record entries made by the scribe today, were at my direction and personally dictated to them by me, Dr. Breezy Cote on 04/05/2022. I have reviewed the chart and agree that the record accurately reflects my personal performance of the history, physical exam, assessment, and plan.

## 2022-04-08 ENCOUNTER — APPOINTMENT (OUTPATIENT)
Dept: UROLOGY | Facility: CLINIC | Age: 82
End: 2022-04-08
Payer: MEDICARE

## 2022-04-08 VITALS
SYSTOLIC BLOOD PRESSURE: 166 MMHG | DIASTOLIC BLOOD PRESSURE: 76 MMHG | WEIGHT: 205 LBS | TEMPERATURE: 97.7 F | OXYGEN SATURATION: 97 % | HEIGHT: 69 IN | HEART RATE: 100 BPM | BODY MASS INDEX: 30.36 KG/M2

## 2022-04-08 PROCEDURE — 51798 US URINE CAPACITY MEASURE: CPT

## 2022-04-08 PROCEDURE — 51700 IRRIGATION OF BLADDER: CPT

## 2022-04-08 PROCEDURE — 99212 OFFICE O/P EST SF 10 MIN: CPT | Mod: 25

## 2022-04-08 PROCEDURE — A4216: CPT | Mod: NC

## 2022-04-11 ENCOUNTER — NON-APPOINTMENT (OUTPATIENT)
Age: 82
End: 2022-04-11

## 2022-04-12 ENCOUNTER — APPOINTMENT (OUTPATIENT)
Dept: UROLOGY | Facility: CLINIC | Age: 82
End: 2022-04-12
Payer: MEDICARE

## 2022-04-12 VITALS
SYSTOLIC BLOOD PRESSURE: 158 MMHG | DIASTOLIC BLOOD PRESSURE: 82 MMHG | OXYGEN SATURATION: 97 % | TEMPERATURE: 97.9 F | HEART RATE: 106 BPM

## 2022-04-12 PROCEDURE — 52000 CYSTOURETHROSCOPY: CPT

## 2022-04-12 PROCEDURE — 76872 US TRANSRECTAL: CPT

## 2022-04-12 RX ORDER — GENTAMICIN SULFATE 40 MG/ML
40 INJECTION, SOLUTION INTRAMUSCULAR; INTRAVENOUS
Qty: 1 | Refills: 0 | Status: COMPLETED | OUTPATIENT
Start: 2022-04-12

## 2022-04-12 RX ORDER — GENTAMICIN SULFATE 40 MG/ML
40 INJECTION, SOLUTION INTRAMUSCULAR; INTRAVENOUS
Qty: 2 | Refills: 0 | Status: COMPLETED | COMMUNITY
Start: 2022-04-11 | End: 2022-04-12

## 2022-04-12 RX ADMIN — GENTAMICIN SULFATE 0 MG/ML: 40 INJECTION, SOLUTION INTRAMUSCULAR; INTRAVENOUS at 00:00

## 2022-04-16 LAB — BACTERIA UR CULT: NORMAL

## 2022-04-16 NOTE — END OF VISIT
[FreeTextEntry3] : Medical record entries made by the scribe today, were at my direction and personally dictated to them by me, Dr. Breezy Cote on 04/08/2022. I have reviewed the chart and agree that the record accurately reflects my personal performance of the history, physical exam, assessment, and plan.

## 2022-04-16 NOTE — ADDENDUM
[FreeTextEntry1] : I, Maricruz Chowdhury, solely acted as scribe for Dr. Breezy Cote on 04/08/2022.

## 2022-04-16 NOTE — HISTORY OF PRESENT ILLNESS
[FreeTextEntry1] : 81 year old male here today for TOV\par n flomax 0.8 mg daily\par \par c/s pending

## 2022-04-16 NOTE — ASSESSMENT
[FreeTextEntry1] : 81 year old male for TOV\par pt was filled with 180 cc \par was unable to urinate - patient was instructed to wait in the waiting room until he was able to do so\par ___________________\par \par 1:00 pm\par PVR: 240\par cath was reinserted\par \par patient instructed on proper hygiene habits\par Follow up for cysto, then UDS, TRUS

## 2022-04-18 ENCOUNTER — APPOINTMENT (OUTPATIENT)
Dept: UROLOGY | Facility: CLINIC | Age: 82
End: 2022-04-18

## 2022-04-19 ENCOUNTER — APPOINTMENT (OUTPATIENT)
Dept: UROLOGY | Facility: CLINIC | Age: 82
End: 2022-04-19
Payer: MEDICARE

## 2022-04-19 VITALS
DIASTOLIC BLOOD PRESSURE: 83 MMHG | HEART RATE: 106 BPM | TEMPERATURE: 97.6 F | SYSTOLIC BLOOD PRESSURE: 153 MMHG | OXYGEN SATURATION: 97 %

## 2022-04-19 PROCEDURE — 99214 OFFICE O/P EST MOD 30 MIN: CPT | Mod: 57

## 2022-04-19 PROCEDURE — 51728 CYSTOMETROGRAM W/VP: CPT

## 2022-04-19 PROCEDURE — 51784 ANAL/URINARY MUSCLE STUDY: CPT

## 2022-04-19 PROCEDURE — 99214 OFFICE O/P EST MOD 30 MIN: CPT | Mod: 25,57

## 2022-04-25 ENCOUNTER — OUTPATIENT (OUTPATIENT)
Dept: OUTPATIENT SERVICES | Facility: HOSPITAL | Age: 82
LOS: 1 days | Discharge: HOME | End: 2022-04-25
Payer: MEDICARE

## 2022-04-25 VITALS
WEIGHT: 218.04 LBS | RESPIRATION RATE: 17 BRPM | OXYGEN SATURATION: 98 % | HEART RATE: 83 BPM | HEIGHT: 68 IN | SYSTOLIC BLOOD PRESSURE: 139 MMHG | DIASTOLIC BLOOD PRESSURE: 86 MMHG | TEMPERATURE: 98 F

## 2022-04-25 DIAGNOSIS — R33.9 RETENTION OF URINE, UNSPECIFIED: ICD-10-CM

## 2022-04-25 DIAGNOSIS — E11.9 TYPE 2 DIABETES MELLITUS WITHOUT COMPLICATIONS: ICD-10-CM

## 2022-04-25 DIAGNOSIS — I10 ESSENTIAL (PRIMARY) HYPERTENSION: ICD-10-CM

## 2022-04-25 DIAGNOSIS — Z01.818 ENCOUNTER FOR OTHER PREPROCEDURAL EXAMINATION: ICD-10-CM

## 2022-04-25 DIAGNOSIS — Z98.890 OTHER SPECIFIED POSTPROCEDURAL STATES: Chronic | ICD-10-CM

## 2022-04-25 DIAGNOSIS — N40.0 BENIGN PROSTATIC HYPERPLASIA WITHOUT LOWER URINARY TRACT SYMPTOMS: ICD-10-CM

## 2022-04-25 DIAGNOSIS — Z96.651 PRESENCE OF RIGHT ARTIFICIAL KNEE JOINT: Chronic | ICD-10-CM

## 2022-04-25 DIAGNOSIS — Z96.652 PRESENCE OF LEFT ARTIFICIAL KNEE JOINT: Chronic | ICD-10-CM

## 2022-04-25 DIAGNOSIS — Z98.49 CATARACT EXTRACTION STATUS, UNSPECIFIED EYE: Chronic | ICD-10-CM

## 2022-04-25 LAB
ANION GAP SERPL CALC-SCNC: 3 MMOL/L — LOW (ref 5–17)
APPEARANCE UR: ABNORMAL
BACTERIA # UR AUTO: ABNORMAL
BILIRUB UR-MCNC: NEGATIVE — SIGNIFICANT CHANGE UP
BUN SERPL-MCNC: 13 MG/DL — SIGNIFICANT CHANGE UP (ref 7–23)
CALCIUM SERPL-MCNC: 8.6 MG/DL — SIGNIFICANT CHANGE UP (ref 8.5–10.1)
CHLORIDE SERPL-SCNC: 110 MMOL/L — HIGH (ref 96–108)
CO2 SERPL-SCNC: 28 MMOL/L — SIGNIFICANT CHANGE UP (ref 22–31)
COLOR SPEC: YELLOW — SIGNIFICANT CHANGE UP
COMMENT - URINE: SIGNIFICANT CHANGE UP
CREAT SERPL-MCNC: 1.05 MG/DL — SIGNIFICANT CHANGE UP (ref 0.5–1.3)
DIFF PNL FLD: ABNORMAL
EGFR: 71 ML/MIN/1.73M2 — SIGNIFICANT CHANGE UP
EPI CELLS # UR: SIGNIFICANT CHANGE UP
GLUCOSE SERPL-MCNC: 141 MG/DL — HIGH (ref 70–99)
GLUCOSE UR QL: NEGATIVE MG/DL — SIGNIFICANT CHANGE UP
GRAN CASTS # UR COMP ASSIST: ABNORMAL /LPF
HCT VFR BLD CALC: 35.8 % — LOW (ref 39–50)
HGB BLD-MCNC: 11.6 G/DL — LOW (ref 13–17)
KETONES UR-MCNC: NEGATIVE — SIGNIFICANT CHANGE UP
LEUKOCYTE ESTERASE UR-ACNC: ABNORMAL
MCHC RBC-ENTMCNC: 30.1 PG — SIGNIFICANT CHANGE UP (ref 27–34)
MCHC RBC-ENTMCNC: 32.4 G/DL — SIGNIFICANT CHANGE UP (ref 32–36)
MCV RBC AUTO: 92.7 FL — SIGNIFICANT CHANGE UP (ref 80–100)
NITRITE UR-MCNC: NEGATIVE — SIGNIFICANT CHANGE UP
NRBC # BLD: 0 /100 WBCS — SIGNIFICANT CHANGE UP (ref 0–0)
PH UR: 6 — SIGNIFICANT CHANGE UP (ref 5–8)
PLATELET # BLD AUTO: 266 K/UL — SIGNIFICANT CHANGE UP (ref 150–400)
POTASSIUM SERPL-MCNC: 4.5 MMOL/L — SIGNIFICANT CHANGE UP (ref 3.5–5.3)
POTASSIUM SERPL-SCNC: 4.5 MMOL/L — SIGNIFICANT CHANGE UP (ref 3.5–5.3)
PROT UR-MCNC: 30 MG/DL
RBC # BLD: 3.86 M/UL — LOW (ref 4.2–5.8)
RBC # FLD: 16.7 % — HIGH (ref 10.3–14.5)
RBC CASTS # UR COMP ASSIST: ABNORMAL /HPF (ref 0–4)
SODIUM SERPL-SCNC: 141 MMOL/L — SIGNIFICANT CHANGE UP (ref 135–145)
SP GR SPEC: 1.01 — SIGNIFICANT CHANGE UP (ref 1.01–1.02)
UROBILINOGEN FLD QL: NEGATIVE MG/DL — SIGNIFICANT CHANGE UP
WBC # BLD: 7.66 K/UL — SIGNIFICANT CHANGE UP (ref 3.8–10.5)
WBC # FLD AUTO: 7.66 K/UL — SIGNIFICANT CHANGE UP (ref 3.8–10.5)
WBC UR QL: >50

## 2022-04-25 PROCEDURE — 93010 ELECTROCARDIOGRAM REPORT: CPT

## 2022-04-25 RX ORDER — GLIMEPIRIDE 1 MG
1 TABLET ORAL
Qty: 0 | Refills: 0 | DISCHARGE

## 2022-04-25 NOTE — H&P PST ADULT - ASSESSMENT
81M pmh htn, bph, hld, dm gerd c/o urinary retention here for PST for scheduled cystoscopy transurethral resection of prostate  CAPRINI SCORE    AGE RELATED RISK FACTORS                                                       MOBILITY RELATED FACTORS  [ ] Age 41-60 years                                            (1 Point)                  [ ] Bed rest                                                        (1 Point)  [ ] Age: 61-74 years                                           (2 Points)                [ ] Plaster cast                                                   (2 Points)  [x ] Age= 75 years                                              (3 Points)                 [ ] Bed bound for more than 72 hours                   (2 Points)    DISEASE RELATED RISK FACTORS                                               GENDER SPECIFIC FACTORS  [x ] Edema in the lower extremities                       (1 Point)                  [ ] Pregnancy                                                     (1 Point)  [ ] Varicose veins                                               (1 Point)                  [ ] Post-partum < 6 weeks                                   (1 Point)             [x ] BMI > 25 Kg/m2                                            (1 Point)                  [ ] Hormonal therapy  or oral contraception            (1 Point)                 [ ] Sepsis (in the previous month)                        (1 Point)                  [ ] History of pregnancy complications  [ ] Pneumonia or serious lung disease                                               [ ] Unexplained or recurrent                       (1 Point)           (in the previous month)                               (1 Point)  [ ] Abnormal pulmonary function test                     (1 Point)                 SURGERY RELATED RISK FACTORS  [ ] Acute myocardial infarction                              (1 Point)                 [ ]  Section                                            (1 Point)  [ ] Congestive heart failure (in the previous month)  (1 Point)                 [ ] Minor surgery                                                 (1 Point)   [ ] Inflammatory bowel disease                             (1 Point)                 [ ] Arthroscopic surgery                                        (2 Points)  [ ] Central venous access                                    (2 Points)                [x ] General surgery lasting more than 45 minutes   (2 Points)       [ ] Stroke (in the previous month)                          (5 Points)               [ ] Elective arthroplasty                                        (5 Points)                                                                                                                                               HEMATOLOGY RELATED FACTORS                                                 TRAUMA RELATED RISK FACTORS  [ ] Prior episodes of VTE                                     (3 Points)                 [ ] Fracture of the hip, pelvis, or leg                       (5 Points)  [ ] Positive family history for VTE                         (3 Points)                 [ ] Acute spinal cord injury (in the previous month)  (5 Points)  [ ] Prothrombin 29166 A                                      (3 Points)                 [ ] Paralysis  (less than 1 month)                          (5 Points)  [ ] Factor V Leiden                                             (3 Points)                 [ ] Multiple Trauma within 1 month                         (5 Points)  [ ] Lupus anticoagulants                                     (3 Points)                                                           [ ] Anticardiolipin antibodies                                (3 Points)                                                       [ ] High homocysteine in the blood                      (3 Points)                                             [ ] Other congenital or acquired thrombophilia       (3 Points)                                                [ ] Heparin induced thrombocytopenia                  (3 Points)                                          Total Score [   7       ]

## 2022-04-25 NOTE — H&P PST ADULT - PROBLEM SELECTOR PLAN 1
cystoscopy transurethral resection of prostate  Pre-op instructions given, patient verbalized understanding  Chlorhexidine wash instructions given   medical clearance

## 2022-04-25 NOTE — H&P PST ADULT - ATTENDING COMMENTS
81 y with retention  uds-c/w obstruction  cysto-no tumor  failed tov despite adeq med therapy    for TURP    d/w pt and son-rationale risk alternative reviewed     all questions answered

## 2022-04-25 NOTE — H&P PST ADULT - HISTORY OF PRESENT ILLNESS
..... 81M pmh htn, bph, hld, dm gerd c/o urinary retention here for PST for scheduled cystoscopy transurethral resection of prostate  This patient denies any fever, cough, sob, flu like symptoms or travel outside of the [place] in the past 30 days

## 2022-04-26 ENCOUNTER — TRANSCRIPTION ENCOUNTER (OUTPATIENT)
Age: 82
End: 2022-04-26

## 2022-04-26 DIAGNOSIS — Z00.00 ENCOUNTER FOR GENERAL ADULT MEDICAL EXAMINATION W/OUT ABNORMAL FINDINGS: ICD-10-CM

## 2022-04-26 LAB
APTT BLD: 33 SEC
BACTERIA UR CULT: ABNORMAL
INR PPP: 1.15 RATIO
PT BLD: 13.4 SEC

## 2022-04-27 ENCOUNTER — APPOINTMENT (OUTPATIENT)
Dept: UROLOGY | Facility: HOSPITAL | Age: 82
End: 2022-04-27

## 2022-04-27 ENCOUNTER — RESULT REVIEW (OUTPATIENT)
Age: 82
End: 2022-04-27

## 2022-04-27 ENCOUNTER — INPATIENT (INPATIENT)
Facility: HOSPITAL | Age: 82
LOS: 0 days | Discharge: HOME | End: 2022-04-28
Attending: UROLOGY | Admitting: UROLOGY
Payer: MEDICARE

## 2022-04-27 VITALS
OXYGEN SATURATION: 100 % | TEMPERATURE: 98 F | HEART RATE: 76 BPM | HEIGHT: 68 IN | SYSTOLIC BLOOD PRESSURE: 136 MMHG | WEIGHT: 218.04 LBS | RESPIRATION RATE: 18 BRPM | DIASTOLIC BLOOD PRESSURE: 86 MMHG

## 2022-04-27 DIAGNOSIS — Z96.651 PRESENCE OF RIGHT ARTIFICIAL KNEE JOINT: Chronic | ICD-10-CM

## 2022-04-27 DIAGNOSIS — Z98.49 CATARACT EXTRACTION STATUS, UNSPECIFIED EYE: Chronic | ICD-10-CM

## 2022-04-27 DIAGNOSIS — Z96.652 PRESENCE OF LEFT ARTIFICIAL KNEE JOINT: Chronic | ICD-10-CM

## 2022-04-27 DIAGNOSIS — Z98.890 OTHER SPECIFIED POSTPROCEDURAL STATES: Chronic | ICD-10-CM

## 2022-04-27 LAB
ANION GAP SERPL CALC-SCNC: 7 MMOL/L — SIGNIFICANT CHANGE UP (ref 5–17)
BUN SERPL-MCNC: 13 MG/DL — SIGNIFICANT CHANGE UP (ref 7–23)
CALCIUM SERPL-MCNC: 7.3 MG/DL — LOW (ref 8.5–10.1)
CHLORIDE SERPL-SCNC: 116 MMOL/L — HIGH (ref 96–108)
CO2 SERPL-SCNC: 20 MMOL/L — LOW (ref 22–31)
CREAT SERPL-MCNC: 0.81 MG/DL — SIGNIFICANT CHANGE UP (ref 0.5–1.3)
CULTURE RESULTS: SIGNIFICANT CHANGE UP
EGFR: 88 ML/MIN/1.73M2 — SIGNIFICANT CHANGE UP
GLUCOSE BLDC GLUCOMTR-MCNC: 119 MG/DL — HIGH (ref 70–99)
GLUCOSE SERPL-MCNC: 112 MG/DL — HIGH (ref 70–99)
HCT VFR BLD CALC: 33.8 % — LOW (ref 39–50)
HGB BLD-MCNC: 10.7 G/DL — LOW (ref 13–17)
MCHC RBC-ENTMCNC: 30.6 PG — SIGNIFICANT CHANGE UP (ref 27–34)
MCHC RBC-ENTMCNC: 31.7 G/DL — LOW (ref 32–36)
MCV RBC AUTO: 96.6 FL — SIGNIFICANT CHANGE UP (ref 80–100)
NRBC # BLD: 0 /100 WBCS — SIGNIFICANT CHANGE UP (ref 0–0)
PLATELET # BLD AUTO: 218 K/UL — SIGNIFICANT CHANGE UP (ref 150–400)
POTASSIUM SERPL-MCNC: 3.9 MMOL/L — SIGNIFICANT CHANGE UP (ref 3.5–5.3)
POTASSIUM SERPL-SCNC: 3.9 MMOL/L — SIGNIFICANT CHANGE UP (ref 3.5–5.3)
RBC # BLD: 3.5 M/UL — LOW (ref 4.2–5.8)
RBC # FLD: 16.7 % — HIGH (ref 10.3–14.5)
SODIUM SERPL-SCNC: 143 MMOL/L — SIGNIFICANT CHANGE UP (ref 135–145)
SPECIMEN SOURCE: SIGNIFICANT CHANGE UP
WBC # BLD: 9.34 K/UL — SIGNIFICANT CHANGE UP (ref 3.8–10.5)
WBC # FLD AUTO: 9.34 K/UL — SIGNIFICANT CHANGE UP (ref 3.8–10.5)

## 2022-04-27 PROCEDURE — 88305 TISSUE EXAM BY PATHOLOGIST: CPT | Mod: 26

## 2022-04-27 PROCEDURE — 52601 PROSTATECTOMY (TURP): CPT

## 2022-04-27 RX ORDER — ONDANSETRON 8 MG/1
4 TABLET, FILM COATED ORAL EVERY 6 HOURS
Refills: 0 | Status: DISCONTINUED | OUTPATIENT
Start: 2022-04-27 | End: 2022-04-28

## 2022-04-27 RX ORDER — SODIUM CHLORIDE 9 MG/ML
1000 INJECTION, SOLUTION INTRAVENOUS
Refills: 0 | Status: DISCONTINUED | OUTPATIENT
Start: 2022-04-27 | End: 2022-04-27

## 2022-04-27 RX ORDER — OXYCODONE AND ACETAMINOPHEN 5; 325 MG/1; MG/1
2 TABLET ORAL EVERY 6 HOURS
Refills: 0 | Status: DISCONTINUED | OUTPATIENT
Start: 2022-04-27 | End: 2022-04-28

## 2022-04-27 RX ORDER — CEFTRIAXONE 500 MG/1
1000 INJECTION, POWDER, FOR SOLUTION INTRAMUSCULAR; INTRAVENOUS EVERY 24 HOURS
Refills: 0 | Status: DISCONTINUED | OUTPATIENT
Start: 2022-04-27 | End: 2022-04-28

## 2022-04-27 RX ORDER — SODIUM CHLORIDE 9 MG/ML
1000 INJECTION INTRAMUSCULAR; INTRAVENOUS; SUBCUTANEOUS
Refills: 0 | Status: DISCONTINUED | OUTPATIENT
Start: 2022-04-27 | End: 2022-04-27

## 2022-04-27 RX ORDER — METOPROLOL TARTRATE 50 MG
1 TABLET ORAL
Qty: 0 | Refills: 0 | DISCHARGE

## 2022-04-27 RX ORDER — ACETAMINOPHEN 500 MG
650 TABLET ORAL EVERY 6 HOURS
Refills: 0 | Status: DISCONTINUED | OUTPATIENT
Start: 2022-04-27 | End: 2022-04-28

## 2022-04-27 RX ORDER — MORPHINE SULFATE 50 MG/1
2 CAPSULE, EXTENDED RELEASE ORAL EVERY 4 HOURS
Refills: 0 | Status: DISCONTINUED | OUTPATIENT
Start: 2022-04-27 | End: 2022-04-28

## 2022-04-27 RX ORDER — BENZOCAINE AND MENTHOL 5; 1 G/100ML; G/100ML
1 LIQUID ORAL EVERY 6 HOURS
Refills: 0 | Status: DISCONTINUED | OUTPATIENT
Start: 2022-04-27 | End: 2022-04-28

## 2022-04-27 RX ORDER — SODIUM CHLORIDE 9 MG/ML
3 INJECTION INTRAMUSCULAR; INTRAVENOUS; SUBCUTANEOUS EVERY 8 HOURS
Refills: 0 | Status: DISCONTINUED | OUTPATIENT
Start: 2022-04-27 | End: 2022-04-27

## 2022-04-27 RX ORDER — SUCRALFATE 1 G
1 TABLET ORAL
Refills: 0 | Status: DISCONTINUED | OUTPATIENT
Start: 2022-04-27 | End: 2022-04-28

## 2022-04-27 RX ORDER — ONDANSETRON 8 MG/1
4 TABLET, FILM COATED ORAL ONCE
Refills: 0 | Status: COMPLETED | OUTPATIENT
Start: 2022-04-27 | End: 2022-04-27

## 2022-04-27 RX ORDER — LATANOPROST 0.05 MG/ML
1 SOLUTION/ DROPS OPHTHALMIC; TOPICAL
Qty: 0 | Refills: 0 | DISCHARGE

## 2022-04-27 RX ORDER — LOSARTAN POTASSIUM 100 MG/1
50 TABLET, FILM COATED ORAL DAILY
Refills: 0 | Status: DISCONTINUED | OUTPATIENT
Start: 2022-04-27 | End: 2022-04-28

## 2022-04-27 RX ORDER — IRBESARTAN 75 MG/1
1 TABLET ORAL
Qty: 0 | Refills: 0 | DISCHARGE

## 2022-04-27 RX ORDER — CALCIUM CARBONATE 500(1250)
3 TABLET ORAL EVERY 6 HOURS
Refills: 0 | Status: DISCONTINUED | OUTPATIENT
Start: 2022-04-27 | End: 2022-04-28

## 2022-04-27 RX ORDER — ATORVASTATIN CALCIUM 80 MG/1
40 TABLET, FILM COATED ORAL AT BEDTIME
Refills: 0 | Status: DISCONTINUED | OUTPATIENT
Start: 2022-04-27 | End: 2022-04-28

## 2022-04-27 RX ORDER — METOPROLOL TARTRATE 50 MG
25 TABLET ORAL DAILY
Refills: 0 | Status: DISCONTINUED | OUTPATIENT
Start: 2022-04-27 | End: 2022-04-28

## 2022-04-27 RX ORDER — FENTANYL CITRATE 50 UG/ML
25 INJECTION INTRAVENOUS
Refills: 0 | Status: DISCONTINUED | OUTPATIENT
Start: 2022-04-27 | End: 2022-04-27

## 2022-04-27 RX ORDER — SODIUM CHLORIDE 9 MG/ML
1000 INJECTION INTRAMUSCULAR; INTRAVENOUS; SUBCUTANEOUS
Refills: 0 | Status: DISCONTINUED | OUTPATIENT
Start: 2022-04-27 | End: 2022-04-28

## 2022-04-27 RX ADMIN — ONDANSETRON 4 MILLIGRAM(S): 8 TABLET, FILM COATED ORAL at 15:14

## 2022-04-27 RX ADMIN — SODIUM CHLORIDE 75 MILLILITER(S): 9 INJECTION, SOLUTION INTRAVENOUS at 15:14

## 2022-04-27 RX ADMIN — Medication 1 APPLICATION(S): at 21:04

## 2022-04-27 RX ADMIN — SODIUM CHLORIDE 100 MILLILITER(S): 9 INJECTION INTRAMUSCULAR; INTRAVENOUS; SUBCUTANEOUS at 18:35

## 2022-04-27 RX ADMIN — ATORVASTATIN CALCIUM 40 MILLIGRAM(S): 80 TABLET, FILM COATED ORAL at 21:04

## 2022-04-27 RX ADMIN — BENZOCAINE AND MENTHOL 1 LOZENGE: 5; 1 LIQUID ORAL at 21:04

## 2022-04-27 RX ADMIN — CEFTRIAXONE 100 MILLIGRAM(S): 500 INJECTION, POWDER, FOR SOLUTION INTRAMUSCULAR; INTRAVENOUS at 18:34

## 2022-04-27 RX ADMIN — Medication 1 GRAM(S): at 18:34

## 2022-04-27 NOTE — PATIENT PROFILE ADULT - FALL HARM RISK - HARM RISK INTERVENTIONS
Assistance with ambulation/Assistance OOB with selected safe patient handling equipment/Communicate Risk of Fall with Harm to all staff/Discuss with provider need for PT consult/Monitor gait and stability/Provide patient with walking aids - walker, cane, crutches/Reinforce activity limits and safety measures with patient and family/Sit up slowly, dangle for a short time, stand at bedside before walking/Tailored Fall Risk Interventions/Use of alarms - bed, chair and/or voice tab/Visual Cue: Yellow wristband and red socks/Bed in lowest position, wheels locked, appropriate side rails in place/Call bell, personal items and telephone in reach/Instruct patient to call for assistance before getting out of bed or chair/Non-slip footwear when patient is out of bed/[place] to call system/Physically safe environment - no spills, clutter or unnecessary equipment/Purposeful Proactive Rounding/Room/bathroom lighting operational, light cord in reach

## 2022-04-27 NOTE — ASU PATIENT PROFILE, ADULT - FALL HARM RISK - HARM RISK INTERVENTIONS

## 2022-04-28 ENCOUNTER — TRANSCRIPTION ENCOUNTER (OUTPATIENT)
Age: 82
End: 2022-04-28

## 2022-04-28 VITALS
TEMPERATURE: 97 F | OXYGEN SATURATION: 98 % | RESPIRATION RATE: 18 BRPM | DIASTOLIC BLOOD PRESSURE: 95 MMHG | SYSTOLIC BLOOD PRESSURE: 162 MMHG | HEART RATE: 81 BPM

## 2022-04-28 LAB
ANION GAP SERPL CALC-SCNC: 5 MMOL/L — SIGNIFICANT CHANGE UP (ref 5–17)
BUN SERPL-MCNC: 8 MG/DL — SIGNIFICANT CHANGE UP (ref 7–23)
CALCIUM SERPL-MCNC: 7.8 MG/DL — LOW (ref 8.5–10.1)
CHLORIDE SERPL-SCNC: 115 MMOL/L — HIGH (ref 96–108)
CO2 SERPL-SCNC: 24 MMOL/L — SIGNIFICANT CHANGE UP (ref 22–31)
CREAT SERPL-MCNC: 0.77 MG/DL — SIGNIFICANT CHANGE UP (ref 0.5–1.3)
EGFR: 89 ML/MIN/1.73M2 — SIGNIFICANT CHANGE UP
GLUCOSE SERPL-MCNC: 111 MG/DL — HIGH (ref 70–99)
HCT VFR BLD CALC: 30.3 % — LOW (ref 39–50)
HGB BLD-MCNC: 9.5 G/DL — LOW (ref 13–17)
MCHC RBC-ENTMCNC: 29.7 PG — SIGNIFICANT CHANGE UP (ref 27–34)
MCHC RBC-ENTMCNC: 31.4 G/DL — LOW (ref 32–36)
MCV RBC AUTO: 94.7 FL — SIGNIFICANT CHANGE UP (ref 80–100)
NRBC # BLD: 0 /100 WBCS — SIGNIFICANT CHANGE UP (ref 0–0)
PLATELET # BLD AUTO: 205 K/UL — SIGNIFICANT CHANGE UP (ref 150–400)
POTASSIUM SERPL-MCNC: 4.3 MMOL/L — SIGNIFICANT CHANGE UP (ref 3.5–5.3)
POTASSIUM SERPL-SCNC: 4.3 MMOL/L — SIGNIFICANT CHANGE UP (ref 3.5–5.3)
RBC # BLD: 3.2 M/UL — LOW (ref 4.2–5.8)
RBC # FLD: 16.6 % — HIGH (ref 10.3–14.5)
SODIUM SERPL-SCNC: 144 MMOL/L — SIGNIFICANT CHANGE UP (ref 135–145)
WBC # BLD: 8.62 K/UL — SIGNIFICANT CHANGE UP (ref 3.8–10.5)
WBC # FLD AUTO: 8.62 K/UL — SIGNIFICANT CHANGE UP (ref 3.8–10.5)

## 2022-04-28 RX ORDER — SODIUM CHLORIDE 9 MG/ML
1000 INJECTION INTRAMUSCULAR; INTRAVENOUS; SUBCUTANEOUS
Refills: 0 | Status: DISCONTINUED | OUTPATIENT
Start: 2022-04-28 | End: 2022-04-28

## 2022-04-28 RX ORDER — CEFDINIR 250 MG/5ML
1 POWDER, FOR SUSPENSION ORAL
Qty: 6 | Refills: 0
Start: 2022-04-28 | End: 2022-04-30

## 2022-04-28 RX ADMIN — Medication 25 MILLIGRAM(S): at 05:28

## 2022-04-28 RX ADMIN — SODIUM CHLORIDE 100 MILLILITER(S): 9 INJECTION INTRAMUSCULAR; INTRAVENOUS; SUBCUTANEOUS at 05:28

## 2022-04-28 RX ADMIN — Medication 1 GRAM(S): at 00:12

## 2022-04-28 RX ADMIN — SODIUM CHLORIDE 75 MILLILITER(S): 9 INJECTION INTRAMUSCULAR; INTRAVENOUS; SUBCUTANEOUS at 11:52

## 2022-04-28 RX ADMIN — Medication 1 GRAM(S): at 05:29

## 2022-04-28 RX ADMIN — Medication 1 APPLICATION(S): at 05:29

## 2022-04-28 RX ADMIN — Medication 1 APPLICATION(S): at 14:40

## 2022-04-28 RX ADMIN — BENZOCAINE AND MENTHOL 1 LOZENGE: 5; 1 LIQUID ORAL at 05:29

## 2022-04-28 RX ADMIN — Medication 1 GRAM(S): at 11:52

## 2022-04-28 RX ADMIN — LOSARTAN POTASSIUM 50 MILLIGRAM(S): 100 TABLET, FILM COATED ORAL at 05:28

## 2022-04-28 NOTE — DISCHARGE NOTE PROVIDER - NSDCCPCAREPLAN_GEN_ALL_CORE_FT
PRINCIPAL DISCHARGE DIAGNOSIS  Diagnosis: BPH with urinary obstruction  Assessment and Plan of Treatment: GENERAL: It is common to have blood in your urine after your procedure. It may be pink or even red; inform your doctor if you have a significant amount of clot in the urine or if you are unable to void at The urine may clear and then become bloody again especially as you are more physically active. It is not uncommon to have some burning when you urinate, this will gradually improve.   BATHING: You may shower or bathe. If going home with yao, shower only until catheter is removed.  DIET: You may resume your regular diet and regular medication regimen.  PAIN: You may take Tylenol (acetaminophen) 650-975mg and/or Motrin (ibuprofen) 400-600mg, both available over the counter, for pain every 6 hours as needed. Do not exceed 4000mg of Tylenol (acetaminophen) daily. You may alternate these medications such that you take one or the other every 3 hours for around the clock pain coverage.  ANTIBIOTICS: You may be given a prescription for an antibiotic, please take this medication as instructed and be sure to complete the entire course.  STOOL SOFTENERS: Do not allow yourself to become constipated as straining may cause bleeding. Take stool softeners or a laxative (ex. Miralax, Colace, Senokot, ExLax, etc), available over the counter, if needed.  ACTIVITY: No heavy lifting or strenuous exercise until you are evaluated at your post-operative appointment. Otherwise, you may return to your usual level of physical activity.  ANTICOAGULATION: If you are taking any blood thinning medications, please discuss with your urologist prior to restarting these medications unless otherwise specified.  FOLLOW-UP: If you did not already schedule your post-operative appointment, please call your urologist to schedule and follow-up appointment.  CALL YOUR UROLOGIST IF: You have any bleeding that does not stop, inability to void >8 hours, fever over 100.4 F, chills, persistent nausea/vomiting, changes in your incision concerning for infection, or if your pain is not controlled on your discharge pain me

## 2022-04-28 NOTE — DISCHARGE NOTE PROVIDER - CARE PROVIDER_API CALL
Breezy Cote)  Urology  [address], [address] [place] [address]  Phone: [phone]  Fax: [phone]  Established Patient  Follow Up Time: 1 week

## 2022-04-28 NOTE — PROGRESS NOTE ADULT - SUBJECTIVE AND OBJECTIVE BOX
POST OP CHECK DAY 0    Patient seen and examined bedside resting comfortably.  No complaints offered.   Indwelling yao connected to CBI, slow rate, pink to clear drainage    T(F): 97 (04-27-22 @ 17:45), Max: 98.6 (04-27-22 @ 14:14)  HR: 64 (04-27-22 @ 17:45) (62 - 79)  BP: 144/83 (04-27-22 @ 17:45) (103/62 - 146/83)  RR: 18 (04-27-22 @ 17:45) (13 - 18)  SpO2: 97% (04-27-22 @ 17:45) (97% - 100%)    ROS:  Negative unless otherwise stated    PHYSICAL EXAM:    General: NAD, alert and awake  HEENT: NCAT, EOMI, conjunctiva clear  Chest: nonlabored respirations, CTA b/l.  Abdomen: soft, NT/ND.   Extremities: Calf soft, nontender b/l.   : No suprapubic tenderness or bladder distention.  Indwelling yao connected to CBI, slow rate, pink to clear drainage    LABS:                        10.7   9.34  )-----------( 218      ( 27 Apr 2022 15:49 )             33.8   04-27    143  |  116<H>  |  13  ----------------------------<  112<H>  3.9   |  20<L>  |  0.81    Ca    7.3<L>      27 Apr 2022 15:49      I&O's Detail    27 Apr 2022 07:01  -  27 Apr 2022 18:47  --------------------------------------------------------  IN:    Continuous Bladder Irrigation (mL): 7500 mL    Lactated Ringers: 150 mL    Oral Fluid: 240 mL  Total IN: 7890 mL    OUT:    Continuous Bladder Irrigation (mL): 14326 mL  Total OUT: 78580 mL    Total NET: -2910 mL  
UROLOGY PROGRESS NOTE:     Subjective: Patient seen and examined at bedside. No events overnight      Objective:  Vital signs  T(F): , Max: 98.6 (04-27-22 @ 14:14)  HR: 73 (04-28-22 @ 05:09)  BP: 132/79 (04-28-22 @ 05:09)  SpO2: 97% (04-28-22 @ 03:45)  Wt(kg): --    Output     I&O's Detail    27 Apr 2022 07:01  -  28 Apr 2022 07:00  --------------------------------------------------------  IN:    Continuous Bladder Irrigation (mL): 95785 mL    Lactated Ringers: 150 mL    Oral Fluid: 240 mL    sodium chloride 0.9%: 1200 mL  Total IN: 11400 mL    OUT:    Continuous Bladder Irrigation (mL): 85523 mL  Total OUT: 02930 mL    Total NET: -5210 mL      28 Apr 2022 07:01  -  28 Apr 2022 10:35  --------------------------------------------------------  IN:    Continuous Bladder Irrigation (mL): 3000 mL  Total IN: 3000 mL    OUT:    Continuous Bladder Irrigation (mL): 2000 mL  Total OUT: 2000 mL    Total NET: 1000 mL          Physical Exam:  Gen: no acute distress  Back: no CVAT b/l  Abd: soft NT ND  : 3way yao in place, +CBI: urine clear.    Labs:                        9.5    8.62  )-----------( 205      ( 28 Apr 2022 07:53 )             30.3     04-28    144  |  115<H>  |  8   ----------------------------<  111<H>  4.3   |  24  |  0.77    Ca    7.8<L>      28 Apr 2022 07:53            Urine Cx: in progress

## 2022-04-28 NOTE — H&P PST ADULT - SYMPTOMS
Verbal report given to CHA Patel. Update and inpatient room number provided to patient's parents. No change in patient status.   none

## 2022-04-28 NOTE — DISCHARGE NOTE PROVIDER - NSDCCPTREATMENT_GEN_ALL_CORE_FT
PRINCIPAL PROCEDURE  Procedure: Cystoscopy, with TURP using button electrode  Findings and Treatment:

## 2022-04-28 NOTE — DISCHARGE NOTE NURSING/CASE MANAGEMENT/SOCIAL WORK - NSDCPEFALRISK_GEN_ALL_CORE
For information on Fall & Injury Prevention, visit: [email or web address] OR  [email or web address] OR  https://www.cdc.gov/steadi/patient.html

## 2022-04-28 NOTE — DISCHARGE NOTE NURSING/CASE MANAGEMENT/SOCIAL WORK - PATIENT PORTAL LINK FT
You can access the FollowMyHealth Patient Portal offered by [facility] by registering at the following website: [email or web address]. By joining [employer]’s FollowMyHealth portal, you will also be able to view your health information using other applications (apps) compatible with our system.

## 2022-04-28 NOTE — PROGRESS NOTE ADULT - ASSESSMENT
s/p TURP POD#1    -follow up urine culture  -clamp CBI  -discharge planning for later today (home with yao to leg bag)
Impression: 81M s/p FRANCISCO    Plan:  --Continue home meds  --Stop CBI in AM  --Possible DC tomorrow afternoon   --Continue rocephin, dc with cefdinir

## 2022-04-28 NOTE — DISCHARGE NOTE PROVIDER - NSDCMRMEDTOKEN_GEN_ALL_CORE_FT
atorvastatin 40 mg oral tablet: 1 tab(s) orally once a day (at bedtime)  cefdinir 300 mg oral capsule: 1 cap(s) orally every 12 hours   irbesartan 150 mg oral tablet: 1 tab(s) orally once a day  latanoprost 0.005% ophthalmic solution: 1 drop(s) to each affected eye once a day (in the evening)  metoprolol succinate 25 mg oral tablet, extended release: 1 tab(s) orally once a day  pantoprazole 40 mg oral granule, delayed release: 40 milligram(s) orally once a day  sucralfate 1 g oral tablet: 1 tab(s) orally 4 times a day  tamsulosin 0.4 mg oral capsule: 1 cap(s) orally once a day (at bedtime)  triamcinolone 0.1% topical ointment: Apply topically to affected area 3 times a day

## 2022-04-28 NOTE — DISCHARGE NOTE PROVIDER - HOSPITAL COURSE
s/p TURP 4/27/ No post op complications.  pod#1 labs wnl, and avss  yao removed intact  patient able to void with low pvr  stable for discharge

## 2022-05-02 LAB
-  AMIKACIN: SIGNIFICANT CHANGE UP
-  AMOXICILLIN/CLAVULANIC ACID: SIGNIFICANT CHANGE UP
-  AMPICILLIN/SULBACTAM: SIGNIFICANT CHANGE UP
-  AMPICILLIN: SIGNIFICANT CHANGE UP
-  AZTREONAM: SIGNIFICANT CHANGE UP
-  CEFAZOLIN: SIGNIFICANT CHANGE UP
-  CEFEPIME: SIGNIFICANT CHANGE UP
-  CEFOTAXIME: SIGNIFICANT CHANGE UP
-  CEFOXITIN: SIGNIFICANT CHANGE UP
-  CEFTAZIDIME: SIGNIFICANT CHANGE UP
-  CEFTRIAXONE: SIGNIFICANT CHANGE UP
-  CEFUROXIME: SIGNIFICANT CHANGE UP
-  CIPROFLOXACIN: SIGNIFICANT CHANGE UP
-  ERTAPENEM: SIGNIFICANT CHANGE UP
-  GENTAMICIN: SIGNIFICANT CHANGE UP
-  IMIPENEM: SIGNIFICANT CHANGE UP
-  LEVOFLOXACIN: SIGNIFICANT CHANGE UP
-  MEROPENEM: SIGNIFICANT CHANGE UP
-  MINOCYCLINE: SIGNIFICANT CHANGE UP
-  NITROFURANTOIN: SIGNIFICANT CHANGE UP
-  PIPERACILLIN/TAZOBACTAM: SIGNIFICANT CHANGE UP
-  TIGECYCLINE: SIGNIFICANT CHANGE UP
-  TOBRAMYCIN: SIGNIFICANT CHANGE UP
-  TRIMETHOPRIM/SULFAMETHOXAZOLE: SIGNIFICANT CHANGE UP
CULTURE RESULTS: SIGNIFICANT CHANGE UP
METHOD TYPE: SIGNIFICANT CHANGE UP
ORGANISM # SPEC MICROSCOPIC CNT: SIGNIFICANT CHANGE UP
ORGANISM # SPEC MICROSCOPIC CNT: SIGNIFICANT CHANGE UP
SPECIMEN SOURCE: SIGNIFICANT CHANGE UP

## 2022-05-09 LAB — SURGICAL PATHOLOGY STUDY: SIGNIFICANT CHANGE UP

## 2022-05-10 ENCOUNTER — APPOINTMENT (OUTPATIENT)
Dept: UROLOGY | Facility: CLINIC | Age: 82
End: 2022-05-10
Payer: MEDICARE

## 2022-05-10 VITALS
OXYGEN SATURATION: 98 % | TEMPERATURE: 98.3 F | HEART RATE: 69 BPM | BODY MASS INDEX: 32.78 KG/M2 | WEIGHT: 222 LBS | SYSTOLIC BLOOD PRESSURE: 159 MMHG | DIASTOLIC BLOOD PRESSURE: 79 MMHG

## 2022-05-10 DIAGNOSIS — N40.1 BENIGN PROSTATIC HYPERPLASIA WITH LOWER URINARY TRACT SYMPTOMS: ICD-10-CM

## 2022-05-10 DIAGNOSIS — Z98.42 CATARACT EXTRACTION STATUS, LEFT EYE: ICD-10-CM

## 2022-05-10 DIAGNOSIS — E78.5 HYPERLIPIDEMIA, UNSPECIFIED: ICD-10-CM

## 2022-05-10 DIAGNOSIS — N32.89 OTHER SPECIFIED DISORDERS OF BLADDER: ICD-10-CM

## 2022-05-10 DIAGNOSIS — Z96.652 PRESENCE OF LEFT ARTIFICIAL KNEE JOINT: ICD-10-CM

## 2022-05-10 DIAGNOSIS — R33.8 OTHER RETENTION OF URINE: ICD-10-CM

## 2022-05-10 DIAGNOSIS — N21.0 CALCULUS IN BLADDER: ICD-10-CM

## 2022-05-10 DIAGNOSIS — I10 ESSENTIAL (PRIMARY) HYPERTENSION: ICD-10-CM

## 2022-05-10 DIAGNOSIS — E11.9 TYPE 2 DIABETES MELLITUS WITHOUT COMPLICATIONS: ICD-10-CM

## 2022-05-10 DIAGNOSIS — Z85.820 PERSONAL HISTORY OF MALIGNANT MELANOMA OF SKIN: ICD-10-CM

## 2022-05-10 PROCEDURE — 51798 US URINE CAPACITY MEASURE: CPT

## 2022-05-10 PROCEDURE — 99213 OFFICE O/P EST LOW 20 MIN: CPT | Mod: 57

## 2022-05-10 NOTE — LETTER BODY
[Dear  ___] : Dear  [unfilled], [Courtesy Letter:] : I had the pleasure of seeing your patient, [unfilled], in my office today. [Please see my note below.] : Please see my note below. [Sincerely,] : Sincerely, [FreeTextEntry1] : see below\par \par has jaleesa 6 prostate ca-to review again in three months re: f/u biopsy and treatment [FreeTextEntry3] : Breezy Cote MD FACS\par \par Attending Urologist-[facility]\par Chief-Urology Division [facility]\par Associate Clinical Professor-[employer] [facility]\par \par

## 2022-05-10 NOTE — ASSESSMENT
[FreeTextEntry1] : 81 year old male with T1b prostate ca as well as h/o Ta bladder cancer\par \par path is jaleesa 3+3\par \par PVR today: 58 cc\par \par 30 minute discussion with pt regarding pathology and f/u\par \par reviewed all options including active surveillance vs. definitive therapy \par \par ? need for interval biopsy/?MRI-not until next visit in three months-\par \par \par f/u 3 months for psa and cysto, \par

## 2022-05-10 NOTE — HISTORY OF PRESENT ILLNESS
[FreeTextEntry1] : 81 year old male with h/o low grade, low stage bladder ca, BPH/LUTS, and urinary retention\par \par \par s/p cysto, TURP 4/27/2022\par \par Biopsy 4/27/2022 demonstrated adenocarcinoma of prostate, prognostic grade group 1 \par [place] score 3+3=6 involving approximately 9% of the submitted prostatic tissue \par weight: 16.1 g \par \par today, pt notes feeling well \par urinating well \par no complaints \par \par denies hematuria \par \par still on 0.8 flomax

## 2022-05-10 NOTE — END OF VISIT
[FreeTextEntry3] : Medical record entries made by the scribe today, were at my direction and personally dictated to them by me, Dr. Breezy Cote on 05/10/2022. I have reviewed the chart and agree that the record accurately reflects my personal performance of the history, physical exam, assessment, and plan.

## 2022-06-21 NOTE — PHYSICAL THERAPY INITIAL EVALUATION ADULT - TRANSFER SKILLS, REHAB EVAL
Consultation H&P        Date of Admission:  No admission date for patient encounter. Date of Consultation:  6/21/2022    PCP:  Stuart Alfaro MD    Referred    Chief Complaint: Lung nodule    History of Present Illness: We are asked to see this patient in consultation by Dr. mendoza  Clive Gibbs is a 78 y.o. female who asymptomatic smoker COPD have central right upper lobe lung nodule since 2018 slowly growing CT scan PET scan is mildly positive fortunately her pathology showed atypical cell sent for surgical evaluation     Past Medical History:  Past Medical History:   Diagnosis Date    Arthritis     Asthma     COPD (chronic obstructive pulmonary disease) ([address]     Hormone replacement therapy        Past Surgical History:  Past Surgical History:   Procedure Laterality Date    BREAST BIOPSY      Right, was benign    COLONOSCOPY N/A 10/9/2018    COLONOSCOPY WITH BIOPSY performed by Geovanna Milton MD at [facility]  5/31/2022    CT NEEDLE BIOPSY LUNG PERCUTANEOUS 5/31/2022 [facility] CT SCAN    CYST REMOVAL      Behind ear    TUBAL LIGATION         Home Medications:   Prior to Admission medications    Medication Sig Start Date End Date Taking? Authorizing Provider   VENTOLIN  (90 Base) MCG/ACT inhaler INHALE TWO PUFFS BY MOUTH FOUR TIMES A DAY AS NEEDED FOR WHEEZING 6/13/22  Yes Stuart Alfaro MD   ANORO ELLIPTA 62.5-25 MCG/INH AEPB inhaler INHALE ONE DOSE BY MOUTH DAILY 5/10/22  Yes Stuart Alfaro MD   ipratropium-albuterol (DUONEB) 0.5-2.5 (3) MG/3ML SOLN nebulizer solution Inhale 3 mLs into the lungs every 6 hours as needed for Shortness of Breath 3/14/22  Yes Hamida Dutta MD        Facility Administered Medications:      Allergies:  No Known Allergies     Social History:    Working:   Caffeine:   Lifestyle:    Social History     Socioeconomic History    Marital status:      Spouse name: Not on file    History:  Heart Disease:   Stroke:   Cancer:   Diabetes:   Hypertension:   Aneurysm/PVD:         Problem Relation Age of Onset    Cancer Mother         stomach    Heart Disease Father     Colon Cancer Brother     Cancer Brother        Review of Systems:  Constitutional:  No night sweats, headaches, weight loss. Eyes:  No glaucoma, cataracts. ENMT:  No nosebleeds, deviated septum. Cardiac:  No arrhythmias, previous MI. Vascular:  No claudication, varicosities. GI:  No PUD, heartburn. :  No kidney stones, frequent UTIs  Musculoskeletal:  No arthritis, gout. Respiratory:  No SOB, emphysema, asthma. Integumentary:  No dermatitis, itching, rash. Neurological:  No stroke, TIAs, seizures. Psychiatric:  No depression, anxiety. Endocrine: No diabetes, thyroid issues. Hematologic:  No bleeding, easy bruising. Immunologic:  No known cancer, steroid therapies. Physical Examination:    BP (!) 140/76 (Site: Left Upper Arm, Position: Sitting, Cuff Size: Medium Adult)   Pulse 54   Temp 97.9 °F (36.6 °C) (Temporal)   Ht 5' 4\" (1.626 m)   Wt 114 lb 9.6 oz (52 kg)   LMP  (LMP Unknown)   SpO2 97%   BMI 19.67 kg/m²      BP RUE:  BP LUE:   Admission Weight: 114 lb 9.6 oz (52 kg)   Hand dominance:    General appearance: NAD, well nourished  Eyes: anicteric, PERRLA  ENMT: no scars or lesions, no nasal deformity, normal dentition, no cyanosis of oral mucosa  Neck: no masses, no thyroid enlargement, no JVD. Respiratory: effort is unlabored, symmetric, no crackles, wheezes or rubs. No palpable/percussable abnormalities. Cardiovascular: regular, no murmur. PMI normal, no thrill. No carotid bruits. No edema or varicosities. Abdominal aorta cannot be appreciated given body habitus. GI: abdomen soft, nondistended, no organomegaly. No masses. Lymphatic: no cervical/supraclavicular adenopathy  Musculoskeletal: strength and tone normal. Full ROM. No scoliosis. Extremities: warm and pink.  No clubbing or petechiae. Skin: no dermatitis or ulceration. No nodularity or induration. Neuro: CN grossly intact. Sensation and motor function grossly intact. Psychiatric: oriented, appropriate mood/affect. MEDICAL DECISION MAKING/TESTING  Studies personally reviewed. Echo:     CXR:     CT  Irregular spiculated right upper lobe pulmonary nodule has increased in size   slowly over time but is not significantly hypermetabolic.  Slow growth and   appearance is suspicious for neoplastic etiology.  There is slight low-grade   uptake seen in right hilar and precarinal lymph node.  No hypermetabolic   pulmonary nodules noted on the left       Mild biliary ductal dilatation is seen. Kahlil Cotto is a questionable stone seen   in the common duct. Wayne Barriga correlation with serum bilirubin       No focal FDG uptake seen in abdomen or pelvis           PET/CT      PFT  Spirometry showed FVC 2.93 L, 87% predicted, FEV1 1.54 L, 60% predicted,  with the FEV1/FVC ratio of 53%. There was a postbronchodilator increase  in FEV1 to 1.82 L and FVC to 3.31 L. Lung volumes showed hyperinflation  and air trapping, diffusion capacity was 58% predicted. Pathology  Lung, upper lobe, needle core biopsy:      - Atypical glands present.  See comment. COMMENT:   The specimen consists of dense fibrosis with lymphoplasmacytic   inflammation and rare angulate glands lined by cuboidal cells with   mild-moderate nuclear pleomorphism and hyperchromatic nuclei.  Although a   reactive/reparative process cannot be entirely excluded, the findings are   concerning for a well differentiated adenocarcinoma and a   malignant/dysplastic process is favored.  Clinical and radiographic   correlation is essential.  A second pathologist has reviewed this case   and agrees with the above diagnosis.        BARJA/BARJA     Labs:   CBC: No results for input(s): WBC, HGB, HCT, MCV, PLT in the last 72 hours.   BMP: No results for input(s): NA, K, CL, CO2, PHOS, BUN, CREATININE, CALCIUM, MG in the last 72 hours. Cardiac Enzymes: No results for input(s): CKTOTAL, CKMB, CKMBINDEX, TROPONINI in the last 72 hours. PT/INR: No results for input(s): PROTIME, INR in the last 72 hours. APTT: No results for input(s): APTT in the last 72 hours. Liver Profile:  Lab Results   Component Value Date    AST 17 04/06/2021    ALT 12 04/06/2021    BILITOT 0.7 04/06/2021    ALKPHOS 100 04/06/2021     Lab Results   Component Value Date    CHOL 187 04/06/2021    HDL 92 04/06/2021    TRIG 66 04/06/2021     UA: No results found for: NITRITE, COLORU, PHUR, LABCAST, [address], RBCUA, MUCUS, TRICHOMONAS, YEAST, BACTERIA, CLARITYU, SPECGRAV, LEUKOCYTESUR, UROBILINOGEN, BILIRUBINUR, BLOODU, GLUCOSEU, AMORPHOUS    History obtained: chart, pt    Risk factors: Smoker COPD     Diagnosis: Right upper lobe lung nodule    Plan:   #1 right upper lobectomy with mediastinal lymphadenectomy  #2 smoking cessation patient have to quit smoking for at least 2 weeks prior to surgery she is currently smokes 6 cigarettes a day. #3 cardiology clearance refer to [facility] for preoperative clearance  #4 patient already on inhaler for COPD there is a good chance she will be on some steroid postoperatively  #5 amiodarone 802 days prior to surgery for A. fib. Typical periop/postop course reviewed including initial limitations on driving/heavy lifting. Risks, benefits and postoperative complications discussed including bleeding, infection, stroke, death, postop pulmonary and renal issues. I spent 60 minutes of care and visit with this patient, greater than 50% of time was spent in counseling and coordinating care, image interpretation, discussion with other caregiver.   And future planning    Nisreen Leon MD FACS independent

## 2022-07-06 ENCOUNTER — APPOINTMENT (OUTPATIENT)
Dept: UROLOGY | Facility: CLINIC | Age: 82
End: 2022-07-06

## 2022-09-20 ENCOUNTER — APPOINTMENT (OUTPATIENT)
Dept: UROLOGY | Facility: CLINIC | Age: 82
End: 2022-09-20

## 2022-09-20 VITALS
SYSTOLIC BLOOD PRESSURE: 179 MMHG | TEMPERATURE: 98.1 F | OXYGEN SATURATION: 97 % | HEART RATE: 64 BPM | DIASTOLIC BLOOD PRESSURE: 89 MMHG

## 2022-09-20 PROCEDURE — 81003 URINALYSIS AUTO W/O SCOPE: CPT | Mod: QW

## 2022-09-20 PROCEDURE — 99213 OFFICE O/P EST LOW 20 MIN: CPT | Mod: 25

## 2022-09-20 PROCEDURE — 52000 CYSTOURETHROSCOPY: CPT

## 2022-09-20 RX ORDER — SULFAMETHOXAZOLE AND TRIMETHOPRIM 800; 160 MG/1; MG/1
800-160 TABLET ORAL
Refills: 0 | Status: COMPLETED | OUTPATIENT
Start: 2022-09-20

## 2022-09-20 RX ADMIN — SULFAMETHOXAZOLE AND TRIMETHOPRIM 0 MG: 800; 160 TABLET ORAL at 00:00

## 2022-09-27 LAB
HLX UV FISH FINAL REPORT: NORMAL
PSA SERPL-MCNC: 0.41 NG/ML
URINE CYTOLOGY: NORMAL

## 2022-09-27 NOTE — PHYSICAL EXAM
[General Appearance - Well Developed] : well developed [General Appearance - Well Nourished] : well nourished [Normal Appearance] : normal appearance [Well Groomed] : well groomed [General Appearance - In No Acute Distress] : no acute distress [Abdomen Soft] : soft [Abdomen Tenderness] : non-tender [Costovertebral Angle Tenderness] : no ~M costovertebral angle tenderness [Urethral Meatus] : meatus normal [Scrotum] : the scrotum was normal [Epididymis] : the epididymides were normal [Testes Tenderness] : no tenderness of the testes [Testes Mass (___cm)] : there were no testicular masses [Anus Abnormality] : the anus and perineum were normal [Rectal Exam - Rectum] : digital rectal exam was normal [Prostate Enlargement] : the prostate was not enlarged [Prostate Tenderness] : the prostate was not tender [No Prostate Nodules] : no prostate nodules [FreeTextEntry1] : small prostate

## 2022-09-27 NOTE — END OF VISIT
[FreeTextEntry3] : Medical record entries made by the scribe today, were at my direction and personally dictated to them by me, Dr. Breezy Cote on 09/20/2022. I have reviewed the chart and agree that the record accurately reflects my personal performance of the history, physical exam, assessment, and plan.

## 2022-09-27 NOTE — ASSESSMENT
[FreeTextEntry1] : 82 year old male with T1b prostate ca as well as h/o Ta bladder cancer\par psa  today \par \par path is jaleesa 3+3=6 -9% of TUR specimen\par \par PSA, cytology, FISH sent today \par \par cysto today showed well resected prostate\par 2+ trabeculation\par no tumors\par \par f/u 3 months for cysto, psa, and further discussion re: MRI/? need for interval biopsy

## 2022-09-27 NOTE — HISTORY OF PRESENT ILLNESS
[FreeTextEntry1] : 82 year old male with h/o LGLS Ta bladder ca, BPH/LUTS, urinary retention, and T1b prostate ca \par s/p TURP 4/27/22 \par \par path demonstrated adenocarcinoma of prostate, prognostic grade group 1 \par [place] score 3+3=6 involving approximately 9% of the submitted prostatic tissue \par weight: 16.1 g \par \par still on 0.8 flomax \par \par cysto and psa  today

## 2022-10-28 NOTE — DISCHARGE NOTE PROVIDER - NSDCQMSTROKE_NEU_ALL_CORE
Writer called [facility] a couple of hours ago to notify her Patient needs to have urine sample to clinic so it can be reviewed by a provider prior to the clinic closing. Writer checked in again at this time, the sample they collected ws contaminated with stool. Informed she would need to be seen in urgent care now as no clinic provider will be reviewing results over the weekend. [facility] asked if they could drop off a new sample and they would be ok waiting for it to be reviewed until Monday 10/31/22. If symptoms worse, advised to be seen in urgent care.  Mandy MCCOY, RN     No

## 2022-11-03 NOTE — PROGRESS NOTE ADULT - SUBJECTIVE AND OBJECTIVE BOX
[employer] Optiblue vs. Light adjustable lens. TMAX _ 98.2     On day # 5 Cefepime / # 7 Zothromax    Vital Signs Last 24 Hrs  T(C): 36.7 (2022 18:00), Max: 36.8 (2022 00:27)  T(F): 98 (2022 18:00), Max: 98.2 (2022 00:27)  HR: 84 (2022 18:00) (84 - 93)  BP: 117/73 (2022 18:00) (107/70 - 127/79)  BP(mean): --  RR: 18 (2022 18:00) (16 - 19)  SpO2: 100% (2022 18:00) (93% - 100%)  Supplemental O2:  on NC O2     Awake, alert, c/o fatigue but otherwise feeling somewhat better.  No c/o cp or abdominal pain and no diarrhea reported.  Intermittent cough is decreasing but still c/o very dry mouth.  O2 Sat's remain between 93 -00% on 3 Liters NC O2 now.  S/p transfusion of 2 Units of PRBC's on 22 for H+H of 6.5 / 19.8.  Appetite is slowly improving.    PHYSICAL EXAM   General: 80 y/o white male awake, alert, with NC O2 in place, pleasant and cooperative, sitting uprignt in bed now, still with a dry mouth but in NAD  HEENT: conj pale, sclerae anicteric, PERRLA, no oral lesions noted but mucosa and tongue still appear somewhat dry  Neck: supple, no nodes noted           [facility] Vas Cath d/c'ed and with dry dressing at the site now  Heart: RR  Lungs: few moist rhonchi scattered  bilaterally and with decreased BS at the bases  Abdomen: BS+, soft, nontender to palpation, no masses or HS-megaly detected  Back: no CVA or Spinal tenderness noted  Extremities: well-healed bilateral TKR scars                    1+ edema LE's                     arms and hands  slightly less swollen   Skin: warm, dry, no rash noted  Khalil in place and draining clear yellow urine now - 2920cc  + 1400cc total output recorded over the prior 24hrs.    I&O's Summary :  2022 07:01  -  2022 07:00  IN: 0 mL / OUT: 4320 mL / NET: -4320 mL  2022 07:01  -  2022 17:39  IN: 0 mL / OUT: 1100 mL / NET: -1100 mL    LABS:  CBC Full  -  ( 2022 07:24 )  WBC Count : 24.50 K/uL  RBC Count : 2.96 M/uL  Hemoglobin : 8.9 g/dL  Hematocrit : 26.5 %  Platelet Count - Automated : 185 K/uL  Mean Cell Volume : 89.5 fl  Mean Cell Hemoglobin : 30.1 pg  Mean Cell Hemoglobin Concentration : 33.6 g/dL  Auto Neutrophil # : 21.32 K/uL  Auto Lymphocyte # : 2.21 K/uL  Auto Monocyte # : 0.98 K/uL  Auto Eosinophil # : 0.00 K/uL  Auto Basophil # : 0.00 K/uL  Auto Neutrophil % : 82.0 %  Auto Lymphocyte % : 9.0 %  Auto Monocyte % : 4.0 %  Auto Eosinophil % : 0.0 %  Auto Basophil % : 0.0 %      140  |  108  |  101<H>  ----------------------------<  112<H>  3.9   |  25  |  2.17<H>  Ca    7.6<L>      2022 07:24  TPro  5.5<L>  /  Alb  1.5<L>  /  TBili  0.6  /  DBili  x   /  AST  112<H>  /  ALT  91<H>  /  AlkPhos  174<H>    LIVER FUNCTIONS - ( 2022 07:24 )  Alb: 1.5 g/dL / Pro: 5.5 gm/dL / ALK PHOS: 174 U/L / ALT: 91 U/L / AST: 112 U/L / GGT: x           PTT - ( 2022 10:54 )  PTT:63.6 sec    Sedimentation Rate, Erythrocyte: 75 mm/hr ( @ 07:24)  Sedimentation Rate, Erythrocyte: 62 mm/hr ( @ 02:22)    C-Reactive Protein, Serum (22 @ 10:51)    C-Reactive Protein, Serum: 47 mg/L  C-Reactive Protein, Serum (22 @ 09:23)    C-Reactive Protein, Serum: 83 mg/L    Procalcitonin, Serum (22 @ 10:51)    Procalcitonin, Serum: 2.09:   Procalcitonin, Serum (22 @ 09:23)    Procalcitonin, Serum: 17.20:   Procalcitonin, Serum (22 @ 09:20)    Procalcitonin, Serum: 81.70:    Lactate, Blood (02.15.22 @ 21:14)    Lactate, Blood: 1.5 mmol/L  Lactate, Blood (02.15.22 @ 18:24)    Lactate, Blood: 2.5: Elevated lactate. Consider ordering follow-up lactate to trend. mmol/L  Lactate, Blood (02.15.22 @ 14:15)    Lactate, Blood: 3.9: Elevated lactate. Consider ordering follow-up lactate to trend. mmol/L    A1C with Estimated Average Glucose (22 @ 09:30)    A1C with Estimated Average Glucose Result: 7.6: Method: Immunoassay       Reference Range                4.0-5.6%       High risk (prediabetic)        5.7-6.4%       Diabetic, diagnostic             >=6.5%       ADA diabetic treatment goal       <7.0%    Estimated Average Glucose: 171    Acute Hepatitis Panel (22 @ 09:37)    Hepatitis C Virus Interpretation: Nonreact: Hepatitis C AB    Hepatitis C Virus S/CO Ratio: 0.12 S/CO    Hepatitis B Core IgM Antibody: Nonreact    Hepatitis B Surface Antigen: Nonreact    Hepatitis A IgM Antibody: Nonreact    Hepatitis B Surface Antibody (22 @ 09:37)    Hepatitis B Surface Antibody: Nonreact    C3 Complement, Serum (22 @ 09:37)    C3 Complement, Serum: 115 mg/dL  C4 Complement, Serum (22 @ 09:37)    C4 Complement, Serum: 26 mg/dL    Glomerular Basement Membrane Ab IgG (22 @ 21:11)    Glomerular Basement Membrane Ab Ig: Negative        MICROBIOLOGY:  Specimen Source: .Sputum Sputum ( @ 01:01)  Culture Results:   Normal Respiratory Alonzo present ( @ 01:01)    Gram Stain:   Rare polymorphonuclear leukocytes per low power field  Moderate Squamous epithelial cells per low power field  Numerous Yeast per oil power field  Moderate Gram Positive Rods per oil power field  Few Gram Negative Rods per oil power field  Rare Gram positive cocci in pairs per oil power field    Specimen Source: Clean Catch Clean Catch (Midstream) ( @ 08:49)  Culture Results:   >100,000 CFU/ml Klebsiella oxytoca/Raoutella ornithinolytica  Multiple Morphological Strains ( @ 08:49)    -  Tigecycline: S <=2    -  Tobramycin: S <=2    -  Trimethoprim/Sulfamethoxazole: S <=0.5/9.5    -  Amikacin: S <=16    -  Amoxicillin/Clavulanic Acid: S <=8/4    -  Ampicillin: R >16 These ampicillin results predict results for amoxicillin    -  Ampicillin/Sulbactam: S 8/4 Enterobacter, Klebsiella aerogenes, Citrobacter, and Serratia may develop resistance during prolonged therapy (3-4 days)    -  Aztreonam: S <=4    -  Cefazolin: R 16    -  Cefepime: S <=2    -  Cefoxitin: S <=8    -  Ceftriaxone: S <=1 Enterobacter, Klebsiella aerogenes, Citrobacter, and Serratia may develop resistance during prolonged therapy    -  Ciprofloxacin: S <=0.25    -  Gentamicin: S <=2    -  Imipenem: S <=1    -  Ertapenem: S <=0.5    -  Levofloxacin: S <=0.5    -  Meropenem: S <=1    -  Nitrofurantoin: S <=32 Should not be used to treat pyelonephritis    -  Piperacillin/Tazobactam: S <=8    Method Type: NATACHA    Specimen Source: .Blood Blood-Peripheral (02-15 @ 19:01)  Culture Results:   Growth in aerobic and anaerobic bottles: Klebsiella oxytoca/Raoutella  ornithinolytica    -  Klebsiella oxytoca: Detec    Gram Stain:   Growth in aerobic and anaerobic bottles: Gram Negative Rods    -  Meropenem: S <=1    -  Ertapenem: S <=0.5    -  Gentamicin: S <=2    -  Imipenem: S <=1    -  Levofloxacin: S <=0.5    -  Piperacillin/Tazobactam: S <=8    -  Tobramycin: S <=2    -  Trimethoprim/Sulfamethoxazole: S <=0.5/9.5    -  Amikacin: S <=16    -  Ampicillin: R >16 These ampicillin results predict results for amoxicillin    -  Ampicillin/Sulbactam: S 8/4 Enterobacter, Klebsiella aerogenes, Citrobacter, and Serratia may develop resistance during prolonged therapy (3-4 days)    -  Aztreonam: R >16    -  Cefazolin: R 16 Enterobacter, Klebsiella aerogenes, Citrobacter, and Serratia may develop resistance during prolonged therapy (3-4 days)    -  Cefepime: S <=2    -  Cefoxitin: S <=8    -  Ceftriaxone: S <=1 Enterobacter, Klebsiella aerogenes, Citrobacter, and Serratia may develop resistance during prolonged therapy    -  Ciprofloxacin: S <=0.25    Organism Identification: Blood Culture PCR  Klebsiella oxytoca /Raoutella ornithinolytica    Method Type: PCR    Method Type: NATACHA    Specimen Source: .Blood Blood-Peripheral (02-15 @ 19:01)  Culture Results:   Growth in aerobic and anaerobic bottles: Klebsiella oxytoca/Raoutella  ornithinolytica  See previous culture [id number] (02-15 @ 19:01)      Legionella Antigen, Urine: Negative ( @ 09:14)    MRSA/MSSA PCR (22 @ 09:20)    MRSA PCR Result.: NotDete:    Staph Aureus PCR Result: Replaced by [facility]    Respiratory Viral Panel with COVID-19 by [facility] (02.15.22 @ 14:34)    Rapid RVP Result: NotDete    SARS-CoV-2: NotDetec:     Radiology:  < from: CT Abdomen and Pelvis w/ Oral Cont (22 @ 13:36) >  ACC: [id number] EXAM:  CT ABDOMEN AND PELVIS OC                        PROCEDURE DATE:  2022    INTERPRETATION:  CLINICAL INFORMATION: Bacteremia  COMPARISON: 2/15  PROCEDURE:  CT of the Abdomen and Pelvis was performed without intravenous contrast.  Intravenous contrast: None.  Oral contrast: Positive contrast was administered.  Sagittal and coronal reformats were performed.  FINDINGS:  VISUALIZED CHEST: Bibasilar atelectasis and trace effusions.  ABDOMEN AND PELVIS:  LIVER: Within normal limits.  BILE DUCTS: Normal caliber.  GALLBLADDER: Cholelithiasis.  SPLEEN: Within normal limits.  PANCREAS: Within normal limits.  ADRENALS: Within normal limits.  KIDNEYS/URETERS: Within normal limits.  BLADDER: Khalil catheter.  REPRODUCTIVE ORGANS: No pelvic masses.  BOWEL: No bowel obstruction. Appendix is normal. Moderate hiatal hernia.  PERITONEUM: No ascites.  VESSELS: Atherosclerotic changes.  RETROPERITONEUM/LYMPH NODES: No lymphadenopathy.  ABDOMINAL WALL: Within normal limits.  BONES: Within normal limits.  IMPRESSION:  No acute findings. Multiple gallstones.    < from: US Duplex Venous Lower Ext Complete, Bilateral (22 @ 04:56) >  ACC: [id number] EXAM:  US DPLX LWR EXT VEINS COMPL BI                        PROCEDURE DATE:  2022    INTERPRETATION:  CLINICAL INFORMATION: Lower extremity swelling.  COMPARISON: None available.  TECHNIQUE: Duplex sonography of theBILATERAL LOWER extremity veins with   color and spectral Doppler, with and without compression.  IMPRESSION:  RIGHT: Limited calf vein visualization. Nonvisualization of the calf   veins. No evidence of deep venous thrombosis at or above the knee.  LEFT: Limited calf vein visualization. Acute nonocclusive above-the-knee   deep venous thrombosis. Occlusive DVT in the greater saphenous vein.    < from: US Abdomen Upper Quadrant Right (22 @ 12:03) >  ACC: [id number] EXAM:  US ABDOMEN RT UPR QUADRANT                        PROCEDURE DATE:  2022    INTERPRETATION:  CLINICAL INFORMATION: Elevated LFTs.  COMPARISON: CT abdomen/pelvis 2/15/2022.  TECHNIQUE: Sonography of the right upper quadrant.  IMPRESSION: Multiple gallstones. Gallbladder wall thickening. No   pericholecystic fluid. No evidence for intrahepatic or extrahepatic   biliary ductal dilatation.    < from: Xray Chest 1 View-PORTABLE IMMEDIATE (Xray Chest 1 View-PORTABLE IMMEDIATE .) (22 @ 01:26) >  ACC: [id number] EXAM:  XR CHEST PORTABLE IMMED 1V                        PROCEDURE DATE:  2022    IMPRESSION:  Right IJ line HD catheter in satisfactory position with no pneumothorax,   new    < from: CT Abdomen and Pelvis No Cont (02.15.22 @ 23:57) >  ACC: [id number] EXAM:  CT ABDOMEN AND PELVIS                        PROCEDURE DATE:  02/15/2022    INTERPRETATION:  CLINICAL INFORMATION:  Abdominal distension  COMPARISON: None.  CONTRAST/COMPLICATIONS:  IV Contrast: None  Oral Contrast:None  Complications: None  PROCEDURE:  Axial CT images were acquired through the abdomen and pelvis obtained   without intravenous contrast. Coronal and sagittal reformatted images   provided.  FINDINGS: This examination is limited by patient motion artifact and the   lack of oral and intravenous contrast.  LOWER CHEST: Bibasilar atelectasis. Coronary artery calcifications.  LIVER: Within normal limits.  BILE DUCTS: Normal caliber.  GALLBLADDER: Cholelithiasis..  SPLEEN: Within normal limits.  PANCREAS: Within normal limits.  ADRENALS: Within normal limits.  KIDNEYS/URETERS: No definite renal stone or hydronephrosis  BLADDER: Distended with Khalil catheter. No bladder wall thickening.  REPRODUCTIVE ORGANS: Prostate gland mildly enlarged.  BOWEL: Evaluation of bowel is limited without distention with oral   contrast, however there is no bowel obstruction. Few colonic diverticula   without acute diverticulitis. Small bowel loops are not dilated.   Unremarkable appendix without appendicitis. Stomach is underdistended for   adequate evaluation, however suggestion of a moderate hiatal hernia.   Appendix  PERITONEUM: No free air significant ascites.  VESSELS:  Limited without intravenous contrast. There is calcific   atherosclerosis of the abdominal aorta without gross aneurysmal   dilatation. 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  RETROPERITONEUM: No lymphadenopathy.  ABDOMINAL WALL: Small fat-containing bilateral inguinal hernias.  BONES: Degenerative changes of the spine. Bilateral L5 spondylolysis with   slight grade 1 anterolisthesis of L5 on S1.  MPRESSION:  There is no bowel obstruction, significant ascites or free   intraperitoneal air.  Moderate hiatal hernia.  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    < from: Xray Chest 1 View- PORTABLE-Urgent (02.15.22 @ 14:32) >  ACC: [id number] EXAM:  XR CHEST PORTABLE URGENT 1V                        PROCEDURE DATE:  02/15/2022    INTERPRETATION:  AP semierect chest on February 15, 2022 at 1:55 PM.   Patient has sepsis.  Heart enlargement again noted.  There aremild mid lower lung field infiltrates medially new since 2020.  IMPRESSION: Bilateral infiltrates as above.    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 ago, s/p  Bilateral Cataract Surgeries, admitted via the ER to [facility] on 2/15/22 with c/o diarrhea x 2 weeks at home along with decreased po intake and mild nausea with a few episodes of vomiting bilious material.       Rx'ed empirically with Rocephin + Zithromax after Blood and Urine Cx's were obtained.    Sepsis with Klebsiella Oxytoca ( AKA Raoutella ornithinolytica ) Bacteremia as identified from 2 out of 2 Blood Cx's from 2/15/22  - ? , GI, or Pulmonary source with Bilateral lower lobe PNA seen on initial CXR and Cholelithiasis with GB wall thickening seen on Sono.  Ab rx changed to Cefepime with Zithromax on 22 and patient remains with low - normal temps.  Sensitivities of the Kleb Oxytoca isolated from Blood Cx's noted above and sensitive to Cefepime.  Urine Legionella Ag is negative and now Urine Cx from 22 reported with >100,000 Klebsiella oxytoca ( multiple morphologic strains ) and now with sensitivities completed and noted to be the same as the Blood Cx isolates.  Sputum Cx finalized as normal alonzo.  CT of the Abdomen + Pelvis with oral contrast done 22  resulted with no acute findings, moderate Hiatal Hernia, and multiple Gallstones.  S/p transfusion of 2 Units of PRBC's on 22 for H+H decreased to 6.5 / 19.8 and noted with continued elevated WBC's and ESR although CRP and Procalcitonin are trending downwards along with continued slow improvement in renal function.    Suggestions:  Will therefore continue present ab rx with Cefepime and adjust the dose in view of improving BUN / Creat  and  will complete rx with Zithromax  today for rx of Sepsis due to Klebsiella Bacteremia and UTI along with Bilateral PNA.  Follow-up temps and labs closely.  Follow-up CXR and monitor O2 Sat's.  Discussed with patient  again in detail at the bedside TMAX _ 98.2     On day # 5 Cefepime / # 7 Zithromax    Vital Signs Last 24 Hrs  T(C): 36.7 (2022 18:00), Max: 36.8 (2022 00:27)  T(F): 98 (2022 18:00), Max: 98.2 (2022 00:27)  HR: 84 (2022 18:00) (84 - 93)  BP: 117/73 (2022 18:00) (107/70 - 127/79)  BP(mean): --  RR: 18 (2022 18:00) (16 - 19)  SpO2: 100% (2022 18:00) (93% - 100%)  Supplemental O2:  on NC O2     Awake, alert, c/o fatigue but otherwise feeling somewhat better.  No c/o cp or abdominal pain and no diarrhea reported.  Intermittent cough is decreasing but still c/o very dry mouth.  O2 Sat's remain between 93 -00% on 3 Liters NC O2 now.  S/p transfusion of 2 Units of PRBC's on 22 for H+H of 6.5 / 19.8.  Appetite is slowly improving.    PHYSICAL EXAM   General: 82 y/o white male awake, alert, with NC O2 in place, pleasant and cooperative, sitting uprignt in bed now, still with a dry mouth but in NAD  HEENT: conj pale, sclerae anicteric, PERRLA, no oral lesions noted but mucosa and tongue still appear somewhat dry  Neck: supple, no nodes noted           [facility] Vas Cath d/c'ed and with dry dressing at the site now  Heart: RR  Lungs: few moist rhonchi scattered  bilaterally and with decreased BS at the bases  Abdomen: BS+, soft, nontender to palpation, no masses or HS-megaly detected  Back: no CVA or Spinal tenderness noted  Extremities: well-healed bilateral TKR scars                    1+ edema LE's                     arms and hands  slightly less swollen   Skin: warm, dry, no rash noted  Khalil in place and draining clear yellow urine now - 2920cc  + 1400cc total output recorded over the prior 24hrs.    I&O's Summary :  2022 07:01  -  2022 07:00  IN: 0 mL / OUT: 4320 mL / NET: -4320 mL  2022 07:01  -  2022 17:39  IN: 0 mL / OUT: 1100 mL / NET: -1100 mL    LABS:  CBC Full  -  ( 2022 07:24 )  WBC Count : 24.50 K/uL  RBC Count : 2.96 M/uL  Hemoglobin : 8.9 g/dL  Hematocrit : 26.5 %  Platelet Count - Automated : 185 K/uL  Mean Cell Volume : 89.5 fl  Mean Cell Hemoglobin : 30.1 pg  Mean Cell Hemoglobin Concentration : 33.6 g/dL  Auto Neutrophil # : 21.32 K/uL  Auto Lymphocyte # : 2.21 K/uL  Auto Monocyte # : 0.98 K/uL  Auto Eosinophil # : 0.00 K/uL  Auto Basophil # : 0.00 K/uL  Auto Neutrophil % : 82.0 %  Auto Lymphocyte % : 9.0 %  Auto Monocyte % : 4.0 %  Auto Eosinophil % : 0.0 %  Auto Basophil % : 0.0 %      140  |  108  |  101<H>  ----------------------------<  112<H>  3.9   |  25  |  2.17<H>  Ca    7.6<L>      2022 07:24  TPro  5.5<L>  /  Alb  1.5<L>  /  TBili  0.6  /  DBili  x   /  AST  112<H>  /  ALT  91<H>  /  AlkPhos  174<H>    LIVER FUNCTIONS - ( 2022 07:24 )  Alb: 1.5 g/dL / Pro: 5.5 gm/dL / ALK PHOS: 174 U/L / ALT: 91 U/L / AST: 112 U/L / GGT: x           PTT - ( 2022 10:54 )  PTT:63.6 sec    Sedimentation Rate, Erythrocyte: 75 mm/hr ( @ 07:24)  Sedimentation Rate, Erythrocyte: 62 mm/hr ( @ 02:22)    C-Reactive Protein, Serum (22 @ 10:51)    C-Reactive Protein, Serum: 47 mg/L  C-Reactive Protein, Serum (22 @ 09:23)    C-Reactive Protein, Serum: 83 mg/L    Procalcitonin, Serum (22 @ 10:51)    Procalcitonin, Serum: 2.09:   Procalcitonin, Serum (22 @ 09:23)    Procalcitonin, Serum: 17.20:   Procalcitonin, Serum (22 @ 09:20)    Procalcitonin, Serum: 81.70:    Lactate, Blood (02.15.22 @ 21:14)    Lactate, Blood: 1.5 mmol/L  Lactate, Blood (02.15.22 @ 18:24)    Lactate, Blood: 2.5: Elevated lactate. Consider ordering follow-up lactate to trend. mmol/L  Lactate, Blood (02.15.22 @ 14:15)    Lactate, Blood: 3.9: Elevated lactate. Consider ordering follow-up lactate to trend. mmol/L    A1C with Estimated Average Glucose (22 @ 09:30)    A1C with Estimated Average Glucose Result: 7.6: Method: Immunoassay       Reference Range                4.0-5.6%       High risk (prediabetic)        5.7-6.4%       Diabetic, diagnostic             >=6.5%       ADA diabetic treatment goal       <7.0%    Estimated Average Glucose: 171    Acute Hepatitis Panel (22 @ 09:37)    Hepatitis C Virus Interpretation: Nonreact: Hepatitis C AB    Hepatitis C Virus S/CO Ratio: 0.12 S/CO    Hepatitis B Core IgM Antibody: Nonreact    Hepatitis B Surface Antigen: Nonreact    Hepatitis A IgM Antibody: Nonreact    Hepatitis B Surface Antibody (22 @ 09:37)    Hepatitis B Surface Antibody: Nonreact    C3 Complement, Serum (22 @ 09:37)    C3 Complement, Serum: 115 mg/dL  C4 Complement, Serum (22 @ 09:37)    C4 Complement, Serum: 26 mg/dL    Glomerular Basement Membrane Ab IgG (22 @ 21:11)    Glomerular Basement Membrane Ab Ig: Negative        MICROBIOLOGY:  Specimen Source: .Sputum Sputum ( @ 01:01)  Culture Results:   Normal Respiratory Alonzo present ( @ 01:01)    Gram Stain:   Rare polymorphonuclear leukocytes per low power field  Moderate Squamous epithelial cells per low power field  Numerous Yeast per oil power field  Moderate Gram Positive Rods per oil power field  Few Gram Negative Rods per oil power field  Rare Gram positive cocci in pairs per oil power field    Specimen Source: Clean Catch Clean Catch (Midstream) ( @ 08:49)  Culture Results:   >100,000 CFU/ml Klebsiella oxytoca/Raoutella ornithinolytica  Multiple Morphological Strains ( @ 08:49)    -  Tigecycline: S <=2    -  Tobramycin: S <=2    -  Trimethoprim/Sulfamethoxazole: S <=0.5/9.5    -  Amikacin: S <=16    -  Amoxicillin/Clavulanic Acid: S <=8/4    -  Ampicillin: R >16 These ampicillin results predict results for amoxicillin    -  Ampicillin/Sulbactam: S 8/4 Enterobacter, Klebsiella aerogenes, Citrobacter, and Serratia may develop resistance during prolonged therapy (3-4 days)    -  Aztreonam: S <=4    -  Cefazolin: R 16    -  Cefepime: S <=2    -  Cefoxitin: S <=8    -  Ceftriaxone: S <=1 Enterobacter, Klebsiella aerogenes, Citrobacter, and Serratia may develop resistance during prolonged therapy    -  Ciprofloxacin: S <=0.25    -  Gentamicin: S <=2    -  Imipenem: S <=1    -  Ertapenem: S <=0.5    -  Levofloxacin: S <=0.5    -  Meropenem: S <=1    -  Nitrofurantoin: S <=32 Should not be used to treat pyelonephritis    -  Piperacillin/Tazobactam: S <=8    Method Type: NATACHA    Specimen Source: .Blood Blood-Peripheral (02-15 @ 19:01)  Culture Results:   Growth in aerobic and anaerobic bottles: Klebsiella oxytoca/Raoutella  ornithinolytica    -  Klebsiella oxytoca: Detec    Gram Stain:   Growth in aerobic and anaerobic bottles: Gram Negative Rods    -  Meropenem: S <=1    -  Ertapenem: S <=0.5    -  Gentamicin: S <=2    -  Imipenem: S <=1    -  Levofloxacin: S <=0.5    -  Piperacillin/Tazobactam: S <=8    -  Tobramycin: S <=2    -  Trimethoprim/Sulfamethoxazole: S <=0.5/9.5    -  Amikacin: S <=16    -  Ampicillin: R >16 These ampicillin results predict results for amoxicillin    -  Ampicillin/Sulbactam: S 8/4 Enterobacter, Klebsiella aerogenes, Citrobacter, and Serratia may develop resistance during prolonged therapy (3-4 days)    -  Aztreonam: R >16    -  Cefazolin: R 16 Enterobacter, Klebsiella aerogenes, Citrobacter, and Serratia may develop resistance during prolonged therapy (3-4 days)    -  Cefepime: S <=2    -  Cefoxitin: S <=8    -  Ceftriaxone: S <=1 Enterobacter, Klebsiella aerogenes, Citrobacter, and Serratia may develop resistance during prolonged therapy    -  Ciprofloxacin: S <=0.25    Organism Identification: Blood Culture PCR  Klebsiella oxytoca /Raoutella ornithinolytica    Method Type: PCR    Method Type: NATACHA    Specimen Source: .Blood Blood-Peripheral (02-15 @ 19:01)  Culture Results:   Growth in aerobic and anaerobic bottles: Klebsiella oxytoca/Raoutella  ornithinolytica  See previous culture [id number] (02-15 @ 19:01)      Legionella Antigen, Urine: Negative ( @ 09:14)    MRSA/MSSA PCR (22 @ 09:20)    MRSA PCR Result.: NotDete:    Staph Aureus PCR Result: [facility]    Respiratory Viral Panel with COVID-19 by [facility] (02.15.22 @ 14:34)    Rapid RVP Result: NotDete    SARS-CoV-2: NotDetec:     Radiology:  < from: CT Abdomen and Pelvis w/ Oral Cont (22 @ 13:36) >  ACC: [id number] EXAM:  CT ABDOMEN AND PELVIS OC                        PROCEDURE DATE:  2022    INTERPRETATION:  CLINICAL INFORMATION: Bacteremia  COMPARISON: 2/15  PROCEDURE:  CT of the Abdomen and Pelvis was performed without intravenous contrast.  Intravenous contrast: None.  Oral contrast: Positive contrast was administered.  Sagittal and coronal reformats were performed.  FINDINGS:  VISUALIZED CHEST: Bibasilar atelectasis and trace effusions.  ABDOMEN AND PELVIS:  LIVER: Within normal limits.  BILE DUCTS: Normal caliber.  GALLBLADDER: Cholelithiasis.  SPLEEN: Within normal limits.  PANCREAS: Within normal limits.  ADRENALS: Within normal limits.  KIDNEYS/URETERS: Within normal limits.  BLADDER: Khalil catheter.  REPRODUCTIVE ORGANS: No pelvic masses.  BOWEL: No bowel obstruction. Appendix is normal. Moderate hiatal hernia.  PERITONEUM: No ascites.  VESSELS: Atherosclerotic changes.  RETROPERITONEUM/LYMPH NODES: No lymphadenopathy.  ABDOMINAL WALL: Within normal limits.  BONES: Within normal limits.  IMPRESSION:  No acute findings. Multiple gallstones.    < from: US Duplex Venous Lower Ext Complete, Bilateral (22 @ 04:56) >  ACC: [id number] EXAM:  US DPLX LWR EXT VEINS COMPL BI                        PROCEDURE DATE:  2022    INTERPRETATION:  CLINICAL INFORMATION: Lower extremity swelling.  COMPARISON: None available.  TECHNIQUE: Duplex sonography of theBILATERAL LOWER extremity veins with   color and spectral Doppler, with and without compression.  IMPRESSION:  RIGHT: Limited calf vein visualization. Nonvisualization of the calf   veins. No evidence of deep venous thrombosis at or above the knee.  LEFT: Limited calf vein visualization. Acute nonocclusive above-the-knee   deep venous thrombosis. Occlusive DVT in the greater saphenous vein.    < from: US Abdomen Upper Quadrant Right (22 @ 12:03) >  ACC: [id number] EXAM:  US ABDOMEN RT UPR QUADRANT                        PROCEDURE DATE:  2022    INTERPRETATION:  CLINICAL INFORMATION: Elevated LFTs.  COMPARISON: CT abdomen/pelvis 2/15/2022.  TECHNIQUE: Sonography of the right upper quadrant.  IMPRESSION: Multiple gallstones. Gallbladder wall thickening. No   pericholecystic fluid. No evidence for intrahepatic or extrahepatic   biliary ductal dilatation.    < from: Xray Chest 1 View-PORTABLE IMMEDIATE (Xray Chest 1 View-PORTABLE IMMEDIATE .) (22 @ 01:26) >  ACC: [id number] EXAM:  XR CHEST PORTABLE IMMED 1V                        PROCEDURE DATE:  2022    IMPRESSION:  Right IJ line HD catheter in satisfactory position with no pneumothorax,   new    < from: CT Abdomen and Pelvis No Cont (02.15.22 @ 23:57) >  ACC: [id number] EXAM:  CT ABDOMEN AND PELVIS                        PROCEDURE DATE:  02/15/2022    INTERPRETATION:  CLINICAL INFORMATION:  Abdominal distension  COMPARISON: None.  CONTRAST/COMPLICATIONS:  IV Contrast: None  Oral Contrast:None  Complications: None  PROCEDURE:  Axial CT images were acquired through the abdomen and pelvis obtained   without intravenous contrast. Coronal and sagittal reformatted images   provided.  FINDINGS: This examination is limited by patient motion artifact and the   lack of oral and intravenous contrast.  LOWER CHEST: Bibasilar atelectasis. Coronary artery calcifications.  LIVER: Within normal limits.  BILE DUCTS: Normal caliber.  GALLBLADDER: Cholelithiasis..  SPLEEN: Within normal limits.  PANCREAS: Within normal limits.  ADRENALS: Within normal limits.  KIDNEYS/URETERS: No definite renal stone or hydronephrosis  BLADDER: Distended with Khalil catheter. No bladder wall thickening.  REPRODUCTIVE ORGANS: Prostate gland mildly enlarged.  BOWEL: Evaluation of bowel is limited without distention with oral   contrast, however there is no bowel obstruction. Few colonic diverticula   without acute diverticulitis. Small bowel loops are not dilated.   Unremarkable appendix without appendicitis. Stomach is underdistended for   adequate evaluation, however suggestion of a moderate hiatal hernia.   Appendix  PERITONEUM: No free air significant ascites.  VESSELS:  Limited without intravenous contrast. There is calcific   atherosclerosis of the abdominal aorta without gross aneurysmal   dilatation. 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  RETROPERITONEUM: No lymphadenopathy.  ABDOMINAL WALL: Small fat-containing bilateral inguinal hernias.  BONES: Degenerative changes of the spine. Bilateral L5 spondylolysis with   slight grade 1 anterolisthesis of L5 on S1.  MPRESSION:  There is no bowel obstruction, significant ascites or free   intraperitoneal air.  Moderate hiatal hernia.  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    < from: Xray Chest 1 View- PORTABLE-Urgent (02.15.22 @ 14:32) >  ACC: [id number] EXAM:  XR CHEST PORTABLE URGENT 1V                        PROCEDURE DATE:  02/15/2022    INTERPRETATION:  AP semierect chest on February 15, 2022 at 1:55 PM.   Patient has sepsis.  Heart enlargement again noted.  There aremild mid lower lung field infiltrates medially new since 2020.  IMPRESSION: Bilateral infiltrates as above.    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 ago, s/p  Bilateral Cataract Surgeries, admitted via the ER to [facility] on 2/15/22 with c/o diarrhea x 2 weeks at home along with decreased po intake and mild nausea with a few episodes of vomiting bilious material.       Rx'ed empirically with Rocephin + Zithromax after Blood and Urine Cx's were obtained.    Sepsis with Klebsiella Oxytoca ( AKA Raoutella ornithinolytica ) Bacteremia as identified from 2 out of 2 Blood Cx's from 2/15/22  - ? , GI, or Pulmonary source with Bilateral lower lobe PNA seen on initial CXR and Cholelithiasis with GB wall thickening seen on Sono.  Ab rx changed to Cefepime with Zithromax on 22 and patient remains with low - normal temps.  Sensitivities of the Kleb Oxytoca isolated from Blood Cx's noted above and sensitive to Cefepime.  Urine Legionella Ag is negative and now Urine Cx from 22 reported with >100,000 Klebsiella oxytoca ( multiple morphologic strains ) and now with sensitivities completed and noted to be the same as the Blood Cx isolates.  Sputum Cx finalized as normal alonzo.  CT of the Abdomen + Pelvis with oral contrast done 22  resulted with no acute findings, moderate Hiatal Hernia, and multiple Gallstones.  S/p transfusion of 2 Units of PRBC's on 22 for H+H decreased to 6.5 / 19.8 and noted with continued elevated WBC's and ESR although CRP and Procalcitonin are trending downwards along with continued slow improvement in renal function.    Suggestions:  Will therefore continue present ab rx with Cefepime and adjust the dose in view of improving BUN / Creat  and  will complete rx with Zithromax  today for rx of Sepsis due to Klebsiella Bacteremia and UTI along with Bilateral PNA.  Follow-up temps and labs closely.  Follow-up CXR and monitor O2 Sat's.  Discussed with patient  again in detail at the bedside TMAX _ 98.2     On day # 5 Cefepime / # 7 Zithromax    Vital Signs Last 24 Hrs  T(C): 36.7 (2022 18:00), Max: 36.8 (2022 00:27)  T(F): 98 (2022 18:00), Max: 98.2 (2022 00:27)  HR: 84 (2022 18:00) (84 - 93)  BP: 117/73 (2022 18:00) (107/70 - 127/79)  BP(mean): --  RR: 18 (2022 18:00) (16 - 19)  SpO2: 100% (2022 18:00) (93% - 100%)  Supplemental O2:  on NC O2     Awake, alert, c/o fatigue but otherwise feeling somewhat better.  No c/o cp or abdominal pain and no diarrhea reported.  Intermittent cough is decreasing but still c/o very dry mouth.  O2 Sat's remain between 93 -00% on 3 Liters NC O2 now.  S/p transfusion of 2 Units of PRBC's on 22 for H+H of 6.5 / 19.8.  Appetite is slowly improving.    PHYSICAL EXAM   General: 80 y/o white male awake, alert, with NC O2 in place, pleasant and cooperative, sitting uprignt in bed now, still with a dry mouth but in NAD  HEENT: conj pale, sclerae anicteric, PERRLA, no oral lesions noted but mucosa and tongue still appear somewhat dry  Neck: supple, no nodes noted           [facility] Vas Cath d/c'ed and with dry dressing at the site now  Heart: RR  Lungs: few moist rhonchi scattered  bilaterally and with decreased BS at the bases  Abdomen: BS+, soft, nontender to palpation, no masses or HS-megaly detected  Back: no CVA or Spinal tenderness noted  Extremities: well-healed bilateral TKR scars                    1+ edema LE's                     arms and hands  slightly less swollen   Skin: warm, dry, no rash noted  Khalil in place and draining clear yellow urine now - 2920cc  + 1400cc total output recorded over the prior 24hrs.    I&O's Summary :  2022 07:01  -  2022 07:00  IN: 0 mL / OUT: 4320 mL / NET: -4320 mL  2022 07:01  -  2022 17:39  IN: 0 mL / OUT: 1100 mL / NET: -1100 mL    LABS:  CBC Full  -  ( 2022 07:24 )  WBC Count : 24.50 K/uL  RBC Count : 2.96 M/uL  Hemoglobin : 8.9 g/dL  Hematocrit : 26.5 %  Platelet Count - Automated : 185 K/uL  Mean Cell Volume : 89.5 fl  Mean Cell Hemoglobin : 30.1 pg  Mean Cell Hemoglobin Concentration : 33.6 g/dL  Auto Neutrophil # : 21.32 K/uL  Auto Lymphocyte # : 2.21 K/uL  Auto Monocyte # : 0.98 K/uL  Auto Eosinophil # : 0.00 K/uL  Auto Basophil # : 0.00 K/uL  Auto Neutrophil % : 82.0 %  Auto Lymphocyte % : 9.0 %  Auto Monocyte % : 4.0 %  Auto Eosinophil % : 0.0 %  Auto Basophil % : 0.0 %      140  |  108  |  101<H>  ----------------------------<  112<H>  3.9   |  25  |  2.17<H>  Ca    7.6<L>      2022 07:24  TPro  5.5<L>  /  Alb  1.5<L>  /  TBili  0.6  /  DBili  x   /  AST  112<H>  /  ALT  91<H>  /  AlkPhos  174<H>    LIVER FUNCTIONS - ( 2022 07:24 )  Alb: 1.5 g/dL / Pro: 5.5 gm/dL / ALK PHOS: 174 U/L / ALT: 91 U/L / AST: 112 U/L / GGT: x           PTT - ( 2022 10:54 )  PTT:63.6 sec    Sedimentation Rate, Erythrocyte: 75 mm/hr ( @ 07:24)  Sedimentation Rate, Erythrocyte: 62 mm/hr ( @ 02:22)    C-Reactive Protein, Serum (22 @ 10:51)    C-Reactive Protein, Serum: 47 mg/L  C-Reactive Protein, Serum (22 @ 09:23)    C-Reactive Protein, Serum: 83 mg/L    Procalcitonin, Serum (22 @ 10:51)    Procalcitonin, Serum: 2.09:   Procalcitonin, Serum (22 @ 09:23)    Procalcitonin, Serum: 17.20:   Procalcitonin, Serum (22 @ 09:20)    Procalcitonin, Serum: 81.70:    Lactate, Blood (02.15.22 @ 21:14)    Lactate, Blood: 1.5 mmol/L  Lactate, Blood (02.15.22 @ 18:24)    Lactate, Blood: 2.5: Elevated lactate. Consider ordering follow-up lactate to trend. mmol/L  Lactate, Blood (02.15.22 @ 14:15)    Lactate, Blood: 3.9: Elevated lactate. Consider ordering follow-up lactate to trend. mmol/L    A1C with Estimated Average Glucose (22 @ 09:30)    A1C with Estimated Average Glucose Result: 7.6: Method: Immunoassay       Reference Range                4.0-5.6%       High risk (prediabetic)        5.7-6.4%       Diabetic, diagnostic             >=6.5%       ADA diabetic treatment goal       <7.0%    Estimated Average Glucose: 171    Acute Hepatitis Panel (22 @ 09:37)    Hepatitis C Virus Interpretation: Nonreact: Hepatitis C AB    Hepatitis C Virus S/CO Ratio: 0.12 S/CO    Hepatitis B Core IgM Antibody: Nonreact    Hepatitis B Surface Antigen: Nonreact    Hepatitis A IgM Antibody: Nonreact    Hepatitis B Surface Antibody (22 @ 09:37)    Hepatitis B Surface Antibody: Nonreact    C3 Complement, Serum (22 @ 09:37)    C3 Complement, Serum: 115 mg/dL  C4 Complement, Serum (22 @ 09:37)    C4 Complement, Serum: 26 mg/dL    Glomerular Basement Membrane Ab IgG (22 @ 21:11)    Glomerular Basement Membrane Ab Ig: Negative        MICROBIOLOGY:  Specimen Source: .Sputum Sputum ( @ 01:01)  Culture Results:   Normal Respiratory Alonzo present ( @ 01:01)    Gram Stain:   Rare polymorphonuclear leukocytes per low power field  Moderate Squamous epithelial cells per low power field  Numerous Yeast per oil power field  Moderate Gram Positive Rods per oil power field  Few Gram Negative Rods per oil power field  Rare Gram positive cocci in pairs per oil power field    Specimen Source: Clean Catch Clean Catch (Midstream) ( @ 08:49)  Culture Results:   >100,000 CFU/ml Klebsiella oxytoca/Raoutella ornithinolytica  Multiple Morphological Strains ( @ 08:49)    -  Tigecycline: S <=2    -  Tobramycin: S <=2    -  Trimethoprim/Sulfamethoxazole: S <=0.5/9.5    -  Amikacin: S <=16    -  Amoxicillin/Clavulanic Acid: S <=8/4    -  Ampicillin: R >16 These ampicillin results predict results for amoxicillin    -  Ampicillin/Sulbactam: S 8/4 Enterobacter, Klebsiella aerogenes, Citrobacter, and Serratia may develop resistance during prolonged therapy (3-4 days)    -  Aztreonam: S <=4    -  Cefazolin: R 16    -  Cefepime: S <=2    -  Cefoxitin: S <=8    -  Ceftriaxone: S <=1 Enterobacter, Klebsiella aerogenes, Citrobacter, and Serratia may develop resistance during prolonged therapy    -  Ciprofloxacin: S <=0.25    -  Gentamicin: S <=2    -  Imipenem: S <=1    -  Ertapenem: S <=0.5    -  Levofloxacin: S <=0.5    -  Meropenem: S <=1    -  Nitrofurantoin: S <=32 Should not be used to treat pyelonephritis    -  Piperacillin/Tazobactam: S <=8    Method Type: NATACHA    Specimen Source: .Blood Blood-Peripheral (02-15 @ 19:01)  Culture Results:   Growth in aerobic and anaerobic bottles: Klebsiella oxytoca/Raoutella  ornithinolytica    -  Klebsiella oxytoca: Detec    Gram Stain:   Growth in aerobic and anaerobic bottles: Gram Negative Rods    -  Meropenem: S <=1    -  Ertapenem: S <=0.5    -  Gentamicin: S <=2    -  Imipenem: S <=1    -  Levofloxacin: S <=0.5    -  Piperacillin/Tazobactam: S <=8    -  Tobramycin: S <=2    -  Trimethoprim/Sulfamethoxazole: S <=0.5/9.5    -  Amikacin: S <=16    -  Ampicillin: R >16 These ampicillin results predict results for amoxicillin    -  Ampicillin/Sulbactam: S 8/4 Enterobacter, Klebsiella aerogenes, Citrobacter, and Serratia may develop resistance during prolonged therapy (3-4 days)    -  Aztreonam: R >16    -  Cefazolin: R 16 Enterobacter, Klebsiella aerogenes, Citrobacter, and Serratia may develop resistance during prolonged therapy (3-4 days)    -  Cefepime: S <=2    -  Cefoxitin: S <=8    -  Ceftriaxone: S <=1 Enterobacter, Klebsiella aerogenes, Citrobacter, and Serratia may develop resistance during prolonged therapy    -  Ciprofloxacin: S <=0.25    Organism Identification: Blood Culture PCR  Klebsiella oxytoca /Raoutella ornithinolytica    Method Type: PCR    Method Type: NATACHA    Specimen Source: .Blood Blood-Peripheral (02-15 @ 19:01)  Culture Results:   Growth in aerobic and anaerobic bottles: Klebsiella oxytoca/Raoutella  ornithinolytica  See previous culture [id number] (02-15 @ 19:01)      Legionella Antigen, Urine: Negative ( @ 09:14)    MRSA/MSSA PCR (22 @ 09:20)    MRSA PCR Result.: NotDete:    [facility]    Respiratory Viral Panel with COVID-19 by [facility] (02.15.22 @ 14:34)    Rapid RVP Result: NotDete    SARS-CoV-2: NotDetec:     Radiology:  < from: CT Abdomen and Pelvis w/ Oral Cont (22 @ 13:36) >  ACC: [id number] EXAM:  CT ABDOMEN AND PELVIS OC                        PROCEDURE DATE:  2022    INTERPRETATION:  CLINICAL INFORMATION: Bacteremia  COMPARISON: 2/15  PROCEDURE:  CT of the Abdomen and Pelvis was performed without intravenous contrast.  Intravenous contrast: None.  Oral contrast: Positive contrast was administered.  Sagittal and coronal reformats were performed.  FINDINGS:  VISUALIZED CHEST: Bibasilar atelectasis and trace effusions.  ABDOMEN AND PELVIS:  LIVER: Within normal limits.  BILE DUCTS: Normal caliber.  GALLBLADDER: Cholelithiasis.  SPLEEN: Within normal limits.  PANCREAS: Within normal limits.  ADRENALS: Within normal limits.  KIDNEYS/URETERS: Within normal limits.  BLADDER: Khalil catheter.  REPRODUCTIVE ORGANS: No pelvic masses.  BOWEL: No bowel obstruction. Appendix is normal. Moderate hiatal hernia.  PERITONEUM: No ascites.  VESSELS: Atherosclerotic changes.  RETROPERITONEUM/LYMPH NODES: No lymphadenopathy.  ABDOMINAL WALL: Within normal limits.  BONES: Within normal limits.  IMPRESSION:  No acute findings. Multiple gallstones.    < from: US Duplex Venous Lower Ext Complete, Bilateral (22 @ 04:56) >  ACC: [id number] EXAM:  US DPLX LWR EXT VEINS COMPL BI                        PROCEDURE DATE:  2022    INTERPRETATION:  CLINICAL INFORMATION: Lower extremity swelling.  COMPARISON: None available.  TECHNIQUE: Duplex sonography of theBILATERAL LOWER extremity veins with   color and spectral Doppler, with and without compression.  IMPRESSION:  RIGHT: Limited calf vein visualization. Nonvisualization of the calf   veins. No evidence of deep venous thrombosis at or above the knee.  LEFT: Limited calf vein visualization. Acute nonocclusive above-the-knee   deep venous thrombosis. Occlusive DVT in the greater saphenous vein.    < from: US Abdomen Upper Quadrant Right (22 @ 12:03) >  ACC: [id number] EXAM:  US ABDOMEN RT UPR QUADRANT                        PROCEDURE DATE:  2022    INTERPRETATION:  CLINICAL INFORMATION: Elevated LFTs.  COMPARISON: CT abdomen/pelvis 2/15/2022.  TECHNIQUE: Sonography of the right upper quadrant.  IMPRESSION: Multiple gallstones. Gallbladder wall thickening. No   pericholecystic fluid. No evidence for intrahepatic or extrahepatic   biliary ductal dilatation.    < from: Xray Chest 1 View-PORTABLE IMMEDIATE (Xray Chest 1 View-PORTABLE IMMEDIATE .) (22 @ 01:26) >  ACC: [id number] EXAM:  XR CHEST PORTABLE IMMED 1V                        PROCEDURE DATE:  2022    IMPRESSION:  Right IJ line HD catheter in satisfactory position with no pneumothorax,   new    < from: CT Abdomen and Pelvis No Cont (02.15.22 @ 23:57) >  ACC: [id number] EXAM:  CT ABDOMEN AND PELVIS                        PROCEDURE DATE:  02/15/2022    INTERPRETATION:  CLINICAL INFORMATION:  Abdominal distension  COMPARISON: None.  CONTRAST/COMPLICATIONS:  IV Contrast: None  Oral Contrast:None  Complications: None  PROCEDURE:  Axial CT images were acquired through the abdomen and pelvis obtained   without intravenous contrast. Coronal and sagittal reformatted images   provided.  FINDINGS: This examination is limited by patient motion artifact and the   lack of oral and intravenous contrast.  LOWER CHEST: Bibasilar atelectasis. Coronary artery calcifications.  LIVER: Within normal limits.  BILE DUCTS: Normal caliber.  GALLBLADDER: Cholelithiasis..  SPLEEN: Within normal limits.  PANCREAS: Within normal limits.  ADRENALS: Within normal limits.  KIDNEYS/URETERS: No definite renal stone or hydronephrosis  BLADDER: Distended with Khalil catheter. No bladder wall thickening.  REPRODUCTIVE ORGANS: Prostate gland mildly enlarged.  BOWEL: Evaluation of bowel is limited without distention with oral   contrast, however there is no bowel obstruction. Few colonic diverticula   without acute diverticulitis. Small bowel loops are not dilated.   Unremarkable appendix without appendicitis. Stomach is underdistended for   adequate evaluation, however suggestion of a moderate hiatal hernia.   Appendix  PERITONEUM: No free air significant ascites.  VESSELS:  Limited without intravenous contrast. There is calcific   atherosclerosis of the abdominal aorta without gross aneurysmal   dilatation. 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  RETROPERITONEUM: No lymphadenopathy.  ABDOMINAL WALL: Small fat-containing bilateral inguinal hernias.  BONES: Degenerative changes of the spine. Bilateral L5 spondylolysis with   slight grade 1 anterolisthesis of L5 on S1.  MPRESSION:  There is no bowel obstruction, significant ascites or free   intraperitoneal air.  Moderate hiatal hernia.  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    < from: Xray Chest 1 View- PORTABLE-Urgent (02.15.22 @ 14:32) >  ACC: [id number] EXAM:  XR CHEST PORTABLE URGENT 1V                        PROCEDURE DATE:  02/15/2022    INTERPRETATION:  AP semierect chest on February 15, 2022 at 1:55 PM.   Patient has sepsis.  Heart enlargement again noted.  There aremild mid lower lung field infiltrates medially new since 2020.  IMPRESSION: Bilateral infiltrates as above.    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 ago, s/p  Bilateral Cataract Surgeries, admitted via the ER to [facility] on 2/15/22 with c/o diarrhea x 2 weeks at home along with decreased po intake and mild nausea with a few episodes of vomiting bilious material.  W/u revealed a markedly elevated WBC's of 37,940.  an elevated Lactate to 3.9, and his BUN / Creat were found to be elevated at 169 / 12.9 and Glucose was elevated as well. Further w/u with CXR and CT of the Abdomen + Pelvis was done and revealed Bilateral Infiltrates, Cholelithiasis, a moderate Hiatal Hernia, but no bowel obstruction was seen.  Patient was admitted to the CCU for further care and he was Rx'ed empirically with Rocephin + Zithromax after Blood and Urine Cx's were obtained and he underwent HD urgently x 1 on 2/15/22 PM and was rx'ed with 5 Liters of NS.  Subsequently found to have Sepsis with Klebsiella Oxytoca ( AKA Raoutella ornithinolytica ) Bacteremia as identified from 2 out of 2 Blood Cx's from 2/15/22  - ? , GI, or Pulmonary source with Bilateral lower lobe PNA seen on initial CXR and Cholelithiasis with GB wall thickening seen on Sono.  Ab rx changed to Cefepime with Zithromax on 22 and patient remains with low - normal temps.  Sensitivities of the Kleb Oxytoca isolated from Blood Cx's noted above and sensitive to Cefepime.  Urine Legionella Ag is negative and now Urine Cx from 22 reported with >100,000 Klebsiella oxytoca ( multiple morphologic strains ) and now with sensitivities completed and noted to be the same as the Blood Cx isolates.  Sputum Cx finalized as normal alonzo.  CT of the Abdomen + Pelvis with oral contrast done 22  resulted with no acute findings, moderate Hiatal Hernia, and multiple Gallstones.  S/p transfusion of 2 Units of PRBC's on 22 for H+H decreased to 6.5 / 19.8 and noted with continued elevated WBC's and ESR although CRP and Procalcitonin are trending downwards along with continued slow improvement in renal function.    Suggestions:  Will therefore continue present ab rx with Cefepime and adjust the dose in view of improving BUN / Creat  and  will complete rx with Zithromax  today for rx of Sepsis due to Klebsiella Bacteremia and UTI along with Bilateral PNA.  Follow-up temps and labs closely.  Follow-up CXR and monitor O2 Sat's.  Discussed with patient  again in detail at the bedside

## 2023-01-03 ENCOUNTER — APPOINTMENT (OUTPATIENT)
Dept: UROLOGY | Facility: CLINIC | Age: 83
End: 2023-01-03
Payer: MEDICARE

## 2023-01-03 ENCOUNTER — RESULT CHARGE (OUTPATIENT)
Age: 83
End: 2023-01-03

## 2023-01-03 VITALS
OXYGEN SATURATION: 98 % | DIASTOLIC BLOOD PRESSURE: 81 MMHG | HEART RATE: 62 BPM | SYSTOLIC BLOOD PRESSURE: 156 MMHG | TEMPERATURE: 97.5 F

## 2023-01-03 PROCEDURE — 52000 CYSTOURETHROSCOPY: CPT

## 2023-01-03 PROCEDURE — 99213 OFFICE O/P EST LOW 20 MIN: CPT | Mod: 25

## 2023-01-03 PROCEDURE — 81003 URINALYSIS AUTO W/O SCOPE: CPT | Mod: QW

## 2023-01-03 RX ORDER — SULFAMETHOXAZOLE AND TRIMETHOPRIM 800; 160 MG/1; MG/1
800-160 TABLET ORAL
Refills: 0 | Status: COMPLETED | OUTPATIENT
Start: 2023-01-03

## 2023-01-03 RX ORDER — SULFAMETHOXAZOLE AND TRIMETHOPRIM 800; 160 MG/1; MG/1
800-160 TABLET ORAL TWICE DAILY
Qty: 2 | Refills: 0 | Status: COMPLETED | COMMUNITY
Start: 2023-01-01 | End: 2023-01-03

## 2023-01-03 RX ADMIN — SULFAMETHOXAZOLE AND TRIMETHOPRIM 0 MG: 800; 160 TABLET ORAL at 00:00

## 2023-01-04 RX ORDER — AMOXICILLIN AND CLAVULANATE POTASSIUM 875; 125 MG/1; MG/1
875-125 TABLET, COATED ORAL TWICE DAILY
Qty: 20 | Refills: 0 | Status: DISCONTINUED | COMMUNITY
Start: 2022-04-19 | End: 2023-01-04

## 2023-01-04 RX ORDER — SULFAMETHOXAZOLE AND TRIMETHOPRIM 800; 160 MG/1; MG/1
800-160 TABLET ORAL TWICE DAILY
Qty: 2 | Refills: 0 | Status: DISCONTINUED | COMMUNITY
Start: 2022-09-20 | End: 2023-01-04

## 2023-01-04 RX ORDER — AMOXICILLIN AND CLAVULANATE POTASSIUM 875; 125 MG/1; MG/1
875-125 TABLET, COATED ORAL
Qty: 10 | Refills: 0 | Status: DISCONTINUED | COMMUNITY
Start: 2022-04-11 | End: 2023-01-04

## 2023-01-04 RX ORDER — TAMSULOSIN HYDROCHLORIDE 0.4 MG/1
0.4 CAPSULE ORAL DAILY
Qty: 180 | Refills: 3 | Status: DISCONTINUED | COMMUNITY
Start: 2020-08-07 | End: 2023-01-04

## 2023-01-04 NOTE — ASSESSMENT
[FreeTextEntry1] : 82 year old male with h/o [place] (3+3=6) T1b prostate ca, Ta bladder ca, LUTS\par s/p TURP 4/27/22 \par \par PSA sent today \par \par cysto today- see procedural note \par no tumors\par \par to go for MR prostate\par \par poss interval biopsy \par \par f/u 6 months

## 2023-01-04 NOTE — END OF VISIT
[FreeTextEntry3] : Medical record entries made by the scribe today, were at my direction and personally dictated to them by me, Dr. Breezy Cote on 01/03/2023. I have reviewed the chart and agree that the record accurately reflects my personal performance of the history, physical exam, assessment, and plan.

## 2023-01-04 NOTE — PHYSICAL EXAM
[General Appearance - Well Developed] : well developed [General Appearance - Well Nourished] : well nourished [Normal Appearance] : normal appearance [Well Groomed] : well groomed [General Appearance - In No Acute Distress] : no acute distress [Abdomen Soft] : soft [Abdomen Tenderness] : non-tender [Costovertebral Angle Tenderness] : no ~M costovertebral angle tenderness [Urethral Meatus] : meatus normal [Scrotum] : the scrotum was normal [Epididymis] : the epididymides were normal [Testes Tenderness] : no tenderness of the testes [Testes Mass (___cm)] : there were no testicular masses [Anus Abnormality] : the anus and perineum were normal [Rectal Exam - Rectum] : digital rectal exam was normal [Prostate Enlargement] : the prostate was not enlarged [Prostate Tenderness] : the prostate was not tender [No Prostate Nodules] : no prostate nodules [FreeTextEntry1] : small, nl prostate

## 2023-01-04 NOTE — HISTORY OF PRESENT ILLNESS
[FreeTextEntry1] : 82 year old male following for h/o [place] (3+3=6) T1b prostate ca, also h/o Ta bladder ca -latteer was 8/4/20\par \par s/p TURP 4/27/22 \par \par still on 0.8 flomax\par \par PSA 9/20/22: 0.41 \par \par cytology 9/20/22: neg \par \par cysto, psa today

## 2023-01-18 ENCOUNTER — RESULT REVIEW (OUTPATIENT)
Age: 83
End: 2023-01-18

## 2023-01-18 ENCOUNTER — APPOINTMENT (OUTPATIENT)
Dept: MRI IMAGING | Facility: CLINIC | Age: 83
End: 2023-01-18
Payer: MEDICARE

## 2023-01-18 ENCOUNTER — OUTPATIENT (OUTPATIENT)
Dept: OUTPATIENT SERVICES | Facility: HOSPITAL | Age: 83
LOS: 1 days | End: 2023-01-18
Payer: COMMERCIAL

## 2023-01-18 DIAGNOSIS — Z98.890 OTHER SPECIFIED POSTPROCEDURAL STATES: Chronic | ICD-10-CM

## 2023-01-18 DIAGNOSIS — Z98.49 CATARACT EXTRACTION STATUS, UNSPECIFIED EYE: Chronic | ICD-10-CM

## 2023-01-18 DIAGNOSIS — Z96.651 PRESENCE OF RIGHT ARTIFICIAL KNEE JOINT: Chronic | ICD-10-CM

## 2023-01-18 DIAGNOSIS — Z96.652 PRESENCE OF LEFT ARTIFICIAL KNEE JOINT: Chronic | ICD-10-CM

## 2023-01-18 DIAGNOSIS — C61 MALIGNANT NEOPLASM OF PROSTATE: ICD-10-CM

## 2023-01-18 PROCEDURE — A9585: CPT

## 2023-01-18 PROCEDURE — 72197 MRI PELVIS W/O & W/DYE: CPT

## 2023-01-18 PROCEDURE — 76498P: CUSTOM | Mod: 26

## 2023-01-18 PROCEDURE — 72197 MRI PELVIS W/O & W/DYE: CPT | Mod: 26

## 2023-01-18 PROCEDURE — 76498 UNLISTED MR PROCEDURE: CPT

## 2023-02-01 NOTE — PROVIDER CONTACT NOTE (CRITICAL VALUE NOTIFICATION) - SITUATION
Covered by current Abx therapy in progress therapy.
CBC = H&H = 6.8 and 20.6. Ptt = 70.6
Last PTT = >200
Fluconazole Counseling:  Patient counseled regarding adverse effects of fluconazole including but not limited to headache, diarrhea, nausea, upset stomach, liver function test abnormalities, taste disturbance, and stomach pain.  There is a rare possibility of liver failure that can occur when taking fluconazole.  The patient understands that monitoring of LFTs and kidney function test may be required, especially at baseline. The patient verbalized understanding of the proper use and possible adverse effects of fluconazole.  All of the patient's questions and concerns were addressed.

## 2023-02-06 ENCOUNTER — NON-APPOINTMENT (OUTPATIENT)
Age: 83
End: 2023-02-06

## 2023-02-09 ENCOUNTER — APPOINTMENT (OUTPATIENT)
Dept: UROLOGY | Facility: CLINIC | Age: 83
End: 2023-02-09
Payer: MEDICARE

## 2023-02-09 ENCOUNTER — OUTPATIENT (OUTPATIENT)
Dept: OUTPATIENT SERVICES | Facility: HOSPITAL | Age: 83
LOS: 1 days | End: 2023-02-09
Payer: COMMERCIAL

## 2023-02-09 VITALS — DIASTOLIC BLOOD PRESSURE: 97 MMHG | HEART RATE: 59 BPM | SYSTOLIC BLOOD PRESSURE: 171 MMHG

## 2023-02-09 VITALS — HEART RATE: 74 BPM | DIASTOLIC BLOOD PRESSURE: 103 MMHG | SYSTOLIC BLOOD PRESSURE: 167 MMHG

## 2023-02-09 VITALS — SYSTOLIC BLOOD PRESSURE: 156 MMHG | DIASTOLIC BLOOD PRESSURE: 102 MMHG | HEART RATE: 70 BPM

## 2023-02-09 DIAGNOSIS — Z98.890 OTHER SPECIFIED POSTPROCEDURAL STATES: Chronic | ICD-10-CM

## 2023-02-09 DIAGNOSIS — R35.0 FREQUENCY OF MICTURITION: ICD-10-CM

## 2023-02-09 DIAGNOSIS — C61 MALIGNANT NEOPLASM OF PROSTATE: ICD-10-CM

## 2023-02-09 DIAGNOSIS — Z98.49 CATARACT EXTRACTION STATUS, UNSPECIFIED EYE: Chronic | ICD-10-CM

## 2023-02-09 DIAGNOSIS — Z96.651 PRESENCE OF RIGHT ARTIFICIAL KNEE JOINT: Chronic | ICD-10-CM

## 2023-02-09 DIAGNOSIS — Z96.652 PRESENCE OF LEFT ARTIFICIAL KNEE JOINT: Chronic | ICD-10-CM

## 2023-02-09 PROCEDURE — 55700: CPT

## 2023-02-09 PROCEDURE — 76872 US TRANSRECTAL: CPT

## 2023-02-09 PROCEDURE — 76872 US TRANSRECTAL: CPT | Mod: 26

## 2023-02-10 ENCOUNTER — NON-APPOINTMENT (OUTPATIENT)
Age: 83
End: 2023-02-10

## 2023-02-17 ENCOUNTER — APPOINTMENT (OUTPATIENT)
Dept: UROLOGY | Facility: CLINIC | Age: 83
End: 2023-02-17
Payer: MEDICARE

## 2023-02-17 PROCEDURE — 99214 OFFICE O/P EST MOD 30 MIN: CPT

## 2023-02-18 ENCOUNTER — NON-APPOINTMENT (OUTPATIENT)
Age: 83
End: 2023-02-18

## 2023-02-20 NOTE — ASSESSMENT
[FreeTextEntry1] : 82 year old male following for T1a prostate cancer, \par \par here with son for discussion\par 30 minutes discussion re : current options\par \par d/w pt biopsy report which revealed [place] (3+4=7) cancer on prostate\par significance of imtermediate risk with  favorable prognosis disucssed\par \par also evidence of disease progression reviewed\par \par all options discussed-  continue active surveillance every 3 months,\par \par Definitvei therapies-radiation, surgical options, cryoablation, focal therapy amongst others as well as focal therapy reviewed\par risk/beneifts discussed\par \par pt to continue with active surveillance\par \par f/u 3-4 months-psa exam cysto\par \par all questions answered, pt understood\par \par f/u 3 months for surveillance + cystoscopy and evaluation of bladder tumor

## 2023-02-20 NOTE — ADDENDUM
[FreeTextEntry1] : I, KRISTIAN TEMPLE, acted solely as a scribe for Dr. Breezy Toro on 02/17/2023.\par

## 2023-02-20 NOTE — HISTORY OF PRESENT ILLNESS
[FreeTextEntry1] : 82 year old male following for h/o [place] (3+3=6) T1a prostate cancer,\par \par Also prior Ta bladder ca 2.5 years ago\par \par was in retention-s/p TURP 4/27/22 voiding well\par \par s/p interval  prostate biopsy 2/9/23\par \par Adenocarcinoma on right posterior lateral apex- [place] (3+3=6)\par Adenocarcinoma on TZ left- [place] (3+4=7)\par \par PSA 1/1/23- .45 ng/ml\par 9/20/22- .41 ng/ml\par \par \par \par

## 2023-02-20 NOTE — LETTER BODY
[Dear  ___] : Dear  [unfilled], [Courtesy Letter:] : I had the pleasure of seeing your patient, [unfilled], in my office today. [Please see my note below.] : Please see my note below. [Sincerely,] : Sincerely, [FreeTextEntry1] : see below\par \par disease progression noted\par \par wants to continue with surveillance at present [FreeTextEntry3] : Breezy Cote MD FACS\par \par Attending Urologist-[facility]\par Chief-Urology Division [facility]\par Associate Clinical Professor-[employer] at [facility]\par \par

## 2023-04-24 NOTE — ASU PREOP CHECKLIST - HAND OFF
NOTIFICATION OF ADMISSION DISCHARGE   This is a Notification of Discharge from [address]  Please be advised that this patient has been discharge from our facility  Below you will find the admission and discharge date and time including the patient’s disposition  UTILIZATION REVIEW CONTACT:  Lorna Granados  Utilization   Network Utilization Review Department  Phone: [phone] x carefully listen to the prompts  All voicemails are confidential   Email: [email or web address]     ADMISSION INFORMATION  PRESENTATION DATE: 4/20/2023 11:21 AM  OBERVATION ADMISSION DATE:   INPATIENT ADMISSION DATE: 4/20/23  2:34 PM   DISCHARGE DATE: 4/21/2023  6:29 PM   DISPOSITION:Home/Self Care    IMPORTANT INFORMATION:  Send all requests for admission clinical reviews, approved or denied determinations and any other requests to dedicated fax number below belonging to the campus where the patient is receiving treatment   List of dedicated fax numbers:  [address] DENIALS (Administrative/Medical Necessity) [phone]   [address] (Maternity/NICU/Pediatrics) [phone]   [facility] [phone]   [place] [address] [phone]   [address] [phone]   220 [facility] [place][note 2] [phone]   90 Located within [facility] [phone]   [address] [place] [address] 119 [phone]   [facility]  [phone]   4053 [facility] [phone]   412 [facility] 850 [facility] [phone] Unit RN to OR RN

## 2023-05-18 NOTE — PATIENT PROFILE ADULT - FUNCTIONAL ASSESSMENT - DAILY ACTIVITY 6.
SSKI Counseling:  I discussed with the patient the risks of SSKI including but not limited to thyroid abnormalities, metallic taste, GI upset, fever, headache, acne, arthralgias, paraesthesias, lymphadenopathy, easy bleeding, arrhythmias, and allergic reaction. 4 = No assist / stand by assistance

## 2023-06-02 ENCOUNTER — APPOINTMENT (OUTPATIENT)
Dept: UROLOGY | Facility: CLINIC | Age: 83
End: 2023-06-02

## 2023-06-27 LAB
PROSTATE BIOPSY: NORMAL
PSA SERPL-MCNC: 0.45 NG/ML

## 2023-07-10 ENCOUNTER — APPOINTMENT (OUTPATIENT)
Dept: UROLOGY | Facility: CLINIC | Age: 83
End: 2023-07-10

## 2024-06-03 DIAGNOSIS — I10 ESSENTIAL (PRIMARY) HYPERTENSION: ICD-10-CM

## 2024-06-03 DIAGNOSIS — E78.00 PURE HYPERCHOLESTEROLEMIA, UNSPECIFIED: ICD-10-CM

## 2024-06-03 DIAGNOSIS — E11.9 TYPE 2 DIABETES MELLITUS W/OUT COMPLICATIONS: ICD-10-CM

## 2024-06-03 RX ORDER — IRBESARTAN 150 MG/1
150 TABLET ORAL
Refills: 0 | Status: ACTIVE | COMMUNITY

## 2024-06-03 RX ORDER — GLIMEPIRIDE 2 MG/1
2 TABLET ORAL
Refills: 0 | Status: ACTIVE | COMMUNITY

## 2024-06-03 RX ORDER — SULFAMETHOXAZOLE AND TRIMETHOPRIM 800; 160 MG/1; MG/1
800-160 TABLET ORAL TWICE DAILY
Qty: 2 | Refills: 0 | Status: DISCONTINUED | COMMUNITY
Start: 2023-06-01 | End: 2024-06-03

## 2024-06-03 RX ORDER — ATORVASTATIN CALCIUM 40 MG/1
40 TABLET, FILM COATED ORAL
Refills: 0 | Status: ACTIVE | COMMUNITY

## 2024-06-03 RX ORDER — AMLODIPINE BESYLATE 5 MG/1
5 TABLET ORAL
Refills: 0 | Status: ACTIVE | COMMUNITY

## 2024-06-04 ENCOUNTER — APPOINTMENT (OUTPATIENT)
Dept: UROLOGY | Facility: CLINIC | Age: 84
End: 2024-06-04
Payer: MEDICARE

## 2024-06-04 VITALS
BODY MASS INDEX: 33.92 KG/M2 | TEMPERATURE: 97.9 F | HEART RATE: 67 BPM | OXYGEN SATURATION: 92 % | SYSTOLIC BLOOD PRESSURE: 152 MMHG | WEIGHT: 229 LBS | HEIGHT: 69 IN | DIASTOLIC BLOOD PRESSURE: 82 MMHG

## 2024-06-04 DIAGNOSIS — R33.8 OTHER RETENTION OF URINE: ICD-10-CM

## 2024-06-04 DIAGNOSIS — N13.8 BENIGN PROSTATIC HYPERPLASIA WITH LOWER URINARY TRACT SYMPMS: ICD-10-CM

## 2024-06-04 DIAGNOSIS — R21 RASH AND OTHER NONSPECIFIC SKIN ERUPTION: ICD-10-CM

## 2024-06-04 DIAGNOSIS — N40.1 BENIGN PROSTATIC HYPERPLASIA WITH LOWER URINARY TRACT SYMPMS: ICD-10-CM

## 2024-06-04 DIAGNOSIS — Z87.898 PERSONAL HISTORY OF OTHER SPECIFIED CONDITIONS: ICD-10-CM

## 2024-06-04 DIAGNOSIS — N47.6 BALANOPOSTHITIS: ICD-10-CM

## 2024-06-04 DIAGNOSIS — Z86.03 PERSONAL HISTORY OF NEOPLASM OF UNCERTAIN BEHAVIOR: ICD-10-CM

## 2024-06-04 PROCEDURE — 99214 OFFICE O/P EST MOD 30 MIN: CPT

## 2024-06-04 RX ORDER — NYSTATIN AND TRIAMCINOLONE ACETONIDE 100000; 1 MG/G; MG/G
100000-0.1 CREAM TOPICAL TWICE DAILY
Qty: 30 | Refills: 1 | Status: ACTIVE | COMMUNITY
Start: 2024-06-04 | End: 1900-01-01

## 2024-06-04 RX ORDER — NYSTATIN AND TRIAMCINOLONE ACETONIDE 100000; 1 MG/G; MG/G
100000-0.1 CREAM TOPICAL 3 TIMES DAILY
Qty: 1 | Refills: 3 | Status: DISCONTINUED | COMMUNITY
Start: 2022-04-19 | End: 2024-06-04

## 2024-06-04 RX ORDER — DIAZEPAM 5 MG/1
5 TABLET ORAL
Qty: 2 | Refills: 0 | Status: DISCONTINUED | COMMUNITY
Start: 2023-01-24 | End: 2024-06-04

## 2024-06-04 NOTE — HISTORY OF PRESENT ILLNESS
[FreeTextEntry1] : 06/04/2024: 83 year old male presents for a visit for prostate ca and bladder ca.   Bladder ca (diagnosed in 2020), TURB and treated with Mitomycin C in 08/2020: 08/2020 TURB: papillary tumor low grade Ta diagnosed in 2020 (Dr. Nicole)  cytology negative  Cystoscopy negative in 01/2022. Asymptomatic. UA , will schedule for Cystoscopy with possible biopsy and fulguration    Prostate ca (incidental finding diagnosed on 04/27/22) with  TURP 04/27/2022. h/o [place] (3+3=6) T1a prostate cancers s/p interval prostate biopsy 2/9/23 that revealed Adenocarcinoma on right posterior lateral apex- [place] (3+3=6) and Adenocarcinoma on TZ left- [place] (3+4=7) PSA: 09/20/2022: 0.41 ng/mL 01/01/2023: 0.45 ng/mL PSA stable. Repeat PSA done today. Watchful waiting.  LUTS: Pt reports he gets up to urinate 3x a night.  Balanitis: Advised pt to wash area and apply cream 2x a day Rx for Nystatin-Triamcinolone given today.  Urinalysis and culture done today. RTO in 1 week for Uroflow, PVR, and Cystoscopy with possible biopsy and fulguration.

## 2024-06-04 NOTE — PHYSICAL EXAM
[Normal Appearance] : normal appearance [Well Groomed] : well groomed [General Appearance - In No Acute Distress] : no acute distress [Edema] : no peripheral edema [Respiration, Rhythm And Depth] : normal respiratory rhythm and effort [Exaggerated Use Of Accessory Muscles For Inspiration] : no accessory muscle use [Abdomen Soft] : soft [Abdomen Tenderness] : non-tender [Costovertebral Angle Tenderness] : no ~M costovertebral angle tenderness [Urethral Meatus] : meatus normal [Penis Abnormality] : normal uncircumcised penis [Urinary Bladder Findings] : the bladder was normal on palpation [Scrotum] : the scrotum was normal [Epididymis] : the epididymides were normal [Normal Station and Gait] : the gait and station were normal for the patient's age [] : no rash [No Focal Deficits] : no focal deficits [Oriented To Time, Place, And Person] : oriented to person, place, and time [Affect] : the affect was normal [Mood] : the mood was normal [No Palpable Adenopathy] : no palpable adenopathy [Testes Tenderness] : no tenderness of the testes [Testes Mass (___cm)] : there were no testicular masses [de-identified] : Balanitis noted.

## 2024-06-04 NOTE — LETTER BODY
[Dear  ___] : Dear  [unfilled], [Consult Letter:] : I had the pleasure of evaluating your patient, [unfilled]. [Please see my note below.] : Please see my note below. [Consult Closing:] : Thank you very much for allowing me to participate in the care of this patient.  If you have any questions, please do not hesitate to contact me. [Sincerely,] : Sincerely, [FreeTextEntry3] : Link King MD.

## 2024-06-05 LAB
APPEARANCE: CLEAR
BILIRUBIN URINE: NEGATIVE
BLOOD URINE: NEGATIVE
COLOR: YELLOW
GLUCOSE QUALITATIVE U: NEGATIVE MG/DL
KETONES URINE: NEGATIVE MG/DL
LEUKOCYTE ESTERASE URINE: NEGATIVE
NITRITE URINE: NEGATIVE
PH URINE: 6
PROTEIN URINE: NORMAL MG/DL
PSA SERPL-MCNC: 0.67 NG/ML
SPECIFIC GRAVITY URINE: 1.01
UROBILINOGEN URINE: 0.2 MG/DL

## 2024-06-13 ENCOUNTER — APPOINTMENT (OUTPATIENT)
Dept: UROLOGY | Facility: CLINIC | Age: 84
End: 2024-06-13
Payer: MEDICARE

## 2024-06-13 VITALS — OXYGEN SATURATION: 95 % | SYSTOLIC BLOOD PRESSURE: 178 MMHG | DIASTOLIC BLOOD PRESSURE: 98 MMHG | HEART RATE: 70 BPM

## 2024-06-13 DIAGNOSIS — C61 MALIGNANT NEOPLASM OF PROSTATE: ICD-10-CM

## 2024-06-13 DIAGNOSIS — C67.9 MALIGNANT NEOPLASM OF BLADDER, UNSPECIFIED: ICD-10-CM

## 2024-06-13 PROCEDURE — 52000 CYSTOURETHROSCOPY: CPT

## 2024-06-13 PROCEDURE — 99213 OFFICE O/P EST LOW 20 MIN: CPT | Mod: 25

## 2024-06-13 NOTE — PHYSICAL EXAM
[Normal Appearance] : normal appearance [Well Groomed] : well groomed [General Appearance - In No Acute Distress] : no acute distress [Edema] : no peripheral edema [Respiration, Rhythm And Depth] : normal respiratory rhythm and effort [Exaggerated Use Of Accessory Muscles For Inspiration] : no accessory muscle use [Abdomen Soft] : soft [Abdomen Tenderness] : non-tender [Costovertebral Angle Tenderness] : no ~M costovertebral angle tenderness [Urethral Meatus] : meatus normal [Penis Abnormality] : normal uncircumcised penis [Urinary Bladder Findings] : the bladder was normal on palpation [Scrotum] : the scrotum was normal [Epididymis] : the epididymides were normal [Testes Tenderness] : no tenderness of the testes [Testes Mass (___cm)] : there were no testicular masses [Normal Station and Gait] : the gait and station were normal for the patient's age [] : no rash [No Focal Deficits] : no focal deficits [Oriented To Time, Place, And Person] : oriented to person, place, and time [Affect] : the affect was normal [Mood] : the mood was normal [No Palpable Adenopathy] : no palpable adenopathy [de-identified] : Balanitis noted.

## 2024-06-13 NOTE — HISTORY OF PRESENT ILLNESS
[FreeTextEntry1] : 06/04/2024: 83 year old male presents for a visit for prostate ca and bladder ca.   Bladder ca (diagnosed in 2020), TURB and treated with Mitomycin C in 08/2020: 08/2020 TURB: papillary tumor low grade Ta diagnosed in 2020 (Dr. Nicole)  cytology negative  Cystoscopy negative in 01/2022. Asymptomatic. UA , will schedule for Cystoscopy with possible biopsy and fulguration    Prostate ca (incidental finding diagnosed on 04/27/22) with  TURP 04/27/2022. h/o [place] (3+3=6) T1a prostate cancers s/p interval prostate biopsy 2/9/23 that revealed Adenocarcinoma on right posterior lateral apex- [place] (3+3=6) and Adenocarcinoma on TZ left- [place] (3+4=7) PSA: 09/20/2022: 0.41 ng/mL 01/01/2023: 0.45 ng/mL PSA stable. Repeat PSA done today. Watchful waiting.  LUTS: Pt reports he gets up to urinate 3x a night.  Balanitis: Advised pt to wash area and apply cream 2x a day Rx for Nystatin-Triamcinolone given today.  Urinalysis and culture done today. RTO in 1 week for Uroflow, PVR, and Cystoscopy with possible biopsy and fulguration.    06/13/2024 Pt is a 82y/o male who present for Prostate ca and Bladder ca  Prostate Ca (incidental finding diagnosed on 04/27/22) with  TURP 04/27/2022. h/o [place] (3+3=6) T1a prostate cancers s/p interval prostate biopsy 2/9/23 that revealed Adenocarcinoma on right posterior lateral apex- [place] (3+3=6) and Adenocarcinoma on TZ left- [place] (3+4=7).   Cystoscopy with possible biopsy and fulguration done today.  PSA  09/20/2022: 0.41 ng/mL 01/01/2023: 0.45 ng/mL (06/04/2024)Last PSA 0.67 ng/mL   Bladder Ca (diagnosed in 2020), TURB and treated with Mitomycin C in 08/2020: 08/2020 TURB: papillary tumor low grade Ta diagnosed in 2020 (Dr. Nicole) Cytology negative Cystoscopy negative in 01/2022. Asymptomatic. UA  Cystoscopy today:  s/p TURP. No bladder tumor, multiple small diverticula. No intervention at present.  RTO: 6 months for Follow up: PSA, UA, culture, uroflow and bladder scan and Cystoscopy with possible biopsy and fulguration

## 2024-06-13 NOTE — END OF VISIT
[FreeTextEntry4] :  This note was written by Tommy Rajan on 06/13/2024 actively solely Link Mack M.D. I, Tommy Rajan, am scribing for and in the presence of Link Mack M.D. in the following sections HISTORY OF PRESENT ILLNESS, PAST MEDICAL/FAMILY/SOCIAL HISTORY; REVIEW OF SYSTEMS; VITAL SIGNS; PHYSICAL EXAM; ASSESSMENT/PLAN.     All medical record entries made by this scribe at my, Link Mack M.D. direction and personally dictated by me on 06/13/2024. I personally performed the services described in the documentation, reviewed the documentation recorded by the scribe in my presence, and it accurately and completely records my words and actions. [Time Spent: ___ minutes] : I have spent [unfilled] minutes of time on the encounter.

## 2024-06-17 LAB
APPEARANCE: ABNORMAL
BACTERIA UR CULT: ABNORMAL
BACTERIA: NEGATIVE /HPF
BILIRUBIN URINE: NEGATIVE
BLOOD URINE: NEGATIVE
CAST: 0 /LPF
COLOR: YELLOW
EPITHELIAL CELLS: 0 /HPF
GLUCOSE QUALITATIVE U: NEGATIVE MG/DL
KETONES URINE: ABNORMAL MG/DL
LEUKOCYTE ESTERASE URINE: ABNORMAL
MICROSCOPIC-UA: NORMAL
NITRITE URINE: NEGATIVE
PH URINE: 5.5
PROTEIN URINE: NEGATIVE MG/DL
RED BLOOD CELLS URINE: 0 /HPF
SPECIFIC GRAVITY URINE: 1.02
URINE CYTOLOGY: NORMAL
UROBILINOGEN URINE: 0.2 MG/DL
WHITE BLOOD CELLS URINE: 0 /HPF

## 2025-01-19 NOTE — H&P PST ADULT - HISTORY OF PRESENT ILLNESS
79 year male with bladder tumor Pt said he had one episode of hematuria denies dysuria  presents to PST for TURBT
independent/needs device

## 2025-04-20 NOTE — PROGRESS NOTE ADULT - ASSESSMENT
81M w/ HTN, bladder tumor removal, melanoma. Presents w/ diarrhea and weakness. Admitted to ICU w/ rapif afib, septic shock likely from UTI vs pneumonia, RADHA w/ hyperkalemia and significant uremia and thrombocytopenia. S/p HD x1 overnight    #Neuro - pt is lethargic/confused at times, still uremic; continue to monitor  #CV - on norepi for likely septic shock and likely hypovolemic shock, decreasing doses, titrate to MAP >/65; pt with new onset rapid afib, will start amiodarone gtt for rate control; get baseline TTE  #Pulm - satting well on 2LNC  #ID- currently on ceftriaxone and azithromycin for CAP vs UTI; GNR+ bacteremia as of this morning, f/u specification; f/u blood and urine cx; send GI PCR and c diff if diarrhea recurs  #Renal/metabolic - RADHA likely combination of prerenal, ATN and obstructive uropathy; s/p HD x1 overnight; now making urine; pt still appears dry, will give additional IVF and start maintenance, d/c bicarb gtt; monitor UOP and electrolytes closely  #GI- keep NPO for now while uremic; noted to have elevated LFTs, CT A/P unremarkable, will get RUQ US  #Heme - noted thrombocytopenia of unclear etiology, will get DIC panel with next labs; monitor for now  #Endo - FS w/ ISS  #PPx - SCDs in setting of thrombocytopenia  #Dispo- prognosis guarded; remains in ICU on pressors; full code   63.5
